# Patient Record
Sex: FEMALE | Race: WHITE | NOT HISPANIC OR LATINO | Employment: UNEMPLOYED | URBAN - NONMETROPOLITAN AREA
[De-identification: names, ages, dates, MRNs, and addresses within clinical notes are randomized per-mention and may not be internally consistent; named-entity substitution may affect disease eponyms.]

---

## 2017-05-11 ENCOUNTER — ALLSCRIPTS OFFICE VISIT (OUTPATIENT)
Dept: FAMILY MEDICINE CLINIC | Facility: CLINIC | Age: 42
End: 2017-05-11
Payer: COMMERCIAL

## 2017-05-11 DIAGNOSIS — M79.671 PAIN OF RIGHT FOOT: ICD-10-CM

## 2017-05-11 DIAGNOSIS — E66.9 OBESITY: ICD-10-CM

## 2017-05-11 PROCEDURE — T1015 CLINIC SERVICE: HCPCS | Performed by: NURSE PRACTITIONER

## 2017-07-09 ENCOUNTER — OFFICE VISIT (OUTPATIENT)
Dept: URGENT CARE | Facility: CLINIC | Age: 42
End: 2017-07-09
Payer: COMMERCIAL

## 2017-07-09 PROCEDURE — 99284 EMERGENCY DEPT VISIT MOD MDM: CPT

## 2017-07-09 PROCEDURE — 90715 TDAP VACCINE 7 YRS/> IM: CPT

## 2017-07-09 PROCEDURE — G0383 LEV 4 HOSP TYPE B ED VISIT: HCPCS

## 2017-10-12 ENCOUNTER — OFFICE VISIT (OUTPATIENT)
Dept: URGENT CARE | Facility: CLINIC | Age: 42
End: 2017-10-12
Payer: COMMERCIAL

## 2017-10-12 PROCEDURE — 99283 EMERGENCY DEPT VISIT LOW MDM: CPT

## 2017-10-12 PROCEDURE — G0382 LEV 3 HOSP TYPE B ED VISIT: HCPCS

## 2017-10-12 PROCEDURE — 94640 AIRWAY INHALATION TREATMENT: CPT

## 2017-10-29 NOTE — PROGRESS NOTES
Assessment    1  Acute bronchitis (466 0) (J20 9)    Plan  Acute bronchitis    · Azithromycin 250 MG Oral Tablet; TAKE 2 TABLETS ON DAY 1 THEN TAKE 1TABLET A DAY FOR 4 DAYS   · Benzonatate 100 MG Oral Capsule; 1-2 caps every 8 hrs as needed for cough   · Ventolin  (90 Base) MCG/ACT Inhalation Aerosol Solution; INHALE 2PUFFS EVERY 4 HOURS AS NEEDED    Discussion/Summary  Medication Side Effects Reviewed: Possible side effects of new medications were reviewed with the patient/guardian today  Understands and agrees with treatment plan: The treatment plan was reviewed with the patient/guardian  The patient/guardian understands and agrees with the treatment plan   Counseling Documentation With Imm: The patient was counseled regarding instructions for management  Chief Complaint    1  Cough  Chief Complaint Free Text Note Form: C/o cough and congestion with diarrhea x 2 days  History of Present Illness  HPI: Started with cough and congestion 2 days ago  Cough is productive of yellow mucous  no fever or chills  Cough: Rhett Ashton presents with complaints of cough  Associated symptoms include stuffy nose, but-- no dyspnea,-- no wheezing,-- no chills,-- no fever,-- no runny nose,-- no sore throat,-- no myalgias,-- no pleuritic chest pain,-- no chest pain,-- no vomiting,-- no postnasal drainage,-- no mouth breathing,-- no noisy breathing,-- no rapid breathing,-- no hoarseness-- and-- no hemoptysis  Review of Systems  Focused-Female:  Constitutional: no fever-- and-- no chills  ENT: nasal discharge, but-- no earache,-- no sore throat-- and-- no hoarseness  Cardiovascular: no chest pain-- and-- no palpitations  Respiratory: no shortness of breath-- and-- no wheezing  Gastrointestinal: no abdominal pain,-- no nausea-- and-- no vomiting  Musculoskeletal: no arthralgias-- and-- no myalgias  Integumentary: no rashes  Neurological: no headache-- and-- no dizziness  ROS Reviewed:   ROS reviewed  Active Problems  1  Allergic rhinitis (477 9) (J30 9)   2  Depression with anxiety (300 4) (F41 8)   3  Disc degeneration, lumbosacral (722 52) (M51 37)   4  Mood disorder of manic type (296 40) (F30 9)   5  Obesity (278 00) (E66 9)   6  Poison ivy dermatitis (692 6) (L23 7)   7  Right foot pain (729 5) (M79 671)    Past Medical History  1  History of Acute foot pain, left (729 5) (M79 672)   2  History of urinary tract infection (V13 02) (Z87 440)   3  History of Pain in left foot (729 5) (S61 678)  Active Problems And Past Medical History Reviewed: The active problems and past medical history were reviewed and updated today  Family History  Mother    1  Family history of obesity (V18 19) (Z83 49)  Father    2  Family history of obesity (V18 19) (Z83 49)   3  Family history of type 2 diabetes mellitus (V18 0) (Z83 3)  Brother    4  Family history of borderline diabetes mellitus (V18 0) (Z84 89)   5  Family history of obesity (V18 19) (Z83 49)  Family History Reviewed: The family history was reviewed and updated today  Social History   · Denied: History of Alcohol   · Denied: History of Drug Use   · Never A Smoker  Social History Reviewed: The social history was reviewed and updated today  Surgical History    1  History of Arthroscopy Knee Left   2  History of Gallbladder Surgery  Surgical History Reviewed: The surgical history was reviewed and updated today  Current Meds   1  Gabapentin TABS; Therapy: (Recorded:[id number]) to Recorded   2  Loratadine 10 MG Oral Tablet; TAKE 1 TABLET  AS NEEDED; Therapy: [id number] to (Evaluate:[id number])  Requested for: [id number]; Last Rx:[id number] Ordered   3  Oxycodone-Acetaminophen 5-325 MG Oral Tablet; Therapy: [id number] to Recorded  Medication List Reviewed: The medication list was reviewed and updated today  Allergies    1   No Known Drug Allergies    Vitals  Signs   Recorded: [id number] 06:18PM   Temperature: 97 7 F  Heart Rate: 118  Respiration: 20  Systolic: 868  Diastolic: 75  Height: 5 ft 4 in  Weight: 220 lb   BMI Calculated: 37 76  BSA Calculated: 2 04  O2 Saturation: 100    Physical Exam   Constitutional  General appearance: No acute distress, well appearing and well nourished  Eyes  Conjunctiva and lids: No swelling, erythema or discharge  Ears, Nose, Mouth, and Throat  External inspection of ears and nose: Normal    Otoscopic examination: Tympanic membranes translucent with normal light reflex  Canals patent without erythema  Nasal mucosa, septum, and turbinates: Normal without edema or erythema  Oropharynx: Normal with no erythema, edema, exudate or lesions  Pulmonary  Respiratory effort: No increased work of breathing or signs of respiratory distress  Auscultation of lungs: Abnormal   Auscultation of the lungs revealed decreased breath sounds diffusely  Cardiovascular  Auscultation of heart: Normal rate and rhythm, normal S1 and S2, without murmurs  Lymphatic  Palpation of lymph nodes in neck: No lymphadenopathy  Musculoskeletal  Gait and station: Normal    Skin  Skin and subcutaneous tissue: Normal without rashes or lesions  Psychiatric  Mood and affect: Normal        Procedure   Treatment #1 included Albuterol 2 5 mg and Ipratropium 0 5 mg  After the first nebulizer treatment, examination at revealed a heart rate of 84 beats per minute,-- a respiratory rate of 16 breaths per minute-- and-- an oxygen saturation of 99%  After treatment #1, the patient noted symptomatic improvement  Lung sounds were clear  Air exchange was improved        Signatures   Electronically signed by : JUDE Clark; Oct 12 2017  7:04PM EST                       (Author)    Electronically signed by : JACQUELINE Rosales ; Oct 13 2017  1:31PM EST                       (Co-author)

## 2018-01-12 VITALS
SYSTOLIC BLOOD PRESSURE: 120 MMHG | BODY MASS INDEX: 37.9 KG/M2 | WEIGHT: 222 LBS | HEIGHT: 64 IN | RESPIRATION RATE: 18 BRPM | DIASTOLIC BLOOD PRESSURE: 80 MMHG | HEART RATE: 110 BPM | TEMPERATURE: 95.5 F | OXYGEN SATURATION: 100 %

## 2018-02-06 ENCOUNTER — OFFICE VISIT (OUTPATIENT)
Dept: FAMILY MEDICINE CLINIC | Facility: CLINIC | Age: 43
End: 2018-02-06
Payer: COMMERCIAL

## 2018-02-06 VITALS
RESPIRATION RATE: 18 BRPM | DIASTOLIC BLOOD PRESSURE: 86 MMHG | HEART RATE: 99 BPM | WEIGHT: 233 LBS | BODY MASS INDEX: 39.78 KG/M2 | TEMPERATURE: 96.9 F | SYSTOLIC BLOOD PRESSURE: 122 MMHG | OXYGEN SATURATION: 98 % | HEIGHT: 64 IN

## 2018-02-06 DIAGNOSIS — F41.9 ANXIETY: ICD-10-CM

## 2018-02-06 DIAGNOSIS — J40 BRONCHITIS: Primary | ICD-10-CM

## 2018-02-06 PROCEDURE — T1015 CLINIC SERVICE: HCPCS | Performed by: FAMILY MEDICINE

## 2018-02-06 RX ORDER — GABAPENTIN 800 MG/1
TABLET ORAL
COMMUNITY
End: 2018-12-14

## 2018-02-06 RX ORDER — HYDROXYZINE HYDROCHLORIDE 25 MG/1
25 TABLET, FILM COATED ORAL EVERY 6 HOURS PRN
Qty: 30 TABLET | Refills: 0 | Status: SHIPPED | OUTPATIENT
Start: 2018-02-06 | End: 2018-12-14

## 2018-02-06 RX ORDER — ALBUTEROL SULFATE 2.5 MG/3ML
2.5 SOLUTION RESPIRATORY (INHALATION) EVERY 6 HOURS PRN
Qty: 75 ML | Refills: 0 | Status: SHIPPED | OUTPATIENT
Start: 2018-02-06 | End: 2018-12-14

## 2018-02-06 RX ORDER — LORATADINE 10 MG/1
1 TABLET ORAL
COMMUNITY
Start: 2017-05-11 | End: 2018-12-14

## 2018-02-06 RX ORDER — ALPRAZOLAM 0.5 MG/1
TABLET ORAL
Refills: 1 | COMMUNITY
Start: 2018-01-13 | End: 2018-12-14

## 2018-02-06 RX ORDER — ALBUTEROL SULFATE 90 UG/1
2 AEROSOL, METERED RESPIRATORY (INHALATION) EVERY 4 HOURS PRN
COMMUNITY
Start: 2017-10-12 | End: 2018-12-14

## 2018-02-06 RX ORDER — PRAMIPEXOLE DIHYDROCHLORIDE 0.25 MG/1
TABLET ORAL
Refills: 2 | COMMUNITY
Start: 2018-01-12 | End: 2018-12-14

## 2018-02-06 NOTE — PROGRESS NOTES
OFFICE VISIT  Mariseal Staton 38 y o  female MRN: [id number]      Assessment / Plan:  Diagnoses and all orders for this visit:    Bronchitis  -     albuterol (2 5 mg/3 mL) 0 083 % nebulizer solution; Take 3 mL (2 5 mg total) by nebulization every 6 (six) hours as needed for wheezing    Anxiety  -     hydrOXYzine HCL (ATARAX) 25 mg tablet; Take 1 tablet (25 mg total) by mouth every 6 (six) hours as needed for itching          Reason For Visit / Chief Complaint  Chief Complaint   Patient presents with    Letter for School/Work     pt would like to go back to work        HPI:  Marisela Staton is a 38 y o  female presents today for release to work  She was admitted to [facility] after failed out patient treatement for bronchits  She had worsening sob  She was treated for the flu/bronchitis  She was released last weekened  She reports today feeling better, slight cough  She is slowly working her [employer] level up  She works as a personal care aide, [employer]  She has finished her course of medications  She also reports having anxiety, unable to get a return call from her pain dr Garcia Reef worse since hospitalization  Historical Information   No past medical history on file    Past Surgical History:   Procedure Laterality Date    ARTHROSCOPY KNEE Left     CHOLECYSTECTOMY       Social History   History   Alcohol Use No     History   Drug Use No     History   Smoking Status    Never Smoker   Smokeless Tobacco    Never Used     Family History   Problem Relation Age of Onset    Obesity Mother     Obesity Father     Diabetes type II Father     Diabetes Brother     Obesity Brother        Meds/Allergies   No Known Allergies    Meds:    Current Outpatient Prescriptions:     albuterol (2 5 mg/3 mL) 0 083 % nebulizer solution, Take 3 mL (2 5 mg total) by nebulization every 6 (six) hours as needed for wheezing, Disp: 75 mL, Rfl: 0    albuterol (VENTOLIN HFA) 90 mcg/act inhaler, Inhale 2 puffs every 4 (four) hours as needed, Disp: , Rfl:     ALPRAZolam (XANAX) 0 5 mg tablet, , Disp: , Rfl: 1    gabapentin (NEURONTIN) 800 mg tablet, Take by mouth, Disp: , Rfl:     hydrOXYzine HCL (ATARAX) 25 mg tablet, Take 1 tablet (25 mg total) by mouth every 6 (six) hours as needed for itching, Disp: 30 tablet, Rfl: 0    loratadine (CLARITIN) 10 mg tablet, Take 1 tablet by mouth, Disp: , Rfl:     pramipexole (MIRAPEX) 0 25 mg tablet, , Disp: , Rfl: 2      REVIEW OF SYSTEMS  A comprehensive review of systems was negative except for: Respiratory: positive for Symptoms; Respiratory w/o Neg: cough      Current Vitals:   Blood Pressure: 122/86 (02/06/18 1336)  Pulse: 99 (02/06/18 1336)  Temperature: (!) 96 9 °F (36 1 °C) (02/06/18 1336)  Respirations: 18 (02/06/18 1336)  Height: 5' 4" (162 6 cm) (02/06/18 1336)  Weight - Scale: 106 kg (233 lb) (02/06/18 1336)  SpO2: 98 % (02/06/18 1336)  [unfilled]    PHYSICAL EXAMS:  General appearance: alert and oriented, in no acute distress  Head: Normocephalic, without obvious abnormality, atraumatic  Eyes: conjunctivae/corneas clear  PERRL, EOM's intact  Fundi benign  Ears: normal TM's and external ear canals both ears  Nose: Nares normal  Septum midline  Mucosa normal  No drainage or sinus tenderness  Throat: lips, mucosa, and tongue normal; teeth and gums normal  Neck: no adenopathy, no carotid bruit, no JVD, supple, symmetrical, trachea midline and thyroid not enlarged, symmetric, no tenderness/mass/nodules  Lungs: wheezes  Heart: regular rate and rhythm, S1, S2 normal, no murmur, click, rub or [id number]:21        Follow up at this office in1  week    Counseling / Coordination of Care  Total floor / unit time spent today 20 minutes  Greater than 50% of total time was spent with the patient and / or family counseling and / or coordination of care

## 2018-02-06 NOTE — LETTER
February 6, 2018     Patient: Braeden Cedeno   YOB: 1975   Date of Visit: 2/6/2018       To Whom it May Concern:    Braeden Cedeno is under my professional care  She was seen in my office on 2/6/2018  She may return to work on 2/12/18  If you have any questions or concerns, please don't hesitate to call           Sincerely,          JENNIFER Mike        CC: No Recipients

## 2018-02-07 NOTE — PROGRESS NOTES
I have reviewed the notes, assessments, and/or procedures performed by [facility] and agree with the above    Nick MUSTAFA

## 2018-07-31 ENCOUNTER — OFFICE VISIT (OUTPATIENT)
Dept: URGENT CARE | Facility: CLINIC | Age: 43
End: 2018-07-31
Payer: COMMERCIAL

## 2018-07-31 VITALS
HEART RATE: 108 BPM | SYSTOLIC BLOOD PRESSURE: 137 MMHG | HEIGHT: 64 IN | TEMPERATURE: 98.2 F | OXYGEN SATURATION: 97 % | BODY MASS INDEX: 39.78 KG/M2 | WEIGHT: 233 LBS | RESPIRATION RATE: 16 BRPM | DIASTOLIC BLOOD PRESSURE: 80 MMHG

## 2018-07-31 DIAGNOSIS — G43.909 MIGRAINE WITHOUT STATUS MIGRAINOSUS, NOT INTRACTABLE, UNSPECIFIED MIGRAINE TYPE: Primary | ICD-10-CM

## 2018-07-31 DIAGNOSIS — A08.4 VIRAL GASTROENTERITIS: ICD-10-CM

## 2018-07-31 PROBLEM — J30.9 ALLERGIC RHINITIS: Status: ACTIVE | Noted: 2017-05-11

## 2018-07-31 PROBLEM — F30.9 MOOD DISORDER OF MANIC TYPE (HCC): Status: ACTIVE | Noted: 2017-05-11

## 2018-07-31 PROCEDURE — 99283 EMERGENCY DEPT VISIT LOW MDM: CPT | Performed by: FAMILY MEDICINE

## 2018-07-31 PROCEDURE — G0382 LEV 3 HOSP TYPE B ED VISIT: HCPCS | Performed by: FAMILY MEDICINE

## 2018-07-31 RX ORDER — ONDANSETRON 4 MG/1
4 TABLET, FILM COATED ORAL EVERY 8 HOURS PRN
Qty: 12 TABLET | Refills: 0 | Status: SHIPPED | OUTPATIENT
Start: 2018-07-31 | End: 2018-12-14

## 2018-07-31 RX ORDER — ONDANSETRON 4 MG/1
4 TABLET, ORALLY DISINTEGRATING ORAL ONCE
Status: COMPLETED | OUTPATIENT
Start: 2018-07-31 | End: 2018-07-31

## 2018-07-31 RX ORDER — DIPHENOXYLATE HYDROCHLORIDE AND ATROPINE SULFATE 2.5; .025 MG/1; MG/1
1 TABLET ORAL 4 TIMES DAILY PRN
Qty: 12 TABLET | Refills: 0 | Status: SHIPPED | OUTPATIENT
Start: 2018-07-31 | End: 2018-08-04

## 2018-07-31 RX ORDER — KETOROLAC TROMETHAMINE 30 MG/ML
30 INJECTION, SOLUTION INTRAMUSCULAR; INTRAVENOUS ONCE
Status: COMPLETED | OUTPATIENT
Start: 2018-07-31 | End: 2018-07-31

## 2018-07-31 RX ADMIN — ONDANSETRON 4 MG: 4 TABLET, ORALLY DISINTEGRATING ORAL at 19:21

## 2018-07-31 RX ADMIN — KETOROLAC TROMETHAMINE 30 MG: 30 INJECTION, SOLUTION INTRAMUSCULAR; INTRAVENOUS at 19:21

## 2018-07-31 NOTE — PATIENT INSTRUCTIONS
Please use the medications for nausea called Zofran as labeled please use the medication Lomotil for diarrhea as labile drink small amounts donor filled stomach     if fever returns please see primary care or present to the emergency room

## 2018-07-31 NOTE — PROGRESS NOTES
[facility] - Patient Visit Note  Clive Leventhal 38 y o  female MRN: [id number]      Assessment / Plan:   Diagnosis ICD-10-CM Associated Orders   1  Migraine without status migrainosus, not intractable, unspecified migraine type G43 909    2  Viral gastroenteritis A08 4 ondansetron (ZOFRAN) 4 mg tablet     diphenoxylate-atropine (LOMOTIL) 2 5-0 025 mg per tablet       Reason For Visit / Chief Complaint  Chief Complaint   Patient presents with    Vomiting     x 2 days    Headache    Diarrhea             Liliana Settle Discussion:  Patient is given 30 milligrams of Toradol and Zofran for her migraine she will be given a script for Lomotil for her diarrhea and Zofran for nausea home  She understands instructions via talk back instructions     HPI:  Clive Leventhal is a 38 y o  female Patient           This Patient Presents with a migraine that is lasting approximately 48 hours  She had tried initially Fioricet x2 and then went to her mom's house and used her mother's Imitrex without relief  She has been nauseated about a 5/10 she also complains of vomiting yesterday approximately 4 times and diarrhea approximately 5 times  No one else in the house is ill she had a temperature on Sunday of proximally 102 as measured by herself today she is afebrile 1st day last normal menstrual period 1 month ago she denies pregnancy she is allergic to no medications at the present time       Problem List     Allergic rhinitis    Depression with anxiety    Disc degeneration, lumbosacral    Mood disorder of manic type ([facility] [address] )    Obesity           ALLERGIES:  Allergies as of 07/31/2018    (No Known Allergies)       The following portions of the patient's history were reviewed and updated as appropriate:   Allergies, current medications, past family history, past medical history, past social history, past surgical history, and the problem list     Historical Information   History reviewed  No pertinent past medical history    Past Surgical History:   Procedure Laterality Date    ARTHROSCOPY KNEE Left     CHOLECYSTECTOMY       Social History   History   Alcohol Use No     History   Drug Use No     History   Smoking Status    Never Smoker   Smokeless Tobacco    Never Used     Family History   Problem Relation Age of Onset    Obesity Mother     Obesity Father     Diabetes type II Father     Diabetes Brother     Obesity Brother              MEDS:    Current Outpatient Prescriptions:     ALPRAZolam (XANAX) 0 5 mg tablet, , Disp: , Rfl: 1    albuterol (2 5 mg/3 mL) 0 083 % nebulizer solution, Take 3 mL (2 5 mg total) by nebulization every 6 (six) hours as needed for wheezing, Disp: 75 mL, Rfl: 0    albuterol (VENTOLIN HFA) 90 mcg/act inhaler, Inhale 2 puffs every 4 (four) hours as needed, Disp: , Rfl:     diphenoxylate-atropine (LOMOTIL) 2 5-0 025 mg per tablet, Take 1 tablet by mouth 4 (four) times a day as needed for diarrhea for up to 4 days, Disp: 12 tablet, Rfl: 0    gabapentin (NEURONTIN) 800 mg tablet, Take by mouth, Disp: , Rfl:     hydrOXYzine HCL (ATARAX) 25 mg tablet, Take 1 tablet (25 mg total) by mouth every 6 (six) hours as needed for itching, Disp: 30 tablet, Rfl: 0    loratadine (CLARITIN) 10 mg tablet, Take 1 tablet by mouth, Disp: , Rfl:     ondansetron (ZOFRAN) 4 mg tablet, Take 1 tablet (4 mg total) by mouth every 8 (eight) hours as needed for nausea or vomiting for up to 4 days, Disp: 12 tablet, Rfl: 0    pramipexole (MIRAPEX) 0 25 mg tablet, , Disp: , Rfl: 2    FACILITY ADMINISTERED MEDS:        REVIEW OF SYSTEMS    GENERAL: NEGATIVE for:  Generalized Fatigue                             Chills                              Fever                             Myalgias     OPTHALMIC: NEGATIVE for:  Diplopia                            Scotomata                            Visual Changes                            Blurred Vision     ENT:  EARS NEGATIVE for:  Hearing Difficulty                            Tinnitus                            Vertigo                            Dizziness                            Ear Pain                            Ear Drainage               NOSE NEGATIVE for:  Nasal Congestion                            Nasal Discharge                            Sinus Pain / Pressure               THROAT NEGATIVE for:  Sore Throat / Throat Pain                            Difficulty Swallowing     RESPIRATORY: NEGATIVE for:  Cough                            Wheezing                            Sputum Production                            Sob / Tachypnea                            Hemoptysis     CARDIOVASCULAR: NEGATIVE for:  Chest Pain                             SOB (cardiac Related)                             Dyspnea on Exertion                             Orthopnea                             PND                             Leg Edema                             Palpitations                               Irregularities/rythym                       CURRENT VITALS:   Blood Pressure: 137/80 (07/31/18 1900)  Pulse: (!) 108 (07/31/18 1900)  Temperature: 98 2 °F (36 8 °C) (07/31/18 1900)  Respirations: 16 (07/31/18 1900)  Height: 5' 4" (162 6 cm) (07/31/18 1900)  Weight - Scale: 106 kg (233 lb) (07/31/18 1900)  SpO2: 97 % (07/31/18 1900)  /80   Pulse (!) 108   Temp 98 2 °F (36 8 °C)   Resp 16   Ht 5' 4" (1 626 m)   Wt 106 kg (233 lb)   SpO2 97%   BMI 39 99 kg/m²       PHYSICAL EXAM:         General Appearance:    Alert, cooperative, no apparent distress, appears stated age     Oriented x3    Head:    Normocephalic, without obvious abnormality, atraumatic   Eyes:      EOM's intact,      BEBE,        conjunctiva/corneas clear,          fundi not visualized well,, patient is very photosensitive   Ears:     Normal external ear canals     Tm right side  Normal     Tm left side    Normal       Nose:   Nares normal externally, septum midline,     mucosa normal,     No anterior drainage         Sinuses   with out   tenderness to palpation / percussion     Throat:   Lips, mucosa, and tongue normal       Anterior pharynx   Normal      Posterior pharynx   Normal        No exudate obvious       Neck:   Supple, symmetrical, trachea midline and moveable    Normal thyroid click present    No carotid bruits appreciated        Lymphatics:     Adenopathy in anterior cervical chain  Normal      Adenopathy in posterior cervical chain   Normal     Lungs:     Clear to auscultation bilaterally    No rales    No ronchi    No wheeze     Heart[de-identified]    Regular rate and rhythm, S1 and S2 normal,     No S3, S4, audible    No murmurs, rubs      Extremities:     Extremities grossly normal     atraumatic,     no cyanosis or edema        Skin:     Skin color, texture, turgor normal, no rashes or lesions           Abdomen:     Soft, non-tender, bowel sounds active all four quadrants,     no masses, no organomegaly     Neurologic   CNII-XII intact,     Motor strength in upper and lower ext  Normal and symmetrical ,     Gross sensory intact          Follow up at primary care in 2 or 3 days, or sooner if needed OR pesent to local Emergency Room if symptoms are worsening  Portions of the record may have been created with voice recognition software   Occasional wrong word or "sound a like" substitutions may have occurred due to the inherent limitations of voice recognition software   Read the chart carefully and recognize, using context, where substitutions have occurred

## 2018-10-25 ENCOUNTER — OFFICE VISIT (OUTPATIENT)
Dept: URGENT CARE | Facility: CLINIC | Age: 43
End: 2018-10-25
Payer: COMMERCIAL

## 2018-10-25 VITALS
TEMPERATURE: 97.8 F | RESPIRATION RATE: 18 BRPM | SYSTOLIC BLOOD PRESSURE: 130 MMHG | HEART RATE: 98 BPM | OXYGEN SATURATION: 100 % | DIASTOLIC BLOOD PRESSURE: 65 MMHG

## 2018-10-25 DIAGNOSIS — M54.2 CERVICALGIA: Primary | ICD-10-CM

## 2018-10-25 PROCEDURE — 99283 EMERGENCY DEPT VISIT LOW MDM: CPT | Performed by: NURSE PRACTITIONER

## 2018-10-25 PROCEDURE — G0382 LEV 3 HOSP TYPE B ED VISIT: HCPCS | Performed by: NURSE PRACTITIONER

## 2018-10-25 RX ORDER — METHOCARBAMOL 750 MG/1
750 TABLET, FILM COATED ORAL EVERY 8 HOURS SCHEDULED
Qty: 20 TABLET | Refills: 0 | Status: SHIPPED | OUTPATIENT
Start: 2018-10-25 | End: 2018-12-14 | Stop reason: SDUPTHER

## 2018-10-25 RX ORDER — OXYCODONE HYDROCHLORIDE AND ACETAMINOPHEN 5; 325 MG/1; MG/1
TABLET ORAL
Refills: 0 | COMMUNITY
Start: 2018-10-11 | End: 2018-12-14

## 2018-10-25 RX ORDER — KETOROLAC TROMETHAMINE 30 MG/ML
30 INJECTION, SOLUTION INTRAMUSCULAR; INTRAVENOUS ONCE
Status: COMPLETED | OUTPATIENT
Start: 2018-10-25 | End: 2018-10-25

## 2018-10-25 RX ORDER — NAPROXEN 500 MG/1
500 TABLET ORAL 2 TIMES DAILY WITH MEALS
Qty: 20 TABLET | Refills: 0 | Status: SHIPPED | OUTPATIENT
Start: 2018-10-25 | End: 2018-12-14 | Stop reason: SDUPTHER

## 2018-10-25 RX ADMIN — KETOROLAC TROMETHAMINE 30 MG: 30 INJECTION, SOLUTION INTRAMUSCULAR; INTRAVENOUS at 14:35

## 2018-10-25 NOTE — PROGRESS NOTES
[facility]        NAME: Levi Esquivel is a 41 y o  female  : 1975    MRN: [id number]  DATE: 2018  TIME: 2:36 PM    Assessment and Plan   Cervicalgia [M54 2]  1  Cervicalgia  ketorolac (TORADOL) injection 30 mg    naproxen (NAPROSYN) 500 mg tablet    methocarbamol (ROBAXIN) 750 mg tablet         Patient Instructions       Follow up with PCP in 3-5 days  Proceed to  ER if symptoms worsen  Chief Complaint     Chief Complaint   Patient presents with    Neck Pain     Pt c/o neck pain for a few weeks  History of Present Illness       80-year-old female with a chief complaint of right-sided neck pain  She has history of chronic left-sided neck pain for which she sees a pain management doctor in [place] [id number]  She takes Percocet as needed for the chronic left-sided neck pain  She states that she has been experiencing pain on the right neck area radiating to the right shoulder for the past couple weeks  She performed CPR at work yesterday and did chest compressions for about 30 min which aggravated the already present pain in the right side of her neck  Current pain level 6/10 on a 0-10 scale  She has taken Percocet couple times without relief  She has used naproxen without significant relief  Pain is exacerbated by any type of movement and is somewhat relieved by rest   She denies arm pain, numbness, tingling, weakness  Neck Pain          Review of Systems   Review of Systems   Constitutional: Negative  HENT: Negative  Eyes: Negative  Respiratory: Negative  Cardiovascular: Negative  Gastrointestinal: Negative  Endocrine: Negative  Genitourinary: Negative  Musculoskeletal: Positive for neck pain  Skin: Negative  Neurological: Negative  Psychiatric/Behavioral: Negative            Current Medications       Current Outpatient Prescriptions:     albuterol (2 5 mg/3 mL) 0 083 % nebulizer solution, Take 3 mL (2 5 mg total) by nebulization every 6 (six) hours as needed for wheezing, Disp: 75 mL, Rfl: 0    albuterol (VENTOLIN HFA) 90 mcg/act inhaler, Inhale 2 puffs every 4 (four) hours as needed, Disp: , Rfl:     ALPRAZolam (XANAX) 0 5 mg tablet, , Disp: , Rfl: 1    gabapentin (NEURONTIN) 800 mg tablet, Take by mouth, Disp: , Rfl:     hydrOXYzine HCL (ATARAX) 25 mg tablet, Take 1 tablet (25 mg total) by mouth every 6 (six) hours as needed for itching, Disp: 30 tablet, Rfl: 0    loratadine (CLARITIN) 10 mg tablet, Take 1 tablet by mouth, Disp: , Rfl:     methocarbamol (ROBAXIN) 750 mg tablet, Take 1 tablet (750 mg total) by mouth every 8 (eight) hours, Disp: 20 tablet, Rfl: 0    naproxen (NAPROSYN) 500 mg tablet, Take 1 tablet (500 mg total) by mouth 2 (two) times a day with meals for 10 days, Disp: 20 tablet, Rfl: 0    ondansetron (ZOFRAN) 4 mg tablet, Take 1 tablet (4 mg total) by mouth every 8 (eight) hours as needed for nausea or vomiting for up to 4 days, Disp: 12 tablet, Rfl: 0    oxyCODONE-acetaminophen (PERCOCET) 5-325 mg per tablet, , Disp: , Rfl: 0    pramipexole (MIRAPEX) 0 25 mg tablet, , Disp: , Rfl: 2  No current facility-administered medications for this visit  Current Allergies     Allergies as of 10/25/2018    (No Known Allergies)            The following portions of the patient's history were reviewed and updated as appropriate: allergies, current medications, past family history, past medical history, past social history, past surgical history and problem list      No past medical history on file  Past Surgical History:   Procedure Laterality Date    ARTHROSCOPY KNEE Left     CHOLECYSTECTOMY         Family History   Problem Relation Age of Onset    Obesity Mother     Obesity Father     Diabetes type II Father     Diabetes Brother     Obesity Brother          Medications have been verified          Objective   /65   Pulse 98   Temp 97 8 °F (36 6 °C)   Resp 18   SpO2 100%        Physical Exam     Physical Exam Constitutional: She is oriented to person, place, and time  She appears well-developed and well-nourished  No distress  HENT:   Head: Normocephalic and atraumatic  Right Ear: External ear normal    Left Ear: External ear normal    Nose: Nose normal    Mouth/Throat: Oropharynx is clear and moist    Eyes: Pupils are equal, round, and reactive to light  Conjunctivae and EOM are normal    Neck: Normal range of motion  Neck supple  Cardiovascular: Normal rate, regular rhythm and normal heart sounds  Pulmonary/Chest: Effort normal and breath sounds normal  No respiratory distress  She has no wheezes  She has no rales  Abdominal: Soft  Bowel sounds are normal    Musculoskeletal:        Right shoulder: She exhibits decreased range of motion (Bilateral lateral rotation ), tenderness (Bilaterally C2-C6 ) and spasm (Bilateral cervical paraspinal muscle spasms  )  Lymphadenopathy:     She has no cervical adenopathy  Neurological: She is alert and oriented to person, place, and time  She has normal strength and normal reflexes  No sensory deficit  She displays a negative Romberg sign  Coordination and gait normal    Reflex Scores:       Tricep reflexes are 2+ on the right side and 2+ on the left side  Bicep reflexes are 2+ on the right side and 2+ on the left side  Brachioradialis reflexes are 2+ on the right side and 2+ on the left side  Skin: Skin is warm and dry  She is not diaphoretic  Psychiatric: She has a normal mood and affect  Her behavior is normal  Judgment and thought content normal    Nursing note and vitals reviewed

## 2018-10-25 NOTE — PATIENT INSTRUCTIONS
Acute Neck Pain   WHAT YOU NEED TO KNOW:   Acute neck pain starts suddenly, increases quickly, and goes away in a few days  The pain may come and go, or be worse with certain movements  The pain may be only in your neck, or it may move to your arms, back, or shoulders  You may also have pain that starts in another body area and moves to your neck  DISCHARGE INSTRUCTIONS:   Return to the emergency department if:   · You have an injury that causes neck pain and shooting pain down your arms or legs  · Your neck pain suddenly becomes severe  · You have neck pain along with numbness, tingling, or weakness in your arms or legs  · You have a stiff neck, a headache, and a fever  Contact your healthcare provider if:   · You have new or worsening symptoms  · Your symptoms continue even after treatment  · You have questions or concerns about your condition or care  Medicines:   · NSAIDs , such as ibuprofen, help decrease swelling, pain, and fever  This medicine is available without a doctor's order  Ask your healthcare provider which medicine to take and how often to take it  Follow directions  NSAIDs can cause stomach bleeding or kidney problems if not taken correctly  If you take blood thinner medicine, always ask if NSAIDs are safe for you  · Acetaminophen  helps decrease pain and fever  Ask your healthcare provider how much to take and how often to take it  Follow directions  Acetaminophen can cause liver damage if not taken correctly  · Steroid medicine  may be used to reduce inflammation  This can help relieve pain caused by swelling  · Take your medicine as directed  Contact your healthcare provider if you think your medicine is not helping or if you have side effects  Tell him or her if you are allergic to any medicine  Keep a list of the medicines, vitamins, and herbs you take  Include the amounts, and when and why you take them  Bring the list or the pill bottles to follow-up visits  Carry your medicine list with you in case of an emergency  Manage or prevent acute neck pain:   · Rest your neck as directed  Do not make sudden movements, such as turning your head quickly  Your healthcare provider may recommend you wear a cervical collar for a short time  The collar will prevent you from moving your head  This will give your neck time to heal if an injury is causing your neck pain  Ask your healthcare provider when you can return to sports or other normal daily activities  · Apply heat as directed  Heat helps relieve pain and swelling  Use a heat wrap, or soak a small towel in warm water  Wring out the extra water  Apply the heat wrap or towel for 20 minutes every hour, or as directed  · Apply ice as directed  Ice helps relieve pain and swelling, and can help prevent tissue damage  Use an ice pack, or put ice in a bag  Cover the ice pack or back with a towel before you apply it to your neck  Apply the ice pack or ice for 15 minutes every hour, or as directed  Your healthcare provider can tell you how often to apply ice  · Do neck exercises as directed  Neck exercises help strengthen the muscles and increase range of motion  Your healthcare provider will tell you which exercises are right for you  He may give you instructions, or he may recommend that you work with a physical therapist  Your healthcare provider or therapist can make sure you are doing the exercises correctly  · Maintain good posture  Try to keep your head and shoulders lifted when you sit  If you work in front of a computer, make sure the monitor is at the right level  You should not need to look up down to see the screen  You should also not have to lean forward to be able to read what is on the screen  Make sure your keyboard, mouse, and other computer items are placed where you do not have to extend your shoulder to reach them  Get up often if you work in front of a computer or sit for long periods of time  Stretch or walk around to keep your neck muscles loose  Follow up with your healthcare provider as directed: Your healthcare provider may refer you to a specialist if your pain does not get better with treatment  Write down your questions so you remember to ask them during your visits  © 2017 [employer] Information is for End User's use only and may not be sold, redistributed or otherwise used for commercial purposes  All illustrations and images included in CareNotes® are the copyrighted property of [employer]  or [address]  The above information is an  only  It is not intended as medical advice for individual conditions or treatments  Talk to your doctor, nurse or pharmacist before following any medical regimen to see if it is safe and effective for you

## 2018-12-09 ENCOUNTER — APPOINTMENT (EMERGENCY)
Dept: RADIOLOGY | Facility: HOSPITAL | Age: 43
End: 2018-12-09
Payer: COMMERCIAL

## 2018-12-09 ENCOUNTER — HOSPITAL ENCOUNTER (EMERGENCY)
Facility: HOSPITAL | Age: 43
Discharge: HOME | End: 2018-12-09
Payer: COMMERCIAL

## 2018-12-09 VITALS
TEMPERATURE: 97.1 F | HEART RATE: 92 BPM | HEIGHT: 64 IN | DIASTOLIC BLOOD PRESSURE: 73 MMHG | WEIGHT: 245 LBS | BODY MASS INDEX: 41.83 KG/M2 | SYSTOLIC BLOOD PRESSURE: 146 MMHG | RESPIRATION RATE: 16 BRPM | OXYGEN SATURATION: 97 %

## 2018-12-09 DIAGNOSIS — M25.511 RIGHT SHOULDER PAIN: Primary | ICD-10-CM

## 2018-12-09 PROCEDURE — 73030 X-RAY EXAM OF SHOULDER: CPT

## 2018-12-09 PROCEDURE — 96372 THER/PROPH/DIAG INJ SC/IM: CPT

## 2018-12-09 PROCEDURE — 99283 EMERGENCY DEPT VISIT LOW MDM: CPT

## 2018-12-09 RX ORDER — KETOROLAC TROMETHAMINE 30 MG/ML
60 INJECTION, SOLUTION INTRAMUSCULAR; INTRAVENOUS ONCE
Status: COMPLETED | OUTPATIENT
Start: 2018-12-09 | End: 2018-12-09

## 2018-12-09 RX ORDER — LORAZEPAM 2 MG/ML
2 INJECTION INTRAMUSCULAR ONCE
Status: COMPLETED | OUTPATIENT
Start: 2018-12-09 | End: 2018-12-09

## 2018-12-09 RX ADMIN — KETOROLAC TROMETHAMINE 60 MG: 30 INJECTION, SOLUTION INTRAMUSCULAR at 17:46

## 2018-12-09 RX ADMIN — LORAZEPAM 2 MG: 2 INJECTION, SOLUTION INTRAMUSCULAR; INTRAVENOUS at 17:44

## 2018-12-09 NOTE — DISCHARGE INSTRUCTIONS
Shoulder Pain   WHAT YOU NEED TO KNOW:   Shoulder pain is a common problem and can affect your daily activities  Pain can be caused by a problem within your shoulder  Shoulder pain may also be caused by pain that spreads to your shoulder from another part of your body  DISCHARGE INSTRUCTIONS:   Return to the emergency department if:   · You have severe pain  · You cannot move your arm or shoulder  · You have numbness or tingling in your shoulder or arm  Contact your healthcare provider if:   · Your pain gets worse or does not go away with treatment  · You have trouble moving your arm or shoulder  · You have questions or concerns about your condition or care  Medicines: You may need any of the following:  · Acetaminophen  decreases pain and fever  It is available without a doctor's order  Ask how much to take and how often to take it  Follow directions  Acetaminophen can cause liver damage if not taken correctly  · NSAIDs , such as ibuprofen, help decrease swelling, pain, and fever  This medicine is available with or without a doctor's order  NSAIDs can cause stomach bleeding or kidney problems in certain people  If you take blood thinner medicine, always ask your healthcare provider if NSAIDs are safe for you  Always read the medicine label and follow directions  · Take your medicine as directed  Contact your healthcare provider if you think your medicine is not helping or if you have side effects  Tell him of her if you are allergic to any medicine  Keep a list of the medicines, vitamins, and herbs you take  Include the amounts, and when and why you take them  Bring the list or the pill bottles to follow-up visits  Carry your medicine list with you in case of an emergency  Follow up with your healthcare provider or orthopedist as directed:  Write down your questions so you remember to ask them during your visits     Manage your symptoms:   · Apply ice  on your shoulder for 20 to 30 minutes every 2 hours or as directed  Use an ice pack, or put crushed ice in a plastic bag  Cover it with a towel  Ice helps prevent tissue damage and decreases swelling and pain  · Apply heat if ice does not help your symptoms  Apply heat on your shoulder for 20 to 30 minutes every 2 hours for as many days as directed  Heat helps decrease pain and muscle spasms  · Go to physical or occupational therapy as directed  A physical therapist teaches you exercises to help improve movement and strength, and to decrease pain  An occupational therapist teaches you skills to help with your daily activities  Prevent shoulder pain:   · Stretch and strengthen your shoulder  Use proper technique during exercises and sports  · Limit activities as directed  Try to avoid repeated overhead movements  © 2017 [address] [facility] Information is for End User's use only and may not be sold, redistributed or otherwise used for commercial purposes  All illustrations and images included in CareNotes® are the copyrighted property of [employer]  or [employer]  The above information is an  only  It is not intended as medical advice for individual conditions or treatments  Talk to your doctor, nurse or pharmacist before following any medical regimen to see if it is safe and effective for you  Shoulder Pain   WHAT YOU NEED TO KNOW:   What do I need to know about shoulder pain? Shoulder pain is a common problem and can affect your daily activities  Pain can be caused by a problem within your shoulder  Shoulder pain may also be caused by pain that spreads to your shoulder from another part of your body  What increases my risk for shoulder pain?    · Repeated overhead activities, such as baseball, weight lifting, or swimming    · Medical conditions, such as rotator cuff disease, diabetes, or thyroid disorders    · A quick increase in the amount or intensity of exercises, or improper technique during exercise    · Muscle imbalance or weakness    · Trauma or a fall    · Age older than 61  How is shoulder pain diagnosed? Your healthcare provider will ask about your pain, including how and when it started  Tell him if you have any weakness or if there was an injury  He will examine your shoulder and do movement tests to see how well you can move your shoulder  You may also need any of the following tests:  · An x-ray, ultrasound, CT, or MRI  may be needed to show the cause of your shoulder pain  You may be given contrast liquid to help the shoulder area show up better in the pictures  Tell the healthcare provider if you have ever had an allergic reaction to contrast dye  Do not enter the MRI room with anything metal  Metal can cause serious injury  Tell the healthcare provider if you have any metal in or on your body  · An electromyography test  measures the electrical activity of your muscles at rest and with movement  How is shoulder pain treated? · Acetaminophen  decreases pain and fever  It is available without a doctor's order  Ask how much to take and how often to take it  Follow directions  Acetaminophen can cause liver damage if not taken correctly  · NSAIDs , such as ibuprofen, help decrease swelling, pain, and fever  This medicine is available with or without a doctor's order  NSAIDs can cause stomach bleeding or kidney problems in certain people  If you take blood thinner medicine, always ask your healthcare provider if NSAIDs are safe for you  Always read the medicine label and follow directions  · A steroid injection  may help decrease pain and swelling  · Surgery  may be needed for long-term pain and loss of function  How can I manage my symptoms? · Apply ice  on your shoulder for 20 to 30 minutes every 2 hours or as directed  Use an ice pack, or put crushed ice in a plastic bag  Cover it with a towel   Ice helps prevent tissue damage and decreases swelling and pain     · Apply heat if ice does not help your symptoms  Apply heat on your shoulder for 20 to 30 minutes every 2 hours for as many days as directed  Heat helps decrease pain and muscle spasms  · Go to physical or occupational therapy as directed  A physical therapist teaches you exercises to help improve movement and strength, and to decrease pain  An occupational therapist teaches you skills to help with your daily activities  How can I help prevent shoulder pain? · Stretch and strengthen your shoulder  Use proper technique during exercises and sports  · Limit activities as directed  Try to avoid repeated overhead movements  When should I seek immediate care or call 911? · You have severe pain  · You cannot move your arm or shoulder  · You have numbness or tingling in your shoulder or arm  When should I contact my healthcare provider? · Your pain gets worse or does not go away with treatment  · You have trouble moving your arm or shoulder  · You have questions or concerns about your condition or care  CARE AGREEMENT:   You have the right to help plan your care  Learn about your health condition and how it may be treated  Discuss treatment options with your caregivers to decide what care you want to receive  You always have the right to refuse treatment  The above information is an  only  It is not intended as medical advice for individual conditions or treatments  Talk to your doctor, nurse or pharmacist before following any medical regimen to see if it is safe and effective for you  © 2017 [address] [employer] Information is for End User's use only and may not be sold, redistributed or otherwise used for commercial purposes  All illustrations and images included in CareNotes® are the copyrighted property of [employer]  or [employer] Gu[employer]

## 2018-12-09 NOTE — ED PROVIDER NOTES
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     AJIT is a 72-year-old female who came to the emergency department due to pain on the right shoulder which started 1 month prior to arrival   Was taking Robaxin and no pain with mild discomfort noted but became more afternoon after she lifted a patient were emergency department for further evaluation and treatment  History provided by:  Patient   used: No    Shoulder Injury   Location:  Shoulder  Shoulder location:  R shoulder  Injury: no    Pain details:     Quality:  Aching    Radiates to:  Does not radiate    Severity:  Moderate    Onset quality:  Gradual    Duration: Several     Timing:  Intermittent    Progression:  Worsening  Handedness:  Right-handed  Dislocation: no    Foreign body present:  No foreign bodies  Tetanus status:  Unknown  Prior injury to area:  No  Relieved by:  Nothing  Worsened by:  Nothing  Ineffective treatments:  NSAIDs  Associated symptoms: decreased range of motion    Associated symptoms: no back pain, no fatigue, no fever, no muscle weakness, no neck pain, no numbness, no stiffness, no swelling and no tingling        Prior to Admission Medications   Prescriptions Last Dose Informant Patient Reported? Taking?    ALPRAZolam (XANAX) 0 5 mg tablet Not Taking at Unknown time  Yes No   albuterol (2 5 mg/3 mL) 0 083 % nebulizer solution Not Taking at Unknown time  No No   Sig: Take 3 mL (2 5 mg total) by nebulization every 6 (six) hours as needed for wheezing   Patient not taking: Reported on 12/9/2018    albuterol (VENTOLIN HFA) 90 mcg/act inhaler Not Taking at Unknown time  Yes No   Sig: Inhale 2 puffs every 4 (four) hours as needed   gabapentin (NEURONTIN) 800 mg tablet Not Taking at Unknown time  Yes No   Sig: Take by mouth   hydrOXYzine HCL (ATARAX) 25 mg tablet Not Taking at Unknown time  No No   Sig: Take 1 tablet (25 mg total) by mouth every 6 (six) hours as needed for itching   Patient not taking: Reported on 12/9/2018    loratadine (CLARITIN) 10 mg tablet Not Taking at Unknown time  Yes No   Sig: Take 1 tablet by mouth   methocarbamol (ROBAXIN) 750 mg tablet Not Taking at Unknown time  No No   Sig: Take 1 tablet (750 mg total) by mouth every 8 (eight) hours   Patient not taking: Reported on 12/9/2018    naproxen (NAPROSYN) 500 mg tablet   No No   Sig: Take 1 tablet (500 mg total) by mouth 2 (two) times a day with meals for 10 days   ondansetron (ZOFRAN) 4 mg tablet   No No   Sig: Take 1 tablet (4 mg total) by mouth every 8 (eight) hours as needed for nausea or vomiting for up to 4 days   oxyCODONE-acetaminophen (PERCOCET) 5-325 mg per tablet Not Taking at Unknown time  Yes No   pramipexole (MIRAPEX) 0 25 mg tablet   Yes No      Facility-Administered Medications: None       History reviewed  No pertinent past medical history  Past Surgical History:   Procedure Laterality Date    ARTHROSCOPY KNEE Left     CHOLECYSTECTOMY         Family History   Problem Relation Age of Onset    Obesity Mother     Obesity Father     Diabetes type II Father     Diabetes Brother     Obesity Brother      I have reviewed and agree with the history as documented  Social History   Substance Use Topics    Smoking status: Never Smoker    Smokeless tobacco: Never Used    Alcohol use No        Review of Systems   Constitutional: Negative for fatigue and fever  HENT: Negative  Eyes: Negative  Respiratory: Negative  Cardiovascular: Negative  Gastrointestinal: Negative  Endocrine: Negative  Genitourinary: Negative  Musculoskeletal: Positive for arthralgias  Negative for back pain, neck pain and stiffness  Skin: Negative  Allergic/Immunologic: Negative  Neurological: Negative  Hematological: Negative  Psychiatric/Behavioral: Negative          Physical Exam  Physical Exam Constitutional: She is oriented to person, place, and time  She appears well-developed and well-nourished  No distress  HENT:   Head: Normocephalic and atraumatic  Right Ear: External ear normal    Left Ear: External ear normal    Nose: Nose normal    Mouth/Throat: Oropharynx is clear and moist  No oropharyngeal exudate  Eyes: Pupils are equal, round, and reactive to light  Conjunctivae and EOM are normal  Right eye exhibits no discharge  Left eye exhibits no discharge  No scleral icterus  Neck: Normal range of motion  Neck supple  No tracheal deviation present  No thyromegaly present  Cardiovascular: Normal rate, regular rhythm, normal heart sounds and intact distal pulses  Pulmonary/Chest: Effort normal and breath sounds normal  No respiratory distress  Abdominal: Soft  Bowel sounds are normal  She exhibits no distension  Musculoskeletal: She exhibits tenderness  She exhibits no edema or deformity  Right shoulder: She exhibits decreased range of motion and tenderness  Lymphadenopathy:     She has no cervical adenopathy  Neurological: She is alert and oriented to person, place, and time  No cranial nerve deficit or sensory deficit  She exhibits normal muscle tone  Coordination normal    Skin: Skin is warm and dry  No rash noted  She is not diaphoretic  No erythema  No pallor  Psychiatric: She has a normal mood and affect  Her behavior is normal  Judgment and thought content normal    Nursing note and vitals reviewed        Vital Signs  ED Triage Vitals [12/09/18 1707]   Temperature Pulse Respirations Blood Pressure SpO2   (!) 97 1 °F (36 2 °C) 92 16 146/73 97 %      Temp Source Heart Rate Source Patient Position - Orthostatic VS BP Location FiO2 (%)   Temporal Monitor Sitting Left arm --      Pain Score       7           Vitals:    12/09/18 1707   BP: 146/73   Pulse: 92   Patient Position - Orthostatic VS: Sitting       Visual Acuity      ED Medications  Medications   LORazepam (ATIVAN) 2 mg/mL injection 2 mg (not administered)   ketorolac (TORADOL) injection 60 mg (not administered)       Diagnostic Studies  Results Reviewed     None                 XR shoulder 2+ views RIGHT   ED Interpretation by Tennille Dickey MD (12/09 1727)   No fracture or dislocation                 Procedures  Procedures       Phone Contacts  ED Phone Contact    ED Course  ED Course as of Dec 09 1741   [employer] Dec 09, 2018   5031 I discussed the x-ray results with the patient who is resting comfortably on the stretcher  Patient request for work excuse  MDM  Number of Diagnoses or Management Options  Right shoulder pain: minor     Amount and/or Complexity of Data Reviewed  Tests in the radiology section of CPT®: ordered and reviewed    Risk of Complications, Morbidity, and/or Mortality  Presenting problems: low  Diagnostic procedures: low  Management options: low    Patient Progress  Patient progress: stable    CritCare Time    Disposition  Final diagnoses:   Right shoulder pain     Time reflects when diagnosis was documented in both MDM as applicable and the Disposition within this note     Time User Action Codes Description Comment    12/9/2018  5:40 PM Allegra Wilson [[id number]] Right shoulder pain       ED Disposition     ED Disposition Condition Comment    Discharge  [address] discharge to home/self care  Condition at discharge: Good        Follow-up Information     Follow up With Specialties Details Why [address], [address], Nurse Practitioner In 3 days  [facility]  [address]  [phone]            Patient's Medications   Discharge Prescriptions    No medications on file     No discharge procedures on file      ED Provider  Electronically Signed by           Tennille Dickey MD  12/09/18 4056

## 2018-12-14 ENCOUNTER — OFFICE VISIT (OUTPATIENT)
Dept: FAMILY MEDICINE CLINIC | Facility: CLINIC | Age: 43
End: 2018-12-14
Payer: COMMERCIAL

## 2018-12-14 VITALS
RESPIRATION RATE: 17 BRPM | WEIGHT: 251 LBS | HEIGHT: 64 IN | TEMPERATURE: 96.8 F | DIASTOLIC BLOOD PRESSURE: 78 MMHG | HEART RATE: 94 BPM | BODY MASS INDEX: 42.85 KG/M2 | SYSTOLIC BLOOD PRESSURE: 134 MMHG | OXYGEN SATURATION: 98 %

## 2018-12-14 DIAGNOSIS — M25.511 ACUTE PAIN OF RIGHT SHOULDER: ICD-10-CM

## 2018-12-14 DIAGNOSIS — Z12.39 SCREENING FOR BREAST CANCER: ICD-10-CM

## 2018-12-14 DIAGNOSIS — M62.838 TRAPEZIUS MUSCLE SPASM: ICD-10-CM

## 2018-12-14 DIAGNOSIS — Z23 FLU VACCINE NEED: ICD-10-CM

## 2018-12-14 DIAGNOSIS — E66.01 CLASS 3 SEVERE OBESITY DUE TO EXCESS CALORIES WITH BODY MASS INDEX (BMI) OF 40.0 TO 44.9 IN ADULT, UNSPECIFIED WHETHER SERIOUS COMORBIDITY PRESENT (HCC): Primary | ICD-10-CM

## 2018-12-14 DIAGNOSIS — Z12.4 SCREENING FOR CERVICAL CANCER: ICD-10-CM

## 2018-12-14 PROCEDURE — 87086 URINE CULTURE/COLONY COUNT: CPT | Performed by: FAMILY MEDICINE

## 2018-12-14 PROCEDURE — T1015 CLINIC SERVICE: HCPCS | Performed by: FAMILY MEDICINE

## 2018-12-14 PROCEDURE — 81001 URINALYSIS AUTO W/SCOPE: CPT | Performed by: FAMILY MEDICINE

## 2018-12-14 RX ORDER — NAPROXEN 500 MG/1
500 TABLET ORAL 2 TIMES DAILY WITH MEALS
Qty: 20 TABLET | Refills: 0 | Status: SHIPPED | OUTPATIENT
Start: 2018-12-14 | End: 2019-05-08

## 2018-12-14 RX ORDER — METHOCARBAMOL 750 MG/1
750 TABLET, FILM COATED ORAL EVERY 8 HOURS SCHEDULED
Qty: 20 TABLET | Refills: 0 | Status: SHIPPED | OUTPATIENT
Start: 2018-12-14 | End: 2019-05-08

## 2018-12-14 NOTE — PROGRESS NOTES
Assessment/Plan:     Diagnoses and all orders for this visit:    Class 3 severe obesity due to excess calories with body mass index (BMI) of 40 0 to 44 9 in adult, unspecified whether serious comorbidity present (HCC)  -     CBC and differential; Future  -     Comprehensive metabolic panel; Future  -     TSH, 3rd generation with Free T4 reflex; Future  -     Lipid Panel with Direct LDL reflex; Future  -     UA w Reflex to Microscopic w Reflex to Culture -Lab Collect  -     Vitamin D 1,25 dihydroxy; Future  -     HEMOGLOBIN A1C W/ EAG ESTIMATION; Future    Flu vaccine need  -     SYRINGE/SINGLE-DOSE VIAL: influenza vaccine, [id number], quadrivalent, 0 5 mL, preservative-free, for patients 3+ yr (FLUZONE)    Acute pain of right shoulder  -     Ambulatory referral to Orthopedic Surgery; Future  -     naproxen (NAPROSYN) 500 mg tablet; Take 1 tablet (500 mg total) by mouth 2 (two) times a day with meals for 10 days    Screening for cervical cancer  -     Ambulatory referral to Obstetrics / Gynecology; Future    Screening for breast cancer  -     Mammo screening bilateral w 3d & cad; Future    Cervicalgia    Trapezius muscle spasm  -     methocarbamol (ROBAXIN) 750 mg tablet; Take 1 tablet (750 mg total) by mouth every 8 (eight) hours       Discussion/Plan:   Obesity - healthy diet and exercise discussed  Check labs  Right shoulder pain/trapezius muscle spasm - naproxen and PRN robaxin renewed  Rest, ice/heat, gentle stretching  Ortho referral given  Follow up Monday as scheduled  Flu vaccine in office today  Mammogram ordered  Referral to OBGYN given for cervical cancer screening  RTC 6 months or sooner if needed  Subjective:      Patient ID: Levi Esquivel is a 41 y o  female  Chief Complaint   Patient presents with    Follow-up     She states she needs a referral to ortho, She has an appt on MOnday  S       Patient is a 41year old female who presents to the office today for follow up   She has right shoulder pain, scheduled with orthopedics with shoulder specialist at [facility]  She is requesting refill on naproxen and PRN robaxin for right shoulder/neck pain, which helps  She is also requesting routine labs  She does not have OBGYN, has not had mammogram  She needs flu shot today  She is otherwise doing well without any complaints or concerns, she is eating and sleeping well, no elimination concerns  She denies use of tobacco, alcohol, recreational drugs          The following portions of the patient's history were reviewed and updated as appropriate: allergies, current medications, past family history, past medical history, past social history, past surgical history and problem list     Patient Active Problem List   Diagnosis    Allergic rhinitis    Depression with anxiety    Disc degeneration, lumbosacral    Mood disorder of manic type ([facility] [address] )    Obesity     Current Outpatient Prescriptions on File Prior to Visit   Medication Sig Dispense Refill    [DISCONTINUED] albuterol (2 5 mg/3 mL) 0 083 % nebulizer solution Take 3 mL (2 5 mg total) by nebulization every 6 (six) hours as needed for wheezing (Patient not taking: Reported on 12/9/2018 ) 75 mL 0    [DISCONTINUED] albuterol (VENTOLIN HFA) 90 mcg/act inhaler Inhale 2 puffs every 4 (four) hours as needed      [DISCONTINUED] ALPRAZolam (XANAX) 0 5 mg tablet   1    [DISCONTINUED] gabapentin (NEURONTIN) 800 mg tablet Take by mouth      [DISCONTINUED] hydrOXYzine HCL (ATARAX) 25 mg tablet Take 1 tablet (25 mg total) by mouth every 6 (six) hours as needed for itching (Patient not taking: Reported on 12/9/2018 ) 30 tablet 0    [DISCONTINUED] loratadine (CLARITIN) 10 mg tablet Take 1 tablet by mouth      [DISCONTINUED] methocarbamol (ROBAXIN) 750 mg tablet Take 1 tablet (750 mg total) by mouth every 8 (eight) hours (Patient not taking: Reported on 12/9/2018 ) 20 tablet 0    [DISCONTINUED] naproxen (NAPROSYN) 500 mg tablet Take 1 tablet (500 mg total) by mouth 2 (two) times a day with meals for 10 days 20 tablet 0    [DISCONTINUED] ondansetron (ZOFRAN) 4 mg tablet Take 1 tablet (4 mg total) by mouth every 8 (eight) hours as needed for nausea or vomiting for up to 4 days 12 tablet 0    [DISCONTINUED] oxyCODONE-acetaminophen (PERCOCET) 5-325 mg per tablet   0    [DISCONTINUED] pramipexole (MIRAPEX) 0 25 mg tablet   2     No current facility-administered medications on file prior to visit  Review of Systems   Constitutional: Negative  HENT: Negative  Respiratory: Negative  Cardiovascular: Negative  Gastrointestinal: Negative  Genitourinary: Negative  Musculoskeletal: Positive for arthralgias and myalgias  Neurological: Negative  Hematological: Negative  Psychiatric/Behavioral: Negative  Objective:      /78   Pulse 94   Temp (!) 96 8 °F (36 °C)   Resp 17   Ht 5' 4" (1 626 m)   Wt 114 kg (251 lb)   SpO2 98%   BMI 43 08 kg/m²          Physical Exam   Constitutional: She appears well-developed and well-nourished  obese   HENT:   Head: Normocephalic and atraumatic  Right Ear: Tympanic membrane, external ear and ear canal normal    Left Ear: Tympanic membrane, external ear and ear canal normal    Nose: Nose normal    Mouth/Throat: Oropharynx is clear and moist    Eyes: Pupils are equal, round, and reactive to light  Conjunctivae are normal    Neck: Neck supple  No thyromegaly present  Cardiovascular: Normal rate, regular rhythm, normal heart sounds and intact distal pulses  Pulmonary/Chest: Effort normal and breath sounds normal    Abdominal: Soft  Bowel sounds are normal  There is no tenderness  Musculoskeletal: She exhibits no edema  Right shoulder: She exhibits decreased range of motion, tenderness and spasm  She exhibits no effusion, normal pulse and normal strength  Arms:  Lymphadenopathy:     She has no cervical adenopathy  Neurological: She is alert  She has normal reflexes     Skin: Skin is warm and dry  Psychiatric: She has a normal mood and affect   Her behavior is normal

## 2018-12-20 ENCOUNTER — APPOINTMENT (OUTPATIENT)
Dept: LAB | Facility: HOSPITAL | Age: 43
End: 2018-12-20
Payer: COMMERCIAL

## 2018-12-20 ENCOUNTER — TRANSCRIBE ORDERS (OUTPATIENT)
Dept: ADMINISTRATIVE | Facility: HOSPITAL | Age: 43
End: 2018-12-20

## 2018-12-20 DIAGNOSIS — E66.01 OBESITY, CLASS III, BMI 40-49.9 (MORBID OBESITY) (HCC): Primary | ICD-10-CM

## 2018-12-20 DIAGNOSIS — E66.01 CLASS 3 SEVERE OBESITY DUE TO EXCESS CALORIES WITH BODY MASS INDEX (BMI) OF 40.0 TO 44.9 IN ADULT, UNSPECIFIED WHETHER SERIOUS COMORBIDITY PRESENT (HCC): ICD-10-CM

## 2018-12-20 LAB
ALBUMIN SERPL BCP-MCNC: 3.9 G/DL (ref 3.5–5.7)
ALP SERPL-CCNC: 71 U/L (ref 40–150)
ALT SERPL W P-5'-P-CCNC: 76 U/L (ref 7–52)
ANION GAP SERPL CALCULATED.3IONS-SCNC: 6 MMOL/L (ref 4–13)
AST SERPL W P-5'-P-CCNC: 73 U/L (ref 13–39)
BACTERIA UR QL AUTO: ABNORMAL /HPF
BASOPHILS # BLD AUTO: 0 THOUSANDS/ΜL (ref 0–0.1)
BASOPHILS NFR BLD AUTO: 0 % (ref 0–2)
BILIRUB SERPL-MCNC: 0.6 MG/DL (ref 0.2–1)
BILIRUB UR QL STRIP: NEGATIVE
BUN SERPL-MCNC: 10 MG/DL (ref 7–25)
CALCIUM SERPL-MCNC: 9.5 MG/DL (ref 8.6–10.5)
CHLORIDE SERPL-SCNC: 102 MMOL/L (ref 98–107)
CHOLEST SERPL-MCNC: 181 MG/DL (ref 0–200)
CLARITY UR: ABNORMAL
CO2 SERPL-SCNC: 26 MMOL/L (ref 21–31)
COLOR UR: ABNORMAL
CREAT SERPL-MCNC: 0.61 MG/DL (ref 0.6–1.2)
EOSINOPHIL # BLD AUTO: 0.3 THOUSAND/ΜL (ref 0–0.61)
EOSINOPHIL NFR BLD AUTO: 3 % (ref 0–5)
ERYTHROCYTE [DISTWIDTH] IN BLOOD BY AUTOMATED COUNT: 13.1 % (ref 11.5–14.5)
EST. AVERAGE GLUCOSE BLD GHB EST-MCNC: 174 MG/DL
GFR SERPL CREATININE-BSD FRML MDRD: 113 ML/MIN/1.73SQ M
GLUCOSE P FAST SERPL-MCNC: 159 MG/DL (ref 65–99)
GLUCOSE UR STRIP-MCNC: NEGATIVE MG/DL
HBA1C MFR BLD: 7.7 % (ref 4.2–6.3)
HCT VFR BLD AUTO: 43.6 % (ref 34.8–46.1)
HDLC SERPL-MCNC: 44 MG/DL (ref 40–60)
HGB BLD-MCNC: 14.5 G/DL (ref 12–16)
HGB UR QL STRIP.AUTO: NEGATIVE
KETONES UR STRIP-MCNC: ABNORMAL MG/DL
LDLC SERPL CALC-MCNC: 102 MG/DL (ref 75–193)
LEUKOCYTE ESTERASE UR QL STRIP: ABNORMAL
LYMPHOCYTES # BLD AUTO: 2.9 THOUSANDS/ΜL (ref 0.6–4.47)
LYMPHOCYTES NFR BLD AUTO: 32 % (ref 21–51)
MCH RBC QN AUTO: 30.5 PG (ref 26–34)
MCHC RBC AUTO-ENTMCNC: 33.3 G/DL (ref 31–37)
MCV RBC AUTO: 92 FL (ref 81–99)
MONOCYTES # BLD AUTO: 1.1 THOUSAND/ΜL (ref 0.17–1.22)
MONOCYTES NFR BLD AUTO: 12 % (ref 2–12)
MUCOUS THREADS UR QL AUTO: ABNORMAL
NEUTROPHILS # BLD AUTO: 4.8 THOUSANDS/ΜL (ref 1.4–6.5)
NEUTS SEG NFR BLD AUTO: 53 % (ref 42–75)
NITRITE UR QL STRIP: NEGATIVE
NON-SQ EPI CELLS URNS QL MICRO: ABNORMAL /HPF
NRBC BLD AUTO-RTO: 0 /100 WBCS
PH UR STRIP.AUTO: 5 [PH] (ref 5–8)
PLATELET # BLD AUTO: 231 THOUSANDS/UL (ref 149–390)
PMV BLD AUTO: 8.9 FL (ref 8.6–11.7)
POTASSIUM SERPL-SCNC: 4.1 MMOL/L (ref 3.5–5.5)
PROT SERPL-MCNC: 7 G/DL (ref 6.4–8.9)
PROT UR STRIP-MCNC: NEGATIVE MG/DL
RBC # BLD AUTO: 4.76 MILLION/UL (ref 3.9–5.2)
RBC #/AREA URNS AUTO: ABNORMAL /HPF
SODIUM SERPL-SCNC: 134 MMOL/L (ref 134–143)
SP GR UR STRIP.AUTO: >=1.03 (ref 1–1.03)
TRIGL SERPL-MCNC: 177 MG/DL (ref 44–166)
TSH SERPL DL<=0.05 MIU/L-ACNC: 0.67 UIU/ML (ref 0.45–5.33)
UROBILINOGEN UR QL STRIP.AUTO: 0.2 E.U./DL
WBC # BLD AUTO: 9.1 THOUSAND/UL (ref 4.8–10.8)
WBC #/AREA URNS AUTO: ABNORMAL /HPF

## 2018-12-20 PROCEDURE — 80053 COMPREHEN METABOLIC PANEL: CPT

## 2018-12-20 PROCEDURE — 36415 COLL VENOUS BLD VENIPUNCTURE: CPT

## 2018-12-20 PROCEDURE — 80061 LIPID PANEL: CPT

## 2018-12-20 PROCEDURE — 82652 VIT D 1 25-DIHYDROXY: CPT

## 2018-12-20 PROCEDURE — 84443 ASSAY THYROID STIM HORMONE: CPT

## 2018-12-20 PROCEDURE — 83036 HEMOGLOBIN GLYCOSYLATED A1C: CPT

## 2018-12-20 PROCEDURE — 85025 COMPLETE CBC W/AUTO DIFF WBC: CPT

## 2018-12-21 LAB
1,25(OH)2D3 SERPL-MCNC: 22 PG/ML (ref 19.9–79.3)
BACTERIA UR CULT: ABNORMAL
BACTERIA UR CULT: ABNORMAL

## 2018-12-26 DIAGNOSIS — R79.89 ELEVATED LFTS: ICD-10-CM

## 2018-12-26 DIAGNOSIS — E11.9 TYPE 2 DIABETES MELLITUS WITHOUT COMPLICATION, WITHOUT LONG-TERM CURRENT USE OF INSULIN (HCC): Primary | ICD-10-CM

## 2018-12-26 DIAGNOSIS — R82.90 ABNORMAL URINALYSIS: ICD-10-CM

## 2019-01-02 ENCOUNTER — TELEPHONE (OUTPATIENT)
Dept: FAMILY MEDICINE CLINIC | Facility: CLINIC | Age: 44
End: 2019-01-02

## 2019-01-02 DIAGNOSIS — E11.8 TYPE 2 DIABETES MELLITUS WITH COMPLICATION, UNSPECIFIED WHETHER LONG TERM INSULIN USE: Primary | ICD-10-CM

## 2019-01-02 RX ORDER — LANCETS
EACH MISCELLANEOUS
Qty: 102 EACH | Refills: 3 | Status: SHIPPED | OUTPATIENT
Start: 2019-01-02 | End: 2019-05-08 | Stop reason: SDUPTHER

## 2019-01-02 RX ORDER — BLOOD-GLUCOSE METER
EACH MISCELLANEOUS 3 TIMES DAILY
Qty: 1 KIT | Refills: 0 | Status: SHIPPED | OUTPATIENT
Start: 2019-01-02 | End: 2021-03-05

## 2019-01-02 NOTE — TELEPHONE ENCOUNTER
Patient is aware  She is NOT interested in education at this time  She states she knows a bit about diabetes  Glucometer was ordered  ----- Message from Douglas Luke PA-C sent at 12/26/2018  8:57 AM EST -----  Please call the patient regarding her abnormal result  A1C 7 7  She is diabetic  Will refer her to DM education class and start metformin 500mg BID  Healthy diet and exercise  Will send blood glucose testing supplies to pharmacy  She may bring to office and we can show her how to test sugars  Liver enzymes elevated  Repeat cmp and if remains elevated, will order further work up  Repeat UA as culture revealed mixed contaminents  Schedule 3 month follow up       Thanks

## 2019-05-08 ENCOUNTER — OFFICE VISIT (OUTPATIENT)
Dept: FAMILY MEDICINE CLINIC | Facility: CLINIC | Age: 44
End: 2019-05-08
Payer: COMMERCIAL

## 2019-05-08 VITALS
HEART RATE: 77 BPM | HEIGHT: 64 IN | RESPIRATION RATE: 16 BRPM | TEMPERATURE: 96.9 F | WEIGHT: 244.8 LBS | SYSTOLIC BLOOD PRESSURE: 118 MMHG | BODY MASS INDEX: 41.79 KG/M2 | OXYGEN SATURATION: 97 % | DIASTOLIC BLOOD PRESSURE: 82 MMHG

## 2019-05-08 DIAGNOSIS — E11.9 TYPE 2 DIABETES MELLITUS WITHOUT COMPLICATION, WITHOUT LONG-TERM CURRENT USE OF INSULIN (HCC): Primary | ICD-10-CM

## 2019-05-08 DIAGNOSIS — E66.01 CLASS 3 SEVERE OBESITY DUE TO EXCESS CALORIES WITH BODY MASS INDEX (BMI) OF 40.0 TO 44.9 IN ADULT, UNSPECIFIED WHETHER SERIOUS COMORBIDITY PRESENT (HCC): ICD-10-CM

## 2019-05-08 DIAGNOSIS — G43.109 MIGRAINE WITH AURA AND WITHOUT STATUS MIGRAINOSUS, NOT INTRACTABLE: ICD-10-CM

## 2019-05-08 DIAGNOSIS — E11.8 TYPE 2 DIABETES MELLITUS WITH COMPLICATION, UNSPECIFIED WHETHER LONG TERM INSULIN USE: ICD-10-CM

## 2019-05-08 PROCEDURE — 99213 OFFICE O/P EST LOW 20 MIN: CPT | Performed by: FAMILY MEDICINE

## 2019-05-08 PROCEDURE — T1015 CLINIC SERVICE: HCPCS | Performed by: FAMILY MEDICINE

## 2019-05-08 RX ORDER — PROPRANOLOL HYDROCHLORIDE 80 MG/1
80 CAPSULE, EXTENDED RELEASE ORAL DAILY
Qty: 30 CAPSULE | Refills: 1 | Status: SHIPPED | OUTPATIENT
Start: 2019-05-08 | End: 2019-09-20 | Stop reason: ALTCHOICE

## 2019-05-08 RX ORDER — ATORVASTATIN CALCIUM 10 MG/1
10 TABLET, FILM COATED ORAL DAILY
Qty: 90 TABLET | Refills: 1 | Status: SHIPPED | OUTPATIENT
Start: 2019-05-08 | End: 2019-11-26 | Stop reason: SDUPTHER

## 2019-05-08 RX ORDER — SUMATRIPTAN 25 MG/1
25 TABLET, FILM COATED ORAL ONCE AS NEEDED
Qty: 9 TABLET | Refills: 1 | Status: SHIPPED | OUTPATIENT
Start: 2019-05-08 | End: 2019-09-28 | Stop reason: SDUPTHER

## 2019-05-08 RX ORDER — LANCETS
EACH MISCELLANEOUS
Qty: 100 EACH | Refills: 5 | Status: SHIPPED | OUTPATIENT
Start: 2019-05-08 | End: 2020-02-25 | Stop reason: SDUPTHER

## 2019-05-08 RX ORDER — IBUPROFEN 600 MG/1
600 TABLET ORAL EVERY 8 HOURS PRN
Refills: 2 | COMMUNITY
Start: 2019-03-25 | End: 2022-03-04

## 2019-05-13 DIAGNOSIS — E11.9 TYPE 2 DIABETES MELLITUS WITHOUT COMPLICATION, WITHOUT LONG-TERM CURRENT USE OF INSULIN (HCC): ICD-10-CM

## 2019-06-16 ENCOUNTER — HOSPITAL ENCOUNTER (EMERGENCY)
Facility: HOSPITAL | Age: 44
Discharge: HOME | End: 2019-06-16
Attending: EMERGENCY MEDICINE
Payer: COMMERCIAL

## 2019-06-16 VITALS
HEART RATE: 96 BPM | TEMPERATURE: 99.9 F | WEIGHT: 240 LBS | RESPIRATION RATE: 16 BRPM | BODY MASS INDEX: 40.97 KG/M2 | SYSTOLIC BLOOD PRESSURE: 145 MMHG | OXYGEN SATURATION: 96 % | DIASTOLIC BLOOD PRESSURE: 63 MMHG | HEIGHT: 64 IN

## 2019-06-16 DIAGNOSIS — S61.419A HAND LACERATION: Primary | ICD-10-CM

## 2019-06-16 PROCEDURE — 99283 EMERGENCY DEPT VISIT LOW MDM: CPT

## 2019-06-16 RX ORDER — GINSENG 100 MG
1 CAPSULE ORAL ONCE
Status: COMPLETED | OUTPATIENT
Start: 2019-06-16 | End: 2019-06-16

## 2019-06-16 RX ORDER — LIDOCAINE HYDROCHLORIDE AND EPINEPHRINE 10; 10 MG/ML; UG/ML
1 INJECTION, SOLUTION INFILTRATION; PERINEURAL ONCE
Status: COMPLETED | OUTPATIENT
Start: 2019-06-16 | End: 2019-06-16

## 2019-06-16 RX ADMIN — LIDOCAINE HYDROCHLORIDE,EPINEPHRINE BITARTRATE 1 ML: 10; .01 INJECTION, SOLUTION INFILTRATION; PERINEURAL at 21:33

## 2019-06-16 RX ADMIN — BACITRACIN ZINC 1 SMALL APPLICATION: 500 OINTMENT TOPICAL at 21:57

## 2019-06-24 ENCOUNTER — OFFICE VISIT (OUTPATIENT)
Dept: FAMILY MEDICINE CLINIC | Facility: CLINIC | Age: 44
End: 2019-06-24
Payer: COMMERCIAL

## 2019-06-24 VITALS
SYSTOLIC BLOOD PRESSURE: 118 MMHG | RESPIRATION RATE: 16 BRPM | DIASTOLIC BLOOD PRESSURE: 62 MMHG | TEMPERATURE: 97.4 F | HEIGHT: 64 IN | WEIGHT: 245 LBS | OXYGEN SATURATION: 96 % | BODY MASS INDEX: 41.83 KG/M2 | HEART RATE: 97 BPM

## 2019-06-24 DIAGNOSIS — S61.411D LACERATION OF RIGHT HAND WITHOUT FOREIGN BODY, SUBSEQUENT ENCOUNTER: Primary | ICD-10-CM

## 2019-06-24 PROCEDURE — T1015 CLINIC SERVICE: HCPCS | Performed by: FAMILY MEDICINE

## 2019-06-24 PROCEDURE — 99213 OFFICE O/P EST LOW 20 MIN: CPT | Performed by: FAMILY MEDICINE

## 2019-07-02 ENCOUNTER — HOSPITAL ENCOUNTER (EMERGENCY)
Facility: HOSPITAL | Age: 44
Discharge: HOME | End: 2019-07-02
Attending: EMERGENCY MEDICINE | Admitting: EMERGENCY MEDICINE
Payer: COMMERCIAL

## 2019-07-02 VITALS
OXYGEN SATURATION: 98 % | TEMPERATURE: 97.6 F | WEIGHT: 245 LBS | HEART RATE: 84 BPM | SYSTOLIC BLOOD PRESSURE: 93 MMHG | BODY MASS INDEX: 41.83 KG/M2 | DIASTOLIC BLOOD PRESSURE: 64 MMHG | RESPIRATION RATE: 16 BRPM | HEIGHT: 64 IN

## 2019-07-02 DIAGNOSIS — G43.909 MIGRAINE: Primary | ICD-10-CM

## 2019-07-02 LAB
ALBUMIN SERPL BCP-MCNC: 4.3 G/DL (ref 3.5–5.7)
ALP SERPL-CCNC: 68 U/L (ref 40–150)
ALT SERPL W P-5'-P-CCNC: 54 U/L (ref 7–52)
ANION GAP SERPL CALCULATED.3IONS-SCNC: 7 MMOL/L (ref 4–13)
AST SERPL W P-5'-P-CCNC: 50 U/L (ref 13–39)
BASOPHILS # BLD AUTO: 0 THOUSANDS/ΜL (ref 0–0.1)
BASOPHILS NFR BLD AUTO: 1 % (ref 0–2)
BILIRUB SERPL-MCNC: 1.1 MG/DL (ref 0.2–1)
BUN SERPL-MCNC: 11 MG/DL (ref 7–25)
CALCIUM SERPL-MCNC: 10.2 MG/DL (ref 8.6–10.5)
CHLORIDE SERPL-SCNC: 104 MMOL/L (ref 98–107)
CO2 SERPL-SCNC: 27 MMOL/L (ref 21–31)
CREAT SERPL-MCNC: 0.69 MG/DL (ref 0.6–1.2)
EOSINOPHIL # BLD AUTO: 0.1 THOUSAND/ΜL (ref 0–0.61)
EOSINOPHIL NFR BLD AUTO: 1 % (ref 0–5)
ERYTHROCYTE [DISTWIDTH] IN BLOOD BY AUTOMATED COUNT: 13 % (ref 11.5–14.5)
GFR SERPL CREATININE-BSD FRML MDRD: 108 ML/MIN/1.73SQ M
GLUCOSE SERPL-MCNC: 151 MG/DL (ref 65–99)
HCT VFR BLD AUTO: 42.5 % (ref 42–47)
HGB BLD-MCNC: 14.5 G/DL (ref 12–16)
LYMPHOCYTES # BLD AUTO: 2.6 THOUSANDS/ΜL (ref 0.6–4.47)
LYMPHOCYTES NFR BLD AUTO: 29 % (ref 21–51)
MCH RBC QN AUTO: 31.3 PG (ref 26–34)
MCHC RBC AUTO-ENTMCNC: 34.2 G/DL (ref 31–37)
MCV RBC AUTO: 92 FL (ref 81–99)
MONOCYTES # BLD AUTO: 0.9 THOUSAND/ΜL (ref 0.17–1.22)
MONOCYTES NFR BLD AUTO: 10 % (ref 2–12)
NEUTROPHILS # BLD AUTO: 5.4 THOUSANDS/ΜL (ref 1.4–6.5)
NEUTS SEG NFR BLD AUTO: 60 % (ref 42–75)
PLATELET # BLD AUTO: 238 THOUSANDS/UL (ref 149–390)
PMV BLD AUTO: 8.3 FL (ref 8.6–11.7)
POTASSIUM SERPL-SCNC: 3.6 MMOL/L (ref 3.5–5.5)
PROT SERPL-MCNC: 7 G/DL (ref 6.4–8.9)
RBC # BLD AUTO: 4.64 MILLION/UL (ref 3.9–5.2)
SODIUM SERPL-SCNC: 138 MMOL/L (ref 134–143)
WBC # BLD AUTO: 8.9 THOUSAND/UL (ref 4.8–10.8)

## 2019-07-02 PROCEDURE — 36415 COLL VENOUS BLD VENIPUNCTURE: CPT | Performed by: EMERGENCY MEDICINE

## 2019-07-02 PROCEDURE — 96361 HYDRATE IV INFUSION ADD-ON: CPT

## 2019-07-02 PROCEDURE — 99284 EMERGENCY DEPT VISIT MOD MDM: CPT

## 2019-07-02 PROCEDURE — 80053 COMPREHEN METABOLIC PANEL: CPT | Performed by: EMERGENCY MEDICINE

## 2019-07-02 PROCEDURE — 85025 COMPLETE CBC W/AUTO DIFF WBC: CPT | Performed by: EMERGENCY MEDICINE

## 2019-07-02 PROCEDURE — 96374 THER/PROPH/DIAG INJ IV PUSH: CPT

## 2019-07-02 PROCEDURE — 96375 TX/PRO/DX INJ NEW DRUG ADDON: CPT

## 2019-07-02 RX ORDER — KETOROLAC TROMETHAMINE 30 MG/ML
15 INJECTION, SOLUTION INTRAMUSCULAR; INTRAVENOUS ONCE
Status: COMPLETED | OUTPATIENT
Start: 2019-07-02 | End: 2019-07-02

## 2019-07-02 RX ORDER — METOCLOPRAMIDE 10 MG/1
10 TABLET ORAL 4 TIMES DAILY PRN
Qty: 30 TABLET | Refills: 0 | Status: SHIPPED | OUTPATIENT
Start: 2019-07-02 | End: 2019-10-25 | Stop reason: ALTCHOICE

## 2019-07-02 RX ORDER — METOCLOPRAMIDE HYDROCHLORIDE 5 MG/ML
10 INJECTION INTRAMUSCULAR; INTRAVENOUS ONCE
Status: COMPLETED | OUTPATIENT
Start: 2019-07-02 | End: 2019-07-02

## 2019-07-02 RX ADMIN — KETOROLAC TROMETHAMINE 15 MG: 30 INJECTION, SOLUTION INTRAMUSCULAR; INTRAVENOUS at 10:46

## 2019-07-02 RX ADMIN — SODIUM CHLORIDE 1000 ML: 0.9 INJECTION, SOLUTION INTRAVENOUS at 10:46

## 2019-07-02 RX ADMIN — METOCLOPRAMIDE 10 MG: 5 INJECTION, SOLUTION INTRAMUSCULAR; INTRAVENOUS at 10:47

## 2019-07-02 NOTE — ED PROVIDER NOTES
History  Chief Complaint   Patient presents with    Headache     for the past 3 days  history of migraines    Dizziness     Patient is a 79-year-old female with history of migraine who started with a headache 3 days ago  Patient states she has had intermittent headaches for the past 3 days  She took her Imitrex yesterday and the headache went away but today it is back  Patient reports her sugars elevated and also feels lightheaded  She denies any chest pain  Denies any fever chills  Prior to Admission Medications   Prescriptions Last Dose Informant Patient Reported? Taking? ACCU-CHEK FASTCLIX LANCETS MISC   No No   Sig: Test blood sugars once daily  Blood Glucose Monitoring Suppl (ACCU-CHEK GUIDE) w/Device KIT   No No   Sig: by Does not apply route 3 (three) times a day Test blood sugars 3 times a day      MG tablet   Yes No   Sig: Take 600 mg by mouth every 8 (eight) hours as needed    SUMAtriptan (IMITREX) 25 mg tablet   No No   Sig: Take 1 tablet (25 mg total) by mouth once as needed for migraine   atorvastatin (LIPITOR) 10 mg tablet   No No   Sig: Take 1 tablet (10 mg total) by mouth daily   glucose blood (ACCU-CHEK GUIDE) test strip   No No   Si each by Other route daily   metFORMIN (GLUCOPHAGE) 500 mg tablet   No No   Sig: TAKE 1 TABLET (500 MG TOTAL) BY MOUTH 2 (TWO) TIMES A DAY WITH MEALS   propranolol (INDERAL LA) 80 mg 24 hr capsule   No No   Sig: Take 1 capsule (80 mg total) by mouth daily   Patient not taking: Reported on 2019      Facility-Administered Medications: None       Past Medical History:   Diagnosis Date    Diabetes mellitus ([address] )     Hyperlipidemia        Past Surgical History:   Procedure Laterality Date    ARTHROSCOPY KNEE Left     CHOLECYSTECTOMY      ECTOPIC PREGNANCY SURGERY      SHOULDER SURGERY Right     torn bicep       Family History   Problem Relation Age of Onset    Obesity Mother     Obesity Father     Diabetes type II Father    [facility] Diabetes Brother     Obesity Brother      I have reviewed and agree with the history as documented  Social History     Tobacco Use    Smoking status: Never Smoker    Smokeless tobacco: Never Used   Substance Use Topics    Alcohol use: No    Drug use: No        Review of Systems   Constitutional: Negative  HENT: Negative  Eyes: Positive for photophobia  Respiratory: Negative  Cardiovascular: Negative  Gastrointestinal: Positive for nausea  Negative for abdominal pain  Genitourinary: Negative  Musculoskeletal: Negative  Skin: Negative  Neurological: Positive for dizziness and headaches  Negative for weakness  All other systems reviewed and are negative  Physical Exam  Physical Exam   Constitutional: She is oriented to person, place, and time  She appears well-developed and well-nourished  HENT:   Head: Normocephalic and atraumatic  Eyes: Pupils are equal, round, and reactive to light  EOM are normal    Neck: Normal range of motion  Cardiovascular: Normal rate and regular rhythm  Pulmonary/Chest: Effort normal and breath sounds normal    Neurological: She is alert and oriented to person, place, and time  Skin: Skin is warm and dry  Psychiatric: She has a normal mood and affect  Her behavior is normal    Nursing note and vitals reviewed        Vital Signs  ED Triage Vitals [07/02/19 0917]   Temperature Pulse Respirations Blood Pressure SpO2   97 6 °F (36 4 °C) 96 16 119/70 98 %      Temp Source Heart Rate Source Patient Position - Orthostatic VS BP Location FiO2 (%)   Temporal Monitor Sitting Left arm --      Pain Score       8           Vitals:    07/02/19 0917   BP: 119/70   Pulse: 96   Patient Position - Orthostatic VS: Sitting         Visual Acuity      ED Medications  Medications   sodium chloride 0 9 % bolus 1,000 mL (1,000 mL Intravenous New Bag 7/2/19 1046)   metoclopramide (REGLAN) injection 10 mg (10 mg Intravenous Given 7/2/19 1047)   ketorolac (TORADOL) injection 15 mg (15 mg Intravenous Given 7/2/19 1046)       Diagnostic Studies  Results Reviewed     Procedure Component Value Units Date/Time    Comprehensive metabolic panel [[id number]]  (Abnormal) Collected:  07/02/19 1044    Lab Status:  Final result Specimen:  Blood from Arm, Right Updated:  07/02/19 1111     Sodium 138 mmol/L      Potassium 3 6 mmol/L      Chloride 104 mmol/L      CO2 27 mmol/L      ANION GAP 7 mmol/L      BUN 11 mg/dL      Creatinine 0 69 mg/dL      Glucose 151 mg/dL      Calcium 10 2 mg/dL      AST 50 U/L      ALT 54 U/L      Alkaline Phosphatase 68 U/L      Total Protein 7 0 g/dL      Albumin 4 3 g/dL      Total Bilirubin 1 10 mg/dL      eGFR 108 ml/min/1 73sq m     Narrative:       National Kidney Disease Foundation guidelines for Chronic Kidney Disease (CKD):     Stage 1 with normal or high GFR (GFR > 90 mL/min/1 73 square meters)    Stage 2 Mild CKD (GFR = 60-89 mL/min/1 73 square meters)    Stage 3A Moderate CKD (GFR = 45-59 mL/min/1 73 square meters)    Stage 3B Moderate CKD (GFR = 30-44 mL/min/1 73 square meters)    Stage 4 Severe CKD (GFR = 15-29 mL/min/1 73 square meters)    Stage 5 End Stage CKD (GFR <15 mL/min/1 73 square meters)  Note: GFR calculation is accurate only with a steady state creatinine    CBC and differential [[id number]]  (Abnormal) Collected:  07/02/19 1044    Lab Status:  Final result Specimen:  Blood from Arm, Right Updated:  07/02/19 1055     WBC 8 90 Thousand/uL      RBC 4 64 Million/uL      Hemoglobin 14 5 g/dL      Hematocrit 42 5 %      MCV 92 fL      MCH 31 3 pg      MCHC 34 2 g/dL      RDW 13 0 %      MPV 8 3 fL      Platelets 897 Thousands/uL      Neutrophils Relative 60 %      Lymphocytes Relative 29 %      Monocytes Relative 10 %      Eosinophils Relative 1 %      Basophils Relative 1 %      Neutrophils Absolute 5 40 Thousands/µL      Lymphocytes Absolute 2 60 Thousands/µL      Monocytes Absolute 0 90 Thousand/µL      Eosinophils Absolute 0 10 Thousand/µL      Basophils Absolute 0 00 Thousands/µL                  No orders to display              Procedures  Procedures       ED Course                               MDM  Number of Diagnoses or Management Options  Migraine:   Diagnosis management comments: Improved post medication       Amount and/or Complexity of Data Reviewed  Clinical lab tests: reviewed    Risk of Complications, Morbidity, and/or Mortality  Presenting problems: moderate  Diagnostic procedures: low  Management options: low    Patient Progress  Patient progress: improved      Disposition  Final diagnoses:   Migraine     Time reflects when diagnosis was documented in both MDM as applicable and the Disposition within this note     Time User Action Codes Description Comment    7/2/2019 12:39 PM Jenny Wilson [G43 [id number]] Migraine       ED Disposition     ED Disposition Condition Date/Time Comment    Discharge Stable Tu Jul 2, 2019 12:39 PM Evans Weiss discharge to home/self care  Follow-up Information     Follow up With Specialties Details Why [id number] Fritz Physician Assistant  As needed [address] [place] [address]  [phone]            Patient's Medications   Discharge Prescriptions    METOCLOPRAMIDE (REGLAN) 10 MG TABLET    Take 1 tablet (10 mg total) by mouth 4 (four) times a day as needed (migraine)       Start Date: 7/2/2019  End Date: --       Order Dose: 10 mg       Quantity: 30 tablet    Refills: 0     No discharge procedures on file      ED Provider  Electronically Signed by           Yoav Keen MD  07/02/19 7758

## 2019-07-16 LAB
LEFT EYE DIABETIC RETINOPATHY: NORMAL
RIGHT EYE DIABETIC RETINOPATHY: NORMAL

## 2019-09-20 ENCOUNTER — OFFICE VISIT (OUTPATIENT)
Dept: URGENT CARE | Facility: HOSPITAL | Age: 44
End: 2019-09-20
Payer: COMMERCIAL

## 2019-09-20 ENCOUNTER — TELEPHONE (OUTPATIENT)
Dept: OTHER | Facility: OTHER | Age: 44
End: 2019-09-20

## 2019-09-20 VITALS
DIASTOLIC BLOOD PRESSURE: 78 MMHG | WEIGHT: 233.4 LBS | HEIGHT: 64 IN | OXYGEN SATURATION: 97 % | RESPIRATION RATE: 18 BRPM | SYSTOLIC BLOOD PRESSURE: 124 MMHG | HEART RATE: 108 BPM | BODY MASS INDEX: 39.85 KG/M2 | TEMPERATURE: 98.6 F

## 2019-09-20 DIAGNOSIS — F41.9 ANXIETY: Primary | ICD-10-CM

## 2019-09-20 PROCEDURE — 99203 OFFICE O/P NEW LOW 30 MIN: CPT | Performed by: NURSE PRACTITIONER

## 2019-09-20 PROCEDURE — G0382 LEV 3 HOSP TYPE B ED VISIT: HCPCS | Performed by: NURSE PRACTITIONER

## 2019-09-20 PROCEDURE — 99283 EMERGENCY DEPT VISIT LOW MDM: CPT | Performed by: NURSE PRACTITIONER

## 2019-09-20 NOTE — PATIENT INSTRUCTIONS
Recommend follow-up with PCP and psychiatrist   If you develop any thoughts to hurt yourself or others, auditory or visual hallucinations, depression, chest pain, palpitations, shortness of breath or any concerning symptoms please proceed ER  Panic Attack   WHAT YOU NEED TO KNOW:   A panic attack is a sudden, strong feeling of fear even though you are not in danger  You also have physical symptoms such as rapid breathing or heavy sweating  Symptoms are usually worst about 10 minutes after they start and can last up to 20 minutes  You may feel like you are having a heart attack  You may have a panic attack before an event, such as a public speech you have to give  A panic attack can also happen for no clear reason  Frequent panic attacks may be a sign of a panic disorder that needs long-term treatment  DISCHARGE INSTRUCTIONS:   Return to the emergency department if:   · You have severe chest pain, shortness of breath, or irregular heartbeats  · You have thoughts of harming yourself or another person  Contact your healthcare provider if:   · You have new or worsening panic attacks after treatment  · You have questions or concerns about your condition or care  Medicines:   · Medicines  may be given to make you feel more relaxed or to reduce anxiety that causes a panic attack  Some medicines are taken only when you are having a panic attack  Other medicines can be taken to prevent panic attacks  · Take your medicine as directed  Contact your healthcare provider if you think your medicine is not helping or if you have side effects  Tell him of her if you are allergic to any medicine  Keep a list of the medicines, vitamins, and herbs you take  Include the amounts, and when and why you take them  Bring the list or the pill bottles to follow-up visits  Carry your medicine list with you in case of an emergency    Follow up with your healthcare provider as directed:  Write down your questions so you remember to ask them during your visits  Manage or prevent a panic attack:   · Manage stress  Stress can trigger a panic attack  Yoga and meditation are good ways to help manage stress  It might be helpful to talk to someone about the stress in your life  · Exercise as directed  Exercise can reduce stress and help you sleep better  Your healthcare provider can help you create an exercise plan  · Set a sleep schedule  Too little sleep can increase anxiety  Go to bed at the same time each night and wake up at the same time each morning  Keep your room quiet and free from distractions, such as a television or computer  · Limit alcohol and caffeine  Alcohol and caffeine can both increase anxiety and make it difficult for you to sleep well  A drink of alcohol is 12 ounces of beer, 5 ounces of wine, or 1½ ounces of liquor  · Eat a variety of healthy foods  Healthy foods include fruits, vegetables, low-fat dairy products, lean meats, fish, and beans  Limit sugar  Sugar can increase your symptoms  · Do not smoke  Nicotine and other chemicals in cigarettes and cigars can increase anxiety and also cause lung damage  Ask your healthcare provider for information if you currently smoke and need help to quit  E-cigarettes or smokeless tobacco still contain nicotine  Talk to your healthcare provider before you use these products  © 2017 [facility] Information is for End User's use only and may not be sold, redistributed or otherwise used for commercial purposes  All illustrations and images included in CareNotes® are the copyrighted property of [employer] , [employer]  or Jorge Guzman  The above information is an  only  It is not intended as medical advice for individual conditions or treatments  Talk to your doctor, nurse or pharmacist before following any medical regimen to see if it is safe and effective for you

## 2019-09-20 NOTE — TELEPHONE ENCOUNTER
Pt would like a call back from [facility]  Pt did not specify for what, other than it was a very important matter

## 2019-09-20 NOTE — PROGRESS NOTES
[facility]        NAME: Madalyn Nails is a 45 y o  female  : 1975    MRN: [id number]  DATE: 2019  TIME: 7:42 PM    Assessment and Plan   Anxiety [F41 9]  1  Anxiety           Patient Instructions     Recommend follow-up with PCP and psychiatrist   If you develop any thoughts to hurt yourself or others, auditory or visual hallucinations, depression, chest pain, palpitations, shortness of breath or any concerning symptoms please proceed ER  Chief Complaint     Chief Complaint   Patient presents with    Anxiety     pt states that she is having panic attacks at workand her boss wants her seen by a doctor because he is concerned about her being around the residents  History of Present Illness       Patient is a 82-year-old female who presents with a chief complaint of anxiety  Patient states that over the past couple weeks she has had an increase in panic attacks  States she does have a history of anxiety and depression  Denies any SI or HI  Denies any auditory or visual hallucinations  Denies any recent psych admissions  States that she was admitted at age 14 for cutting  Denies any previous suicide attempts  Patient notes that she has been fighting a lot with her  over the past couple weeks  states that her boss overheard her arguing with her  and advised her to take a couple days off of work  Patient states that her boss wants a doctor's note to clear her to come back to work  Patient states that she has an appointment with her PCP in a week and a half and in 2 weeks will be seeing her psychiatrist   Does not currently see a psychiatrist or therapist regularly  Denies feeling anxious at this time  Denies any chest pain, palpitations, shortness of breath  Review of Systems   Review of Systems   Constitutional: Negative for chills, diaphoresis, fatigue and fever  HENT: Negative  Eyes: Negative for photophobia and visual disturbance  Respiratory: Negative for cough, chest tightness and shortness of breath  Cardiovascular: Negative for chest pain and palpitations  Gastrointestinal: Negative for abdominal pain, diarrhea, nausea and vomiting  Musculoskeletal: Negative for arthralgias, back pain, joint swelling, myalgias, neck pain and neck stiffness  Skin: Negative for rash  Neurological: Negative for dizziness, facial asymmetry, weakness, light-headedness, numbness and headaches  Psychiatric/Behavioral: Positive for agitation  Negative for behavioral problems, confusion, hallucinations, self-injury, sleep disturbance and suicidal ideas  The patient is nervous/anxious  Current Medications       Current Outpatient Medications:     ACCU-CHEK FASTCLIX LANCETS MISC, Test blood sugars once daily  , Disp: 100 each, Rfl: 5    atorvastatin (LIPITOR) 10 mg tablet, Take 1 tablet (10 mg total) by mouth daily, Disp: 90 tablet, Rfl: 1    Blood Glucose Monitoring Suppl (ACCU-CHEK GUIDE) w/Device KIT, by Does not apply route 3 (three) times a day Test blood sugars 3 times a day , Disp: 1 kit, Rfl: 0    glucose blood (ACCU-CHEK GUIDE) test strip, 1 each by Other route daily, Disp: 100 each, Rfl: 5    metFORMIN (GLUCOPHAGE) 500 mg tablet, TAKE 1 TABLET (500 MG TOTAL) BY MOUTH 2 (TWO) TIMES A DAY WITH MEALS, Disp: 60 tablet, Rfl: 2    metoclopramide (REGLAN) 10 mg tablet, Take 1 tablet (10 mg total) by mouth 4 (four) times a day as needed (migraine), Disp: 30 tablet, Rfl: 0    SUMAtriptan (IMITREX) 25 mg tablet, Take 1 tablet (25 mg total) by mouth once as needed for migraine, Disp: 9 tablet, Rfl: 1     MG tablet, Take 600 mg by mouth every 8 (eight) hours as needed , Disp: , Rfl: 2    Current Allergies     Allergies as of 09/20/2019    (No Known Allergies)            The following portions of the patient's history were reviewed and updated as appropriate: allergies, current medications, past family history, past medical history, past social history, past surgical history and problem list      Past Medical History:   Diagnosis Date    Anxiety     Diabetes mellitus ([address] )     Hyperlipidemia        Past Surgical History:   Procedure Laterality Date    ARTHROSCOPY KNEE Left     CHOLECYSTECTOMY      ECTOPIC PREGNANCY SURGERY      SHOULDER SURGERY Right     torn bicep       Family History   Problem Relation Age of Onset    Obesity Mother     Obesity Father     Diabetes type II Father     Diabetes Brother     Obesity Brother          Medications have been verified  Objective   /78   Pulse (!) 108   Temp 98 6 °F (37 °C) (Tympanic)   Resp 18   Ht 5' 4" (1 626 m)   Wt 106 kg (233 lb 6 4 oz)   LMP 09/14/2019 (Exact Date)   SpO2 97%   BMI 40 06 kg/m²        Physical Exam     Physical Exam   Constitutional: She is oriented to person, place, and time  She appears well-developed and well-nourished  No distress  HENT:   Head: Normocephalic and atraumatic  Eyes: Pupils are equal, round, and reactive to light  Conjunctivae and EOM are normal    Cardiovascular: Normal rate, regular rhythm, S1 normal, S2 normal, normal heart sounds, intact distal pulses and normal pulses  Pulmonary/Chest: Effort normal and breath sounds normal    Neurological: She is alert and oriented to person, place, and time  GCS eye subscore is 4  GCS verbal subscore is 5  GCS motor subscore is 6  Skin: Skin is warm and dry  Capillary refill takes less than 2 seconds  She is not diaphoretic  Psychiatric: She has a normal mood and affect   Her speech is normal and behavior is normal  Thought content normal

## 2019-09-25 NOTE — TELEPHONE ENCOUNTER
Could you follow up with patient regarding status of this matter as I have been out of office  Thanks!

## 2019-09-28 DIAGNOSIS — G43.109 MIGRAINE WITH AURA AND WITHOUT STATUS MIGRAINOSUS, NOT INTRACTABLE: ICD-10-CM

## 2019-09-28 DIAGNOSIS — E11.9 TYPE 2 DIABETES MELLITUS WITHOUT COMPLICATION, WITHOUT LONG-TERM CURRENT USE OF INSULIN (HCC): ICD-10-CM

## 2019-09-30 RX ORDER — SUMATRIPTAN 25 MG/1
25 TABLET, FILM COATED ORAL ONCE AS NEEDED
Qty: 9 TABLET | Refills: 1 | Status: SHIPPED | OUTPATIENT
Start: 2019-09-30 | End: 2019-10-25 | Stop reason: SDUPTHER

## 2019-09-30 RX ORDER — PROPRANOLOL HYDROCHLORIDE 80 MG/1
80 CAPSULE, EXTENDED RELEASE ORAL DAILY
Qty: 30 CAPSULE | Refills: 1 | Status: SHIPPED | OUTPATIENT
Start: 2019-09-30 | End: 2019-10-25 | Stop reason: ALTCHOICE

## 2019-10-25 ENCOUNTER — OFFICE VISIT (OUTPATIENT)
Dept: FAMILY MEDICINE CLINIC | Facility: CLINIC | Age: 44
End: 2019-10-25
Payer: COMMERCIAL

## 2019-10-25 VITALS
HEART RATE: 103 BPM | TEMPERATURE: 98.7 F | BODY MASS INDEX: 39.61 KG/M2 | HEIGHT: 64 IN | OXYGEN SATURATION: 99 % | RESPIRATION RATE: 18 BRPM | SYSTOLIC BLOOD PRESSURE: 108 MMHG | WEIGHT: 232 LBS | DIASTOLIC BLOOD PRESSURE: 66 MMHG

## 2019-10-25 DIAGNOSIS — Z12.39 SCREENING FOR BREAST CANCER: ICD-10-CM

## 2019-10-25 DIAGNOSIS — E66.01 CLASS 3 SEVERE OBESITY DUE TO EXCESS CALORIES WITHOUT SERIOUS COMORBIDITY WITH BODY MASS INDEX (BMI) OF 40.0 TO 44.9 IN ADULT (HCC): ICD-10-CM

## 2019-10-25 DIAGNOSIS — F30.9 MOOD DISORDER OF MANIC TYPE (HCC): ICD-10-CM

## 2019-10-25 DIAGNOSIS — E11.9 TYPE 2 DIABETES MELLITUS WITHOUT COMPLICATION, WITHOUT LONG-TERM CURRENT USE OF INSULIN (HCC): Primary | ICD-10-CM

## 2019-10-25 DIAGNOSIS — E11.9 DIABETIC EYE EXAM (HCC): ICD-10-CM

## 2019-10-25 DIAGNOSIS — Z23 NEEDS FLU SHOT: ICD-10-CM

## 2019-10-25 DIAGNOSIS — Z13.220 SCREENING FOR HYPERLIPIDEMIA: ICD-10-CM

## 2019-10-25 DIAGNOSIS — Z01.00 DIABETIC EYE EXAM (HCC): ICD-10-CM

## 2019-10-25 DIAGNOSIS — G43.109 MIGRAINE WITH AURA AND WITHOUT STATUS MIGRAINOSUS, NOT INTRACTABLE: ICD-10-CM

## 2019-10-25 DIAGNOSIS — Z13.29 SCREENING FOR THYROID DISORDER: ICD-10-CM

## 2019-10-25 LAB
CREAT UR-MCNC: 131 MG/DL
MICROALBUMIN UR-MCNC: 12.2 MG/L (ref 0–20)
MICROALBUMIN/CREAT 24H UR: 9 MG/G CREATININE (ref 0–30)
SL AMB POCT HEMOGLOBIN AIC: 7.5 (ref ?–6.5)

## 2019-10-25 PROCEDURE — 90686 IIV4 VACC NO PRSV 0.5 ML IM: CPT

## 2019-10-25 PROCEDURE — 83036 HEMOGLOBIN GLYCOSYLATED A1C: CPT | Performed by: NURSE PRACTITIONER

## 2019-10-25 PROCEDURE — 99204 OFFICE O/P NEW MOD 45 MIN: CPT | Performed by: NURSE PRACTITIONER

## 2019-10-25 PROCEDURE — 90471 IMMUNIZATION ADMIN: CPT

## 2019-10-25 PROCEDURE — 82043 UR ALBUMIN QUANTITATIVE: CPT | Performed by: NURSE PRACTITIONER

## 2019-10-25 PROCEDURE — 3008F BODY MASS INDEX DOCD: CPT | Performed by: NURSE PRACTITIONER

## 2019-10-25 PROCEDURE — 82570 ASSAY OF URINE CREATININE: CPT | Performed by: NURSE PRACTITIONER

## 2019-10-25 RX ORDER — SUMATRIPTAN 25 MG/1
25 TABLET, FILM COATED ORAL ONCE AS NEEDED
Qty: 9 TABLET | Refills: 1 | Status: SHIPPED | OUTPATIENT
Start: 2019-10-25 | End: 2020-01-03 | Stop reason: SDUPTHER

## 2019-10-25 RX ORDER — METFORMIN HYDROCHLORIDE 500 MG/1
500 TABLET, EXTENDED RELEASE ORAL
Qty: 30 TABLET | Refills: 3 | Status: SHIPPED | OUTPATIENT
Start: 2019-10-25 | End: 2020-01-03 | Stop reason: SDUPTHER

## 2019-10-25 RX ORDER — QUETIAPINE FUMARATE 100 MG/1
TABLET, FILM COATED ORAL
Qty: 60 TABLET | Refills: 0 | Status: SHIPPED | OUTPATIENT
Start: 2019-10-25 | End: 2019-11-19

## 2019-10-25 NOTE — PROGRESS NOTES
[facility]        NAME: Osiel East is a 45 y o  female  : 1975    MRN: [id number]  DATE: 2019  TIME: 6:01 PM    Assessment and Plan   Type 2 diabetes mellitus without complication, without long-term current use of insulin ([facility]) [E11 9]  1  Type 2 diabetes mellitus without complication, without long-term current use of insulin ([facility])  Microalbumin / creatinine urine ratio    POCT hemoglobin A1c    metFORMIN (GLUCOPHAGE-XR) 500 mg 24 hr tablet    Comprehensive metabolic panel   2  Screening for breast cancer  Mammo screening bilateral w 3d & cad   3  Diabetic eye exam ([facility] [address] )     4  Screening for hyperlipidemia  Lipid panel   5  Screening for thyroid disorder  TSH, 3rd generation   6  Migraine with aura and without status migrainosus, not intractable  SUMAtriptan (IMITREX) 25 mg tablet   7  Needs flu shot  influenza vaccine, 9320-7875, quadrivalent, 0 5 mL, preservative-free, for adult and pediatric patients 6 mos+ (AFLURIA, FLUARIX, FLULAVAL, FLUZONE)    CANCELED: influenza vaccine, 3016-3218, quadrivalent, recombinant, PF, 0 5 mL, for patients 18 yr+ (FLUBLOK)   8  Mood disorder of manic type ([facility])  QUEtiapine (SEROquel) 100 mg tablet   9  Class 3 severe obesity due to excess calories without serious comorbidity with body mass index (BMI) of 40 0 to 44 9 in adult ([facility])     BMI Counseling: Body mass index is 39 82 kg/m²  The BMI is above normal  Nutrition recommendations include 3-5 servings of fruits/vegetables daily, moderation in carbohydrate intake, increasing intake of lean protein, reducing intake of saturated fat and trans fat and reducing intake of cholesterol  Patient Instructions     Patient Instructions   Follow up  Chief Complaint     Chief Complaint   Patient presents with    Establish Care    Anxiety     patient scratches body when nervous    Diabetes         History of Present Illness       Patient here as a new patient visit       History of DM   Diagnosed 6 months ago  Has been on metformin  Having diarrhea and needing the bathroom right after  Anxiety, bipolar- was taking xanax in the past which seemed to help  Has appointment next week with the [facility]  Wants to start something today  Willing to try something different that xanax  Migraines- getting about 6-8 a month  Takes Imitrex for migraines which seems to work well  Usually unilateral  Denies vision changes  Does get some nausea  Review of Systems   Review of Systems   Constitutional: Negative for activity change, appetite change, chills, fatigue and fever  HENT: Negative for congestion, ear pain, nosebleeds, rhinorrhea and sore throat  Eyes: Negative for photophobia, pain, redness and visual disturbance  Respiratory: Negative for cough, shortness of breath and wheezing  Cardiovascular: Negative  Negative for chest pain  Gastrointestinal: Negative  Negative for abdominal pain, constipation, diarrhea and vomiting  Endocrine: Negative  Genitourinary: Negative for difficulty urinating, dysuria and flank pain  Musculoskeletal: Negative  Skin: Negative for color change and rash  Neurological: Positive for headaches  Negative for dizziness, weakness and numbness  Hematological: Negative for adenopathy  Psychiatric/Behavioral: Positive for dysphoric mood  Negative for agitation and confusion  The patient is nervous/anxious          PHQ-9 Depression Screening    PHQ-9:    Frequency of the following problems over the past two weeks:       Little interest or pleasure in doing things:  3 - nearly every day  Feeling down, depressed, or hopeless:  3 - nearly every day  Trouble falling or staying asleep, or sleeping too much:  3 - nearly every day  Feeling tired or having little energy:  3 - nearly every day  Poor appetite or overeating:  3 - nearly every day  Feeling bad about yourself - or that you are a failure or have let yourself or your family down:  3 - nearly every day  Trouble concentrating on things, such as reading the newspaper or watching television:  3 - nearly every day  Moving or speaking so slowly that other people could have noticed  Or the opposite - being so fidgety or restless that you have been moving around a lot more than usual:  3 - nearly every day  Thoughts that you would be better off dead, or of hurting yourself in some way:  1 - several days  PHQ-2 Score:  6  PHQ-9 Score:  25        Current Medications       Current Outpatient Medications:     ACCU-CHEK FASTCLIX LANCETS MISC, Test blood sugars once daily  , Disp: 100 each, Rfl: 5    atorvastatin (LIPITOR) 10 mg tablet, Take 1 tablet (10 mg total) by mouth daily, Disp: 90 tablet, Rfl: 1    Blood Glucose Monitoring Suppl (ACCU-CHEK GUIDE) w/Device KIT, by Does not apply route 3 (three) times a day Test blood sugars 3 times a day , Disp: 1 kit, Rfl: 0     MG tablet, Take 600 mg by mouth every 8 (eight) hours as needed , Disp: , Rfl: 2    SUMAtriptan (IMITREX) 25 mg tablet, Take 1 tablet (25 mg total) by mouth once as needed for migraine, Disp: 9 tablet, Rfl: 1    metFORMIN (GLUCOPHAGE-XR) 500 mg 24 hr tablet, Take 1 tablet (500 mg total) by mouth daily with dinner, Disp: 30 tablet, Rfl: 3    QUEtiapine (SEROquel) 100 mg tablet, Start taking 1 tablet at night x 3 days, then increase to 2 tab x 3 days and then take 3 tabs nightly, Disp: 60 tablet, Rfl: 0    Current Allergies     Allergies as of 10/25/2019    (No Known Allergies)            The following portions of the patient's history were reviewed and updated as appropriate: allergies, current medications, past family history, past medical history, past social history, past surgical history and problem list      Past Medical History:   Diagnosis Date    Anxiety     Diabetes mellitus ([facility] [address] )     Hyperlipidemia        Past Surgical History:   Procedure Laterality Date    ARTHROSCOPY KNEE Left     CHOLECYSTECTOMY      ECTOPIC PREGNANCY SURGERY      SHOULDER SURGERY Right     torn bicep       Family History   Problem Relation Age of Onset    Obesity Mother     Obesity Father     Diabetes type II Father     Diabetes Brother     Obesity Brother          Medications have been verified  Objective   /66   Pulse 103   Temp 98 7 °F (37 1 °C) (Tympanic)   Resp 18   Ht 5' 4" (1 626 m)   Wt 105 kg (232 lb)   SpO2 99%   BMI 39 82 kg/m²        Physical Exam     Physical Exam   Constitutional: She is oriented to person, place, and time  She appears well-developed and well-nourished  She is cooperative  She does not appear ill  No distress  Eyes: Lids are normal    Cardiovascular: Normal rate, regular rhythm, S1 normal, S2 normal, normal heart sounds and intact distal pulses  Exam reveals no gallop and no friction rub  Pulses are no weak pulses  No murmur heard  Pulses:       Dorsalis pedis pulses are 2+ on the right side, and 2+ on the left side  Pulmonary/Chest: Effort normal and breath sounds normal  No respiratory distress  She has no decreased breath sounds  She has no wheezes  Abdominal: Normal appearance  Musculoskeletal: Normal range of motion  She exhibits no edema, tenderness or deformity  Feet:   Right Foot:   Skin Integrity: Negative for ulcer, skin breakdown, erythema, warmth, callus or dry skin  Left Foot:   Skin Integrity: Negative for ulcer, skin breakdown, erythema, warmth, callus or dry skin  Neurological: She is alert and oriented to person, place, and time  Skin: Skin is warm  No rash noted  No erythema  Psychiatric: She has a normal mood and affect  Her behavior is normal  Thought content normal    Nursing note and vitals reviewed  Patient's shoes and socks removed  Right Foot/Ankle   Right Foot Inspection  Skin Exam: skin normal and skin intact no dry skin, no warmth, no callus, no erythema, no maceration, no abnormal color, no pre-ulcer, no ulcer and no callus Toe Exam: ROM and strength within normal limits  Sensory       Monofilament testing: intact  Vascular  Capillary refills: < 3 seconds  The right DP pulse is 2+  Left Foot/Ankle  Left Foot Inspection  Skin Exam: skin normal and skin intactno dry skin, no warmth, no erythema, no maceration, normal color, no pre-ulcer, no ulcer and no callus                                         Sensory       Monofilament: intact  Vascular  Capillary refills: < 3 seconds  The left DP pulse is 2+  Assign Risk Category:  No deformity present; No loss of protective sensation;  No weak pulses       Risk: 0

## 2019-11-19 ENCOUNTER — HOSPITAL ENCOUNTER (EMERGENCY)
Facility: HOSPITAL | Age: 44
Discharge: HOME | End: 2019-11-19
Attending: EMERGENCY MEDICINE | Admitting: EMERGENCY MEDICINE
Payer: COMMERCIAL

## 2019-11-19 ENCOUNTER — APPOINTMENT (EMERGENCY)
Dept: RADIOLOGY | Facility: HOSPITAL | Age: 44
End: 2019-11-19
Payer: COMMERCIAL

## 2019-11-19 VITALS
OXYGEN SATURATION: 99 % | TEMPERATURE: 97.3 F | RESPIRATION RATE: 18 BRPM | DIASTOLIC BLOOD PRESSURE: 84 MMHG | HEART RATE: 112 BPM | HEIGHT: 64 IN | BODY MASS INDEX: 39.61 KG/M2 | WEIGHT: 232 LBS | SYSTOLIC BLOOD PRESSURE: 137 MMHG

## 2019-11-19 DIAGNOSIS — S90.32XA CONTUSION OF LEFT FOOT, INITIAL ENCOUNTER: Primary | ICD-10-CM

## 2019-11-19 PROCEDURE — 99283 EMERGENCY DEPT VISIT LOW MDM: CPT

## 2019-11-19 PROCEDURE — 73630 X-RAY EXAM OF FOOT: CPT

## 2019-11-19 PROCEDURE — 99284 EMERGENCY DEPT VISIT MOD MDM: CPT | Performed by: EMERGENCY MEDICINE

## 2019-11-19 RX ORDER — CITALOPRAM 10 MG/1
40 TABLET ORAL DAILY
COMMUNITY
End: 2021-07-07 | Stop reason: SDUPTHER

## 2019-11-19 RX ORDER — OXYCODONE HYDROCHLORIDE AND ACETAMINOPHEN 5; 325 MG/1; MG/1
2 TABLET ORAL ONCE
Status: COMPLETED | OUTPATIENT
Start: 2019-11-19 | End: 2019-11-19

## 2019-11-19 RX ADMIN — OXYCODONE HYDROCHLORIDE AND ACETAMINOPHEN 2 TABLET: 5; 325 TABLET ORAL at 20:48

## 2019-11-20 NOTE — ED PROVIDER NOTES
History  Chief Complaint   Patient presents with    Foot Injury     dropped cast iron pan on left foot     REPORT A 45YEAR-OLD  PAST MEDICAL HISTORY:  OBESITY, TYPE 2 DIABETES    CHIEF COMPLAINT:    LEFT FOOT INJURY PRIOR TO ARRIVAL    MECHANISM:  PATIENT WAS PUTTING AWAY HEAVY CAST AND POT AND SLIPPED AND FELL DOWN BLUNTLY ONTO THE TOP OF HER LEFT FOOT  SHE HAS HAD SEVERE PAIN SINCE THEN  THERE IS NO DEFORMITY    NEUROVASCULARLY INTACT:  NO WEAKNESS OR NUMBNESS    NO OTHER INJURIES:  NO BACK PAIN  NO HEAD INJURY, NO NECK INJURY, NO PAIN IN THE OTHER EXTREMITIES  SHE HAS EQUAL AND NORMAL  STRENGTH    NO WOUNDS  TETANUS UP-TO-DATE    INTERVENTIONS:  NONE        History provided by:  Patient  Foot Injury - Major   Location:  Foot  Injury: yes    Mechanism of injury: crush    Crush:     Mechanism:  Falling object    Approximate weight of object:  5 LBS  Foot location:  L foot  Pain details:     Quality:  Pressure and shooting    Radiates to:  Does not radiate    Severity:  Severe    Onset quality:  Sudden    Timing:  Constant    Progression:  Unchanged  Chronicity:  New  Dislocation: no    Foreign body present:  No foreign bodies  Prior injury to area:  No  Relieved by:  Nothing  Worsened by:  Rotation and flexion  Ineffective treatments:  None tried  Associated symptoms: no back pain, no decreased ROM, no fatigue, no fever, no itching, no muscle weakness, no neck pain, no numbness, no stiffness, no swelling and no tingling    Risk factors: obesity    Risk factors: no concern for non-accidental trauma, no frequent fractures, no known bone disorder and no recent illness        Prior to Admission Medications   Prescriptions Last Dose Informant Patient Reported? Taking? ACCU-CHEK FASTCLIX LANCETS MISC   No No   Sig: Test blood sugars once daily  Blood Glucose Monitoring Suppl (ACCU-CHEK GUIDE) w/Device KIT   No No   Sig: by Does not apply route 3 (three) times a day Test blood sugars 3 times a day      MG tablet   Yes No   Sig: Take 600 mg by mouth every 8 (eight) hours as needed    SUMAtriptan (IMITREX) 25 mg tablet   No No   Sig: Take 1 tablet (25 mg total) by mouth once as needed for migraine   atorvastatin (LIPITOR) 10 mg tablet   No No   Sig: Take 1 tablet (10 mg total) by mouth daily   citalopram (CeleXA) 10 mg tablet   Yes Yes   Sig: Take 10 mg by mouth daily   metFORMIN (GLUCOPHAGE-XR) 500 mg 24 hr tablet   No No   Sig: Take 1 tablet (500 mg total) by mouth daily with dinner      Facility-Administered Medications: None       Past Medical History:   Diagnosis Date    Anxiety     Diabetes mellitus ([address] )     Hyperlipidemia        Past Surgical History:   Procedure Laterality Date    ARTHROSCOPY KNEE Left     CHOLECYSTECTOMY      ECTOPIC PREGNANCY SURGERY      SHOULDER SURGERY Right     torn bicep       Family History   Problem Relation Age of Onset    Obesity Mother     Obesity Father     Diabetes type II Father     Diabetes Brother     Obesity Brother      I have reviewed and agree with the history as documented  Social History     Tobacco Use    Smoking status: Never Smoker    Smokeless tobacco: Never Used   Substance Use Topics    Alcohol use: No    Drug use: Not Currently        Review of Systems   Constitutional: Negative for fatigue and fever  Musculoskeletal: Negative for back pain, neck pain, neck stiffness and stiffness  ACUTE LEFT FOOT DORSUM PAIN   Skin: Negative for itching  All other systems reviewed and are negative  Physical Exam  Physical Exam   Constitutional: She is oriented to person, place, and time  She appears well-developed and well-nourished  No distress  HENT:   Head: Normocephalic and atraumatic  Nose: Nose normal    Mouth/Throat: Oropharynx is clear and moist  No oropharyngeal exudate  Eyes: Pupils are equal, round, and reactive to light  Conjunctivae and EOM are normal  Right eye exhibits no discharge  Left eye exhibits no discharge   No scleral icterus  Neck: Normal range of motion  Neck supple  No JVD present  No tracheal deviation present  Cardiovascular: Normal rate, regular rhythm, normal heart sounds and intact distal pulses  Exam reveals no gallop and no friction rub  No murmur heard  Pulmonary/Chest: Effort normal and breath sounds normal  No stridor  No respiratory distress  She has no wheezes  She has no rales  She exhibits no tenderness  Abdominal: Soft  Bowel sounds are normal  She exhibits no distension and no mass  There is no tenderness  There is no rebound and no guarding  No hernia  Musculoskeletal: Normal range of motion  She exhibits tenderness (TTP OVER THE DORSUM OF THE LEFT FOOT)  She exhibits no edema or deformity  PT'S EXAM IS NOT CONCERNING FOR MLT, NOR IS     THERE IS NO MIDLINE TENDERNESS OF THE CERVICAL, THORACIC OR L-SPINE  THERE IS NO CREPITUS OR ECCHYMOSES  NO INDURATION OR FLUCTUANCE THAT IS PRESENT THROUGHOUT THE SPINE    NECK IS SOFT AND SUPPLE, NO NUCHAL RIGIDITY, NO MENINGEAL SIGNS, NO CREPITUS, NO MLT, NO INDURATION OR FLUCCUANCE     Lymphadenopathy:     She has no cervical adenopathy  Neurological: She is alert and oriented to person, place, and time  No cranial nerve deficit or sensory deficit  She exhibits normal muscle tone  Coordination normal    Skin: Skin is warm  Capillary refill takes less than 2 seconds  No rash noted  She is not diaphoretic  No erythema  No pallor     Psychiatric: Judgment and thought content normal    Mild agitation from pain         Vital Signs  ED Triage Vitals [11/19/19 2003]   Temperature Pulse Respirations Blood Pressure SpO2   (!) 97 3 °F (36 3 °C) (!) 112 18 137/84 99 %      Temp Source Heart Rate Source Patient Position - Orthostatic VS BP Location FiO2 (%)   Temporal Monitor Sitting Left arm --      Pain Score       Worst Possible Pain           Vitals:    11/19/19 2003   BP: 137/84   Pulse: (!) 112   Patient Position - Orthostatic VS: Sitting         Visual Acuity      ED Medications  Medications   oxyCODONE-acetaminophen (PERCOCET) 5-325 mg per tablet 2 tablet (2 tablets Oral Given 11/19/19 2048)       Diagnostic Studies  Results Reviewed     None                 XR foot 3+ views LEFT   ED Interpretation by Tati Ku MD (11/19 2134)   NO DEFINITE FRACTURE SEEN  NO FOREIGN BODIES  NO FINDINGS TO SUGGEST DISLOCATION                 Procedures  Procedures       ED Course  ED Course as of Nov 19 2239 Tue Nov 19, 2019 2117 PATIENT RECEIVED HER MEDICATION  SHE IS VISIBLY MUCH IMPROVED      2134 X-RAYS REVIEWED  NO DEFINITE FRACTURE SEEN  WE WILL SPLINT AND PROVIDED CRUTCHES      2144 PATIENT UNDERSTANDS THE RESULTS OF HER WORKUP  SHE IS VERY APPRECIATIVE OF HER ER CARE AND SHE IS READY TO MANAGE FROM HOME                                      MDM    Disposition  Final diagnoses:   Contusion of left foot, initial encounter     Time reflects when diagnosis was documented in both MDM as applicable and the Disposition within this note     Time User Action Codes Description Comment    11/19/2019  9:46 PM Katrina Watt Add [S90 32XA] Contusion of left foot, initial encounter       ED Disposition     ED Disposition Condition Date/Time Comment    Discharge Stable Tue Nov 19, 2019  9:46 PM Caridad Youssef discharge to home/self care              Follow-up Information     Follow up With Specialties Details Why Contact Info Additional [id number] [address], [address] [place] [address], Nurse Practitioner Call today  [place] [address]  [address]  [address] Specialists [facility] Orthopedic Surgery Call today  [address] [id number]  [address] [place] [address] Specialists Shahnaz Cho, [address] [place], [place], [address]          Discharge Medication List as of 11/19/2019  9:47 PM      CONTINUE these medications which have NOT CHANGED    Details   citalopram (CeleXA) 10 mg tablet Take 10 mg by mouth daily, Historical Med      ACCU-CHEK FASTCLIX LANCETS MISC Test blood sugars once daily  , Normal      atorvastatin (LIPITOR) 10 mg tablet Take 1 tablet (10 mg total) by mouth daily, Starting Wed 5/8/2019, Normal      Blood Glucose Monitoring Suppl (ACCU-CHEK GUIDE) w/Device KIT by Does not apply route 3 (three) times a day Test blood sugars 3 times a day , Starting Wed 1/2/2019, Normal       MG tablet Take 600 mg by mouth every 8 (eight) hours as needed , Starting Mon 3/25/2019, Historical Med      metFORMIN (GLUCOPHAGE-XR) 500 mg 24 hr tablet Take 1 tablet (500 mg total) by mouth daily with dinner, Starting Fri 10/25/2019, Normal      SUMAtriptan (IMITREX) 25 mg tablet Take 1 tablet (25 mg total) by mouth once as needed for migraine, Starting Fri 10/25/2019, Normal           No discharge procedures on file      ED Provider  Electronically Signed by           Morena Pruitt MD  11/19/19 6652

## 2019-11-26 DIAGNOSIS — E11.9 TYPE 2 DIABETES MELLITUS WITHOUT COMPLICATION, WITHOUT LONG-TERM CURRENT USE OF INSULIN (HCC): ICD-10-CM

## 2019-11-26 NOTE — TELEPHONE ENCOUNTER
Patient called needing refills on her Atorvastatin 10mg 1 tab  daily by Mouth   # 30 with 3 refills called into [address]

## 2019-11-27 RX ORDER — ATORVASTATIN CALCIUM 10 MG/1
10 TABLET, FILM COATED ORAL DAILY
Qty: 30 TABLET | Refills: 3 | Status: SHIPPED | OUTPATIENT
Start: 2019-11-27 | End: 2020-04-24 | Stop reason: SDUPTHER

## 2020-01-03 ENCOUNTER — OFFICE VISIT (OUTPATIENT)
Dept: FAMILY MEDICINE CLINIC | Facility: CLINIC | Age: 45
End: 2020-01-03
Payer: COMMERCIAL

## 2020-01-03 VITALS
TEMPERATURE: 99.6 F | HEART RATE: 82 BPM | RESPIRATION RATE: 18 BRPM | SYSTOLIC BLOOD PRESSURE: 118 MMHG | OXYGEN SATURATION: 98 % | HEIGHT: 64 IN | BODY MASS INDEX: 39.61 KG/M2 | DIASTOLIC BLOOD PRESSURE: 68 MMHG | WEIGHT: 232 LBS

## 2020-01-03 DIAGNOSIS — E11.9 TYPE 2 DIABETES MELLITUS WITHOUT COMPLICATION, WITHOUT LONG-TERM CURRENT USE OF INSULIN (HCC): Primary | ICD-10-CM

## 2020-01-03 DIAGNOSIS — G43.109 MIGRAINE WITH AURA AND WITHOUT STATUS MIGRAINOSUS, NOT INTRACTABLE: ICD-10-CM

## 2020-01-03 DIAGNOSIS — G25.81 RESTLESS LEG SYNDROME: ICD-10-CM

## 2020-01-03 DIAGNOSIS — M79.672 LEFT FOOT PAIN: ICD-10-CM

## 2020-01-03 PROCEDURE — 3008F BODY MASS INDEX DOCD: CPT | Performed by: NURSE PRACTITIONER

## 2020-01-03 PROCEDURE — 99214 OFFICE O/P EST MOD 30 MIN: CPT | Performed by: NURSE PRACTITIONER

## 2020-01-03 RX ORDER — METFORMIN HYDROCHLORIDE 500 MG/1
500 TABLET, EXTENDED RELEASE ORAL
Qty: 30 TABLET | Refills: 3 | Status: SHIPPED | OUTPATIENT
Start: 2020-01-03 | End: 2020-04-24

## 2020-01-03 RX ORDER — SUMATRIPTAN 25 MG/1
25 TABLET, FILM COATED ORAL ONCE AS NEEDED
Qty: 9 TABLET | Refills: 1 | Status: SHIPPED | OUTPATIENT
Start: 2020-01-03 | End: 2020-02-25 | Stop reason: SDUPTHER

## 2020-01-03 RX ORDER — PRAMIPEXOLE DIHYDROCHLORIDE 0.25 MG/1
0.25 TABLET ORAL
Qty: 30 TABLET | Refills: 2 | Status: SHIPPED | OUTPATIENT
Start: 2020-01-03 | End: 2020-06-24 | Stop reason: SDUPTHER

## 2020-01-03 RX ORDER — HYDROXYZINE HYDROCHLORIDE 25 MG/1
TABLET, FILM COATED ORAL
Refills: 0 | COMMUNITY
Start: 2019-10-29 | End: 2020-06-24 | Stop reason: SDUPTHER

## 2020-01-03 NOTE — PROGRESS NOTES
[facility]        NAME: Alicia Hyde is a 45 y o  female  : 1975    MRN: [id number]  DATE: January 3, 2020  TIME: 2:45 PM    Assessment and Plan   Type 2 diabetes mellitus without complication, without long-term current use of insulin ([facility]CC) [E11 9]  1  Type 2 diabetes mellitus without complication, without long-term current use of insulin ([facility]CC)  HEMOGLOBIN A1C W/ EAG ESTIMATION    metFORMIN (GLUCOPHAGE-XR) 500 mg 24 hr tablet    CANCELED: Ambulatory referral to Podiatry   2  Left foot pain  Ambulatory referral to Orthopedic Surgery   3  Restless leg syndrome  pramipexole (MIRAPEX) 0 25 mg tablet   4  Migraine with aura and without status migrainosus, not intractable  SUMAtriptan (IMITREX) 25 mg tablet         Patient Instructions     There are no Patient Instructions on file for this visit  Chief Complaint     Chief Complaint   Patient presents with    Left foot pain    Restless Leg         History of Present Illness       Patient here with c/o left foot pain x a few months  Pain with ambulation  Denies numbness, tingling, swelling  History of Restless leg, was on mirapex  In the past which seemed to help  Its keeping her up at night, moving so much its keeping her  up, reports kicking a water glass off the table next to her bed  DM2- diabetic diet 80% of the time, has been having more sweets recently but knows she needs to stop this  Will try to avoid carbs and sugars in diet  No exercise on a daily basis  Review of Systems   Review of Systems   Constitutional: Negative for activity change, appetite change, chills, fatigue and fever  HENT: Negative for congestion, ear pain, nosebleeds, rhinorrhea and sore throat  Eyes: Negative for photophobia, pain, redness and visual disturbance  Respiratory: Negative for cough, shortness of breath and wheezing  Cardiovascular: Negative  Negative for chest pain  Gastrointestinal: Negative    Negative for abdominal pain, constipation, diarrhea and vomiting  Endocrine: Negative  Genitourinary: Negative for difficulty urinating, dysuria and flank pain  Musculoskeletal: Negative  Left foot pain  Skin: Negative for color change and rash  Neurological: Negative for dizziness, weakness, numbness and headaches  Restless leg   Hematological: Negative for adenopathy  Psychiatric/Behavioral: Negative for agitation and confusion  The patient is not nervous/anxious  PHQ-9 Depression Screening    PHQ-9:    Frequency of the following problems over the past two weeks:       Little interest or pleasure in doing things:  0 - not at all  Feeling down, depressed, or hopeless:  0 - not at all  PHQ-2 Score:  0        Current Medications       Current Outpatient Medications:     ACCU-CHEK FASTCLIX LANCETS MISC, Test blood sugars once daily  , Disp: 100 each, Rfl: 5    atorvastatin (LIPITOR) 10 mg tablet, Take 1 tablet (10 mg total) by mouth daily, Disp: 30 tablet, Rfl: 3    Blood Glucose Monitoring Suppl (ACCU-CHEK GUIDE) w/Device KIT, by Does not apply route 3 (three) times a day Test blood sugars 3 times a day , Disp: 1 kit, Rfl: 0    citalopram (CeleXA) 10 mg tablet, Take 10 mg by mouth daily, Disp: , Rfl:     hydrOXYzine HCL (ATARAX) 25 mg tablet, , Disp: , Rfl: 0     MG tablet, Take 600 mg by mouth every 8 (eight) hours as needed , Disp: , Rfl: 2    metFORMIN (GLUCOPHAGE-XR) 500 mg 24 hr tablet, Take 1 tablet (500 mg total) by mouth daily with dinner, Disp: 30 tablet, Rfl: 3    SUMAtriptan (IMITREX) 25 mg tablet, Take 1 tablet (25 mg total) by mouth once as needed for migraine, Disp: 9 tablet, Rfl: 1    pramipexole (MIRAPEX) 0 25 mg tablet, Take 1 tablet (0 25 mg total) by mouth daily at bedtime, Disp: 30 tablet, Rfl: 2    Current Allergies     Allergies as of 01/03/2020    (No Known Allergies)            The following portions of the patient's history were reviewed and updated as appropriate: allergies, current medications, past family history, past medical history, past social history, past surgical history and problem list      Past Medical History:   Diagnosis Date    Anxiety     Diabetes mellitus ([address] )     Hyperlipidemia        Past Surgical History:   Procedure Laterality Date    ARTHROSCOPY KNEE Left     CHOLECYSTECTOMY      ECTOPIC PREGNANCY SURGERY      SHOULDER SURGERY Right     torn bicep       Family History   Problem Relation Age of Onset    Obesity Mother     Obesity Father     Diabetes type II Father     Diabetes Brother     Obesity Brother          Medications have been verified  Objective   /68   Pulse 82   Temp 99 6 °F (37 6 °C) (Tympanic)   Resp 18   Ht 5' 4" (1 626 m)   Wt 105 kg (232 lb)   SpO2 98%   BMI 39 82 kg/m²        Physical Exam     Physical Exam   Constitutional: She is oriented to person, place, and time  She appears well-developed and well-nourished  She is cooperative  She does not appear ill  No distress  Eyes: Lids are normal    Cardiovascular: Normal rate, regular rhythm, S1 normal, S2 normal, normal heart sounds and intact distal pulses  Exam reveals no gallop and no friction rub  No murmur heard  Pulmonary/Chest: Effort normal and breath sounds normal  No respiratory distress  She has no decreased breath sounds  She has no wheezes  Abdominal: Normal appearance  Musculoskeletal: Normal range of motion  She exhibits no edema, tenderness or deformity  Neurological: She is alert and oriented to person, place, and time  Skin: Skin is warm  No rash noted  No erythema  Psychiatric: She has a normal mood and affect  Her behavior is normal  Thought content normal    Nursing note and vitals reviewed

## 2020-01-15 ENCOUNTER — APPOINTMENT (OUTPATIENT)
Dept: RADIOLOGY | Facility: CLINIC | Age: 45
End: 2020-01-15
Payer: COMMERCIAL

## 2020-01-15 ENCOUNTER — CONSULT (OUTPATIENT)
Dept: OBGYN CLINIC | Facility: CLINIC | Age: 45
End: 2020-01-15
Payer: COMMERCIAL

## 2020-01-15 VITALS
HEIGHT: 64 IN | DIASTOLIC BLOOD PRESSURE: 74 MMHG | BODY MASS INDEX: 41.15 KG/M2 | WEIGHT: 241 LBS | SYSTOLIC BLOOD PRESSURE: 114 MMHG

## 2020-01-15 DIAGNOSIS — M79.672 LEFT FOOT PAIN: ICD-10-CM

## 2020-01-15 DIAGNOSIS — S92.515A CLOSED NONDISPLACED FRACTURE OF PROXIMAL PHALANX OF LESSER TOE OF LEFT FOOT, INITIAL ENCOUNTER: Primary | ICD-10-CM

## 2020-01-15 PROCEDURE — 73630 X-RAY EXAM OF FOOT: CPT

## 2020-01-15 PROCEDURE — 99243 OFF/OP CNSLTJ NEW/EST LOW 30: CPT | Performed by: ORTHOPAEDIC SURGERY

## 2020-01-15 NOTE — PROGRESS NOTES
ASSESSMENT/PLAN:    Diagnoses and all orders for this visit:    Closed nondisplaced fracture of proximal phalanx of lesser toe of left foot, initial encounter    Left foot pain  -     Ambulatory referral to Orthopedic Surgery  -     XR foot 3+ vw left; Future        Plan:  I would recommend follow-up in 3-4 weeks  She was provided with a postoperative, wooden soled shoe which is to be worn as instructed  If she does note improvement in symptoms, she can transition to a standard pair shoes  In addition, the toe was buddy-taped to the adjacent toes, both medially and laterally  Tape should remain in place but may be changed as needed  X-rays will be obtained only if clinically indicated at follow-up  I have encouraged her to contact me if any questions or concerns arise  Return for 3 - 4 weeks  _____________________________________________________  CHIEF COMPLAINT:  Chief Complaint   Patient presents with    Left Foot - Pain         SUBJECTIVE:  Lydia Gagr is a 45y o  year old female who presents for evaluation of her left foot  She injured herself approximately 6 or 7 weeks ago when she dropped something onto the left foot and noted immediate onset of pain  She was seen in the emergency room at [facility] on the day of injury, 11/19/2019  X-rays were obtained  She was provided with crutches  She presented to her primary care physician's office about 2 weeks ago because of persistent pain and was referred for orthopedic consultation and treatment  She complains of pain across the dorsal aspect of her foot near the area of the MTP joint  She is not sure that there has been any improvement, indicating persistent inability to wear a pair of sneakers  She states that she is tired of wearing sandals and is hoping something could be done to help improve her symptoms  She denies paresthesias      PAST MEDICAL HISTORY:  Past Medical History:   Diagnosis Date    Anxiety     Diabetes mellitus ([facility] [address] )     Hyperlipidemia        PAST SURGICAL HISTORY:  Past Surgical History:   Procedure Laterality Date    ARTHROSCOPY KNEE Left     CHOLECYSTECTOMY      ECTOPIC PREGNANCY SURGERY      SHOULDER SURGERY Right     torn bicep       FAMILY HISTORY:  Family History   Problem Relation Age of Onset    Obesity Mother     Obesity Father     Diabetes type II Father     Diabetes Brother     Obesity Brother        SOCIAL HISTORY:  Social History     Tobacco Use    Smoking status: Never Smoker    Smokeless tobacco: Never Used   Substance Use Topics    Alcohol use: No    Drug use: Not Currently       MEDICATIONS:    Current Outpatient Medications:     ACCU-CHEK FASTCLIX LANCETS MISC, Test blood sugars once daily  , Disp: 100 each, Rfl: 5    atorvastatin (LIPITOR) 10 mg tablet, Take 1 tablet (10 mg total) by mouth daily, Disp: 30 tablet, Rfl: 3    Blood Glucose Monitoring Suppl (ACCU-CHEK GUIDE) w/Device KIT, by Does not apply route 3 (three) times a day Test blood sugars 3 times a day , Disp: 1 kit, Rfl: 0    citalopram (CeleXA) 10 mg tablet, Take 10 mg by mouth daily, Disp: , Rfl:     hydrOXYzine HCL (ATARAX) 25 mg tablet, , Disp: , Rfl: 0     MG tablet, Take 600 mg by mouth every 8 (eight) hours as needed , Disp: , Rfl: 2    metFORMIN (GLUCOPHAGE-XR) 500 mg 24 hr tablet, Take 1 tablet (500 mg total) by mouth daily with dinner, Disp: 30 tablet, Rfl: 3    pramipexole (MIRAPEX) 0 25 mg tablet, Take 1 tablet (0 25 mg total) by mouth daily at bedtime, Disp: 30 tablet, Rfl: 2    SUMAtriptan (IMITREX) 25 mg tablet, Take 1 tablet (25 mg total) by mouth once as needed for migraine, Disp: 9 tablet, Rfl: 1    ALLERGIES:  No Known Allergies    Review of systems:   Constitutional: Negative for fatigue, fever or loss of apetite  HENT: Negative  Respiratory: Negative for shortness of breath, dyspnea  Cardiovascular: Negative for chest pain/tightness     Gastrointestinal: Negative for abdominal pain, N/V  Endocrine: Negative for cold/heat intolerance, unexplained weight loss/gain  Genitourinary: Negative for flank pain, dysuria, hematuria  Musculoskeletal:  Positive as in the HPI   Skin: Negative for rash  Neurological:  Negative  Psychiatric/Behavioral: Negative for agitation  _____________________________________________________  PHYSICAL EXAMINATION:    Blood pressure 114/74, height 5' 4" (1 626 m), weight 109 kg (241 lb), not currently breastfeeding  General: well developed and well nourished, alert, oriented times 3 and appears comfortable  Psychiatric: Normal  HEENT: Benign  Cardiovascular: Regular    Pulmonary: No wheezing or stridor  Abdomen: Soft, Nontender  Skin: No masses, erythema, lacerations, fluctation, ulcerations  Neurovascular: Motor and sensory exams are grossly intact although she does lack some active flexion of the toes secondary to pain  Good color and capillary refill is noted  MUSCULOSKELETAL EXAMINATION:    The left foot exam demonstrates mild swelling and slight red discoloration of the 3rd toe  There is tenderness to palpation of the proximal portion of the 3rd toe  The remaining toes are nontender  The metatarsals are nontender, including the distal portion of the 3rd metatarsal   There is no gross deformity to visual inspection of the toe or the remainder of her left foot  The remainder of the lower extremity examination bilaterally is benign  _____________________________________________________  STUDIES REVIEWED:  Initial x-rays dated 11/19/2019 were reviewed  I do see evidence of a fracture of the proximal phalanx of the 3rd toe, most visible on the oblique image  Repeat x-rays were obtained today demonstrating the fracture to remain acceptably aligned with early callus formation  The original report was reviewed      PROCEDURES:  Procedures      Lucillie Snellen

## 2020-02-25 ENCOUNTER — TELEPHONE (OUTPATIENT)
Dept: FAMILY MEDICINE CLINIC | Facility: CLINIC | Age: 45
End: 2020-02-25

## 2020-02-25 DIAGNOSIS — G43.109 MIGRAINE WITH AURA AND WITHOUT STATUS MIGRAINOSUS, NOT INTRACTABLE: ICD-10-CM

## 2020-02-25 DIAGNOSIS — E11.8 TYPE 2 DIABETES MELLITUS WITH COMPLICATION (HCC): ICD-10-CM

## 2020-02-25 RX ORDER — SUMATRIPTAN 25 MG/1
25 TABLET, FILM COATED ORAL ONCE AS NEEDED
Qty: 30 TABLET | Refills: 1 | Status: SHIPPED | OUTPATIENT
Start: 2020-02-25 | End: 2020-04-24 | Stop reason: SDUPTHER

## 2020-02-25 RX ORDER — LANCETS
EACH MISCELLANEOUS
Qty: 100 EACH | Refills: 5 | Status: SHIPPED | OUTPATIENT
Start: 2020-02-25 | End: 2021-06-07 | Stop reason: SDUPTHER

## 2020-02-25 NOTE — TELEPHONE ENCOUNTER
Patient called and the WRONG test strips were called in for her   Her Meter is a TRUEMETRIX and the right stuff needs  benjamín called into [place]

## 2020-02-26 DIAGNOSIS — E11.8 TYPE 2 DIABETES MELLITUS WITH COMPLICATION (HCC): Primary | ICD-10-CM

## 2020-03-16 ENCOUNTER — TELEPHONE (OUTPATIENT)
Dept: FAMILY MEDICINE CLINIC | Facility: CLINIC | Age: 45
End: 2020-03-16

## 2020-03-16 NOTE — TELEPHONE ENCOUNTER
Patient called she has a tooth infection and was wondering if she could get an antibiotic  She is unable to go to the dentist at the moment for insurance reason she plans on going to the dentist in 2 months once she gets her insurance figured out  Tooth ache has been going on for 2 months now and patient does have pain         [facility]acy is [facility]

## 2020-03-17 DIAGNOSIS — K04.7 TOOTH ABSCESS: Primary | ICD-10-CM

## 2020-03-17 RX ORDER — AMOXICILLIN 500 MG/1
500 CAPSULE ORAL 2 TIMES DAILY
Qty: 20 CAPSULE | Refills: 0 | Status: SHIPPED | OUTPATIENT
Start: 2020-03-17 | End: 2020-03-27

## 2020-03-19 ENCOUNTER — TELEPHONE (OUTPATIENT)
Dept: FAMILY MEDICINE CLINIC | Facility: CLINIC | Age: 45
End: 2020-03-19

## 2020-04-24 ENCOUNTER — TELEMEDICINE (OUTPATIENT)
Dept: FAMILY MEDICINE CLINIC | Facility: CLINIC | Age: 45
End: 2020-04-24
Payer: COMMERCIAL

## 2020-04-24 VITALS — RESPIRATION RATE: 16 BRPM | TEMPERATURE: 98.7 F

## 2020-04-24 DIAGNOSIS — E11.9 TYPE 2 DIABETES MELLITUS WITHOUT COMPLICATION, WITHOUT LONG-TERM CURRENT USE OF INSULIN (HCC): ICD-10-CM

## 2020-04-24 DIAGNOSIS — G43.109 MIGRAINE WITH AURA AND WITHOUT STATUS MIGRAINOSUS, NOT INTRACTABLE: ICD-10-CM

## 2020-04-24 DIAGNOSIS — J30.1 ALLERGIC RHINITIS DUE TO POLLEN, UNSPECIFIED SEASONALITY: Primary | ICD-10-CM

## 2020-04-24 PROCEDURE — 99213 OFFICE O/P EST LOW 20 MIN: CPT | Performed by: NURSE PRACTITIONER

## 2020-04-24 RX ORDER — SUMATRIPTAN 25 MG/1
25 TABLET, FILM COATED ORAL ONCE AS NEEDED
Qty: 30 TABLET | Refills: 1 | Status: SHIPPED | OUTPATIENT
Start: 2020-04-24 | End: 2020-05-15 | Stop reason: SDUPTHER

## 2020-04-24 RX ORDER — LEVOCETIRIZINE DIHYDROCHLORIDE 5 MG/1
5 TABLET, FILM COATED ORAL EVERY EVENING
Qty: 30 TABLET | Refills: 3 | Status: SHIPPED | OUTPATIENT
Start: 2020-04-24 | End: 2020-06-24

## 2020-04-24 RX ORDER — TOPIRAMATE 25 MG/1
25 CAPSULE, COATED PELLETS ORAL 2 TIMES DAILY
Qty: 60 CAPSULE | Refills: 3 | Status: SHIPPED | OUTPATIENT
Start: 2020-04-24 | End: 2020-10-05 | Stop reason: SDUPTHER

## 2020-04-24 RX ORDER — ATORVASTATIN CALCIUM 10 MG/1
10 TABLET, FILM COATED ORAL DAILY
Qty: 30 TABLET | Refills: 5 | Status: SHIPPED | OUTPATIENT
Start: 2020-04-24 | End: 2020-05-15 | Stop reason: SDUPTHER

## 2020-05-05 ENCOUNTER — TELEMEDICINE (OUTPATIENT)
Dept: FAMILY MEDICINE CLINIC | Facility: CLINIC | Age: 45
End: 2020-05-05
Payer: COMMERCIAL

## 2020-05-05 DIAGNOSIS — K04.7 DENTAL ABSCESS: Primary | ICD-10-CM

## 2020-05-05 PROCEDURE — 99213 OFFICE O/P EST LOW 20 MIN: CPT | Performed by: NURSE PRACTITIONER

## 2020-05-14 ENCOUNTER — TELEMEDICINE (OUTPATIENT)
Dept: FAMILY MEDICINE CLINIC | Facility: CLINIC | Age: 45
End: 2020-05-14
Payer: COMMERCIAL

## 2020-05-14 DIAGNOSIS — Z20.828 EXPOSURE TO SARS-ASSOCIATED CORONAVIRUS: ICD-10-CM

## 2020-05-14 DIAGNOSIS — J40 BRONCHITIS: Primary | ICD-10-CM

## 2020-05-14 PROCEDURE — 99213 OFFICE O/P EST LOW 20 MIN: CPT | Performed by: NURSE PRACTITIONER

## 2020-05-14 RX ORDER — PREDNISONE 20 MG/1
TABLET ORAL
Qty: 12 TABLET | Refills: 0 | Status: SHIPPED | OUTPATIENT
Start: 2020-05-14 | End: 2020-06-24

## 2020-05-14 RX ORDER — DEXTROMETHORPHAN HYDROBROMIDE AND PROMETHAZINE HYDROCHLORIDE 15; 6.25 MG/5ML; MG/5ML
5 SOLUTION ORAL 4 TIMES DAILY PRN
Qty: 118 ML | Refills: 0 | Status: SHIPPED | OUTPATIENT
Start: 2020-05-14 | End: 2020-06-24

## 2020-05-14 RX ORDER — ALBUTEROL SULFATE 90 UG/1
2 AEROSOL, METERED RESPIRATORY (INHALATION) EVERY 6 HOURS PRN
Qty: 1 INHALER | Refills: 5 | Status: SHIPPED | OUTPATIENT
Start: 2020-05-14 | End: 2021-07-07

## 2020-05-14 RX ORDER — DOXYCYCLINE HYCLATE 100 MG
100 TABLET ORAL 2 TIMES DAILY
Qty: 20 TABLET | Refills: 0 | Status: SHIPPED | OUTPATIENT
Start: 2020-05-14 | End: 2020-05-24

## 2020-05-15 DIAGNOSIS — E11.9 TYPE 2 DIABETES MELLITUS WITHOUT COMPLICATION, WITHOUT LONG-TERM CURRENT USE OF INSULIN (HCC): ICD-10-CM

## 2020-05-15 DIAGNOSIS — G43.109 MIGRAINE WITH AURA AND WITHOUT STATUS MIGRAINOSUS, NOT INTRACTABLE: ICD-10-CM

## 2020-05-15 RX ORDER — ATORVASTATIN CALCIUM 10 MG/1
10 TABLET, FILM COATED ORAL DAILY
Qty: 30 TABLET | Refills: 3 | Status: SHIPPED | OUTPATIENT
Start: 2020-05-15 | End: 2020-10-05 | Stop reason: SDUPTHER

## 2020-05-15 RX ORDER — SUMATRIPTAN 25 MG/1
25 TABLET, FILM COATED ORAL ONCE AS NEEDED
Qty: 30 TABLET | Refills: 0 | Status: SHIPPED | OUTPATIENT
Start: 2020-05-15 | End: 2020-11-29 | Stop reason: SDUPTHER

## 2020-05-22 LAB — EXT SARS-COV-2: NOT DETECTED

## 2020-06-24 ENCOUNTER — OFFICE VISIT (OUTPATIENT)
Dept: FAMILY MEDICINE CLINIC | Facility: CLINIC | Age: 45
End: 2020-06-24
Payer: COMMERCIAL

## 2020-06-24 VITALS
HEIGHT: 64 IN | HEART RATE: 110 BPM | RESPIRATION RATE: 20 BRPM | BODY MASS INDEX: 39.27 KG/M2 | TEMPERATURE: 97.6 F | WEIGHT: 230 LBS | DIASTOLIC BLOOD PRESSURE: 86 MMHG | SYSTOLIC BLOOD PRESSURE: 128 MMHG | OXYGEN SATURATION: 98 %

## 2020-06-24 DIAGNOSIS — G25.81 RESTLESS LEG SYNDROME: ICD-10-CM

## 2020-06-24 DIAGNOSIS — H10.13 ALLERGIC CONJUNCTIVITIS OF BOTH EYES: ICD-10-CM

## 2020-06-24 DIAGNOSIS — J30.1 ALLERGIC RHINITIS DUE TO POLLEN, UNSPECIFIED SEASONALITY: Primary | ICD-10-CM

## 2020-06-24 DIAGNOSIS — F41.8 DEPRESSION WITH ANXIETY: ICD-10-CM

## 2020-06-24 PROCEDURE — 1036F TOBACCO NON-USER: CPT | Performed by: INTERNAL MEDICINE

## 2020-06-24 PROCEDURE — 99213 OFFICE O/P EST LOW 20 MIN: CPT | Performed by: INTERNAL MEDICINE

## 2020-06-24 PROCEDURE — 3008F BODY MASS INDEX DOCD: CPT | Performed by: INTERNAL MEDICINE

## 2020-06-24 PROCEDURE — 2022F DILAT RTA XM EVC RTNOPTHY: CPT | Performed by: INTERNAL MEDICINE

## 2020-06-24 RX ORDER — OLOPATADINE HYDROCHLORIDE 1 MG/ML
1 SOLUTION/ DROPS OPHTHALMIC 2 TIMES DAILY
Qty: 5 ML | Refills: 3 | Status: SHIPPED | OUTPATIENT
Start: 2020-06-24 | End: 2020-11-29 | Stop reason: SDUPTHER

## 2020-06-24 RX ORDER — MONTELUKAST SODIUM 10 MG/1
10 TABLET ORAL
Qty: 30 TABLET | Refills: 1 | Status: SHIPPED | OUTPATIENT
Start: 2020-06-24 | End: 2020-08-05 | Stop reason: SDUPTHER

## 2020-06-24 RX ORDER — HYDROXYZINE 50 MG/1
50 TABLET, FILM COATED ORAL EVERY 6 HOURS PRN
Qty: 120 TABLET | Refills: 1 | Status: SHIPPED | OUTPATIENT
Start: 2020-06-24 | End: 2021-11-22 | Stop reason: SDUPTHER

## 2020-06-24 RX ORDER — AZELASTINE 1 MG/ML
1 SPRAY, METERED NASAL 2 TIMES DAILY
Qty: 1 BOTTLE | Refills: 3 | Status: SHIPPED | OUTPATIENT
Start: 2020-06-24 | End: 2020-11-29 | Stop reason: SDUPTHER

## 2020-06-24 RX ORDER — PRAMIPEXOLE DIHYDROCHLORIDE 0.25 MG/1
0.25 TABLET ORAL
Qty: 30 TABLET | Refills: 2 | Status: SHIPPED | OUTPATIENT
Start: 2020-06-24 | End: 2020-10-01

## 2020-08-05 DIAGNOSIS — J30.1 ALLERGIC RHINITIS DUE TO POLLEN, UNSPECIFIED SEASONALITY: ICD-10-CM

## 2020-08-05 RX ORDER — MONTELUKAST SODIUM 10 MG/1
10 TABLET ORAL
Qty: 30 TABLET | Refills: 1 | Status: SHIPPED | OUTPATIENT
Start: 2020-08-05 | End: 2021-07-07 | Stop reason: SDUPTHER

## 2020-08-05 NOTE — TELEPHONE ENCOUNTER
Received fax from patient's pharmacy, [facility], requesting refill of Singulair 10mg  Please advise

## 2020-09-26 DIAGNOSIS — E11.9 TYPE 2 DIABETES MELLITUS WITHOUT COMPLICATION, WITHOUT LONG-TERM CURRENT USE OF INSULIN (HCC): Primary | ICD-10-CM

## 2020-09-26 DIAGNOSIS — E78.00 HYPERCHOLESTEREMIA: ICD-10-CM

## 2020-09-26 DIAGNOSIS — G25.81 RESTLESS LEG SYNDROME: ICD-10-CM

## 2020-10-01 DIAGNOSIS — J30.1 ALLERGIC RHINITIS DUE TO POLLEN, UNSPECIFIED SEASONALITY: ICD-10-CM

## 2020-10-01 RX ORDER — PRAMIPEXOLE DIHYDROCHLORIDE 0.25 MG/1
0.25 TABLET ORAL
Qty: 30 TABLET | Refills: 0 | Status: SHIPPED | OUTPATIENT
Start: 2020-10-01 | End: 2020-10-05 | Stop reason: SDUPTHER

## 2020-10-02 RX ORDER — LEVOCETIRIZINE DIHYDROCHLORIDE 5 MG/1
5 TABLET, FILM COATED ORAL EVERY EVENING
Qty: 30 TABLET | Refills: 0 | Status: SHIPPED | OUTPATIENT
Start: 2020-10-02 | End: 2021-03-05

## 2020-10-05 ENCOUNTER — TELEPHONE (OUTPATIENT)
Dept: FAMILY MEDICINE CLINIC | Facility: CLINIC | Age: 45
End: 2020-10-05

## 2020-10-05 ENCOUNTER — OFFICE VISIT (OUTPATIENT)
Dept: FAMILY MEDICINE CLINIC | Facility: CLINIC | Age: 45
End: 2020-10-05
Payer: COMMERCIAL

## 2020-10-05 VITALS
OXYGEN SATURATION: 99 % | RESPIRATION RATE: 18 BRPM | TEMPERATURE: 97.9 F | DIASTOLIC BLOOD PRESSURE: 70 MMHG | HEART RATE: 99 BPM | BODY MASS INDEX: 39.27 KG/M2 | HEIGHT: 64 IN | WEIGHT: 230 LBS | SYSTOLIC BLOOD PRESSURE: 116 MMHG

## 2020-10-05 DIAGNOSIS — T78.40XA ALLERGY, INITIAL ENCOUNTER: ICD-10-CM

## 2020-10-05 DIAGNOSIS — G25.81 RESTLESS LEG SYNDROME: ICD-10-CM

## 2020-10-05 DIAGNOSIS — G43.109 MIGRAINE WITH AURA AND WITHOUT STATUS MIGRAINOSUS, NOT INTRACTABLE: ICD-10-CM

## 2020-10-05 DIAGNOSIS — E11.9 TYPE 2 DIABETES MELLITUS WITHOUT COMPLICATION, WITHOUT LONG-TERM CURRENT USE OF INSULIN (HCC): Primary | ICD-10-CM

## 2020-10-05 LAB — SL AMB POCT HEMOGLOBIN AIC: 7 (ref ?–6.5)

## 2020-10-05 PROCEDURE — 3051F HG A1C>EQUAL 7.0%<8.0%: CPT | Performed by: NURSE PRACTITIONER

## 2020-10-05 PROCEDURE — 99214 OFFICE O/P EST MOD 30 MIN: CPT | Performed by: NURSE PRACTITIONER

## 2020-10-05 PROCEDURE — 3725F SCREEN DEPRESSION PERFORMED: CPT | Performed by: NURSE PRACTITIONER

## 2020-10-05 PROCEDURE — 83036 HEMOGLOBIN GLYCOSYLATED A1C: CPT | Performed by: NURSE PRACTITIONER

## 2020-10-05 PROCEDURE — 1036F TOBACCO NON-USER: CPT | Performed by: NURSE PRACTITIONER

## 2020-10-05 RX ORDER — TOPIRAMATE 25 MG/1
25 CAPSULE, COATED PELLETS ORAL 2 TIMES DAILY
Qty: 60 CAPSULE | Refills: 3 | Status: SHIPPED | OUTPATIENT
Start: 2020-10-05 | End: 2020-11-23

## 2020-10-05 RX ORDER — PRAMIPEXOLE DIHYDROCHLORIDE 0.5 MG/1
0.5 TABLET ORAL
Qty: 30 TABLET | Refills: 3 | Status: SHIPPED | OUTPATIENT
Start: 2020-10-05 | End: 2020-11-29 | Stop reason: SDUPTHER

## 2020-10-05 RX ORDER — ATORVASTATIN CALCIUM 10 MG/1
10 TABLET, FILM COATED ORAL DAILY
Qty: 30 TABLET | Refills: 3 | Status: SHIPPED | OUTPATIENT
Start: 2020-10-05 | End: 2020-11-29 | Stop reason: SDUPTHER

## 2020-11-04 ENCOUNTER — TELEPHONE (OUTPATIENT)
Dept: FAMILY MEDICINE CLINIC | Facility: CLINIC | Age: 45
End: 2020-11-04

## 2020-11-06 ENCOUNTER — TRANSCRIBE ORDERS (OUTPATIENT)
Dept: ADMINISTRATIVE | Facility: HOSPITAL | Age: 45
End: 2020-11-06

## 2020-11-06 ENCOUNTER — CLINICAL SUPPORT (OUTPATIENT)
Dept: FAMILY MEDICINE CLINIC | Facility: CLINIC | Age: 45
End: 2020-11-06

## 2020-11-06 DIAGNOSIS — E11.9 TYPE 2 DIABETES MELLITUS WITHOUT COMPLICATION, WITHOUT LONG-TERM CURRENT USE OF INSULIN (HCC): Primary | ICD-10-CM

## 2020-11-06 DIAGNOSIS — K04.7 TOOTH INFECTION: ICD-10-CM

## 2020-11-06 DIAGNOSIS — Z12.31 SCREENING MAMMOGRAM FOR HIGH-RISK PATIENT: Primary | ICD-10-CM

## 2020-11-06 LAB
CREAT UR-MCNC: 110 MG/DL
MICROALBUMIN UR-MCNC: 20.4 MG/L (ref 0–20)
MICROALBUMIN/CREAT 24H UR: 19 MG/G CREATININE (ref 0–30)

## 2020-11-06 PROCEDURE — 3061F NEG MICROALBUMINURIA REV: CPT | Performed by: NURSE PRACTITIONER

## 2020-11-06 PROCEDURE — 82043 UR ALBUMIN QUANTITATIVE: CPT | Performed by: NURSE PRACTITIONER

## 2020-11-06 PROCEDURE — 82570 ASSAY OF URINE CREATININE: CPT | Performed by: NURSE PRACTITIONER

## 2020-11-06 RX ORDER — AMOXICILLIN 500 MG/1
500 CAPSULE ORAL EVERY 12 HOURS SCHEDULED
Qty: 20 CAPSULE | Refills: 0 | Status: SHIPPED | OUTPATIENT
Start: 2020-11-06 | End: 2020-11-16

## 2020-11-12 ENCOUNTER — HOSPITAL ENCOUNTER (OUTPATIENT)
Dept: MAMMOGRAPHY | Facility: HOSPITAL | Age: 45
Discharge: HOME | End: 2020-11-12
Payer: COMMERCIAL

## 2020-11-12 VITALS — HEIGHT: 64 IN | BODY MASS INDEX: 39.27 KG/M2 | WEIGHT: 230 LBS

## 2020-11-12 DIAGNOSIS — Z12.31 SCREENING MAMMOGRAM FOR HIGH-RISK PATIENT: ICD-10-CM

## 2020-11-12 PROCEDURE — 77063 BREAST TOMOSYNTHESIS BI: CPT

## 2020-11-12 PROCEDURE — 77067 SCR MAMMO BI INCL CAD: CPT

## 2020-11-21 DIAGNOSIS — G43.109 MIGRAINE WITH AURA AND WITHOUT STATUS MIGRAINOSUS, NOT INTRACTABLE: ICD-10-CM

## 2020-11-23 RX ORDER — TOPIRAMATE 25 MG/1
25 CAPSULE, COATED PELLETS ORAL 2 TIMES DAILY
Qty: 60 CAPSULE | Refills: 3 | Status: SHIPPED | OUTPATIENT
Start: 2020-11-23 | End: 2021-11-22 | Stop reason: SDUPTHER

## 2020-11-29 DIAGNOSIS — G43.109 MIGRAINE WITH AURA AND WITHOUT STATUS MIGRAINOSUS, NOT INTRACTABLE: ICD-10-CM

## 2020-11-29 DIAGNOSIS — J30.1 ALLERGIC RHINITIS DUE TO POLLEN, UNSPECIFIED SEASONALITY: ICD-10-CM

## 2020-11-29 DIAGNOSIS — H10.13 ALLERGIC CONJUNCTIVITIS OF BOTH EYES: ICD-10-CM

## 2020-11-29 DIAGNOSIS — E11.9 TYPE 2 DIABETES MELLITUS WITHOUT COMPLICATION, WITHOUT LONG-TERM CURRENT USE OF INSULIN (HCC): ICD-10-CM

## 2020-11-29 DIAGNOSIS — G25.81 RESTLESS LEG SYNDROME: ICD-10-CM

## 2020-11-30 RX ORDER — PRAMIPEXOLE DIHYDROCHLORIDE 0.5 MG/1
0.5 TABLET ORAL
Qty: 30 TABLET | Refills: 0 | Status: SHIPPED | OUTPATIENT
Start: 2020-11-30 | End: 2020-12-07

## 2020-11-30 RX ORDER — ATORVASTATIN CALCIUM 10 MG/1
10 TABLET, FILM COATED ORAL DAILY
Qty: 30 TABLET | Refills: 0 | Status: SHIPPED | OUTPATIENT
Start: 2020-11-30 | End: 2021-09-14

## 2020-11-30 RX ORDER — OLOPATADINE HYDROCHLORIDE 1 MG/ML
1 SOLUTION/ DROPS OPHTHALMIC 2 TIMES DAILY
Qty: 5 ML | Refills: 0 | Status: SHIPPED | OUTPATIENT
Start: 2020-11-30 | End: 2021-06-07 | Stop reason: SDUPTHER

## 2020-11-30 RX ORDER — SUMATRIPTAN 25 MG/1
25 TABLET, FILM COATED ORAL ONCE AS NEEDED
Qty: 30 TABLET | Refills: 0 | Status: SHIPPED | OUTPATIENT
Start: 2020-11-30 | End: 2021-06-07 | Stop reason: SDUPTHER

## 2020-11-30 RX ORDER — AZELASTINE 1 MG/ML
1 SPRAY, METERED NASAL 2 TIMES DAILY
Qty: 1 BOTTLE | Refills: 0 | Status: SHIPPED | OUTPATIENT
Start: 2020-11-30 | End: 2020-12-29

## 2020-12-05 DIAGNOSIS — E11.9 TYPE 2 DIABETES MELLITUS WITHOUT COMPLICATION, WITHOUT LONG-TERM CURRENT USE OF INSULIN (HCC): ICD-10-CM

## 2020-12-05 DIAGNOSIS — G25.81 RESTLESS LEG SYNDROME: ICD-10-CM

## 2020-12-07 RX ORDER — PRAMIPEXOLE DIHYDROCHLORIDE 0.5 MG/1
0.5 TABLET ORAL
Qty: 30 TABLET | Refills: 2 | Status: SHIPPED | OUTPATIENT
Start: 2020-12-07 | End: 2020-12-29

## 2020-12-09 ENCOUNTER — OFFICE VISIT (OUTPATIENT)
Dept: URGENT CARE | Facility: CLINIC | Age: 45
End: 2020-12-09
Payer: COMMERCIAL

## 2020-12-09 VITALS — RESPIRATION RATE: 18 BRPM | OXYGEN SATURATION: 99 % | HEART RATE: 122 BPM | TEMPERATURE: 97.4 F

## 2020-12-09 DIAGNOSIS — R05.9 COUGH: ICD-10-CM

## 2020-12-09 DIAGNOSIS — J20.9 ACUTE BRONCHITIS, UNSPECIFIED ORGANISM: Primary | ICD-10-CM

## 2020-12-09 PROCEDURE — G0382 LEV 3 HOSP TYPE B ED VISIT: HCPCS | Performed by: PHYSICIAN ASSISTANT

## 2020-12-09 PROCEDURE — 87637 SARSCOV2&INF A&B&RSV AMP PRB: CPT | Performed by: PHYSICIAN ASSISTANT

## 2020-12-09 PROCEDURE — 99203 OFFICE O/P NEW LOW 30 MIN: CPT | Performed by: PHYSICIAN ASSISTANT

## 2020-12-09 PROCEDURE — 99283 EMERGENCY DEPT VISIT LOW MDM: CPT | Performed by: PHYSICIAN ASSISTANT

## 2020-12-09 RX ORDER — ALBUTEROL SULFATE 90 UG/1
2 AEROSOL, METERED RESPIRATORY (INHALATION) EVERY 6 HOURS PRN
Qty: 8.5 G | Refills: 0 | Status: SHIPPED | OUTPATIENT
Start: 2020-12-09 | End: 2021-07-07 | Stop reason: SDUPTHER

## 2020-12-09 RX ORDER — AZITHROMYCIN 250 MG/1
TABLET, FILM COATED ORAL
Qty: 6 TABLET | Refills: 0 | Status: SHIPPED | OUTPATIENT
Start: 2020-12-09 | End: 2020-12-13

## 2020-12-12 LAB
FLUAV RNA NPH QL NAA+PROBE: NOT DETECTED
FLUBV RNA NPH QL NAA+PROBE: NOT DETECTED
RSV RNA NPH QL NAA+PROBE: NOT DETECTED
SARS-COV-2 RNA NPH QL NAA+PROBE: DETECTED

## 2020-12-15 ENCOUNTER — TELEMEDICINE (OUTPATIENT)
Dept: FAMILY MEDICINE CLINIC | Facility: CLINIC | Age: 45
End: 2020-12-15
Payer: COMMERCIAL

## 2020-12-15 VITALS — OXYGEN SATURATION: 93 %

## 2020-12-15 DIAGNOSIS — U07.1 COVID-19 VIRUS INFECTION: Primary | ICD-10-CM

## 2020-12-15 PROCEDURE — 1036F TOBACCO NON-USER: CPT | Performed by: INTERNAL MEDICINE

## 2020-12-15 PROCEDURE — 99213 OFFICE O/P EST LOW 20 MIN: CPT | Performed by: INTERNAL MEDICINE

## 2020-12-16 ENCOUNTER — TELEPHONE (OUTPATIENT)
Dept: FAMILY MEDICINE CLINIC | Facility: CLINIC | Age: 45
End: 2020-12-16

## 2020-12-27 DIAGNOSIS — G25.81 RESTLESS LEG SYNDROME: ICD-10-CM

## 2020-12-27 DIAGNOSIS — J30.1 ALLERGIC RHINITIS DUE TO POLLEN, UNSPECIFIED SEASONALITY: ICD-10-CM

## 2020-12-29 RX ORDER — PRAMIPEXOLE DIHYDROCHLORIDE 0.5 MG/1
0.5 TABLET ORAL
Qty: 30 TABLET | Refills: 2 | Status: SHIPPED | OUTPATIENT
Start: 2020-12-29 | End: 2021-05-03

## 2020-12-29 RX ORDER — AZELASTINE 1 MG/ML
1 SPRAY, METERED NASAL 2 TIMES DAILY
Qty: 30 ML | Refills: 2 | Status: SHIPPED | OUTPATIENT
Start: 2020-12-29 | End: 2021-03-19

## 2020-12-30 DIAGNOSIS — U07.1 COVID-19: Primary | ICD-10-CM

## 2020-12-30 RX ORDER — NEBULIZER ACCESSORIES
KIT MISCELLANEOUS EVERY 6 HOURS
Qty: 1 EACH | Refills: 0 | Status: SHIPPED | OUTPATIENT
Start: 2020-12-30 | End: 2021-03-05

## 2020-12-31 ENCOUNTER — OFFICE VISIT (OUTPATIENT)
Dept: URGENT CARE | Facility: CLINIC | Age: 45
End: 2020-12-31
Payer: COMMERCIAL

## 2020-12-31 VITALS
HEART RATE: 107 BPM | TEMPERATURE: 97.6 F | SYSTOLIC BLOOD PRESSURE: 125 MMHG | HEIGHT: 64 IN | OXYGEN SATURATION: 98 % | RESPIRATION RATE: 20 BRPM | BODY MASS INDEX: 39.27 KG/M2 | DIASTOLIC BLOOD PRESSURE: 81 MMHG | WEIGHT: 230 LBS

## 2020-12-31 DIAGNOSIS — R00.0 TACHYCARDIA: ICD-10-CM

## 2020-12-31 DIAGNOSIS — R06.02 SHORTNESS OF BREATH: Primary | ICD-10-CM

## 2020-12-31 DIAGNOSIS — R05.9 COUGH: ICD-10-CM

## 2020-12-31 DIAGNOSIS — U07.1 COVID-19 VIRUS INFECTION: ICD-10-CM

## 2020-12-31 PROCEDURE — 99213 OFFICE O/P EST LOW 20 MIN: CPT | Performed by: PHYSICIAN ASSISTANT

## 2020-12-31 PROCEDURE — 99283 EMERGENCY DEPT VISIT LOW MDM: CPT | Performed by: PHYSICIAN ASSISTANT

## 2020-12-31 PROCEDURE — G0382 LEV 3 HOSP TYPE B ED VISIT: HCPCS | Performed by: PHYSICIAN ASSISTANT

## 2021-01-05 RX ORDER — ALBUTEROL SULFATE 90 UG/1
2 AEROSOL, METERED RESPIRATORY (INHALATION) EVERY 6 HOURS PRN
Qty: 8.5 G | Refills: 0 | OUTPATIENT
Start: 2021-01-05

## 2021-01-18 ENCOUNTER — TELEPHONE (OUTPATIENT)
Dept: FAMILY MEDICINE CLINIC | Facility: CLINIC | Age: 46
End: 2021-01-18

## 2021-01-18 NOTE — TELEPHONE ENCOUNTER
Called and spoke with patient  I offered her a virtual visit with you for tomorrow  She is scheduled for 11:30 with you  I advised the patient to to ER with any worsening symptoms, chest pain, sob or difficulty breathing, nausea, vomiting, or headache  She verbalizes an understanding

## 2021-01-18 NOTE — TELEPHONE ENCOUNTER
Pt called and stated that she did her 14 days quarantine and her cough is worse   SOB also      She has no inhalers and is using her husbands      Please advise    Phone; [phone]

## 2021-02-06 DIAGNOSIS — E11.9 TYPE 2 DIABETES MELLITUS WITHOUT COMPLICATION, WITHOUT LONG-TERM CURRENT USE OF INSULIN (HCC): ICD-10-CM

## 2021-03-05 ENCOUNTER — LAB (OUTPATIENT)
Dept: LAB | Facility: CLINIC | Age: 46
End: 2021-03-05
Payer: COMMERCIAL

## 2021-03-05 ENCOUNTER — OFFICE VISIT (OUTPATIENT)
Dept: FAMILY MEDICINE CLINIC | Facility: CLINIC | Age: 46
End: 2021-03-05
Payer: COMMERCIAL

## 2021-03-05 ENCOUNTER — TELEPHONE (OUTPATIENT)
Dept: FAMILY MEDICINE CLINIC | Facility: CLINIC | Age: 46
End: 2021-03-05

## 2021-03-05 VITALS
SYSTOLIC BLOOD PRESSURE: 126 MMHG | RESPIRATION RATE: 20 BRPM | HEART RATE: 97 BPM | OXYGEN SATURATION: 98 % | TEMPERATURE: 98.1 F | WEIGHT: 243 LBS | HEIGHT: 64 IN | BODY MASS INDEX: 41.48 KG/M2 | DIASTOLIC BLOOD PRESSURE: 70 MMHG

## 2021-03-05 DIAGNOSIS — E78.00 HYPERCHOLESTEREMIA: ICD-10-CM

## 2021-03-05 DIAGNOSIS — E11.9 TYPE 2 DIABETES MELLITUS WITHOUT COMPLICATION, WITHOUT LONG-TERM CURRENT USE OF INSULIN (HCC): ICD-10-CM

## 2021-03-05 DIAGNOSIS — E66.01 CLASS 3 SEVERE OBESITY DUE TO EXCESS CALORIES WITHOUT SERIOUS COMORBIDITY WITH BODY MASS INDEX (BMI) OF 40.0 TO 44.9 IN ADULT (HCC): ICD-10-CM

## 2021-03-05 DIAGNOSIS — Z00.00 ANNUAL PHYSICAL EXAM: ICD-10-CM

## 2021-03-05 DIAGNOSIS — E11.9 TYPE 2 DIABETES MELLITUS WITHOUT COMPLICATION, WITHOUT LONG-TERM CURRENT USE OF INSULIN (HCC): Primary | ICD-10-CM

## 2021-03-05 DIAGNOSIS — F41.8 DEPRESSION WITH ANXIETY: ICD-10-CM

## 2021-03-05 LAB
ALBUMIN SERPL BCP-MCNC: 3.8 G/DL (ref 3.5–5)
ALP SERPL-CCNC: 69 U/L (ref 46–116)
ALT SERPL W P-5'-P-CCNC: 46 U/L (ref 12–78)
ANION GAP SERPL CALCULATED.3IONS-SCNC: 6 MMOL/L (ref 4–13)
AST SERPL W P-5'-P-CCNC: 28 U/L (ref 5–45)
BILIRUB SERPL-MCNC: 0.39 MG/DL (ref 0.2–1)
BUN SERPL-MCNC: 12 MG/DL (ref 5–25)
CALCIUM SERPL-MCNC: 9.2 MG/DL (ref 8.3–10.1)
CHLORIDE SERPL-SCNC: 111 MMOL/L (ref 100–108)
CHOLEST SERPL-MCNC: 147 MG/DL (ref 50–200)
CO2 SERPL-SCNC: 23 MMOL/L (ref 21–32)
CREAT SERPL-MCNC: 0.7 MG/DL (ref 0.6–1.3)
EST. AVERAGE GLUCOSE BLD GHB EST-MCNC: 160 MG/DL
GFR SERPL CREATININE-BSD FRML MDRD: 106 ML/MIN/1.73SQ M
GLUCOSE P FAST SERPL-MCNC: 146 MG/DL (ref 65–99)
HBA1C MFR BLD: 7.2 %
HDLC SERPL-MCNC: 50 MG/DL
LDLC SERPL CALC-MCNC: 70 MG/DL (ref 0–100)
NONHDLC SERPL-MCNC: 97 MG/DL
POTASSIUM SERPL-SCNC: 4 MMOL/L (ref 3.5–5.3)
PROT SERPL-MCNC: 7.4 G/DL (ref 6.4–8.2)
SL AMB POCT HEMOGLOBIN AIC: 7.5 (ref ?–6.5)
SODIUM SERPL-SCNC: 140 MMOL/L (ref 136–145)
TRIGL SERPL-MCNC: 136 MG/DL

## 2021-03-05 PROCEDURE — 36415 COLL VENOUS BLD VENIPUNCTURE: CPT

## 2021-03-05 PROCEDURE — 3008F BODY MASS INDEX DOCD: CPT | Performed by: NURSE PRACTITIONER

## 2021-03-05 PROCEDURE — 80053 COMPREHEN METABOLIC PANEL: CPT

## 2021-03-05 PROCEDURE — 83036 HEMOGLOBIN GLYCOSYLATED A1C: CPT | Performed by: NURSE PRACTITIONER

## 2021-03-05 PROCEDURE — 99213 OFFICE O/P EST LOW 20 MIN: CPT | Performed by: NURSE PRACTITIONER

## 2021-03-05 PROCEDURE — 3051F HG A1C>EQUAL 7.0%<8.0%: CPT | Performed by: NURSE PRACTITIONER

## 2021-03-05 PROCEDURE — 99396 PREV VISIT EST AGE 40-64: CPT | Performed by: NURSE PRACTITIONER

## 2021-03-05 PROCEDURE — 3725F SCREEN DEPRESSION PERFORMED: CPT | Performed by: NURSE PRACTITIONER

## 2021-03-05 PROCEDURE — 80061 LIPID PANEL: CPT

## 2021-03-05 PROCEDURE — 83036 HEMOGLOBIN GLYCOSYLATED A1C: CPT

## 2021-03-05 NOTE — PATIENT INSTRUCTIONS
follow up in 3 months    Wellness Visit for Adults   AMBULATORY CARE:   A wellness visit  is when you see your healthcare provider to get screened for health problems  Your healthcare provider will also give you advice on how to stay healthy  Write down your questions so you remember to ask them  Ask your healthcare provider how often you should have a wellness visit  What happens at a wellness visit:  Your healthcare provider will ask about your health, and your family history of health problems  This includes high blood pressure, heart disease, and cancer  He or she will ask if you have symptoms that concern you, if you smoke, and about your mood  You may also be asked about your intake of medicines, supplements, food, and alcohol  Any of the following may be done:  · Your weight  will be checked  Your height may also be checked so your body mass index (BMI) can be calculated  Your BMI shows if you are at a healthy weight  · Your blood pressure  and heart rate will be checked  Your temperature may also be checked  · Blood and urine tests  may be done  Blood tests may be done to check your cholesterol levels  Abnormal cholesterol levels increase your risk for heart disease and stroke  You may also need a blood or urine test to check for diabetes if you are at increased risk  Urine tests may be done to look for signs of an infection or kidney disease  · A physical exam  includes checking your heartbeat and lungs with a stethoscope  Your healthcare provider may also check your skin to look for sun damage  · Screening tests  may be recommended  A screening test is done to check for diseases that may not cause symptoms  The screening tests you may need depend on your age, gender, family history, and lifestyle habits  For example, colorectal screening may be recommended if you are 48years old or older  Screening tests you need if you are a woman:   · A Pap smear  is used to screen for cervical cancer  Pap smears are usually done every 3 to 5 years depending on your age  You may need them more often if you have had abnormal Pap smear test results in the past  Ask your healthcare provider how often you should have a Pap smear  · A mammogram  is an x-ray of your breasts to screen for breast cancer  Experts recommend mammograms every 2 years starting at age 48 years  You may need a mammogram at age 52 years or younger if you have an increased risk for breast cancer  Talk to your healthcare provider about when you should start having mammograms and how often you need them  Vaccines you may need:   · Get an influenza vaccine  every year  The influenza vaccine protects you from the flu  Several types of viruses cause the flu  The viruses change over time, so new vaccines are made each year  · Get a tetanus-diphtheria (Td) booster vaccine  every 10 years  This vaccine protects you against tetanus and diphtheria  Tetanus is a severe infection that may cause painful muscle spasms and lockjaw  Diphtheria is a severe bacterial infection that causes a thick covering in the back of your mouth and throat  · Get a human papillomavirus (HPV) vaccine  if you are female and aged 23 to 32 or male 23 to 24 and never received it  This vaccine protects you from HPV infection  HPV is the most common infection spread by sexual contact  HPV may also cause vaginal, penile, and anal cancers  · Get a pneumococcal vaccine  if you are aged 72 years or older  The pneumococcal vaccine is an injection given to protect you from pneumococcal disease  Pneumococcal disease is an infection caused by pneumococcal bacteria  The infection may cause pneumonia, meningitis, or an ear infection  · Get a shingles vaccine  if you are 60 or older, even if you have had shingles before  The shingles vaccine is an injection to protect you from the varicella-zoster virus  This is the same virus that causes chickenpox   Shingles is a painful rash that develops in people who had chickenpox or have been exposed to the virus  How to eat healthy:  My Plate is a model for planning healthy meals  It shows the types and amounts of foods that should go on your plate  Fruits and vegetables make up about half of your plate, and grains and protein make up the other half  A serving of dairy is included on the side of your plate  The amount of calories and serving sizes you need depends on your age, gender, weight, and height  Examples of healthy foods are listed below:  · Eat a variety of vegetables  such as dark green, red, and orange vegetables  You can also include canned vegetables low in sodium (salt) and frozen vegetables without added butter or sauces  · Eat a variety of fresh fruits , canned fruit in 100% juice, frozen fruit, and dried fruit  · Include whole grains  At least half of the grains you eat should be whole grains  Examples include whole-wheat bread, wheat pasta, brown rice, and whole-grain cereals such as oatmeal     · Eat a variety of protein foods such as seafood (fish and shellfish), lean meat, and poultry without skin (turkey and chicken)  Examples of lean meats include pork leg, shoulder, or tenderloin, and beef round, sirloin, tenderloin, and extra lean ground beef  Other protein foods include eggs and egg substitutes, beans, peas, soy products, nuts, and seeds  · Choose low-fat dairy products such as skim or 1% milk or low-fat yogurt, cheese, and cottage cheese  · Limit unhealthy fats  such as butter, hard margarine, and shortening  Exercise:  Exercise at least 30 minutes per day on most days of the week  Some examples of exercise include walking, biking, dancing, and swimming  You can also fit in more physical activity by taking the stairs instead of the elevator or parking farther away from stores  Include muscle strengthening activities 2 days each week  Regular exercise provides many health benefits   It helps you manage your weight, and decreases your risk for type 2 diabetes, heart disease, stroke, and high blood pressure  Exercise can also help improve your mood  Ask your healthcare provider about the best exercise plan for you  General health and safety guidelines:   · Do not smoke  Nicotine and other chemicals in cigarettes and cigars can cause lung damage  Ask your healthcare provider for information if you currently smoke and need help to quit  E-cigarettes or smokeless tobacco still contain nicotine  Talk to your healthcare provider before you use these products  · Limit alcohol  A drink of alcohol is 12 ounces of beer, 5 ounces of wine, or 1½ ounces of liquor  · Lose weight, if needed  Being overweight increases your risk of certain health conditions  These include heart disease, high blood pressure, type 2 diabetes, and certain types of cancer  · Protect your skin  Do not sunbathe or use tanning beds  Use sunscreen with a SPF 15 or higher  Apply sunscreen at least 15 minutes before you go outside  Reapply sunscreen every 2 hours  Wear protective clothing, hats, and sunglasses when you are outside  · Drive safely  Always wear your seatbelt  Make sure everyone in your car wears a seatbelt  A seatbelt can save your life if you are in an accident  Do not use your cell phone when you are driving  This could distract you and cause an accident  Pull over if you need to make a call or send a text message  · Practice safe sex  Use latex condoms if are sexually active and have more than one partner  Your healthcare provider may recommend screening tests for sexually transmitted infections (STIs)  · Wear helmets, lifejackets, and protective gear  Always wear a helmet when you ride a bike or motorcycle, go skiing, or play sports that could cause a head injury  Wear protective equipment when you play sports  Wear a lifejacket when you are on a boat or doing water sports      © Copyright [facility] [address] Information is for End User's use only and may not be sold, redistributed or otherwise used for commercial purposes  All illustrations and images included in CareNotes® are the copyrighted property of [employer]  or [employer]  The above information is an  only  It is not intended as medical advice for individual conditions or treatments  Talk to your doctor, nurse or pharmacist before following any medical regimen to see if it is safe and effective for you

## 2021-03-05 NOTE — PROGRESS NOTES
[facility]        NAME: Anthony Tristan is a 39 y o  female  : 1975    MRN: [id number]  DATE: 2021  TIME: 2:15 PM    Assessment and Plan   Type 2 diabetes mellitus without complication, without long-term current use of insulin (HCC) [E11 9]  1  Type 2 diabetes mellitus without complication, without long-term current use of insulin (HCC)  One Touch Verio IQ    glucose blood test strip    POCT hemoglobin A1c   2  Depression with anxiety     3  Class 3 severe obesity due to excess calories without serious comorbidity with body mass index (BMI) of 40 0 to 44 9 in adult ([facility] [address] )     4  Annual physical exam       1  Type 2 diabetes mellitus without complication, without long-term current use of insulin (HCC)  Continue metformin and dietary changes  Needs new meter   - One Touch Verio IQ  - glucose blood test strip; Use as instructed  Dispense: 200 each; Refill: 11  - POCT hemoglobin A1c    2  Depression with anxiety  Continue celexa    3  Class 3 severe obesity due to excess calories without serious comorbidity with body mass index (BMI) of 40 0 to 44 9 in adult [facility])  Dietary changes    4  Annual physical exam        Patient Instructions     Patient Instructions     follow up in 3 months    Wellness Visit for Adults   AMBULATORY CARE:   A wellness visit  is when you see your healthcare provider to get screened for health problems  Your healthcare provider will also give you advice on how to stay healthy  Write down your questions so you remember to ask them  Ask your healthcare provider how often you should have a wellness visit  What happens at a wellness visit:  Your healthcare provider will ask about your health, and your family history of health problems  This includes high blood pressure, heart disease, and cancer  He or she will ask if you have symptoms that concern you, if you smoke, and about your mood   You may also be asked about your intake of medicines, supplements, food, and alcohol  Any of the following may be done:  · Your weight  will be checked  Your height may also be checked so your body mass index (BMI) can be calculated  Your BMI shows if you are at a healthy weight  · Your blood pressure  and heart rate will be checked  Your temperature may also be checked  · Blood and urine tests  may be done  Blood tests may be done to check your cholesterol levels  Abnormal cholesterol levels increase your risk for heart disease and stroke  You may also need a blood or urine test to check for diabetes if you are at increased risk  Urine tests may be done to look for signs of an infection or kidney disease  · A physical exam  includes checking your heartbeat and lungs with a stethoscope  Your healthcare provider may also check your skin to look for sun damage  · Screening tests  may be recommended  A screening test is done to check for diseases that may not cause symptoms  The screening tests you may need depend on your age, gender, family history, and lifestyle habits  For example, colorectal screening may be recommended if you are 48years old or older  Screening tests you need if you are a woman:   · A Pap smear  is used to screen for cervical cancer  Pap smears are usually done every 3 to 5 years depending on your age  You may need them more often if you have had abnormal Pap smear test results in the past  Ask your healthcare provider how often you should have a Pap smear  · A mammogram  is an x-ray of your breasts to screen for breast cancer  Experts recommend mammograms every 2 years starting at age 48 years  You may need a mammogram at age 52 years or younger if you have an increased risk for breast cancer  Talk to your healthcare provider about when you should start having mammograms and how often you need them  Vaccines you may need:   · Get an influenza vaccine  every year  The influenza vaccine protects you from the flu   Several types of viruses cause the flu  The viruses change over time, so new vaccines are made each year  · Get a tetanus-diphtheria (Td) booster vaccine  every 10 years  This vaccine protects you against tetanus and diphtheria  Tetanus is a severe infection that may cause painful muscle spasms and lockjaw  Diphtheria is a severe bacterial infection that causes a thick covering in the back of your mouth and throat  · Get a human papillomavirus (HPV) vaccine  if you are female and aged 23 to 32 or male 23 to 24 and never received it  This vaccine protects you from HPV infection  HPV is the most common infection spread by sexual contact  HPV may also cause vaginal, penile, and anal cancers  · Get a pneumococcal vaccine  if you are aged 72 years or older  The pneumococcal vaccine is an injection given to protect you from pneumococcal disease  Pneumococcal disease is an infection caused by pneumococcal bacteria  The infection may cause pneumonia, meningitis, or an ear infection  · Get a shingles vaccine  if you are 60 or older, even if you have had shingles before  The shingles vaccine is an injection to protect you from the varicella-zoster virus  This is the same virus that causes chickenpox  Shingles is a painful rash that develops in people who had chickenpox or have been exposed to the virus  How to eat healthy:  My Plate is a model for planning healthy meals  It shows the types and amounts of foods that should go on your plate  Fruits and vegetables make up about half of your plate, and grains and protein make up the other half  A serving of dairy is included on the side of your plate  The amount of calories and serving sizes you need depends on your age, gender, weight, and height  Examples of healthy foods are listed below:  · Eat a variety of vegetables  such as dark green, red, and orange vegetables  You can also include canned vegetables low in sodium (salt) and frozen vegetables without added butter or sauces      · Eat a variety of fresh fruits , canned fruit in 100% juice, frozen fruit, and dried fruit  · Include whole grains  At least half of the grains you eat should be whole grains  Examples include whole-wheat bread, wheat pasta, brown rice, and whole-grain cereals such as oatmeal     · Eat a variety of protein foods such as seafood (fish and shellfish), lean meat, and poultry without skin (turkey and chicken)  Examples of lean meats include pork leg, shoulder, or tenderloin, and beef round, sirloin, tenderloin, and extra lean ground beef  Other protein foods include eggs and egg substitutes, beans, peas, soy products, nuts, and seeds  · Choose low-fat dairy products such as skim or 1% milk or low-fat yogurt, cheese, and cottage cheese  · Limit unhealthy fats  such as butter, hard margarine, and shortening  Exercise:  Exercise at least 30 minutes per day on most days of the week  Some examples of exercise include walking, biking, dancing, and swimming  You can also fit in more physical activity by taking the stairs instead of the elevator or parking farther away from stores  Include muscle strengthening activities 2 days each week  Regular exercise provides many health benefits  It helps you manage your weight, and decreases your risk for type 2 diabetes, heart disease, stroke, and high blood pressure  Exercise can also help improve your mood  Ask your healthcare provider about the best exercise plan for you  General health and safety guidelines:   · Do not smoke  Nicotine and other chemicals in cigarettes and cigars can cause lung damage  Ask your healthcare provider for information if you currently smoke and need help to quit  E-cigarettes or smokeless tobacco still contain nicotine  Talk to your healthcare provider before you use these products  · Limit alcohol  A drink of alcohol is 12 ounces of beer, 5 ounces of wine, or 1½ ounces of liquor  · Lose weight, if needed    Being overweight increases your risk of certain health conditions  These include heart disease, high blood pressure, type 2 diabetes, and certain types of cancer  · Protect your skin  Do not sunbathe or use tanning beds  Use sunscreen with a SPF 15 or higher  Apply sunscreen at least 15 minutes before you go outside  Reapply sunscreen every 2 hours  Wear protective clothing, hats, and sunglasses when you are outside  · Drive safely  Always wear your seatbelt  Make sure everyone in your car wears a seatbelt  A seatbelt can save your life if you are in an accident  Do not use your cell phone when you are driving  This could distract you and cause an accident  Pull over if you need to make a call or send a text message  · Practice safe sex  Use latex condoms if are sexually active and have more than one partner  Your healthcare provider may recommend screening tests for sexually transmitted infections (STIs)  · Wear helmets, lifejackets, and protective gear  Always wear a helmet when you ride a bike or motorcycle, go skiing, or play sports that could cause a head injury  Wear protective equipment when you play sports  Wear a lifejacket when you are on a boat or doing water sports  © Copyright [address] Information is for End User's use only and may not be sold, redistributed or otherwise used for commercial purposes  All illustrations and images included in CareNotes® are the copyrighted property of A D A M , Inc  or [facility] [employer]   The above information is an  only  It is not intended as medical advice for individual conditions or treatments  Talk to your doctor, nurse or pharmacist before following any medical regimen to see if it is safe and effective for you  Chief Complaint     Chief Complaint   Patient presents with    Follow-up         History of Present Illness       Patient here for follow up visit  Recently has COVID infection, has recovered from this     Reports she gets out of breathing with the stairs since she had COVID and she also has gained 15 lbs  Reports that shoveling snow she needs to use an inhaler because she starts to wheeze with activity  DM2- Hgba1c in office is 7 5 today and  Needs to start watching her diet  Has been eating more sweets  A1c has increased from 7 0 up to 7 5  compliant with medication  2 friends  of an overdose in her house, they were staying with patient while they were on parole when her  was in senior care in [place] for 2 months   has some health issues and feels like she has a a lot of stress in her life    Diabetes  She presents for her follow-up diabetic visit  She has type 2 diabetes mellitus  Her disease course has been stable  There are no hypoglycemic associated symptoms  Pertinent negatives for hypoglycemia include no dizziness or headaches  There are no diabetic associated symptoms  Pertinent negatives for diabetes include no chest pain and no fatigue  Risk factors for coronary artery disease include diabetes mellitus and dyslipidemia  Current diabetic treatment includes oral agent (monotherapy)  Her weight is stable  She is following a generally healthy diet  She has not had a previous visit with a dietitian  An ACE inhibitor/angiotensin II receptor blocker is being taken  She does not see a podiatrist Eye exam is not current  Review of Systems   Review of Systems   Constitutional: Negative  Negative for fatigue and fever  Eyes: Negative for photophobia  Respiratory: Positive for shortness of breath  Negative for cough and wheezing  Cardiovascular: Negative  Negative for chest pain, palpitations and leg swelling  Musculoskeletal: Negative for arthralgias  Neurological: Negative  Negative for dizziness, light-headedness, numbness and headaches  Hematological: Negative for adenopathy  Psychiatric/Behavioral: Negative  Negative for decreased concentration and dysphoric mood         PHQ-9 Depression Screening    PHQ-9:   Frequency of the following problems over the past two weeks:      Little interest or pleasure in doing things: 0 - not at all  Feeling down, depressed, or hopeless: 0 - not at all  Trouble falling or staying asleep, or sleeping too much: 0 - not at all  Feeling tired or having little energy: 0 - not at all  Poor appetite or overeatin - not at all  Feeling bad about yourself - or that you are a failure or have let yourself or your family down: 0 - not at all  Trouble concentrating on things, such as reading the newspaper or watching television: 0 - not at all  Moving or speaking so slowly that other people could have noticed  Or the opposite - being so fidgety or restless that you have been moving around a lot more than usual: 0 - not at all  Thoughts that you would be better off dead, or of hurting yourself in some way: 0 - not at all  PHQ-2 Score: 0  PHQ-9 Score: 0        Current Medications       Current Outpatient Medications:     Accu-Chek FastClix Lancets MISC, Test blood sugars once daily  , Disp: 100 each, Rfl: 5    albuterol (ProAir HFA) 90 mcg/act inhaler, Inhale 2 puffs every 6 (six) hours as needed for wheezing, Disp: 8 5 g, Rfl: 0    atorvastatin (LIPITOR) 10 mg tablet, Take 1 tablet (10 mg total) by mouth daily, Disp: 30 tablet, Rfl: 0    azelastine (ASTELIN) 0 1 % nasal spray, 1 SPRAY INTO EACH NOSTRIL 2 (TWO) TIMES A DAY USE IN EACH NOSTRIL AS DIRECTED, Disp: 30 mL, Rfl: 2    citalopram (CeleXA) 10 mg tablet, Take 10 mg by mouth daily, Disp: , Rfl:     hydrOXYzine HCL (ATARAX) 50 mg tablet, Take 1 tablet (50 mg total) by mouth every 6 (six) hours as needed for itching, allergies or anxiety, Disp: 120 tablet, Rfl: 1    metFORMIN (GLUCOPHAGE) 500 mg tablet, TAKE 2 TABLETS IN THE AM AND 1 TABLET IN THE PM, Disp: 60 tablet, Rfl: 2    montelukast (SINGULAIR) 10 mg tablet, Take 1 tablet (10 mg total) by mouth daily at bedtime, Disp: 30 tablet, Rfl: 1   olopatadine (PATANOL) 0 1 % ophthalmic solution, Administer 1 drop to both eyes 2 (two) times a day, Disp: 5 mL, Rfl: 0    pramipexole (MIRAPEX) 0 5 mg tablet, TAKE 1 TABLET (0 5 MG TOTAL) BY MOUTH DAILY AT BEDTIME, Disp: 30 tablet, Rfl: 2    SUMAtriptan (IMITREX) 25 mg tablet, Take 1 tablet (25 mg total) by mouth once as needed for migraine, Disp: 30 tablet, Rfl: 0    topiramate (TOPAMAX) 25 mg sprinkle capsule, TAKE 1 CAPSULE (25 MG TOTAL) BY MOUTH 2 (TWO) TIMES A DAY, Disp: 60 capsule, Rfl: 3    albuterol (PROVENTIL HFA,VENTOLIN HFA) 90 mcg/act inhaler, Inhale 2 puffs every 6 (six) hours as needed for wheezing or shortness of breath (Patient not taking: Reported on 3/5/2021), Disp: 1 Inhaler, Rfl: 5    glucose blood test strip, Use as instructed, Disp: 200 each, Rfl: 11     MG tablet, Take 600 mg by mouth every 8 (eight) hours as needed , Disp: , Rfl: 2    Current Allergies     Allergies as of 03/05/2021    (No Known Allergies)            The following portions of the patient's history were reviewed and updated as appropriate: allergies, current medications, past family history, past medical history, past social history, past surgical history and problem list      Past Medical History:   Diagnosis Date    Anxiety     Diabetes mellitus ([address] )     Hyperlipidemia        Past Surgical History:   Procedure Laterality Date    ARTHROSCOPY KNEE Left     CHOLECYSTECTOMY      ECTOPIC PREGNANCY SURGERY      SHOULDER SURGERY Right     torn bicep       Family History   Problem Relation Age of Onset    Obesity Mother     Obesity Father     Diabetes type II Father     Diabetes Brother     Obesity Brother     No Known Problems Daughter     No Known Problems Maternal Grandmother     No Known Problems Paternal Grandmother     No Known Problems Maternal Aunt     No Known Problems Maternal Aunt     No Known Problems Maternal Aunt          Medications have been verified          Objective   /70 Pulse 97   Temp 98 1 °F (36 7 °C)   Resp 20   Ht 5' 4" (1 626 m)   Wt 110 kg (243 lb)   SpO2 98%   BMI 41 71 kg/m²        Physical Exam     Physical Exam  Vitals signs and nursing note reviewed  Constitutional:       General: She is not in acute distress  Appearance: Normal appearance  She is well-developed  She is not ill-appearing  Eyes:      General: Lids are normal    Cardiovascular:      Rate and Rhythm: Normal rate and regular rhythm  Heart sounds: Normal heart sounds, S1 normal and S2 normal  No murmur  No friction rub  No gallop  Pulmonary:      Effort: Pulmonary effort is normal  No respiratory distress  Breath sounds: Normal breath sounds  No decreased breath sounds or wheezing  Musculoskeletal: Normal range of motion  General: No tenderness or deformity  Skin:     General: Skin is warm  Findings: No erythema or rash  Neurological:      Mental Status: She is alert and oriented to person, place, and time  Psychiatric:         Behavior: Behavior normal  Behavior is cooperative  Thought Content:  Thought content normal

## 2021-03-06 PROBLEM — E78.49 OTHER HYPERLIPIDEMIA: Status: ACTIVE | Noted: 2021-03-06

## 2021-03-06 PROBLEM — G43.009 MIGRAINE WITHOUT AURA, NOT INTRACTABLE: Status: ACTIVE | Noted: 2021-03-06

## 2021-03-06 NOTE — PROGRESS NOTES
ADULT ANNUAL [facility]    NAME: Katherine Ledesma  AGE: 39 y o  SEX: female  : 1975     DATE: 3/6/2021     Assessment and Plan:     Problem List Items Addressed This Visit        Endocrine    Type 2 diabetes mellitus without complication, without long-term current use of insulin ([facility] [address] ) - Primary    Relevant Medications    glucose blood test strip    Other Relevant Orders    One Touch Verio IQ    POCT hemoglobin A1c (Completed)       Other    Depression with anxiety    Obesity      Other Visit Diagnoses     Annual physical exam              Immunizations and preventive care screenings were discussed with patient today  Appropriate education was printed on patient's after visit summary  Counseling:  Alcohol/drug use: discussed moderation in alcohol intake, the recommendations for healthy alcohol use, and avoidance of illicit drug use  Sexual health: discussed sexually transmitted diseases, partner selection, use of condoms, avoidance of unintended pregnancy, and contraceptive alternatives  · Exercise: the importance of regular exercise/physical activity was discussed  Recommend exercise 3-5 times per week for at least 30 minutes  BMI Counseling: Body mass index is 41 71 kg/m²  The BMI is above normal  Nutrition recommendations include encouraging healthy choices of fruits and vegetables, consuming healthier snacks, limiting drinks that contain sugar, moderation in carbohydrate intake, reducing intake of saturated and trans fat and reducing intake of cholesterol  Exercise recommendations include exercising 3-5 times per week  Return in 3 months (on 2021)  Chief Complaint:     Chief Complaint   Patient presents with    Follow-up      History of Present Illness:     Adult Annual Physical   Patient here for a comprehensive physical exam  The patient reports no problems      Diet and Physical Activity  · Diet/Nutrition: poor diet, frequent junk food and limited fruits/vegetables  · Exercise: no formal exercise  Depression Screening  PHQ-9 Depression Screening    PHQ-9:   Frequency of the following problems over the past two weeks:      Little interest or pleasure in doing things: 0 - not at all  Feeling down, depressed, or hopeless: 0 - not at all  Trouble falling or staying asleep, or sleeping too much: 0 - not at all  Feeling tired or having little energy: 0 - not at all  Poor appetite or overeatin - not at all  Feeling bad about yourself - or that you are a failure or have let yourself or your family down: 0 - not at all  Trouble concentrating on things, such as reading the newspaper or watching television: 0 - not at all  Moving or speaking so slowly that other people could have noticed  Or the opposite - being so fidgety or restless that you have been moving around a lot more than usual: 0 - not at all  Thoughts that you would be better off dead, or of hurting yourself in some way: 0 - not at all  PHQ-2 Score: 0  PHQ-9 Score: 0       General Health  · Sleep: sleeps well and gets 7-8 hours of sleep on average  · Hearing: normal - bilateral   · Vision: most recent eye exam >1 year ago  · Dental: no dental visits for >1 year              Review of Systems:     Review of Systems see acute note   Past Medical History:     Past Medical History:   Diagnosis Date    Anxiety     Diabetes mellitus ([facility] [address] )     Hyperlipidemia       Past Surgical History:     Past Surgical History:   Procedure Laterality Date    ARTHROSCOPY KNEE Left     CHOLECYSTECTOMY      ECTOPIC PREGNANCY SURGERY      SHOULDER SURGERY Right     torn bicep      Social History:        Social History     Socioeconomic History    Marital status: /Civil Union     Spouse name: Not on file    Number of children: Not on file    Years of education: Not on file    Highest education level: Not on file   Occupational History    Not on file   Social Needs    Financial resource strain: Not on file    Food insecurity     Worry: Not on file     Inability: Not on file    Transportation needs     Medical: Not on file     Non-medical: Not on file   Tobacco Use    Smoking status: Never Smoker    Smokeless tobacco: Never Used   Substance and Sexual Activity    Alcohol use: No    Drug use: Not Currently    Sexual activity: Not on file   Lifestyle    Physical activity     Days per week: Not on file     Minutes per session: Not on file    Stress: Not on file   Relationships    Social connections     Talks on phone: Not on file     Gets together: Not on file     Attends Denominational service: Not on file     Active member of club or organization: Not on file     Attends meetings of clubs or organizations: Not on file     Relationship status: Not on file    Intimate partner violence     Fear of current or ex partner: Not on file     Emotionally abused: Not on file     Physically abused: Not on file     Forced sexual activity: Not on file   Other Topics Concern    Not on file   Social History Narrative    Not on file      Family History:     Family History   Problem Relation Age of Onset    Obesity Mother     Obesity Father     Diabetes type II Father     Diabetes Brother     Obesity Brother     No Known Problems Daughter     No Known Problems Maternal Grandmother     No Known Problems Paternal Grandmother     No Known Problems Maternal Aunt     No Known Problems Maternal Aunt     No Known Problems Maternal Aunt       Current Medications:     Current Outpatient Medications   Medication Sig Dispense Refill    Accu-Chek FastClix Lancets MISC Test blood sugars once daily   100 each 5    albuterol (ProAir HFA) 90 mcg/act inhaler Inhale 2 puffs every 6 (six) hours as needed for wheezing 8 5 g 0    atorvastatin (LIPITOR) 10 mg tablet Take 1 tablet (10 mg total) by mouth daily 30 tablet 0    azelastine (ASTELIN) 0 1 % nasal spray 1 SPRAY INTO EACH NOSTRIL 2 (TWO) TIMES A DAY USE IN EACH NOSTRIL AS DIRECTED 30 mL 2    citalopram (CeleXA) 10 mg tablet Take 10 mg by mouth daily      hydrOXYzine HCL (ATARAX) 50 mg tablet Take 1 tablet (50 mg total) by mouth every 6 (six) hours as needed for itching, allergies or anxiety 120 tablet 1    metFORMIN (GLUCOPHAGE) 500 mg tablet TAKE 2 TABLETS IN THE AM AND 1 TABLET IN THE PM 60 tablet 2    montelukast (SINGULAIR) 10 mg tablet Take 1 tablet (10 mg total) by mouth daily at bedtime 30 tablet 1    olopatadine (PATANOL) 0 1 % ophthalmic solution Administer 1 drop to both eyes 2 (two) times a day 5 mL 0    pramipexole (MIRAPEX) 0 5 mg tablet TAKE 1 TABLET (0 5 MG TOTAL) BY MOUTH DAILY AT BEDTIME 30 tablet 2    SUMAtriptan (IMITREX) 25 mg tablet Take 1 tablet (25 mg total) by mouth once as needed for migraine 30 tablet 0    topiramate (TOPAMAX) 25 mg sprinkle capsule TAKE 1 CAPSULE (25 MG TOTAL) BY MOUTH 2 (TWO) TIMES A DAY 60 capsule 3    albuterol (PROVENTIL HFA,VENTOLIN HFA) 90 mcg/act inhaler Inhale 2 puffs every 6 (six) hours as needed for wheezing or shortness of breath (Patient not taking: Reported on 3/5/2021) 1 Inhaler 5    glucose blood test strip Use as instructed 200 each 11     MG tablet Take 600 mg by mouth every 8 (eight) hours as needed   2     No current facility-administered medications for this visit         Allergies:     No Known Allergies   Physical Exam:     /70   Pulse 97   Temp 98 1 °F (36 7 °C)   Resp 20   Ht 5' 4" (1 626 m)   Wt 110 kg (243 lb)   SpO2 98%   BMI 41 71 kg/m²     Physical Exam see acute note    Vincenzo King, [address]

## 2021-03-08 DIAGNOSIS — E11.9 TYPE 2 DIABETES MELLITUS WITHOUT COMPLICATION, WITHOUT LONG-TERM CURRENT USE OF INSULIN (HCC): Primary | ICD-10-CM

## 2021-03-08 NOTE — TELEPHONE ENCOUNTER
Pt called stating her new one touch verio glucometer was covered, but she does not have the test strips for it   Sending script back for approval

## 2021-03-10 DIAGNOSIS — E11.9 TYPE 2 DIABETES MELLITUS WITHOUT COMPLICATION, WITHOUT LONG-TERM CURRENT USE OF INSULIN (HCC): ICD-10-CM

## 2021-03-10 NOTE — TELEPHONE ENCOUNTER
Pharmacy called stating they need a new script with how many times a day patient is to be using the strips for testing   [facility]

## 2021-03-15 RX ORDER — LORAZEPAM 0.5 MG/1
TABLET ORAL
COMMUNITY
Start: 2021-02-20 | End: 2022-03-11

## 2021-03-15 RX ORDER — NALOXONE HYDROCHLORIDE 4 MG/.1ML
SPRAY NASAL
COMMUNITY
Start: 2020-12-23 | End: 2021-10-11

## 2021-03-15 RX ORDER — VENLAFAXINE HYDROCHLORIDE 75 MG/1
CAPSULE, EXTENDED RELEASE ORAL
COMMUNITY
Start: 2021-01-20 | End: 2021-07-07

## 2021-03-15 RX ORDER — VENLAFAXINE HYDROCHLORIDE 37.5 MG/1
CAPSULE, EXTENDED RELEASE ORAL
COMMUNITY
Start: 2021-01-20 | End: 2021-07-07

## 2021-03-19 DIAGNOSIS — J30.1 ALLERGIC RHINITIS DUE TO POLLEN, UNSPECIFIED SEASONALITY: ICD-10-CM

## 2021-03-19 RX ORDER — AZELASTINE 1 MG/ML
1 SPRAY, METERED NASAL 2 TIMES DAILY
Qty: 30 ML | Refills: 2 | Status: SHIPPED | OUTPATIENT
Start: 2021-03-19 | End: 2022-03-11

## 2021-03-26 ENCOUNTER — TELEPHONE (OUTPATIENT)
Dept: FAMILY MEDICINE CLINIC | Facility: CLINIC | Age: 46
End: 2021-03-26

## 2021-03-26 DIAGNOSIS — E11.9 TYPE 2 DIABETES MELLITUS WITHOUT COMPLICATION, WITHOUT LONG-TERM CURRENT USE OF INSULIN (HCC): Primary | ICD-10-CM

## 2021-03-26 NOTE — TELEPHONE ENCOUNTER
Pt called asking if we can send lancets to go with her new glucose meter   She is asking for one touch verio lancets to go to [facility]

## 2021-05-02 DIAGNOSIS — G25.81 RESTLESS LEG SYNDROME: ICD-10-CM

## 2021-05-03 RX ORDER — PRAMIPEXOLE DIHYDROCHLORIDE 0.5 MG/1
0.5 TABLET ORAL
Qty: 30 TABLET | Refills: 2 | Status: SHIPPED | OUTPATIENT
Start: 2021-05-03 | End: 2021-08-17 | Stop reason: SDUPTHER

## 2021-06-07 DIAGNOSIS — H10.13 ALLERGIC CONJUNCTIVITIS OF BOTH EYES: ICD-10-CM

## 2021-06-07 DIAGNOSIS — E11.8 TYPE 2 DIABETES MELLITUS WITH COMPLICATION (HCC): ICD-10-CM

## 2021-06-07 DIAGNOSIS — G43.109 MIGRAINE WITH AURA AND WITHOUT STATUS MIGRAINOSUS, NOT INTRACTABLE: ICD-10-CM

## 2021-06-07 DIAGNOSIS — E11.9 TYPE 2 DIABETES MELLITUS WITHOUT COMPLICATION, WITHOUT LONG-TERM CURRENT USE OF INSULIN (HCC): ICD-10-CM

## 2021-06-09 RX ORDER — OLOPATADINE HYDROCHLORIDE 1 MG/ML
1 SOLUTION/ DROPS OPHTHALMIC 2 TIMES DAILY
Qty: 5 ML | Refills: 0 | Status: SHIPPED | OUTPATIENT
Start: 2021-06-09 | End: 2021-07-01

## 2021-06-09 RX ORDER — LANCETS
EACH MISCELLANEOUS
Qty: 100 EACH | Refills: 0 | Status: SHIPPED | OUTPATIENT
Start: 2021-06-09 | End: 2022-03-23

## 2021-06-09 RX ORDER — SUMATRIPTAN 25 MG/1
25 TABLET, FILM COATED ORAL ONCE AS NEEDED
Qty: 30 TABLET | Refills: 0 | Status: SHIPPED | OUTPATIENT
Start: 2021-06-09 | End: 2021-11-22 | Stop reason: SDUPTHER

## 2021-06-11 DIAGNOSIS — E11.8 TYPE 2 DIABETES MELLITUS WITH COMPLICATION (HCC): Primary | ICD-10-CM

## 2021-06-11 DIAGNOSIS — K04.7 DENTAL ABSCESS: ICD-10-CM

## 2021-06-11 RX ORDER — AMOXICILLIN 500 MG/1
500 CAPSULE ORAL EVERY 12 HOURS SCHEDULED
Qty: 20 CAPSULE | Refills: 0 | Status: SHIPPED | OUTPATIENT
Start: 2021-06-11 | End: 2021-06-21

## 2021-06-11 RX ORDER — LANCETS 30 GAUGE
EACH MISCELLANEOUS ONCE
Qty: 100 EACH | Refills: 1 | Status: SHIPPED | OUTPATIENT
Start: 2021-06-11 | End: 2022-03-16

## 2021-06-11 NOTE — TELEPHONE ENCOUNTER
Patient called states that the lancets that were called in 6/9 are not the right ones  Patient states she needs the verio 1 touch lancets   [facility]     Please call patient to let her know they were sent [phone]

## 2021-06-11 NOTE — TELEPHONE ENCOUNTER
Please enter script for these  Thanks   Also patient no showed her appointment and needs this rescheduled if not already done

## 2021-06-11 NOTE — TELEPHONE ENCOUNTER
Patient is scheduled for 6/23 to come in for DM check  Patient is asking if you could send in a antibiotic for a dental abscess  Patients dentist said to call us since they were not sure what she could take with her DM

## 2021-06-24 ENCOUNTER — TELEPHONE (OUTPATIENT)
Dept: FAMILY MEDICINE CLINIC | Facility: CLINIC | Age: 46
End: 2021-06-24

## 2021-06-24 NOTE — TELEPHONE ENCOUNTER
Pt called and is getting her teeth pulled on July 9  She has ulcers in her mouth and is asking for an antibiotic called in to the pharmacy      Due to her being DM the dentist would not call any thing in    [facility]    Phone: [phone]

## 2021-06-25 NOTE — TELEPHONE ENCOUNTER
Sores in her mouth may not be a bacterial infection  She could have ulcers which would not require an antibiotic, if she has a dental abscess this is another story   Please call to get more information from the patient

## 2021-06-28 NOTE — TELEPHONE ENCOUNTER
Attempted to contact patient  No answer and voicemail box is full  Unable to leave message  Will try again later

## 2021-06-29 NOTE — TELEPHONE ENCOUNTER
Third attempt to contact pt without success  vm is full, unable to leave message  She has follow up scheduled with PCP tomorrow

## 2021-07-06 DIAGNOSIS — E11.9 TYPE 2 DIABETES MELLITUS WITHOUT COMPLICATION, WITHOUT LONG-TERM CURRENT USE OF INSULIN (HCC): ICD-10-CM

## 2021-07-07 ENCOUNTER — OFFICE VISIT (OUTPATIENT)
Dept: FAMILY MEDICINE CLINIC | Facility: CLINIC | Age: 46
End: 2021-07-07
Payer: COMMERCIAL

## 2021-07-07 DIAGNOSIS — Z12.4 SCREENING FOR CERVICAL CANCER: ICD-10-CM

## 2021-07-07 DIAGNOSIS — J30.1 ALLERGIC RHINITIS DUE TO POLLEN, UNSPECIFIED SEASONALITY: ICD-10-CM

## 2021-07-07 DIAGNOSIS — F41.8 DEPRESSION WITH ANXIETY: Primary | ICD-10-CM

## 2021-07-07 DIAGNOSIS — E78.49 OTHER HYPERLIPIDEMIA: ICD-10-CM

## 2021-07-07 DIAGNOSIS — F41.8 DEPRESSION WITH ANXIETY: ICD-10-CM

## 2021-07-07 DIAGNOSIS — E11.9 ENCOUNTER FOR DIABETIC FOOT EXAM (HCC): ICD-10-CM

## 2021-07-07 DIAGNOSIS — R05.9 COUGH: ICD-10-CM

## 2021-07-07 DIAGNOSIS — H10.13 ALLERGIC CONJUNCTIVITIS OF BOTH EYES: ICD-10-CM

## 2021-07-07 DIAGNOSIS — E11.9 TYPE 2 DIABETES MELLITUS WITHOUT COMPLICATION, WITHOUT LONG-TERM CURRENT USE OF INSULIN (HCC): Primary | ICD-10-CM

## 2021-07-07 PROCEDURE — 3725F SCREEN DEPRESSION PERFORMED: CPT | Performed by: NURSE PRACTITIONER

## 2021-07-07 PROCEDURE — 1036F TOBACCO NON-USER: CPT | Performed by: NURSE PRACTITIONER

## 2021-07-07 PROCEDURE — 3008F BODY MASS INDEX DOCD: CPT | Performed by: NURSE PRACTITIONER

## 2021-07-07 PROCEDURE — 99214 OFFICE O/P EST MOD 30 MIN: CPT | Performed by: NURSE PRACTITIONER

## 2021-07-07 RX ORDER — DIPHENOXYLATE HYDROCHLORIDE AND ATROPINE SULFATE 2.5; .025 MG/1; MG/1
1 TABLET ORAL DAILY
COMMUNITY
End: 2022-03-23

## 2021-07-07 RX ORDER — MONTELUKAST SODIUM 10 MG/1
10 TABLET ORAL
Qty: 30 TABLET | Refills: 3 | Status: SHIPPED | OUTPATIENT
Start: 2021-07-07 | End: 2021-10-26

## 2021-07-07 RX ORDER — CITALOPRAM 40 MG/1
40 TABLET ORAL DAILY
Qty: 30 TABLET | Refills: 3 | Status: SHIPPED | OUTPATIENT
Start: 2021-07-07 | End: 2021-10-26

## 2021-07-07 RX ORDER — ALBUTEROL SULFATE 90 UG/1
2 AEROSOL, METERED RESPIRATORY (INHALATION) EVERY 6 HOURS PRN
Qty: 8.5 G | Refills: 6 | Status: SHIPPED | OUTPATIENT
Start: 2021-07-07 | End: 2021-12-07

## 2021-07-07 RX ORDER — OLOPATADINE HYDROCHLORIDE 1 MG/ML
1 SOLUTION/ DROPS OPHTHALMIC 2 TIMES DAILY
Qty: 5 ML | Refills: 5 | Status: SHIPPED | OUTPATIENT
Start: 2021-07-07

## 2021-07-07 RX ORDER — CITALOPRAM 10 MG/1
40 TABLET ORAL DAILY
Qty: 30 TABLET | Refills: 3 | Status: SHIPPED | OUTPATIENT
Start: 2021-07-07 | End: 2021-07-07

## 2021-07-07 NOTE — TELEPHONE ENCOUNTER
Pharmacy called in regards to patient's rx for celexa 10 mg take 4 tablets daily #30 x 3  Pharmacy states she has been getting 40 mg tablets all along  They are asking if we can resend script for celexa 40 mg rather than 10 mg  Please advise

## 2021-07-07 NOTE — PROGRESS NOTES
[facility] Primary Care        NAME: Carmelo Bro is a 39 y o  female  : 1975    MRN: [id number]  DATE: 2021  TIME: 4:07 PM    Assessment and Plan   Type 2 diabetes mellitus without complication, without long-term current use of insulin (HCC) [E11 9]  1  Type 2 diabetes mellitus without complication, without long-term current use of insulin ([facility])  metFORMIN (GLUCOPHAGE) 500 mg tablet    HEMOGLOBIN A1C W/ EAG ESTIMATION    Comprehensive metabolic panel   2  Screening for cervical cancer  Ambulatory referral to Obstetrics / Gynecology   3  Depression with anxiety  DISCONTINUED: citalopram (CeleXA) 10 mg tablet   4  Allergic rhinitis due to pollen, unspecified seasonality  montelukast (SINGULAIR) 10 mg tablet   5  Cough  albuterol (ProAir HFA) 90 mcg/act inhaler   6  Allergic conjunctivitis of both eyes  olopatadine (PATANOL) 0 1 % ophthalmic solution   7  Other hyperlipidemia  Lipid panel   8  [facility]       1  Screening for cervical cancer  Has not had OPAP in 18 years  2  Depression with anxiety  Needs refill as she got discharged from her mental health office for no show  - citalopram (CeleXA) 10 mg tablet; Take 4 tablets (40 mg total) by mouth daily  Dispense: 30 tablet; Refill: 3    3  Allergic rhinitis due to pollen, unspecified seasonality    - montelukast (SINGULAIR) 10 mg tablet; Take 1 tablet (10 mg total) by mouth daily at bedtime  Dispense: 30 tablet; Refill: 3    4  Type 2 diabetes mellitus without complication, without long-term current use of insulin ([facility])  contnue medication  Will get labs done tomorrow  - metFORMIN (GLUCOPHAGE) 500 mg tablet; Take 2 tablets (1,000 mg total) by mouth daily with breakfast AND 1 tablet (500 mg total) daily with dinner  Dispense: 90 tablet; Refill: 3    5  Cough    - albuterol (ProAir HFA) 90 mcg/act inhaler; Inhale 2 puffs every 6 (six) hours as needed for wheezing  Dispense: 8 5 g; Refill: 6    6  Allergic conjunctivitis of both eyes    - olopatadine (PATANOL) 0 1 % ophthalmic solution; Administer 1 drop to both eyes 2 (two) times a day  Dispense: 5 mL; Refill: 5    7  Other hyperlipidemia      8  Encounter for diabetic foot exam ([facility] [address] )  Decreased sensation in b/l feet  Patient Instructions     There are no Patient Instructions on file for this visit  Chief Complaint     Chief Complaint   Patient presents with    Follow-up     dm         History of Present Illness       Patient here for follow up visit for DM2  A1c was checked last 3 months ago and was 7 2  DM2- has been taking medications  Has decrease sensation in her feet  DM foot exam done today  Reports ongoing back pain with radiation down the left leg  Has been trying the topicals  Due for DM eye exam  Goes to [facility]  Did get mammogram done  Diabetes  She presents for her follow-up diabetic visit  She has type 2 diabetes mellitus  Her disease course has been stable  Pertinent negatives for hypoglycemia include no confusion, dizziness, headaches or nervousness/anxiousness  Pertinent negatives for diabetes include no chest pain, no fatigue and no weakness  Risk factors for coronary artery disease include diabetes mellitus, dyslipidemia and hypertension  When asked about current treatments, none were reported  She is following a diabetic and generally healthy diet  When asked about meal planning, she reported none  She has not had a previous visit with a dietitian  She participates in exercise daily  Her breakfast blood glucose range is generally 140-180 mg/dl  (Reports afternoon sugar was 154  ) An ACE inhibitor/angiotensin II receptor blocker is being taken  She does not see a podiatrist Eye exam is not current  Review of Systems   Review of Systems   Constitutional: Negative for activity change, appetite change, chills, fatigue and fever     HENT: Negative for congestion, ear pain, nosebleeds, rhinorrhea and sore throat  Eyes: Negative for photophobia, pain, redness and visual disturbance  Respiratory: Negative for cough, shortness of breath and wheezing  Cardiovascular: Negative  Negative for chest pain  Gastrointestinal: Negative  Negative for abdominal pain, constipation, diarrhea and vomiting  Endocrine: Negative  Genitourinary: Negative for difficulty urinating, dysuria and flank pain  Musculoskeletal: Negative  Skin: Negative for color change and rash  Neurological: Negative for dizziness, weakness, numbness and headaches  Hematological: Negative for adenopathy  Psychiatric/Behavioral: Negative for agitation and confusion  The patient is not nervous/anxious  PHQ-9 Depression Screening    PHQ-9:   Frequency of the following problems over the past two weeks:      Little interest or pleasure in doing things: 2 - more than half the days  Feeling down, depressed, or hopeless: 1 - several days  Trouble falling or staying asleep, or sleeping too much: 3 - nearly every day  Feeling tired or having little energy: 3 - nearly every day  Poor appetite or overeatin - not at all  Feeling bad about yourself - or that you are a failure or have let yourself or your family down: 3 - nearly every day  Trouble concentrating on things, such as reading the newspaper or watching television: 3 - nearly every day  Moving or speaking so slowly that other people could have noticed  Or the opposite - being so fidgety or restless that you have been moving around a lot more than usual: 0 - not at all  Thoughts that you would be better off dead, or of hurting yourself in some way: 0 - not at all  PHQ-2 Score: 3  PHQ-9 Score: 15        Current Medications       Current Outpatient Medications:     Accu-Chek FastClix Lancets MISC, Test blood sugars once daily  , Disp: 100 each, Rfl: 0    albuterol (ProAir HFA) 90 mcg/act inhaler, Inhale 2 puffs every 6 (six) hours as needed for wheezing, Disp: 8 5 g, Rfl: 6    atorvastatin (LIPITOR) 10 mg tablet, Take 1 tablet (10 mg total) by mouth daily, Disp: 30 tablet, Rfl: 0    azelastine (ASTELIN) 0 1 % nasal spray, 1 SPRAY INTO EACH NOSTRIL 2 (TWO) TIMES A DAY USE IN EACH NOSTRIL AS DIRECTED, Disp: 30 mL, Rfl: 2    glucose blood test strip, Use as instructed, Disp: 100 each, Rfl: 2    glucose blood test strip, Use as instructed  Test up to 3 times a day as needed, Disp: 200 each, Rfl: 11    hydrOXYzine HCL (ATARAX) 50 mg tablet, Take 1 tablet (50 mg total) by mouth every 6 (six) hours as needed for itching, allergies or anxiety, Disp: 120 tablet, Rfl: 1     MG tablet, Take 600 mg by mouth every 8 (eight) hours as needed , Disp: , Rfl: 2    metFORMIN (GLUCOPHAGE) 500 mg tablet, Take 2 tablets (1,000 mg total) by mouth daily with breakfast AND 1 tablet (500 mg total) daily with dinner , Disp: 90 tablet, Rfl: 3    multivitamin (THERAGRAN) TABS, Take 1 tablet by mouth daily, Disp: , Rfl:     Narcan 4 MG/0 1ML nasal spray, , Disp: , Rfl:     olopatadine (PATANOL) 0 1 % ophthalmic solution, Administer 1 drop to both eyes 2 (two) times a day, Disp: 5 mL, Rfl: 5    pramipexole (MIRAPEX) 0 5 mg tablet, TAKE 1 TABLET (0 5 MG TOTAL) BY MOUTH DAILY AT BEDTIME, Disp: 30 tablet, Rfl: 2    SUMAtriptan (IMITREX) 25 mg tablet, Take 1 tablet (25 mg total) by mouth once as needed for migraine, Disp: 30 tablet, Rfl: 0    topiramate (TOPAMAX) 25 mg sprinkle capsule, TAKE 1 CAPSULE (25 MG TOTAL) BY MOUTH 2 (TWO) TIMES A DAY, Disp: 60 capsule, Rfl: 3    citalopram (CeleXA) 40 mg tablet, Take 1 tablet (40 mg total) by mouth daily, Disp: 30 tablet, Rfl: 3    Lancets MISC, Use once for 1 dose On[facility]ouch Verio   Test once daily or as directed , Disp: 100 each, Rfl: 1    LORazepam (ATIVAN) 0 5 mg tablet, , Disp: , Rfl:     montelukast (SINGULAIR) 10 mg tablet, Take 1 tablet (10 mg total) by mouth daily at bedtime, Disp: 30 tablet, Rfl: 3    Current Allergies     Allergies as of 07/07/2021    (No Known Allergies)            The following portions of the patient's history were reviewed and updated as appropriate: allergies, current medications, past family history, past medical history, past social history, past surgical history and problem list      Past Medical History:   Diagnosis Date    Anxiety     Diabetes mellitus ([address] )     Hyperlipidemia        Past Surgical History:   Procedure Laterality Date    ARTHROSCOPY KNEE Left     CHOLECYSTECTOMY      ECTOPIC PREGNANCY SURGERY      SHOULDER SURGERY Right     torn bicep       Family History   Problem Relation Age of Onset    Obesity Mother     Obesity Father     Diabetes type II Father     Diabetes Brother     Obesity Brother     No Known Problems Daughter     No Known Problems Maternal Grandmother     No Known Problems Paternal Grandmother     No Known Problems Maternal Aunt     No Known Problems Maternal Aunt     No Known Problems Maternal Aunt          Medications have been verified  Objective   /74   Pulse (!) 124   Temp 98 5 °F (36 9 °C)   Resp 18   Ht 5' 4" (1 626 m)   Wt 110 kg (243 lb)   SpO2 98%   BMI 41 71 kg/m²        Physical Exam     Physical Exam  Vitals and nursing note reviewed  Constitutional:       General: She is not in acute distress  Appearance: Normal appearance  She is well-developed  She is not ill-appearing  Eyes:      General: Lids are normal    Cardiovascular:      Rate and Rhythm: Normal rate and regular rhythm  Pulses: no weak pulses          Dorsalis pedis pulses are 2+ on the right side and 2+ on the left side  Heart sounds: Normal heart sounds, S1 normal and S2 normal  No murmur heard  No friction rub  Pulmonary:      Effort: Pulmonary effort is normal  No respiratory distress  Breath sounds: Normal breath sounds  No decreased breath sounds or wheezing  Musculoskeletal:         General: No tenderness or deformity  Normal range of motion  Feet:      Right foot:      Skin integrity: No ulcer, skin breakdown, erythema, warmth, callus or dry skin  Left foot:      Skin integrity: No ulcer, skin breakdown, erythema, warmth, callus or dry skin  Skin:     General: Skin is warm  Findings: No erythema or rash  Neurological:      Mental Status: She is alert and oriented to person, place, and time  Psychiatric:         Behavior: Behavior normal  Behavior is cooperative  Thought Content: Thought content normal        Right Foot/Ankle   Right Foot Inspection  Skin Exam: skin normal and skin intact no dry skin, no warmth, no callus, no erythema, no maceration, no abnormal color, no pre-ulcer, no ulcer and no callus                          Toe Exam: ROM and strength within normal limits  Sensory       Monofilament testing: diminished  Vascular  Capillary refills: < 3 seconds  The right DP pulse is 2+  Left Foot/Ankle  Left Foot Inspection  Skin Exam: skin normal and skin intactno dry skin, no warmth, no erythema, no maceration, normal color, no pre-ulcer, no ulcer and no callus                                         Sensory       Monofilament: diminished  Vascular  Capillary refills: < 3 seconds  The left DP pulse is 2+  Assign Risk Category:  No deformity present; No loss of protective sensation;  No weak pulses       Risk: 0

## 2021-07-09 VITALS
WEIGHT: 243 LBS | SYSTOLIC BLOOD PRESSURE: 134 MMHG | DIASTOLIC BLOOD PRESSURE: 74 MMHG | HEIGHT: 64 IN | TEMPERATURE: 98.5 F | RESPIRATION RATE: 18 BRPM | OXYGEN SATURATION: 98 % | BODY MASS INDEX: 41.48 KG/M2 | HEART RATE: 90 BPM

## 2021-08-03 DIAGNOSIS — E11.9 TYPE 2 DIABETES MELLITUS WITHOUT COMPLICATION, WITHOUT LONG-TERM CURRENT USE OF INSULIN (HCC): ICD-10-CM

## 2021-08-17 DIAGNOSIS — G25.81 RESTLESS LEG SYNDROME: ICD-10-CM

## 2021-08-17 NOTE — TELEPHONE ENCOUNTER
Patient requesting refill(s) of:pramipexole 0 5 mg daily    Last filled: 5/3/2021 #30 x 2  Last appt:7/7/2021  Next appt:11/9/2021  Pharmacy: [facility]

## 2021-08-18 RX ORDER — PRAMIPEXOLE DIHYDROCHLORIDE 0.5 MG/1
0.5 TABLET ORAL
Qty: 30 TABLET | Refills: 2 | Status: SHIPPED | OUTPATIENT
Start: 2021-08-18 | End: 2021-11-22 | Stop reason: SDUPTHER

## 2021-09-10 DIAGNOSIS — E11.9 TYPE 2 DIABETES MELLITUS WITHOUT COMPLICATION, WITHOUT LONG-TERM CURRENT USE OF INSULIN (HCC): ICD-10-CM

## 2021-09-10 NOTE — TELEPHONE ENCOUNTER
Patient requesting refill(s) of: Atorvastatin 10mg     Last filled: 11/30/20  Last appt: 7/7/21   Next appt: 11/9/21  Pharmacy: [address] [place] [address]

## 2021-09-14 RX ORDER — ATORVASTATIN CALCIUM 10 MG/1
10 TABLET, FILM COATED ORAL DAILY
Qty: 30 TABLET | Refills: 3 | Status: SHIPPED | OUTPATIENT
Start: 2021-09-14 | End: 2022-01-03

## 2021-09-17 DIAGNOSIS — E11.9 TYPE 2 DIABETES MELLITUS WITHOUT COMPLICATION, WITHOUT LONG-TERM CURRENT USE OF INSULIN (HCC): ICD-10-CM

## 2021-09-20 NOTE — TELEPHONE ENCOUNTER
Patient requesting refill(s) of: metformin 500 mg 2 tablets am 1 tablet pm    Last filled: 8/3/2021 #90 x 0  Last appt:7/7/2021  Next appt:11/9/2021  Pharmacy: [facility]

## 2021-09-28 ENCOUNTER — HOSPITAL ENCOUNTER (EMERGENCY)
Facility: HOSPITAL | Age: 46
Discharge: HOME | End: 2021-09-28
Attending: EMERGENCY MEDICINE | Admitting: EMERGENCY MEDICINE
Payer: COMMERCIAL

## 2021-09-28 VITALS
RESPIRATION RATE: 16 BRPM | WEIGHT: 230 LBS | HEIGHT: 64 IN | BODY MASS INDEX: 39.27 KG/M2 | OXYGEN SATURATION: 99 % | HEART RATE: 108 BPM | TEMPERATURE: 98.6 F | DIASTOLIC BLOOD PRESSURE: 73 MMHG | SYSTOLIC BLOOD PRESSURE: 124 MMHG
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
## 2021-09-28 DIAGNOSIS — L03.90 CELLULITIS: Primary | ICD-10-CM

## 2021-09-28 PROCEDURE — 90715 TDAP VACCINE 7 YRS/> IM: CPT | Performed by: EMERGENCY MEDICINE

## 2021-09-28 PROCEDURE — 99281 EMR DPT VST MAYX REQ PHY/QHP: CPT

## 2021-09-28 PROCEDURE — 99284 EMERGENCY DEPT VISIT MOD MDM: CPT | Performed by: EMERGENCY MEDICINE

## 2021-09-28 PROCEDURE — 90471 IMMUNIZATION ADMIN: CPT

## 2021-09-28 RX ORDER — SULFAMETHOXAZOLE AND TRIMETHOPRIM 800; 160 MG/1; MG/1
1 TABLET ORAL 2 TIMES DAILY
Qty: 14 TABLET | Refills: 0 | Status: SHIPPED | OUTPATIENT
Start: 2021-09-28 | End: 2021-10-05

## 2021-09-28 RX ORDER — AMOXICILLIN AND CLAVULANATE POTASSIUM 875; 125 MG/1; MG/1
1 TABLET, FILM COATED ORAL EVERY 12 HOURS
Qty: 14 TABLET | Refills: 0 | Status: SHIPPED | OUTPATIENT
Start: 2021-09-28 | End: 2021-10-05

## 2021-09-28 RX ORDER — AMOXICILLIN AND CLAVULANATE POTASSIUM 875; 125 MG/1; MG/1
1 TABLET, FILM COATED ORAL ONCE
Status: COMPLETED | OUTPATIENT
Start: 2021-09-28 | End: 2021-09-28

## 2021-09-28 RX ORDER — SULFAMETHOXAZOLE AND TRIMETHOPRIM 800; 160 MG/1; MG/1
1 TABLET ORAL ONCE
Status: COMPLETED | OUTPATIENT
Start: 2021-09-28 | End: 2021-09-28

## 2021-09-28 RX ADMIN — SULFAMETHOXAZOLE AND TRIMETHOPRIM 1 TABLET: 800; 160 TABLET ORAL at 17:04

## 2021-09-28 RX ADMIN — AMOXICILLIN AND CLAVULANATE POTASSIUM 1 TABLET: 875; 125 TABLET, FILM COATED ORAL at 17:04

## 2021-09-28 RX ADMIN — TETANUS TOXOID, REDUCED DIPHTHERIA TOXOID AND ACELLULAR PERTUSSIS VACCINE, ADSORBED 0.5 ML: 5; 2.5; 8; 8; 2.5 SUSPENSION INTRAMUSCULAR at 17:04

## 2021-10-06 ENCOUNTER — APPOINTMENT (OUTPATIENT)
Dept: LAB | Facility: CLINIC | Age: 46
End: 2021-10-06
Payer: COMMERCIAL

## 2021-10-06 ENCOUNTER — OFFICE VISIT (OUTPATIENT)
Dept: FAMILY MEDICINE CLINIC | Facility: CLINIC | Age: 46
End: 2021-10-06
Payer: COMMERCIAL

## 2021-10-06 VITALS
DIASTOLIC BLOOD PRESSURE: 68 MMHG | HEART RATE: 100 BPM | SYSTOLIC BLOOD PRESSURE: 112 MMHG | TEMPERATURE: 98.5 F | OXYGEN SATURATION: 97 % | WEIGHT: 247 LBS | BODY MASS INDEX: 42.17 KG/M2 | HEIGHT: 64 IN

## 2021-10-06 DIAGNOSIS — S81.801D WOUND OF RIGHT LOWER EXTREMITY, SUBSEQUENT ENCOUNTER: ICD-10-CM

## 2021-10-06 DIAGNOSIS — E11.9 TYPE 2 DIABETES MELLITUS WITHOUT COMPLICATION, WITHOUT LONG-TERM CURRENT USE OF INSULIN (HCC): ICD-10-CM

## 2021-10-06 DIAGNOSIS — Z23 ENCOUNTER FOR IMMUNIZATION: Primary | ICD-10-CM

## 2021-10-06 DIAGNOSIS — L03.115 CELLULITIS OF RIGHT LOWER EXTREMITY: ICD-10-CM

## 2021-10-06 DIAGNOSIS — E78.49 OTHER HYPERLIPIDEMIA: ICD-10-CM

## 2021-10-06 LAB
ALBUMIN SERPL BCP-MCNC: 3.5 G/DL (ref 3.5–5)
ALP SERPL-CCNC: 90 U/L (ref 46–116)
ALT SERPL W P-5'-P-CCNC: 49 U/L (ref 12–78)
ANION GAP SERPL CALCULATED.3IONS-SCNC: 4 MMOL/L (ref 4–13)
AST SERPL W P-5'-P-CCNC: 38 U/L (ref 5–45)
BILIRUB SERPL-MCNC: 0.19 MG/DL (ref 0.2–1)
BUN SERPL-MCNC: 11 MG/DL (ref 5–25)
CALCIUM SERPL-MCNC: 9.4 MG/DL (ref 8.3–10.1)
CHLORIDE SERPL-SCNC: 103 MMOL/L (ref 100–108)
CHOLEST SERPL-MCNC: 158 MG/DL (ref 50–200)
CO2 SERPL-SCNC: 25 MMOL/L (ref 21–32)
CREAT SERPL-MCNC: 0.77 MG/DL (ref 0.6–1.3)
CREAT UR-MCNC: 228 MG/DL
EST. AVERAGE GLUCOSE BLD GHB EST-MCNC: 200 MG/DL
GFR SERPL CREATININE-BSD FRML MDRD: 94 ML/MIN/1.73SQ M
GLUCOSE P FAST SERPL-MCNC: 213 MG/DL (ref 65–99)
HBA1C MFR BLD: 8.6 %
HDLC SERPL-MCNC: 43 MG/DL
LDLC SERPL CALC-MCNC: 79 MG/DL (ref 0–100)
MICROALBUMIN UR-MCNC: 25.1 MG/L (ref 0–20)
MICROALBUMIN/CREAT 24H UR: 11 MG/G CREATININE (ref 0–30)
NONHDLC SERPL-MCNC: 115 MG/DL
POTASSIUM SERPL-SCNC: 4.1 MMOL/L (ref 3.5–5.3)
PROT SERPL-MCNC: 7.7 G/DL (ref 6.4–8.2)
SODIUM SERPL-SCNC: 132 MMOL/L (ref 136–145)
TRIGL SERPL-MCNC: 179 MG/DL

## 2021-10-06 PROCEDURE — 82570 ASSAY OF URINE CREATININE: CPT | Performed by: NURSE PRACTITIONER

## 2021-10-06 PROCEDURE — 90471 IMMUNIZATION ADMIN: CPT

## 2021-10-06 PROCEDURE — 82043 UR ALBUMIN QUANTITATIVE: CPT | Performed by: NURSE PRACTITIONER

## 2021-10-06 PROCEDURE — 80053 COMPREHEN METABOLIC PANEL: CPT

## 2021-10-06 PROCEDURE — 90682 RIV4 VACC RECOMBINANT DNA IM: CPT

## 2021-10-06 PROCEDURE — 36415 COLL VENOUS BLD VENIPUNCTURE: CPT

## 2021-10-06 PROCEDURE — 83036 HEMOGLOBIN GLYCOSYLATED A1C: CPT

## 2021-10-06 PROCEDURE — 99214 OFFICE O/P EST MOD 30 MIN: CPT | Performed by: NURSE PRACTITIONER

## 2021-10-06 PROCEDURE — 80061 LIPID PANEL: CPT

## 2021-10-06 RX ORDER — CLINDAMYCIN HYDROCHLORIDE 300 MG/1
300 CAPSULE ORAL 2 TIMES DAILY
Qty: 14 CAPSULE | Refills: 0 | Status: SHIPPED | OUTPATIENT
Start: 2021-10-06 | End: 2021-10-13

## 2021-10-11 ENCOUNTER — OFFICE VISIT (OUTPATIENT)
Dept: WOUND CARE | Facility: CLINIC | Age: 46
End: 2021-10-11
Payer: COMMERCIAL

## 2021-10-11 VITALS
SYSTOLIC BLOOD PRESSURE: 132 MMHG | TEMPERATURE: 98.1 F | DIASTOLIC BLOOD PRESSURE: 69 MMHG | RESPIRATION RATE: 20 BRPM | HEIGHT: 64 IN | WEIGHT: 240 LBS | BODY MASS INDEX: 40.97 KG/M2 | HEART RATE: 89 BPM

## 2021-10-11 DIAGNOSIS — T63.304A SPIDER BITE WOUND, UNDETERMINED INTENT, INITIAL ENCOUNTER: Primary | ICD-10-CM

## 2021-10-11 DIAGNOSIS — E11.9 TYPE 2 DIABETES MELLITUS WITHOUT COMPLICATION, WITHOUT LONG-TERM CURRENT USE OF INSULIN (HCC): ICD-10-CM

## 2021-10-11 PROCEDURE — 97597 DBRDMT OPN WND 1ST 20 CM/<: CPT | Performed by: FAMILY MEDICINE

## 2021-10-11 PROCEDURE — 99213 OFFICE O/P EST LOW 20 MIN: CPT | Performed by: FAMILY MEDICINE

## 2021-10-11 PROCEDURE — 99214 OFFICE O/P EST MOD 30 MIN: CPT | Performed by: FAMILY MEDICINE

## 2021-10-11 RX ORDER — LIDOCAINE 40 MG/G
CREAM TOPICAL ONCE
Status: COMPLETED | OUTPATIENT
Start: 2021-10-11 | End: 2021-10-11

## 2021-10-11 RX ADMIN — LIDOCAINE: 40 CREAM TOPICAL at 14:19

## 2021-10-16 DIAGNOSIS — E11.9 TYPE 2 DIABETES MELLITUS WITHOUT COMPLICATION, WITHOUT LONG-TERM CURRENT USE OF INSULIN (HCC): ICD-10-CM

## 2021-10-23 DIAGNOSIS — F41.8 DEPRESSION WITH ANXIETY: ICD-10-CM

## 2021-10-23 DIAGNOSIS — J30.1 ALLERGIC RHINITIS DUE TO POLLEN, UNSPECIFIED SEASONALITY: ICD-10-CM

## 2021-10-26 RX ORDER — MONTELUKAST SODIUM 10 MG/1
10 TABLET ORAL
Qty: 30 TABLET | Refills: 3 | Status: SHIPPED | OUTPATIENT
Start: 2021-10-26 | End: 2022-02-18

## 2021-10-26 RX ORDER — CITALOPRAM 40 MG/1
40 TABLET ORAL DAILY
Qty: 30 TABLET | Refills: 3 | Status: SHIPPED | OUTPATIENT
Start: 2021-10-26 | End: 2022-02-18

## 2021-11-03 ENCOUNTER — TELEPHONE (OUTPATIENT)
Dept: FAMILY MEDICINE CLINIC | Facility: CLINIC | Age: 46
End: 2021-11-03

## 2021-11-03 DIAGNOSIS — E11.9 TYPE 2 DIABETES MELLITUS WITHOUT COMPLICATION, WITHOUT LONG-TERM CURRENT USE OF INSULIN (HCC): Primary | ICD-10-CM

## 2021-11-08 ENCOUNTER — TELEPHONE (OUTPATIENT)
Dept: WOUND CARE | Facility: CLINIC | Age: 46
End: 2021-11-08

## 2021-11-15 ENCOUNTER — OFFICE VISIT (OUTPATIENT)
Dept: URGENT CARE | Facility: CLINIC | Age: 46
End: 2021-11-15
Payer: COMMERCIAL

## 2021-11-15 VITALS
OXYGEN SATURATION: 98 % | WEIGHT: 240 LBS | RESPIRATION RATE: 18 BRPM | BODY MASS INDEX: 41.2 KG/M2 | HEART RATE: 100 BPM | TEMPERATURE: 97.9 F

## 2021-11-15 DIAGNOSIS — J20.8 ACUTE BACTERIAL BRONCHITIS: Primary | ICD-10-CM

## 2021-11-15 DIAGNOSIS — R05.9 COUGH: ICD-10-CM

## 2021-11-15 DIAGNOSIS — B96.89 ACUTE BACTERIAL BRONCHITIS: Primary | ICD-10-CM

## 2021-11-15 LAB — GLUCOSE SERPL-MCNC: 209 MG/DL (ref 65–140)

## 2021-11-15 PROCEDURE — 82948 REAGENT STRIP/BLOOD GLUCOSE: CPT | Performed by: PHYSICIAN ASSISTANT

## 2021-11-15 PROCEDURE — 0241U HB NFCT DS VIR RESP RNA 4 TRGT: CPT | Performed by: PHYSICIAN ASSISTANT

## 2021-11-15 PROCEDURE — 99214 OFFICE O/P EST MOD 30 MIN: CPT | Performed by: PHYSICIAN ASSISTANT

## 2021-11-15 RX ORDER — AZITHROMYCIN 250 MG/1
TABLET, FILM COATED ORAL
Qty: 6 TABLET | Refills: 0 | Status: SHIPPED | OUTPATIENT
Start: 2021-11-15 | End: 2021-11-19

## 2021-11-15 RX ORDER — BENZONATATE 200 MG/1
200 CAPSULE ORAL 3 TIMES DAILY PRN
Qty: 20 CAPSULE | Refills: 0 | Status: SHIPPED | OUTPATIENT
Start: 2021-11-15 | End: 2022-03-11

## 2021-11-15 RX ORDER — ALBUTEROL SULFATE 90 UG/1
2 AEROSOL, METERED RESPIRATORY (INHALATION) EVERY 6 HOURS PRN
Qty: 8.5 G | Refills: 0 | Status: SHIPPED | OUTPATIENT
Start: 2021-11-15 | End: 2022-02-15

## 2021-11-16 LAB
FLUAV RNA RESP QL NAA+PROBE: NEGATIVE
FLUBV RNA RESP QL NAA+PROBE: NEGATIVE
RSV RNA RESP QL NAA+PROBE: NEGATIVE
SARS-COV-2 RNA RESP QL NAA+PROBE: NEGATIVE

## 2021-11-17 ENCOUNTER — HOSPITAL ENCOUNTER (EMERGENCY)
Facility: HOSPITAL | Age: 46
Discharge: HOME | End: 2021-11-17
Attending: INTERNAL MEDICINE | Admitting: INTERNAL MEDICINE
Payer: COMMERCIAL

## 2021-11-17 VITALS
SYSTOLIC BLOOD PRESSURE: 125 MMHG | RESPIRATION RATE: 16 BRPM | HEART RATE: 95 BPM | TEMPERATURE: 98.4 F | DIASTOLIC BLOOD PRESSURE: 88 MMHG | OXYGEN SATURATION: 99 %

## 2021-11-17 DIAGNOSIS — F32.A DEPRESSION, UNSPECIFIED DEPRESSION TYPE: Primary | ICD-10-CM

## 2021-11-17 LAB
AMPHETAMINES SERPL QL SCN: POSITIVE
BARBITURATES UR QL: NEGATIVE
BENZODIAZ UR QL: NEGATIVE
COCAINE UR QL: NEGATIVE
METHADONE UR QL: NEGATIVE
OPIATES UR QL SCN: NEGATIVE
OXYCODONE+OXYMORPHONE UR QL SCN: NEGATIVE
PCP UR QL: NEGATIVE
THC UR QL: NEGATIVE

## 2021-11-17 PROCEDURE — 99284 EMERGENCY DEPT VISIT MOD MDM: CPT | Performed by: INTERNAL MEDICINE

## 2021-11-17 PROCEDURE — 99284 EMERGENCY DEPT VISIT MOD MDM: CPT

## 2021-11-17 PROCEDURE — 80307 DRUG TEST PRSMV CHEM ANLYZR: CPT | Performed by: INTERNAL MEDICINE

## 2021-11-18 ENCOUNTER — TELEPHONE (OUTPATIENT)
Dept: DIABETES SERVICES | Facility: CLINIC | Age: 46
End: 2021-11-18

## 2021-11-22 DIAGNOSIS — G43.109 MIGRAINE WITH AURA AND WITHOUT STATUS MIGRAINOSUS, NOT INTRACTABLE: ICD-10-CM

## 2021-11-22 DIAGNOSIS — F41.8 DEPRESSION WITH ANXIETY: ICD-10-CM

## 2021-11-22 DIAGNOSIS — G25.81 RESTLESS LEG SYNDROME: ICD-10-CM

## 2021-11-22 DIAGNOSIS — J30.1 ALLERGIC RHINITIS DUE TO POLLEN, UNSPECIFIED SEASONALITY: ICD-10-CM

## 2021-11-23 RX ORDER — TOPIRAMATE 25 MG/1
25 CAPSULE, COATED PELLETS ORAL 2 TIMES DAILY
Qty: 60 CAPSULE | Refills: 0 | Status: SHIPPED | OUTPATIENT
Start: 2021-11-23 | End: 2021-12-20

## 2021-11-23 RX ORDER — SUMATRIPTAN 25 MG/1
25 TABLET, FILM COATED ORAL ONCE AS NEEDED
Qty: 30 TABLET | Refills: 0 | Status: SHIPPED | OUTPATIENT
Start: 2021-11-23 | End: 2022-02-22

## 2021-11-23 RX ORDER — PRAMIPEXOLE DIHYDROCHLORIDE 0.5 MG/1
0.5 TABLET ORAL
Qty: 30 TABLET | Refills: 0 | Status: SHIPPED | OUTPATIENT
Start: 2021-11-23 | End: 2022-02-14 | Stop reason: SDUPTHER

## 2021-11-23 RX ORDER — HYDROXYZINE 50 MG/1
50 TABLET, FILM COATED ORAL EVERY 6 HOURS PRN
Qty: 120 TABLET | Refills: 0 | Status: SHIPPED | OUTPATIENT
Start: 2021-11-23 | End: 2021-12-20

## 2021-11-29 ENCOUNTER — OFFICE VISIT (OUTPATIENT)
Dept: URGENT CARE | Facility: CLINIC | Age: 46
End: 2021-11-29
Payer: COMMERCIAL

## 2021-11-29 VITALS
WEIGHT: 240 LBS | HEART RATE: 94 BPM | TEMPERATURE: 97.3 F | OXYGEN SATURATION: 98 % | BODY MASS INDEX: 40.97 KG/M2 | HEIGHT: 64 IN | RESPIRATION RATE: 18 BRPM

## 2021-11-29 DIAGNOSIS — J02.9 SORE THROAT: ICD-10-CM

## 2021-11-29 DIAGNOSIS — B34.9 VIRAL ILLNESS: Primary | ICD-10-CM

## 2021-11-29 LAB — S PYO AG THROAT QL: NEGATIVE

## 2021-11-29 PROCEDURE — 99213 OFFICE O/P EST LOW 20 MIN: CPT | Performed by: PHYSICIAN ASSISTANT

## 2021-11-29 PROCEDURE — 87070 CULTURE OTHR SPECIMN AEROBIC: CPT | Performed by: PHYSICIAN ASSISTANT

## 2021-11-29 PROCEDURE — U0003 INFECTIOUS AGENT DETECTION BY NUCLEIC ACID (DNA OR RNA); SEVERE ACUTE RESPIRATORY SYNDROME CORONAVIRUS 2 (SARS-COV-2) (CORONAVIRUS DISEASE [COVID-19]), AMPLIFIED PROBE TECHNIQUE, MAKING USE OF HIGH THROUGHPUT TECHNOLOGIES AS DESCRIBED BY CMS-2020-01-R: HCPCS | Performed by: PHYSICIAN ASSISTANT

## 2021-11-29 PROCEDURE — U0005 INFEC AGEN DETEC AMPLI PROBE: HCPCS | Performed by: PHYSICIAN ASSISTANT

## 2021-11-29 PROCEDURE — 87880 STREP A ASSAY W/OPTIC: CPT | Performed by: PHYSICIAN ASSISTANT

## 2021-11-30 LAB — SARS-COV-2 RNA RESP QL NAA+PROBE: NEGATIVE

## 2021-12-01 LAB — BACTERIA THROAT CULT: NORMAL

## 2021-12-15 ENCOUNTER — TELEPHONE (OUTPATIENT)
Dept: FAMILY MEDICINE CLINIC | Facility: CLINIC | Age: 46
End: 2021-12-15

## 2021-12-20 DIAGNOSIS — G43.109 MIGRAINE WITH AURA AND WITHOUT STATUS MIGRAINOSUS, NOT INTRACTABLE: ICD-10-CM

## 2021-12-20 DIAGNOSIS — F41.8 DEPRESSION WITH ANXIETY: ICD-10-CM

## 2021-12-20 DIAGNOSIS — J30.1 ALLERGIC RHINITIS DUE TO POLLEN, UNSPECIFIED SEASONALITY: ICD-10-CM

## 2021-12-20 RX ORDER — TOPIRAMATE 25 MG/1
25 CAPSULE, COATED PELLETS ORAL 2 TIMES DAILY
Qty: 60 CAPSULE | Refills: 0 | Status: SHIPPED | OUTPATIENT
Start: 2021-12-20 | End: 2022-01-18

## 2021-12-20 RX ORDER — HYDROXYZINE 50 MG/1
50 TABLET, FILM COATED ORAL EVERY 6 HOURS PRN
Qty: 120 TABLET | Refills: 0 | Status: SHIPPED | OUTPATIENT
Start: 2021-12-20 | End: 2022-01-18

## 2021-12-29 DIAGNOSIS — R05.9 COUGH: ICD-10-CM

## 2021-12-29 RX ORDER — ALBUTEROL SULFATE 90 UG/1
2 AEROSOL, METERED RESPIRATORY (INHALATION) EVERY 6 HOURS PRN
Qty: 18 G | Refills: 0 | Status: SHIPPED | OUTPATIENT
Start: 2021-12-29 | End: 2022-02-15

## 2021-12-31 DIAGNOSIS — E11.9 TYPE 2 DIABETES MELLITUS WITHOUT COMPLICATION, WITHOUT LONG-TERM CURRENT USE OF INSULIN (HCC): ICD-10-CM

## 2022-01-03 RX ORDER — ATORVASTATIN CALCIUM 10 MG/1
10 TABLET, FILM COATED ORAL DAILY
Qty: 30 TABLET | Refills: 3 | Status: SHIPPED | OUTPATIENT
Start: 2022-01-03 | End: 2022-04-26

## 2022-01-03 NOTE — TELEPHONE ENCOUNTER
Patient requesting refill(s) of: Atorvastatin     Last filled: 9/14/21 30 tabs 3 refills   Last appt: 10/6/21  Next appt: None scheduled   Pharmacy: [facility]

## 2022-01-16 DIAGNOSIS — F41.8 DEPRESSION WITH ANXIETY: ICD-10-CM

## 2022-01-16 DIAGNOSIS — G43.109 MIGRAINE WITH AURA AND WITHOUT STATUS MIGRAINOSUS, NOT INTRACTABLE: ICD-10-CM

## 2022-01-16 DIAGNOSIS — J30.1 ALLERGIC RHINITIS DUE TO POLLEN, UNSPECIFIED SEASONALITY: ICD-10-CM

## 2022-01-18 RX ORDER — HYDROXYZINE 50 MG/1
50 TABLET, FILM COATED ORAL EVERY 6 HOURS PRN
Qty: 120 TABLET | Refills: 0 | Status: SHIPPED | OUTPATIENT
Start: 2022-01-18

## 2022-01-18 RX ORDER — TOPIRAMATE 25 MG/1
25 CAPSULE, COATED PELLETS ORAL 2 TIMES DAILY
Qty: 60 CAPSULE | Refills: 0 | Status: SHIPPED | OUTPATIENT
Start: 2022-01-18 | End: 2022-05-23 | Stop reason: SDUPTHER

## 2022-01-20 DIAGNOSIS — R05.9 COUGH: ICD-10-CM

## 2022-01-20 RX ORDER — ALBUTEROL SULFATE 90 UG/1
2 AEROSOL, METERED RESPIRATORY (INHALATION) EVERY 6 HOURS PRN
Qty: 18 G | Refills: 0 | Status: SHIPPED | OUTPATIENT
Start: 2022-01-20 | End: 2022-02-15

## 2022-01-20 NOTE — TELEPHONE ENCOUNTER
Patient requesting refill(s) of: albuterol inhaler 2 puff Q6H PRN    Last filled: 12/29/2021 #18g x 0  Last appt: 10/6/2021  Next appt: none  Pharmacy: [facility]

## 2022-02-14 DIAGNOSIS — G25.81 RESTLESS LEG SYNDROME: ICD-10-CM

## 2022-02-14 NOTE — TELEPHONE ENCOUNTER
Patient requesting refill(s) of: Pramipexole 0 5mg    Last filled: 11/23/21 #30 x0  Last appt: 10/6/21  Next appt: N/A  Pharmacy: [facility]

## 2022-02-15 ENCOUNTER — OFFICE VISIT (OUTPATIENT)
Dept: URGENT CARE | Facility: CLINIC | Age: 47
End: 2022-02-15
Payer: COMMERCIAL

## 2022-02-15 VITALS
WEIGHT: 240 LBS | BODY MASS INDEX: 41.2 KG/M2 | RESPIRATION RATE: 18 BRPM | HEART RATE: 102 BPM | TEMPERATURE: 97.5 F | OXYGEN SATURATION: 98 %

## 2022-02-15 DIAGNOSIS — T16.2XXA FOREIGN BODY OF LEFT EAR, INITIAL ENCOUNTER: ICD-10-CM

## 2022-02-15 DIAGNOSIS — R05.9 COUGH: ICD-10-CM

## 2022-02-15 DIAGNOSIS — H60.502 ACUTE OTITIS EXTERNA OF LEFT EAR, UNSPECIFIED TYPE: Primary | ICD-10-CM

## 2022-02-15 PROCEDURE — 99213 OFFICE O/P EST LOW 20 MIN: CPT | Performed by: PHYSICIAN ASSISTANT

## 2022-02-15 RX ORDER — PRAMIPEXOLE DIHYDROCHLORIDE 0.5 MG/1
0.5 TABLET ORAL
Qty: 30 TABLET | Refills: 0 | Status: ON HOLD | OUTPATIENT
Start: 2022-02-15 | End: 2022-03-15

## 2022-02-15 RX ORDER — ALBUTEROL SULFATE 90 UG/1
2 AEROSOL, METERED RESPIRATORY (INHALATION) EVERY 6 HOURS PRN
Qty: 18 G | Refills: 0 | Status: SHIPPED | OUTPATIENT
Start: 2022-02-15 | End: 2022-03-10

## 2022-02-15 NOTE — PROGRESS NOTES
[facility]    NAME: Liza Moulton is a 42 y o  female  : 1975    MRN: [id number]  DATE: February 15, 2022  TIME: 5:53 PM    Assessment and Plan   Acute otitis externa of left ear, unspecified type [H60 502]  1  Acute otitis externa of left ear, unspecified type  neomycin-polymyxin-hydrocortisone (CORTISPORIN) otic solution   2  Foreign body of left ear, initial encounter     Universal Protocol:  Patient understanding: patient states understanding of the procedure being performed    Foreign body removal    Date/Time: 2/15/2022 5:52 PM  Performed by: Juan Escobar PA-C  Authorized by: Juan Escobar PA-C   Body area: ear  Location details: left ear  Localization method: visualized  Removal mechanism: alligator forceps  Complexity: simple  1 objects recovered  Objects recovered: piece of cotton  Post-procedure assessment: foreign body removed  Patient tolerance: patient tolerated the procedure well with no immediate complications        Patient Instructions     Patient Instructions   Drops as directed  Chief Complaint     Chief Complaint   Patient presents with    Earache     left x 3 days       History of Present Illness   17-year-old female here with complaint of decreased hearing in her left ear  Started couple days ago  Thinks there is something in her ear blocking it  Denies any ear pain  No cough or cold symptoms  Review of Systems   Review of Systems   Constitutional: Negative for appetite change, chills and fever  HENT: Positive for hearing loss  Negative for congestion, ear discharge, ear pain, facial swelling, postnasal drip, sinus pressure, sneezing and sore throat  Respiratory: Negative for cough, shortness of breath and wheezing  Neurological: Negative for headaches  Current Medications     Current Outpatient Medications:     Accu-Chek FastClix Lancets MISC, Test blood sugars once daily  , Disp: 100 each, Rfl: 0    albuterol (Vernadine  HFA) 90 mcg/act inhaler, INHALE 2 PUFFS EVERY 6 (SIX) HOURS AS NEEDED FOR WHEEZING, Disp: 18 g, Rfl: 0    atorvastatin (LIPITOR) 10 mg tablet, TAKE 1 TABLET (10 MG TOTAL) BY MOUTH DAILY, Disp: 30 tablet, Rfl: 3    azelastine (ASTELIN) 0 1 % nasal spray, 1 SPRAY INTO EACH NOSTRIL 2 (TWO) TIMES A DAY USE IN EACH NOSTRIL AS DIRECTED, Disp: 30 mL, Rfl: 2    benzonatate (TESSALON) 200 MG capsule, Take 1 capsule (200 mg total) by mouth 3 (three) times a day as needed for cough, Disp: 20 capsule, Rfl: 0    citalopram (CeleXA) 40 mg tablet, TAKE 1 TABLET (40 MG TOTAL) BY MOUTH DAILY, Disp: 30 tablet, Rfl: 3    glucose blood test strip, Use as instructed, Disp: 100 each, Rfl: 2    glucose blood test strip, Use as instructed  Test up to 3 times a day as needed, Disp: 200 each, Rfl: 11    hydrOXYzine HCL (ATARAX) 50 mg tablet, TAKE 1 TABLET (50 MG TOTAL) BY MOUTH EVERY 6 (SIX) HOURS AS NEEDED FOR ITCHING, ALLERGIES OR ANXIETY, Disp: 120 tablet, Rfl: 0    metFORMIN (GLUCOPHAGE) 500 mg tablet, TAKE 2 TABLETS (1,000 MG TOTAL) BY MOUTH DAILY WITH BREAKFAST AND 1 TABLET (500 MG TOTAL) DAILY WITH DINNER , Disp: 90 tablet, Rfl: 2    montelukast (SINGULAIR) 10 mg tablet, TAKE 1 TABLET (10 MG TOTAL) BY MOUTH DAILY AT BEDTIME, Disp: 30 tablet, Rfl: 3    olopatadine (PATANOL) 0 1 % ophthalmic solution, Administer 1 drop to both eyes 2 (two) times a day, Disp: 5 mL, Rfl: 5    pramipexole (MIRAPEX) 0 5 mg tablet, Take 1 tablet (0 5 mg total) by mouth daily at bedtime, Disp: 30 tablet, Rfl: 0    SUMAtriptan (IMITREX) 25 mg tablet, Take 1 tablet (25 mg total) by mouth once as needed for migraine, Disp: 30 tablet, Rfl: 0    topiramate (TOPAMAX) 25 mg sprinkle capsule, TAKE 1 CAPSULE (25 MG TOTAL) BY MOUTH 2 (TWO) TIMES A DAY, Disp: 60 capsule, Rfl: 0     MG tablet, Take 600 mg by mouth every 8 (eight) hours as needed , Disp: , Rfl: 2    Lancets MISC, Use once for 1 dose Onetouch Verio  Test once daily or as directed  , Disp: 100 each, Rfl: 1    LORazepam (ATIVAN) 0 5 mg tablet, , Disp: , Rfl:     multivitamin (THERAGRAN) TABS, Take 1 tablet by mouth daily (Patient not taking: Reported on 10/11/2021), Disp: , Rfl:     neomycin-polymyxin-hydrocortisone (CORTISPORIN) otic solution, Administer 4 drops into the left ear every 6 (six) hours for 5 days, Disp: 10 mL, Rfl: 0    Current Allergies     Allergies as of 02/15/2022 - Reviewed 02/15/2022   Allergen Reaction Noted    Grass pollen(k-o-r-t-swt milana) Hives, Itching, Shortness Of Breath, Sneezing, and Throat Swelling 11/17/2021    Dust mite extract Hives and Itching 11/17/2021    Misc natural products Diarrhea, Nausea Only, and Vomiting 11/17/2021    Red dye - food allergy Hives, Itching, and Rash 11/17/2021          The following portions of the patient's history were reviewed and updated as appropriate: allergies, current medications, past family history, past medical history, past social history, past surgical history and problem list    Past Medical History:   Diagnosis Date    Anxiety     Diabetes mellitus ([address] )     Hyperlipidemia      Past Surgical History:   Procedure Laterality Date    ARTHROSCOPY KNEE Left     CHOLECYSTECTOMY      ECTOPIC PREGNANCY SURGERY      SHOULDER SURGERY Right     torn bicep     Family History   Problem Relation Age of Onset    Obesity Mother     Obesity Father     Diabetes type II Father     Diabetes Brother     Obesity Brother     No Known Problems Daughter     No Known Problems Maternal Grandmother     No Known Problems Paternal Grandmother     No Known Problems Maternal Aunt     No Known Problems Maternal Aunt     No Known Problems Maternal Aunt      Social History     Socioeconomic History    Marital status: /Civil Union     Spouse name: Not on file    Number of children: Not on file    Years of education: Not on file    Highest education level: Not on file   Occupational History    Not on file   Tobacco Use  Smoking status: Never Smoker    Smokeless tobacco: Never Used   Vaping Use    Vaping Use: Never used   Substance and Sexual Activity    Alcohol use: No    Drug use: Not Currently    Sexual activity: Not on file   Other Topics Concern    Not on file   Social History Narrative    Not on file     Social Determinants of Health     Financial Resource Strain: Not on file   Food Insecurity: Not on file   Transportation Needs: Not on file   Physical Activity: Not on file   Stress: Not on file   Social Connections: Not on file   Intimate Partner Violence: Not on file   Housing Stability: Not on file     Medications have been verified  Objective   Pulse 102   Temp 97 5 °F (36 4 °C)   Resp 18   Wt 109 kg (240 lb)   SpO2 98%   BMI 41 20 kg/m²      Physical Exam   Physical Exam  Vitals and nursing note reviewed  Constitutional:       General: She is not in acute distress  Appearance: She is well-developed  HENT:      Head: Normocephalic and atraumatic  Right Ear: Tympanic membrane normal       Left Ear: Tympanic membrane normal  A foreign body is present  Nose: Nose normal  No mucosal edema or rhinorrhea  Right Sinus: No maxillary sinus tenderness or frontal sinus tenderness  Left Sinus: No maxillary sinus tenderness or frontal sinus tenderness  Mouth/Throat:      Pharynx: No oropharyngeal exudate or posterior oropharyngeal erythema  Eyes:      Conjunctiva/sclera: Conjunctivae normal    Cardiovascular:      Rate and Rhythm: Normal rate and regular rhythm  Heart sounds: Normal heart sounds  No murmur heard        Pulmonary:      Effort: Pulmonary effort is normal

## 2022-02-15 NOTE — TELEPHONE ENCOUNTER
Patient requesting refill(s) of: Albuterol     Last filled: 1/20/22 18g 0 refills   Last appt: 10/6/2021  Next appt: None scheduled   [facility]       Called patient she is due for an appointment but she did not answer and voice mail is full

## 2022-02-15 NOTE — TELEPHONE ENCOUNTER
Patient requesting refill(s) of:  albuterol (PROVENTIL HFA,VENTOLIN HFA) 90 mcg/act inhaler  Last filled:12/29/21  Last appt:10/6/21  Next appt:None  Pharmacy: [facility]

## 2022-02-22 DIAGNOSIS — G43.109 MIGRAINE WITH AURA AND WITHOUT STATUS MIGRAINOSUS, NOT INTRACTABLE: ICD-10-CM

## 2022-02-22 RX ORDER — SUMATRIPTAN 25 MG/1
25 TABLET, FILM COATED ORAL ONCE AS NEEDED
Qty: 3 TABLET | Refills: 0 | Status: SHIPPED | OUTPATIENT
Start: 2022-02-22 | End: 2022-03-07

## 2022-02-22 NOTE — TELEPHONE ENCOUNTER
Patient requesting refill(s) of:  SUMAtriptan (IMITREX) 25 mg tablet  Last filled:11/23/21  Last appt:10/6/21  Next appt:3/4/22  Pharmacy: [facility]

## 2022-03-04 ENCOUNTER — OFFICE VISIT (OUTPATIENT)
Dept: FAMILY MEDICINE CLINIC | Facility: CLINIC | Age: 47
End: 2022-03-04
Payer: COMMERCIAL

## 2022-03-04 VITALS
HEART RATE: 89 BPM | WEIGHT: 234 LBS | DIASTOLIC BLOOD PRESSURE: 80 MMHG | SYSTOLIC BLOOD PRESSURE: 118 MMHG | RESPIRATION RATE: 12 BRPM | OXYGEN SATURATION: 94 % | BODY MASS INDEX: 39.95 KG/M2 | TEMPERATURE: 97.1 F | HEIGHT: 64 IN

## 2022-03-04 DIAGNOSIS — E66.01 CLASS 3 SEVERE OBESITY DUE TO EXCESS CALORIES WITH SERIOUS COMORBIDITY AND BODY MASS INDEX (BMI) OF 40.0 TO 44.9 IN ADULT (HCC): ICD-10-CM

## 2022-03-04 DIAGNOSIS — G43.109 MIGRAINE WITH AURA AND WITHOUT STATUS MIGRAINOSUS, NOT INTRACTABLE: ICD-10-CM

## 2022-03-04 DIAGNOSIS — E11.9 TYPE 2 DIABETES MELLITUS WITHOUT COMPLICATION, WITHOUT LONG-TERM CURRENT USE OF INSULIN (HCC): ICD-10-CM

## 2022-03-04 DIAGNOSIS — Z91.09 ENVIRONMENTAL ALLERGIES: ICD-10-CM

## 2022-03-04 DIAGNOSIS — F30.9 MOOD DISORDER OF MANIC TYPE (HCC): ICD-10-CM

## 2022-03-04 DIAGNOSIS — F41.8 DEPRESSION WITH ANXIETY: ICD-10-CM

## 2022-03-04 DIAGNOSIS — E78.49 OTHER HYPERLIPIDEMIA: Primary | ICD-10-CM

## 2022-03-04 DIAGNOSIS — G47.09 OTHER INSOMNIA: ICD-10-CM

## 2022-03-04 DIAGNOSIS — Z12.31 SCREENING MAMMOGRAM FOR BREAST CANCER: ICD-10-CM

## 2022-03-04 LAB — SL AMB POCT HEMOGLOBIN AIC: 8 (ref ?–6.5)

## 2022-03-04 PROCEDURE — 99214 OFFICE O/P EST MOD 30 MIN: CPT | Performed by: NURSE PRACTITIONER

## 2022-03-04 PROCEDURE — 83036 HEMOGLOBIN GLYCOSYLATED A1C: CPT | Performed by: NURSE PRACTITIONER

## 2022-03-04 RX ORDER — TRAZODONE HYDROCHLORIDE 50 MG/1
50 TABLET ORAL
Qty: 30 TABLET | Refills: 2 | Status: SHIPPED | OUTPATIENT
Start: 2022-03-04 | End: 2022-03-23 | Stop reason: SDUPTHER

## 2022-03-04 NOTE — PROGRESS NOTES
[facility]        NAME: Mary Carmen Dhillon is a 42 y o  female  : 1975    MRN: [id number]  DATE: 2022  TIME: 4:53 PM    Assessment and Plan   Other hyperlipidemia [E78 49]  1  Other hyperlipidemia  CBC and differential   2  Mood disorder of manic type ([place] [address] )     3  Type 2 diabetes mellitus without complication, without long-term current use of insulin (HCC)  HEMOGLOBIN A1C W/ EAG ESTIMATION    Comprehensive metabolic panel    Lipid panel    CBC and differential    POCT hemoglobin A1c   4  Depression with anxiety     5  Other insomnia  traZODone (DESYREL) 50 mg tablet   6  Screening mammogram for breast cancer  Mammo screening bilateral w 3d & cad   7  Class 3 severe obesity due to excess calories with serious comorbidity and body mass index (BMI) of 40 0 to 44 9 in [facility])  Ambulatory Referral to Weight Management   8  Environmental allergies  Ambulatory Referral to Allergy   9  Migraine with aura and without status migrainosus, not intractable       1  Other hyperlipidemia    - CBC and differential; Future    2  Mood disorder of manic type ([place] [address] )      3  Type 2 diabetes mellitus without complication, without long-term current use of insulin (HCC)  Improving down to 8 0 with HgbA1c  Continue current medications and dietary changes  Follow up in 4 months    - HEMOGLOBIN A1C W/ EAG ESTIMATION; Future  - Comprehensive metabolic panel; Future  - Lipid panel; Future  - CBC and differential; Future  - POCT hemoglobin A1c    4  Depression with anxiety      5  Other insomnia    - traZODone (DESYREL) 50 mg tablet; Take 1 tablet (50 mg total) by mouth daily at bedtime  Dispense: 30 tablet; Refill: 2    6  Screening mammogram for breast cancer    - Mammo screening bilateral w 3d & cad; Future    7  Class 3 severe obesity due to excess calories with serious comorbidity and body mass index (BMI) of 40 0 to 44 9 in [facility])    - Ambulatory Referral to Weight Management; Future    8  Environmental allergies    - Ambulatory Referral to Allergy; Future    9  Migraine with aura and without status migrainosus, not intractable        Patient Instructions     There are no Patient Instructions on file for this visit  Chief Complaint     Chief Complaint   Patient presents with    Follow-up         History of Present Illness        Patient here for follow up visit today  Admits to using methamphetamines 2021 and before this  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  Migraines getting 1-2 times a month now that she is taking the Topamax again which is improved from every week  Having trouble sleeping- taking 20mg of melatonin, other OTC medications that did not help  Would like something for sleep  Dm2-taking medication, has bee watching her diet  Would like to lose weight, wants to go to weight management to help with diet  Has allergies and would like to know what she is allergic too as she gets bad reactions even from the grass  Review of Systems   Review of Systems   Constitutional: Positive for fever  Respiratory: Negative  Negative for shortness of breath and wheezing  Cardiovascular: Negative  Negative for chest pain and palpitations  Gastrointestinal: Negative  Negative for abdominal pain  Neurological: Positive for headaches  Psychiatric/Behavioral: Positive for sleep disturbance  Negative for dysphoric mood  The patient is not nervous/anxious          PHQ-2/9 Depression Screening    Little interest or pleasure in doing things: 0 - not at all  Feeling down, depressed, or hopeless: 0 - not at all  Trouble falling or staying asleep, or sleeping too much: 3 - nearly every day  Feeling tired or having little energy: 2 - more than half the days  Poor appetite or overeatin - not at all  Feeling bad about yourself - or that you are a failure or have let yourself or your family down: 0 - not at all  Trouble concentrating on things, such as reading the newspaper or watching television: 3 - nearly every day  Moving or speaking so slowly that other people could have noticed  Or the opposite - being so fidgety or restless that you have been moving around a lot more than usual: 0 - not at all  Thoughts that you would be better off dead, or of hurting yourself in some way: 0 - not at all  PHQ-9 Score: 8   PHQ-9 Interpretation: Mild depression         Current Medications       Current Outpatient Medications:     Accu-Chek FastClix Lancets MISC, Test blood sugars once daily  , Disp: 100 each, Rfl: 0    albuterol (PROVENTIL HFA,VENTOLIN HFA) 90 mcg/act inhaler, INHALE 2 PUFFS EVERY 6 (SIX) HOURS AS NEEDED FOR WHEEZING, Disp: 18 g, Rfl: 0    atorvastatin (LIPITOR) 10 mg tablet, TAKE 1 TABLET (10 MG TOTAL) BY MOUTH DAILY, Disp: 30 tablet, Rfl: 3    azelastine (ASTELIN) 0 1 % nasal spray, 1 SPRAY INTO EACH NOSTRIL 2 (TWO) TIMES A DAY USE IN EACH NOSTRIL AS DIRECTED, Disp: 30 mL, Rfl: 2    benzonatate (TESSALON) 200 MG capsule, Take 1 capsule (200 mg total) by mouth 3 (three) times a day as needed for cough, Disp: 20 capsule, Rfl: 0    citalopram (CeleXA) 40 mg tablet, TAKE 1 TABLET (40 MG TOTAL) BY MOUTH DAILY, Disp: 30 tablet, Rfl: 1    glucose blood test strip, Use as instructed, Disp: 100 each, Rfl: 2    glucose blood test strip, Use as instructed   Test up to 3 times a day as needed, Disp: 200 each, Rfl: 11    hydrOXYzine HCL (ATARAX) 50 mg tablet, TAKE 1 TABLET (50 MG TOTAL) BY MOUTH EVERY 6 (SIX) HOURS AS NEEDED FOR ITCHING, ALLERGIES OR ANXIETY, Disp: 120 tablet, Rfl: 0    LORazepam (ATIVAN) 0 5 mg tablet,  , Disp: , Rfl:     metFORMIN (GLUCOPHAGE) 500 mg tablet, TAKE 2 TABLETS (1,000 MG TOTAL) BY MOUTH DAILY WITH BREAKFAST AND 1 TABLET (500 MG TOTAL) DAILY WITH DINNER , Disp: 90 tablet, Rfl: 2    montelukast (SINGULAIR) 10 mg tablet, TAKE 1 TABLET (10 MG TOTAL) BY MOUTH DAILY AT BEDTIME, Disp: 30 tablet, Rfl: 1    multivitamin (THERAGRAN) TABS, Take 1 tablet by mouth daily  , Disp: , Rfl:     olopatadine (PATANOL) 0 1 % ophthalmic solution, Administer 1 drop to both eyes 2 (two) times a day, Disp: 5 mL, Rfl: 5    pramipexole (MIRAPEX) 0 5 mg tablet, Take 1 tablet (0 5 mg total) by mouth daily at bedtime, Disp: 30 tablet, Rfl: 0    SUMAtriptan (IMITREX) 25 mg tablet, TAKE 1 TABLET (25 MG TOTAL) BY MOUTH ONCE AS NEEDED FOR MIGRAINE, Disp: 3 tablet, Rfl: 0    topiramate (TOPAMAX) 25 mg sprinkle capsule, TAKE 1 CAPSULE (25 MG TOTAL) BY MOUTH 2 (TWO) TIMES A DAY, Disp: 60 capsule, Rfl: 0    Lancets MISC, Use once for 1 dose Onetouch Verio   Test once daily or as directed , Disp: 100 each, Rfl: 1    traZODone (DESYREL) 50 mg tablet, Take 1 tablet (50 mg total) by mouth daily at bedtime, Disp: 30 tablet, Rfl: 2    Current Allergies     Allergies as of 03/04/2022 - Reviewed 03/04/2022   Allergen Reaction Noted    Grass pollen(k-o-r-t-swt milana) Hives, Itching, Shortness Of Breath, Sneezing, and Throat Swelling 11/17/2021    Dust mite extract Hives and Itching 11/17/2021    Misc natural products Diarrhea, Nausea Only, and Vomiting 11/17/2021    Red dye - food allergy Hives, Itching, and Rash 11/17/2021            The following portions of the patient's history were reviewed and updated as appropriate: allergies, current medications, past family history, past medical history, past social history, past surgical history and problem list      Past Medical History:   Diagnosis Date    Anxiety     Diabetes mellitus ([address] )     Hyperlipidemia        Past Surgical History:   Procedure Laterality Date    ARTHROSCOPY KNEE Left     CHOLECYSTECTOMY      ECTOPIC PREGNANCY SURGERY      SHOULDER SURGERY Right     torn bicep       Family History   Problem Relation Age of Onset    Obesity Mother     Obesity Father     Diabetes type II Father     Diabetes Brother     Obesity Brother     No Known Problems Daughter     No Known Problems Maternal Grandmother     No Known Problems Paternal Grandmother     No Known Problems Maternal Aunt     No Known Problems Maternal Aunt     No Known Problems Maternal Aunt          Medications have been verified  Objective   /80   Pulse 89   Temp (!) 97 1 °F (36 2 °C) (Tympanic)   Resp 12   Ht 5' 4" (1 626 m)   Wt 106 kg (234 lb)   SpO2 94%   BMI 40 17 kg/m²        Physical Exam     Physical Exam  Vitals and nursing note reviewed  Constitutional:       General: She is not in acute distress  Appearance: Normal appearance  She is well-developed  She is not ill-appearing  Eyes:      General: Lids are normal    Cardiovascular:      Rate and Rhythm: Normal rate and regular rhythm  Heart sounds: Normal heart sounds, S1 normal and S2 normal  No murmur heard  Pulmonary:      Effort: Pulmonary effort is normal  No respiratory distress  Breath sounds: Normal breath sounds  No decreased breath sounds  Musculoskeletal:         General: No tenderness or deformity  Normal range of motion  Skin:     General: Skin is warm  Findings: No erythema or rash  Neurological:      Mental Status: She is alert and oriented to person, place, and time  Psychiatric:         Behavior: Behavior normal  Behavior is cooperative  Thought Content:  Thought content normal

## 2022-03-06 DIAGNOSIS — F41.8 DEPRESSION WITH ANXIETY: ICD-10-CM

## 2022-03-07 RX ORDER — CITALOPRAM 40 MG/1
40 TABLET ORAL DAILY
Qty: 30 TABLET | Refills: 3 | Status: SHIPPED | OUTPATIENT
Start: 2022-03-07 | End: 2022-05-21

## 2022-03-07 NOTE — TELEPHONE ENCOUNTER
Patient pharmacy requesting refill of Celexa 40mg    Last filled: 2/18/22 #30x1  Last appt: 3/4/22  Next appt: 7/7/22  [facility]

## 2022-03-11 ENCOUNTER — APPOINTMENT (EMERGENCY)
Dept: CT IMAGING | Facility: HOSPITAL | Age: 47
DRG: 720 | End: 2022-03-11
Payer: COMMERCIAL

## 2022-03-11 ENCOUNTER — HOSPITAL ENCOUNTER (INPATIENT)
Facility: HOSPITAL | Age: 47
LOS: 4 days | Discharge: HOME | DRG: 720 | End: 2022-03-15
Attending: EMERGENCY MEDICINE | Admitting: STUDENT IN AN ORGANIZED HEALTH CARE EDUCATION/TRAINING PROGRAM
Payer: COMMERCIAL

## 2022-03-11 ENCOUNTER — OFFICE VISIT (OUTPATIENT)
Dept: URGENT CARE | Facility: CLINIC | Age: 47
End: 2022-03-11
Payer: COMMERCIAL

## 2022-03-11 VITALS — OXYGEN SATURATION: 98 % | RESPIRATION RATE: 18 BRPM | HEART RATE: 100 BPM | TEMPERATURE: 97.4 F

## 2022-03-11 DIAGNOSIS — L03.314 CELLULITIS OF GROIN: Primary | ICD-10-CM

## 2022-03-11 DIAGNOSIS — L03.90 SEPSIS DUE TO CELLULITIS (HCC): ICD-10-CM

## 2022-03-11 DIAGNOSIS — E11.9 TYPE 2 DIABETES MELLITUS WITHOUT COMPLICATION, WITHOUT LONG-TERM CURRENT USE OF INSULIN (HCC): Chronic | ICD-10-CM

## 2022-03-11 DIAGNOSIS — F30.9 MOOD DISORDER OF MANIC TYPE (HCC): ICD-10-CM

## 2022-03-11 DIAGNOSIS — L03.116 CELLULITIS OF LEFT THIGH: ICD-10-CM

## 2022-03-11 DIAGNOSIS — L03.116 LEFT LEG CELLULITIS: Primary | ICD-10-CM

## 2022-03-11 DIAGNOSIS — A41.9 SEPSIS DUE TO CELLULITIS (HCC): ICD-10-CM

## 2022-03-11 DIAGNOSIS — R73.9 HYPERGLYCEMIA: ICD-10-CM

## 2022-03-11 DIAGNOSIS — D72.829 LEUKOCYTOSIS: ICD-10-CM

## 2022-03-11 DIAGNOSIS — E66.9 OBESITY: ICD-10-CM

## 2022-03-11 DIAGNOSIS — E11.9 DM (DIABETES MELLITUS) (HCC): ICD-10-CM

## 2022-03-11 PROBLEM — M79.672 LEFT FOOT PAIN: Status: RESOLVED | Noted: 2020-01-15 | Resolved: 2022-03-11

## 2022-03-11 PROBLEM — E78.49 OTHER HYPERLIPIDEMIA: Chronic | Status: ACTIVE | Noted: 2021-03-06

## 2022-03-11 PROBLEM — G47.09 OTHER INSOMNIA: Status: RESOLVED | Noted: 2022-03-04 | Resolved: 2022-03-11

## 2022-03-11 PROBLEM — S92.515A CLOSED NONDISPLACED FRACTURE OF PROXIMAL PHALANX OF LESSER TOE OF LEFT FOOT: Status: RESOLVED | Noted: 2020-01-15 | Resolved: 2022-03-11

## 2022-03-11 LAB
ALBUMIN SERPL BCP-MCNC: 4 G/DL (ref 3.5–5)
ALP SERPL-CCNC: 70 U/L (ref 34–104)
ALT SERPL W P-5'-P-CCNC: 27 U/L (ref 7–52)
ANION GAP SERPL CALCULATED.3IONS-SCNC: 10 MMOL/L (ref 4–13)
APTT PPP: 30 SECONDS (ref 23–37)
AST SERPL W P-5'-P-CCNC: 21 U/L (ref 13–39)
BASOPHILS # BLD AUTO: 0.06 THOUSANDS/ΜL (ref 0–0.1)
BASOPHILS NFR BLD AUTO: 0 % (ref 0–1)
BILIRUB SERPL-MCNC: 0.58 MG/DL (ref 0.2–1)
BILIRUB UR QL STRIP: NEGATIVE
BUN SERPL-MCNC: 8 MG/DL (ref 5–25)
CALCIUM SERPL-MCNC: 9.6 MG/DL (ref 8.4–10.2)
CHLORIDE SERPL-SCNC: 103 MMOL/L (ref 96–108)
CLARITY UR: ABNORMAL
CO2 SERPL-SCNC: 17 MMOL/L (ref 21–32)
COLOR UR: YELLOW
CREAT SERPL-MCNC: 0.68 MG/DL (ref 0.6–1.3)
EOSINOPHIL # BLD AUTO: 0.05 THOUSAND/ΜL (ref 0–0.61)
EOSINOPHIL NFR BLD AUTO: 0 % (ref 0–6)
ERYTHROCYTE [DISTWIDTH] IN BLOOD BY AUTOMATED COUNT: 13.1 % (ref 11.6–15.1)
GFR SERPL CREATININE-BSD FRML MDRD: 106 ML/MIN/1.73SQ M
GLUCOSE SERPL-MCNC: 193 MG/DL (ref 65–140)
GLUCOSE SERPL-MCNC: 277 MG/DL (ref 65–140)
GLUCOSE UR STRIP-MCNC: ABNORMAL MG/DL
HCT VFR BLD AUTO: 40.8 % (ref 34.8–46.1)
HGB BLD-MCNC: 13.1 G/DL (ref 11.5–15.4)
HGB UR QL STRIP.AUTO: NEGATIVE
IMM GRANULOCYTES # BLD AUTO: 0.09 THOUSAND/UL (ref 0–0.2)
IMM GRANULOCYTES NFR BLD AUTO: 1 % (ref 0–2)
INR PPP: 0.98 (ref 0.84–1.19)
KETONES UR STRIP-MCNC: ABNORMAL MG/DL
LACTATE SERPL-SCNC: 1.8 MMOL/L (ref 0.5–2)
LEUKOCYTE ESTERASE UR QL STRIP: NEGATIVE
LYMPHOCYTES # BLD AUTO: 2.87 THOUSANDS/ΜL (ref 0.6–4.47)
LYMPHOCYTES NFR BLD AUTO: 16 % (ref 14–44)
MCH RBC QN AUTO: 29.6 PG (ref 26.8–34.3)
MCHC RBC AUTO-ENTMCNC: 32.1 G/DL (ref 31.4–37.4)
MCV RBC AUTO: 92 FL (ref 82–98)
MONOCYTES # BLD AUTO: 1.74 THOUSAND/ΜL (ref 0.17–1.22)
MONOCYTES NFR BLD AUTO: 10 % (ref 4–12)
NEUTROPHILS # BLD AUTO: 13.44 THOUSANDS/ΜL (ref 1.85–7.62)
NEUTS SEG NFR BLD AUTO: 73 % (ref 43–75)
NITRITE UR QL STRIP: NEGATIVE
NRBC BLD AUTO-RTO: 0 /100 WBCS
PH UR STRIP.AUTO: 6 [PH]
PLATELET # BLD AUTO: 220 THOUSANDS/UL (ref 149–390)
PMV BLD AUTO: 10.2 FL (ref 8.9–12.7)
POTASSIUM SERPL-SCNC: 3.7 MMOL/L (ref 3.5–5.3)
PROCALCITONIN SERPL-MCNC: 0.08 NG/ML
PROT SERPL-MCNC: 7.4 G/DL (ref 6.4–8.4)
PROT UR STRIP-MCNC: NEGATIVE MG/DL
PROTHROMBIN TIME: 12.9 SECONDS (ref 11.6–14.5)
RBC # BLD AUTO: 4.42 MILLION/UL (ref 3.81–5.12)
SODIUM SERPL-SCNC: 130 MMOL/L (ref 135–147)
SP GR UR STRIP.AUTO: 1.02 (ref 1–1.03)
UROBILINOGEN UR QL STRIP.AUTO: 0.2 E.U./DL
WBC # BLD AUTO: 18.25 THOUSAND/UL (ref 4.31–10.16)

## 2022-03-11 PROCEDURE — G1004 CDSM NDSC: HCPCS

## 2022-03-11 PROCEDURE — 81003 URINALYSIS AUTO W/O SCOPE: CPT | Performed by: EMERGENCY MEDICINE

## 2022-03-11 PROCEDURE — 90715 TDAP VACCINE 7 YRS/> IM: CPT | Performed by: EMERGENCY MEDICINE

## 2022-03-11 PROCEDURE — 99223 1ST HOSP IP/OBS HIGH 75: CPT | Performed by: PHYSICIAN ASSISTANT

## 2022-03-11 PROCEDURE — 36415 COLL VENOUS BLD VENIPUNCTURE: CPT | Performed by: EMERGENCY MEDICINE

## 2022-03-11 PROCEDURE — 84145 PROCALCITONIN (PCT): CPT | Performed by: EMERGENCY MEDICINE

## 2022-03-11 PROCEDURE — 85730 THROMBOPLASTIN TIME PARTIAL: CPT | Performed by: EMERGENCY MEDICINE

## 2022-03-11 PROCEDURE — 82948 REAGENT STRIP/BLOOD GLUCOSE: CPT

## 2022-03-11 PROCEDURE — 85610 PROTHROMBIN TIME: CPT | Performed by: EMERGENCY MEDICINE

## 2022-03-11 PROCEDURE — 96375 TX/PRO/DX INJ NEW DRUG ADDON: CPT

## 2022-03-11 PROCEDURE — 96365 THER/PROPH/DIAG IV INF INIT: CPT

## 2022-03-11 PROCEDURE — 96361 HYDRATE IV INFUSION ADD-ON: CPT

## 2022-03-11 PROCEDURE — 80053 COMPREHEN METABOLIC PANEL: CPT | Performed by: EMERGENCY MEDICINE

## 2022-03-11 PROCEDURE — 87040 BLOOD CULTURE FOR BACTERIA: CPT | Performed by: EMERGENCY MEDICINE

## 2022-03-11 PROCEDURE — 99285 EMERGENCY DEPT VISIT HI MDM: CPT

## 2022-03-11 PROCEDURE — 85025 COMPLETE CBC W/AUTO DIFF WBC: CPT | Performed by: EMERGENCY MEDICINE

## 2022-03-11 PROCEDURE — 99285 EMERGENCY DEPT VISIT HI MDM: CPT | Performed by: EMERGENCY MEDICINE

## 2022-03-11 PROCEDURE — 83605 ASSAY OF LACTIC ACID: CPT | Performed by: EMERGENCY MEDICINE

## 2022-03-11 PROCEDURE — 99213 OFFICE O/P EST LOW 20 MIN: CPT

## 2022-03-11 PROCEDURE — 73701 CT LOWER EXTREMITY W/DYE: CPT

## 2022-03-11 RX ORDER — TOPIRAMATE 25 MG/1
25 CAPSULE, COATED PELLETS ORAL 2 TIMES DAILY
Status: DISCONTINUED | OUTPATIENT
Start: 2022-03-12 | End: 2022-03-15 | Stop reason: HOSPADM

## 2022-03-11 RX ORDER — PRAMIPEXOLE DIHYDROCHLORIDE 0.5 MG/1
0.5 TABLET ORAL
Status: DISCONTINUED | OUTPATIENT
Start: 2022-03-11 | End: 2022-03-15 | Stop reason: HOSPADM

## 2022-03-11 RX ORDER — MORPHINE SULFATE 4 MG/ML
4 INJECTION, SOLUTION INTRAMUSCULAR; INTRAVENOUS ONCE
Status: COMPLETED | OUTPATIENT
Start: 2022-03-11 | End: 2022-03-11

## 2022-03-11 RX ORDER — HYDROXYZINE HYDROCHLORIDE 25 MG/1
50 TABLET, FILM COATED ORAL EVERY 6 HOURS PRN
Status: DISCONTINUED | OUTPATIENT
Start: 2022-03-11 | End: 2022-03-15 | Stop reason: HOSPADM

## 2022-03-11 RX ORDER — CEFEPIME HYDROCHLORIDE 2 G/50ML
2000 INJECTION, SOLUTION INTRAVENOUS ONCE
Status: COMPLETED | OUTPATIENT
Start: 2022-03-11 | End: 2022-03-11

## 2022-03-11 RX ORDER — TRAZODONE HYDROCHLORIDE 50 MG/1
50 TABLET ORAL
Status: DISCONTINUED | OUTPATIENT
Start: 2022-03-11 | End: 2022-03-15 | Stop reason: HOSPADM

## 2022-03-11 RX ORDER — ACETAMINOPHEN 325 MG/1
650 TABLET ORAL EVERY 4 HOURS PRN
Status: DISCONTINUED | OUTPATIENT
Start: 2022-03-11 | End: 2022-03-15 | Stop reason: HOSPADM

## 2022-03-11 RX ORDER — ONDANSETRON 2 MG/ML
4 INJECTION INTRAMUSCULAR; INTRAVENOUS EVERY 6 HOURS PRN
Status: DISCONTINUED | OUTPATIENT
Start: 2022-03-11 | End: 2022-03-15 | Stop reason: HOSPADM

## 2022-03-11 RX ORDER — MONTELUKAST SODIUM 10 MG/1
10 TABLET ORAL
Status: DISCONTINUED | OUTPATIENT
Start: 2022-03-11 | End: 2022-03-15 | Stop reason: HOSPADM

## 2022-03-11 RX ORDER — SODIUM CHLORIDE 9 MG/ML
125 INJECTION, SOLUTION INTRAVENOUS CONTINUOUS
Status: DISCONTINUED | OUTPATIENT
Start: 2022-03-11 | End: 2022-03-12

## 2022-03-11 RX ORDER — KETOROLAC TROMETHAMINE 30 MG/ML
30 INJECTION, SOLUTION INTRAMUSCULAR; INTRAVENOUS EVERY 6 HOURS PRN
Status: DISCONTINUED | OUTPATIENT
Start: 2022-03-11 | End: 2022-03-12

## 2022-03-11 RX ORDER — FENTANYL CITRATE 50 UG/ML
50 INJECTION, SOLUTION INTRAMUSCULAR; INTRAVENOUS ONCE
Status: COMPLETED | OUTPATIENT
Start: 2022-03-11 | End: 2022-03-11

## 2022-03-11 RX ORDER — CITALOPRAM 20 MG/1
40 TABLET ORAL DAILY
Status: DISCONTINUED | OUTPATIENT
Start: 2022-03-12 | End: 2022-03-15 | Stop reason: HOSPADM

## 2022-03-11 RX ORDER — ATORVASTATIN CALCIUM 10 MG/1
10 TABLET, FILM COATED ORAL DAILY
Status: DISCONTINUED | OUTPATIENT
Start: 2022-03-12 | End: 2022-03-15 | Stop reason: HOSPADM

## 2022-03-11 RX ORDER — CEFEPIME HYDROCHLORIDE 2 G/50ML
2000 INJECTION, SOLUTION INTRAVENOUS EVERY 12 HOURS
Status: DISCONTINUED | OUTPATIENT
Start: 2022-03-12 | End: 2022-03-15 | Stop reason: HOSPADM

## 2022-03-11 RX ADMIN — IOHEXOL 100 ML: 350 INJECTION, SOLUTION INTRAVENOUS at 21:07

## 2022-03-11 RX ADMIN — SODIUM CHLORIDE 1000 ML: 0.9 INJECTION, SOLUTION INTRAVENOUS at 22:34

## 2022-03-11 RX ADMIN — SODIUM CHLORIDE 1000 ML: 0.9 INJECTION, SOLUTION INTRAVENOUS at 20:19

## 2022-03-11 RX ADMIN — FENTANYL CITRATE 50 MCG: 50 INJECTION, SOLUTION INTRAMUSCULAR; INTRAVENOUS at 22:33

## 2022-03-11 RX ADMIN — CEFEPIME HYDROCHLORIDE 2000 MG: 2 INJECTION, SOLUTION INTRAVENOUS at 20:17

## 2022-03-11 RX ADMIN — VANCOMYCIN HYDROCHLORIDE 1500 MG: 1 INJECTION, POWDER, LYOPHILIZED, FOR SOLUTION INTRAVENOUS at 23:10

## 2022-03-11 RX ADMIN — INSULIN LISPRO 1 UNITS: 100 INJECTION, SOLUTION INTRAVENOUS; SUBCUTANEOUS at 23:05

## 2022-03-11 RX ADMIN — INSULIN DETEMIR 10 UNITS: 100 INJECTION, SOLUTION SUBCUTANEOUS at 23:07

## 2022-03-11 RX ADMIN — TETANUS TOXOID, REDUCED DIPHTHERIA TOXOID AND ACELLULAR PERTUSSIS VACCINE, ADSORBED 0.5 ML: 5; 2.5; 8; 8; 2.5 SUSPENSION INTRAMUSCULAR at 22:43

## 2022-03-11 RX ADMIN — MORPHINE SULFATE 4 MG: 4 INJECTION INTRAVENOUS at 20:14

## 2022-03-11 NOTE — PROGRESS NOTES
[facility]        NAME: Supa Liao is a 42 y o  female  : 1975    MRN: [id number]  DATE: 2022  TIME: 6:56 PM    Assessment and Plan   Cellulitis of groin [Q44 730]  1  Cellulitis of groin  Transfer to other facility       Patient is an uncontrolled diabetic and appears ill upon arrival  I sent patient to the ER for possible IV antibiotics and further evaluation  Patient Instructions     Go to the ER for further evaluation  Follow up with PCP in 3-5 days  Proceed to  ER if symptoms worsen  Chief Complaint     Chief Complaint   Patient presents with    Insect Bite     Insect bite on her left thigh for two days  History of Present Illness       Patient is a 60EEN presenting with swelling and redness in her left upper thigh  Patient states she is unsure if she was bit by something  Patient admits she is a diabetic and has not checked her blood sugar for two months  Patient has fever, chills and nausea  Denies any cough ,sob, cp  Insect Bite  Associated symptoms include arthralgias, chills, a fever and nausea  Pertinent negatives include no abdominal pain, chest pain, congestion, coughing, headaches, numbness, vomiting or weakness  Review of Systems   Review of Systems   Constitutional: Positive for chills and fever  Negative for activity change and appetite change  HENT: Negative for congestion  Respiratory: Negative for cough, chest tightness, shortness of breath and wheezing  Cardiovascular: Negative for chest pain and palpitations  Gastrointestinal: Positive for nausea  Negative for abdominal pain, diarrhea and vomiting  Musculoskeletal: Positive for arthralgias  Skin: Positive for wound  Neurological: Negative for dizziness, weakness, numbness and headaches  All other systems reviewed and are negative  Current Medications       Current Outpatient Medications:     Accu-Chek FastClix Lancets MISC, Test blood sugars once daily  , Disp: 100 each, Rfl: 0    albuterol (PROVENTIL HFA,VENTOLIN HFA) 90 mcg/act inhaler, INHALE 2 PUFFS EVERY 6 (SIX) HOURS AS NEEDED FOR WHEEZING, Disp: 18 g, Rfl: 6    atorvastatin (LIPITOR) 10 mg tablet, TAKE 1 TABLET (10 MG TOTAL) BY MOUTH DAILY, Disp: 30 tablet, Rfl: 3    azelastine (ASTELIN) 0 1 % nasal spray, 1 SPRAY INTO EACH NOSTRIL 2 (TWO) TIMES A DAY USE IN EACH NOSTRIL AS DIRECTED, Disp: 30 mL, Rfl: 2    benzonatate (TESSALON) 200 MG capsule, Take 1 capsule (200 mg total) by mouth 3 (three) times a day as needed for cough, Disp: 20 capsule, Rfl: 0    citalopram (CeleXA) 40 mg tablet, TAKE 1 TABLET (40 MG TOTAL) BY MOUTH DAILY, Disp: 30 tablet, Rfl: 3    glucose blood test strip, Use as instructed, Disp: 100 each, Rfl: 2    glucose blood test strip, Use as instructed  Test up to 3 times a day as needed, Disp: 200 each, Rfl: 11    hydrOXYzine HCL (ATARAX) 50 mg tablet, TAKE 1 TABLET (50 MG TOTAL) BY MOUTH EVERY 6 (SIX) HOURS AS NEEDED FOR ITCHING, ALLERGIES OR ANXIETY, Disp: 120 tablet, Rfl: 0    Lancets MISC, Use once for 1 dose Onetouch Verio   Test once daily or as directed , Disp: 100 each, Rfl: 1    LORazepam (ATIVAN) 0 5 mg tablet,  , Disp: , Rfl:     metFORMIN (GLUCOPHAGE) 500 mg tablet, TAKE 2 TABLETS (1,000 MG TOTAL) BY MOUTH DAILY WITH BREAKFAST AND 1 TABLET (500 MG TOTAL) DAILY WITH DINNER , Disp: 90 tablet, Rfl: 2    montelukast (SINGULAIR) 10 mg tablet, TAKE 1 TABLET (10 MG TOTAL) BY MOUTH DAILY AT BEDTIME, Disp: 30 tablet, Rfl: 1    multivitamin (THERAGRAN) TABS, Take 1 tablet by mouth daily  , Disp: , Rfl:     olopatadine (PATANOL) 0 1 % ophthalmic solution, Administer 1 drop to both eyes 2 (two) times a day, Disp: 5 mL, Rfl: 5    pramipexole (MIRAPEX) 0 5 mg tablet, Take 1 tablet (0 5 mg total) by mouth daily at bedtime, Disp: 30 tablet, Rfl: 0    SUMAtriptan (IMITREX) 25 mg tablet, TAKE 1 TABLET (25 MG TOTAL) BY MOUTH ONCE AS NEEDED FOR MIGRAINE, Disp: 9 tablet, Rfl: 0    topiramate (TOPAMAX) 25 mg sprinkle capsule, TAKE 1 CAPSULE (25 MG TOTAL) BY MOUTH 2 (TWO) TIMES A DAY, Disp: 60 capsule, Rfl: 0    traZODone (DESYREL) 50 mg tablet, Take 1 tablet (50 mg total) by mouth daily at bedtime, Disp: 30 tablet, Rfl: 2    Current Allergies     Allergies as of 03/11/2022 - Reviewed 03/11/2022   Allergen Reaction Noted    Grass pollen(k-o-r-t-swt milana) Hives, Itching, Shortness Of Breath, Sneezing, and Throat Swelling 11/17/2021    Dust mite extract Hives and Itching 11/17/2021    Misc natural products Diarrhea, Nausea Only, and Vomiting 11/17/2021    Red dye - food allergy Hives, Itching, and Rash 11/17/2021            The following portions of the patient's history were reviewed and updated as appropriate: allergies, current medications, past family history, past medical history, past social history, past surgical history and problem list      Past Medical History:   Diagnosis Date    Anxiety     Diabetes mellitus ([facility] [address] )     Hyperlipidemia        Past Surgical History:   Procedure Laterality Date    ARTHROSCOPY KNEE Left     CHOLECYSTECTOMY      ECTOPIC PREGNANCY SURGERY      SHOULDER SURGERY Right     torn bicep       Family History   Problem Relation Age of Onset    Obesity Mother     Obesity Father     Diabetes type II Father     Diabetes Brother     Obesity Brother     No Known Problems Daughter     No Known Problems Maternal Grandmother     No Known Problems Paternal Grandmother     No Known Problems Maternal Aunt     No Known Problems Maternal Aunt     No Known Problems Maternal Aunt          Medications have been verified  Objective   Pulse 100   Temp (!) 97 4 °F (36 3 °C)   Resp 18   SpO2 98%        Physical Exam     Physical Exam  Vitals and nursing note reviewed  Constitutional:       General: She is not in acute distress  Appearance: She is normal weight  She is ill-appearing and toxic-appearing  HENT:      Nose: No congestion        Mouth/Throat: Pharynx: Oropharynx is clear  Cardiovascular:      Rate and Rhythm: Normal rate and regular rhythm  Heart sounds: Normal heart sounds  Pulmonary:      Effort: Tachypnea present  Breath sounds: Normal breath sounds  Skin:         Neurological:      Mental Status: She is alert

## 2022-03-12 LAB
ANION GAP SERPL CALCULATED.3IONS-SCNC: 8 MMOL/L (ref 4–13)
BUN SERPL-MCNC: 5 MG/DL (ref 5–25)
CALCIUM SERPL-MCNC: 8.7 MG/DL (ref 8.4–10.2)
CHLORIDE SERPL-SCNC: 107 MMOL/L (ref 96–108)
CO2 SERPL-SCNC: 19 MMOL/L (ref 21–32)
CREAT SERPL-MCNC: 0.59 MG/DL (ref 0.6–1.3)
ERYTHROCYTE [DISTWIDTH] IN BLOOD BY AUTOMATED COUNT: 13 % (ref 11.6–15.1)
GFR SERPL CREATININE-BSD FRML MDRD: 111 ML/MIN/1.73SQ M
GLUCOSE SERPL-MCNC: 178 MG/DL (ref 65–140)
GLUCOSE SERPL-MCNC: 191 MG/DL (ref 65–140)
GLUCOSE SERPL-MCNC: 196 MG/DL (ref 65–140)
GLUCOSE SERPL-MCNC: 201 MG/DL (ref 65–140)
GLUCOSE SERPL-MCNC: 230 MG/DL (ref 65–140)
HCT VFR BLD AUTO: 35.3 % (ref 34.8–46.1)
HGB BLD-MCNC: 11.8 G/DL (ref 11.5–15.4)
MCH RBC QN AUTO: 29.4 PG (ref 26.8–34.3)
MCHC RBC AUTO-ENTMCNC: 33.4 G/DL (ref 31.4–37.4)
MCV RBC AUTO: 88 FL (ref 82–98)
PLATELET # BLD AUTO: 223 THOUSANDS/UL (ref 149–390)
PMV BLD AUTO: 10.1 FL (ref 8.9–12.7)
POTASSIUM SERPL-SCNC: 3.1 MMOL/L (ref 3.5–5.3)
PROCALCITONIN SERPL-MCNC: 0.09 NG/ML
RBC # BLD AUTO: 4.01 MILLION/UL (ref 3.81–5.12)
SODIUM SERPL-SCNC: 134 MMOL/L (ref 135–147)
WBC # BLD AUTO: 15.24 THOUSAND/UL (ref 4.31–10.16)

## 2022-03-12 PROCEDURE — 82948 REAGENT STRIP/BLOOD GLUCOSE: CPT

## 2022-03-12 PROCEDURE — 84145 PROCALCITONIN (PCT): CPT | Performed by: EMERGENCY MEDICINE

## 2022-03-12 PROCEDURE — 99221 1ST HOSP IP/OBS SF/LOW 40: CPT | Performed by: SURGERY

## 2022-03-12 PROCEDURE — 99232 SBSQ HOSP IP/OBS MODERATE 35: CPT | Performed by: HOSPITALIST

## 2022-03-12 PROCEDURE — 80048 BASIC METABOLIC PNL TOTAL CA: CPT | Performed by: PHYSICIAN ASSISTANT

## 2022-03-12 PROCEDURE — 36415 COLL VENOUS BLD VENIPUNCTURE: CPT | Performed by: EMERGENCY MEDICINE

## 2022-03-12 PROCEDURE — 85027 COMPLETE CBC AUTOMATED: CPT | Performed by: PHYSICIAN ASSISTANT

## 2022-03-12 RX ORDER — KETOROLAC TROMETHAMINE 30 MG/ML
15 INJECTION, SOLUTION INTRAMUSCULAR; INTRAVENOUS EVERY 6 HOURS SCHEDULED
Status: DISPENSED | OUTPATIENT
Start: 2022-03-12 | End: 2022-03-15

## 2022-03-12 RX ORDER — TOPIRAMATE 25 MG/1
TABLET ORAL
Status: DISPENSED
Start: 2022-03-12 | End: 2022-03-13

## 2022-03-12 RX ORDER — POTASSIUM CHLORIDE 14.9 MG/ML
20 INJECTION INTRAVENOUS
Status: DISPENSED | OUTPATIENT
Start: 2022-03-12 | End: 2022-03-12

## 2022-03-12 RX ADMIN — POTASSIUM CHLORIDE 20 MEQ: 200 INJECTION, SOLUTION INTRAVENOUS at 09:00

## 2022-03-12 RX ADMIN — TRAZODONE HYDROCHLORIDE 50 MG: 50 TABLET ORAL at 21:03

## 2022-03-12 RX ADMIN — TOPIRAMATE 25 MG: 25 CAPSULE, COATED PELLETS ORAL at 08:11

## 2022-03-12 RX ADMIN — TRAZODONE HYDROCHLORIDE 50 MG: 50 TABLET ORAL at 00:51

## 2022-03-12 RX ADMIN — MONTELUKAST 10 MG: 10 TABLET, FILM COATED ORAL at 21:04

## 2022-03-12 RX ADMIN — PRAMIPEXOLE DIHYDROCHLORIDE 0.5 MG: 0.5 TABLET ORAL at 00:51

## 2022-03-12 RX ADMIN — SODIUM CHLORIDE 125 ML/HR: 9 INJECTION, SOLUTION INTRAVENOUS at 00:16

## 2022-03-12 RX ADMIN — INSULIN DETEMIR 15 UNITS: 100 INJECTION, SOLUTION SUBCUTANEOUS at 21:32

## 2022-03-12 RX ADMIN — INSULIN LISPRO 2 UNITS: 100 INJECTION, SOLUTION INTRAVENOUS; SUBCUTANEOUS at 13:44

## 2022-03-12 RX ADMIN — CITALOPRAM HYDROBROMIDE 40 MG: 20 TABLET ORAL at 08:11

## 2022-03-12 RX ADMIN — INSULIN LISPRO 2 UNITS: 100 INJECTION, SOLUTION INTRAVENOUS; SUBCUTANEOUS at 21:30

## 2022-03-12 RX ADMIN — KETOROLAC TROMETHAMINE 15 MG: 30 INJECTION, SOLUTION INTRAMUSCULAR; INTRAVENOUS at 13:00

## 2022-03-12 RX ADMIN — ENOXAPARIN SODIUM 40 MG: 100 INJECTION SUBCUTANEOUS at 08:11

## 2022-03-12 RX ADMIN — TOPIRAMATE 25 MG: 25 CAPSULE, COATED PELLETS ORAL at 18:58

## 2022-03-12 RX ADMIN — INSULIN LISPRO 1 UNITS: 100 INJECTION, SOLUTION INTRAVENOUS; SUBCUTANEOUS at 09:30

## 2022-03-12 RX ADMIN — PRAMIPEXOLE DIHYDROCHLORIDE 0.5 MG: 0.5 TABLET ORAL at 21:06

## 2022-03-12 RX ADMIN — CEFEPIME HYDROCHLORIDE 2000 MG: 2 INJECTION, SOLUTION INTRAVENOUS at 08:11

## 2022-03-12 RX ADMIN — INSULIN LISPRO 2 UNITS: 100 INJECTION, SOLUTION INTRAVENOUS; SUBCUTANEOUS at 18:22

## 2022-03-12 RX ADMIN — KETOROLAC TROMETHAMINE 15 MG: 30 INJECTION, SOLUTION INTRAMUSCULAR; INTRAVENOUS at 18:58

## 2022-03-12 RX ADMIN — ATORVASTATIN CALCIUM 10 MG: 10 TABLET, FILM COATED ORAL at 08:11

## 2022-03-12 RX ADMIN — CEFEPIME HYDROCHLORIDE 2000 MG: 2 INJECTION, SOLUTION INTRAVENOUS at 21:00

## 2022-03-12 NOTE — ED PROVIDER NOTES
History  Chief Complaint   Patient presents with    Abscess     dx with left sided abscess at [facility] today  referred herer for IV antibiotics      HPI      This is a very pleasant, nontoxic, noncompliant diabetic who is morbidly obese 44-year-old female presents the emergency department with a chief complaint of left inner thigh pain times 48 hours  Patient does not check her sugars and not checked her blood sugar in approximately 3 months  Patient is a diabetic  Patient reports shaking chills but no documented fever at home  Patient was concerned she may have been bit by a bug  Patient denies any cough, shortness breath, chest pain  No history of MRSA  No history of trauma  Last hemoglobin A1c was 8 0 as of  of this month  Prior to Admission Medications   Prescriptions Last Dose Informant Patient Reported? Taking? Accu-Chek FastClix Lancets MISC  Self No No   Sig: Test blood sugars once daily  LORazepam (ATIVAN) 0 5 mg tablet  Self Yes No   Sig:     Lancets MISC   No No   Sig: Use once for 1 dose Onetouch Verio  Test once daily or as directed  SUMAtriptan (IMITREX) 25 mg tablet   No No   Sig: TAKE 1 TABLET (25 MG TOTAL) BY MOUTH ONCE AS NEEDED FOR MIGRAINE   albuterol (PROVENTIL HFA,VENTOLIN HFA) 90 mcg/act inhaler   No No   Sig: INHALE 2 PUFFS EVERY 6 (SIX) HOURS AS NEEDED FOR WHEEZING   atorvastatin (LIPITOR) 10 mg tablet   No No   Sig: TAKE 1 TABLET (10 MG TOTAL) BY MOUTH DAILY   azelastine (ASTELIN) 0 1 % nasal spray  Self No No   Si SPRAY INTO EACH NOSTRIL 2 (TWO) TIMES A DAY USE IN EACH NOSTRIL AS DIRECTED   benzonatate (TESSALON) 200 MG capsule   No No   Sig: Take 1 capsule (200 mg total) by mouth 3 (three) times a day as needed for cough   citalopram (CeleXA) 40 mg tablet   No No   Sig: TAKE 1 TABLET (40 MG TOTAL) BY MOUTH DAILY   glucose blood test strip  Self No No   Sig: Use as instructed   glucose blood test strip  Self No No   Sig: Use as instructed   Test up to 3 times a day as needed   hydrOXYzine HCL (ATARAX) 50 mg tablet   No No   Sig: TAKE 1 TABLET (50 MG TOTAL) BY MOUTH EVERY 6 (SIX) HOURS AS NEEDED FOR ITCHING, ALLERGIES OR ANXIETY   metFORMIN (GLUCOPHAGE) 500 mg tablet   No No   Sig: TAKE 2 TABLETS (1,000 MG TOTAL) BY MOUTH DAILY WITH BREAKFAST AND 1 TABLET (500 MG TOTAL) DAILY WITH DINNER  montelukast (SINGULAIR) 10 mg tablet   No No   Sig: TAKE 1 TABLET (10 MG TOTAL) BY MOUTH DAILY AT BEDTIME   multivitamin (THERAGRAN) TABS  Self Yes No   Sig: Take 1 tablet by mouth daily     olopatadine (PATANOL) 0 1 % ophthalmic solution   No No   Sig: Administer 1 drop to both eyes 2 (two) times a day   pramipexole (MIRAPEX) 0 5 mg tablet   No No   Sig: Take 1 tablet (0 5 mg total) by mouth daily at bedtime   topiramate (TOPAMAX) 25 mg sprinkle capsule   No No   Sig: TAKE 1 CAPSULE (25 MG TOTAL) BY MOUTH 2 (TWO) TIMES A DAY   traZODone (DESYREL) 50 mg tablet   No No   Sig: Take 1 tablet (50 mg total) by mouth daily at bedtime      Facility-Administered Medications: None       Past Medical History:   Diagnosis Date    Anxiety     Diabetes mellitus ([facility] [address] )     Hyperlipidemia        Past Surgical History:   Procedure Laterality Date    ARTHROSCOPY KNEE Left     CHOLECYSTECTOMY      ECTOPIC PREGNANCY SURGERY      SHOULDER SURGERY Right     torn bicep       Family History   Problem Relation Age of Onset    Obesity Mother     Obesity Father     Diabetes type II Father     Diabetes Brother     Obesity Brother     No Known Problems Daughter     No Known Problems Maternal Grandmother     No Known Problems Paternal Grandmother     No Known Problems Maternal Aunt     No Known Problems Maternal Aunt     No Known Problems Maternal Aunt      I have reviewed and agree with the history as documented      E-Cigarette/Vaping    E-Cigarette Use Never User      E-Cigarette/Vaping Substances     Social History     Tobacco Use    Smoking status: Never Smoker    Smokeless tobacco: Never Used Vaping Use    Vaping Use: Never used   Substance Use Topics    Alcohol use: No    Drug use: Not Currently       Review of Systems   Constitutional: Positive for chills  HENT: Negative  Eyes: Negative  Respiratory: Negative  Cardiovascular: Positive for leg swelling  Gastrointestinal: Negative  Endocrine: Negative  Genitourinary: Negative  Musculoskeletal: Negative  Skin: Positive for color change, rash and wound  Allergic/Immunologic: Negative  Neurological: Negative  Hematological: Negative  Psychiatric/Behavioral: Negative  Physical Exam  Physical Exam  Vitals and nursing note reviewed  Constitutional:       Appearance: Normal appearance  She is normal weight  HENT:      Head: Normocephalic and atraumatic  Right Ear: External ear normal       Left Ear: External ear normal       Nose: Nose normal       Mouth/Throat:      Mouth: Mucous membranes are moist       Pharynx: Oropharynx is clear  Eyes:      Extraocular Movements: Extraocular movements intact  Conjunctiva/sclera: Conjunctivae normal       Pupils: Pupils are equal, round, and reactive to light  Cardiovascular:      Pulses: Normal pulses  Heart sounds: Normal heart sounds  Pulmonary:      Effort: Pulmonary effort is normal       Breath sounds: Normal breath sounds  Abdominal:      General: Abdomen is flat  Bowel sounds are normal    Musculoskeletal:         General: Swelling and tenderness present  Normal range of motion  Cervical back: Normal range of motion  Comments: See photo inserted into chart: 15 cm x 15 cm area of induration with erythema maximal amount of fluctuance was 6 cm x 3 cm no active drainage L inner thigh      Skin:     General: Skin is warm  Capillary Refill: Capillary refill takes less than 2 seconds  Neurological:      General: No focal deficit present  Mental Status: She is alert and oriented to person, place, and time     Psychiatric: Mood and Affect: Mood normal          Behavior: Behavior normal          Thought Content:  Thought content normal          Judgment: Judgment normal          Vital Signs  ED Triage Vitals   Temperature Pulse Respirations Blood Pressure SpO2   03/11/22 1928 03/11/22 1929 03/11/22 1929 03/11/22 1929 03/11/22 1929   (!) 96 5 °F (35 8 °C) (!) 114 18 135/86 99 %      Temp Source Heart Rate Source Patient Position - Orthostatic VS BP Location FiO2 (%)   03/11/22 1928 03/11/22 1929 03/11/22 1929 03/11/22 1929 --   Temporal Monitor Sitting Left arm       Pain Score       03/11/22 1929       8           Vitals:    03/11/22 1929 03/11/22 2020 03/11/22 2030 03/11/22 2115   BP: 135/86 115/60 116/69 112/59   Pulse: (!) 114 65 100 (!) 106   Patient Position - Orthostatic VS: Sitting            Visual Acuity      ED Medications  Medications   sodium chloride 0 9 % bolus 1,000 mL (0 mL Intravenous Stopped 3/11/22 2217)     Followed by   sodium chloride 0 9 % bolus 1,000 mL (has no administration in time range)   vancomycin (VANCOCIN) 1500 mg in sodium chloride 0 9% 250 mL IVPB (has no administration in time range)   fentanyl citrate (PF) 100 MCG/2ML 50 mcg (has no administration in time range)   tetanus-diphtheria-acellular pertussis (BOOSTRIX) IM injection 0 5 mL (has no administration in time range)   cefepime (MAXIPIME) IVPB (premix in dextrose) 2,000 mg 50 mL (0 mg Intravenous Stopped 3/11/22 2118)   morphine (PF) 4 mg/mL injection 4 mg (4 mg Intravenous Given 3/11/22 2014)   iohexol (OMNIPAQUE) 350 MG/ML injection (SINGLE-DOSE) 100 mL (100 mL Intravenous Given 3/11/22 2107)       Diagnostic Studies  Results Reviewed     Procedure Component Value Units Date/Time    Procalcitonin with AM Reflex [[id number]]  (Normal) Collected: 03/11/22 2000    Lab Status: Final result Specimen: Blood from Arm, Right Updated: 03/11/22 2036     Procalcitonin 0 08 ng/ml     Procalcitonin Reflex [[id number]]     Lab Status: No result Specimen: Blood Comprehensive metabolic panel [[id number]]  (Abnormal) Collected: 03/11/22 2000    Lab Status: Final result Specimen: Blood from Line, Venous Updated: 03/11/22 2035     Sodium 130 mmol/L      Potassium 3 7 mmol/L      Chloride 103 mmol/L      CO2 17 mmol/L      ANION GAP 10 mmol/L      BUN 8 mg/dL      Creatinine 0 68 mg/dL      Glucose 277 mg/dL      Calcium 9 6 mg/dL      AST 21 U/L      ALT 27 U/L      Alkaline Phosphatase 70 U/L      Total Protein 7 4 g/dL      Albumin 4 0 g/dL      Total Bilirubin 0 58 mg/dL      eGFR 106 ml/min/1 73sq m     Narrative:      [place] guidelines for Chronic Kidney Disease (CKD):     Stage 1 with normal or high GFR (GFR > 90 mL/min/1 73 square meters)    Stage 2 Mild CKD (GFR = 60-89 mL/min/1 73 square meters)    Stage 3A Moderate CKD (GFR = 45-59 mL/min/1 73 square meters)    Stage 3B Moderate CKD (GFR = 30-44 mL/min/1 73 square meters)    Stage 4 Severe CKD (GFR = 15-29 mL/min/1 73 square meters)    Stage 5 End Stage CKD (GFR <15 mL/min/1 73 square meters)  Note: GFR calculation is accurate only with a steady state creatinine    Lactic acid [[id number]]  (Normal) Collected: 03/11/22 2000    Lab Status: Final result Specimen: Blood from Line, Venous Updated: 03/11/22 2027     LACTIC ACID 1 8 mmol/L     Narrative:      Result may be elevated if tourniquet was used during collection      Danielbon Homans [[id number]]  (Normal) Collected: 03/11/22 2000    Lab Status: Final result Specimen: Blood from Arm, Right Updated: 03/11/22 2021     Protime 12 9 seconds      INR 0 98    APTT [[id number]]  (Normal) Collected: 03/11/22 2000    Lab Status: Final result Specimen: Blood from Arm, Right Updated: 03/11/22 2021     PTT 30 seconds     UA w Reflex to Microscopic w Reflex to Culture [[id number]]  (Abnormal) Collected: 03/11/22 2009    Lab Status: Final result Specimen: Urine, Clean Catch Updated: 03/11/22 2018     Color, UA Yellow     Clarity, UA Hazy     Specific [place], UA 1 025     pH, UA 6 0     Leukocytes, UA Negative     Nitrite, UA Negative     Protein, UA Negative mg/dl      Glucose, UA 3+ mg/dl      Ketones, UA Trace mg/dl      Urobilinogen, UA 0 2 E U /dl      Bilirubin, UA Negative     Blood, UA Negative    CBC and differential [[id number]]  (Abnormal) Collected: 03/11/22 2000    Lab Status: Final result Specimen: Blood from Line, Venous Updated: 03/11/22 2010     WBC 18 25 Thousand/uL      RBC 4 42 Million/uL      Hemoglobin 13 1 g/dL      Hematocrit 40 8 %      MCV 92 fL      MCH 29 6 pg      MCHC 32 1 g/dL      RDW 13 1 %      MPV 10 2 fL      Platelets 629 Thousands/uL      nRBC 0 /100 WBCs      Neutrophils Relative 73 %      Immat GRANS % 1 %      Lymphocytes Relative 16 %      Monocytes Relative 10 %      Eosinophils Relative 0 %      Basophils Relative 0 %      Neutrophils Absolute 13 44 Thousands/µL      Immature Grans Absolute 0 09 Thousand/uL      Lymphocytes Absolute 2 87 Thousands/µL      Monocytes Absolute 1 74 Thousand/µL      Eosinophils Absolute 0 05 Thousand/µL      Basophils Absolute 0 06 Thousands/µL     Blood culture #2 [[id number]] Collected: 03/11/22 2000    Lab Status: In process Specimen: Blood from Line, Venous Updated: 03/11/22 2007    Blood culture #1 [[id number]] Collected: 03/11/22 2000    Lab Status: In process Specimen: Blood from Line, Venous Updated: 03/11/22 2007                 CT lower extremity w contrast left   Final Result by Lydia Haas DO (03/11 2137)      Cellulitis medial left thigh without abscess or soft tissue gas  No CT evidence of osteomyelitis           Workstation performed: [id number]                    Procedures  ECG 12 Lead Documentation Only    Date/Time: 3/11/2022 8:30 PM  Performed by: Venice Fischer DO  Authorized by: Joelle Stern III, DO     Indications / Diagnosis:  Sinus tachycardia  ECG reviewed by me, the ED Provider: yes    Patient location:  ED  Comments:      I personally reviewed this EKG is performed the patient March 11, 2022, EKG was completed at 8:26 p m  and interpreted by me at 8:30 p m   Normal sinus rhythm, ventricular rate 99 beats per minute, remaining portion of the intervals are within normal limits, no acute ST abnormalities, no evidence of Brugada syndrome  ED Course  ED Course as of 03/11/22 2232   Joan Bhatti Mar 11, 2022   2000 History physical completed  Orders placed  2040 Case discussed with general surgery on call, Dr Oakes Last  2101 Patient to CT imaging   2202 CT imaging negative for obvious abscess, patient be admitted to the hospital for IV antibiotics case referred to [facility] for disposition  Initial Sepsis Screening     Row Name 03/11/22 2210                Is the patient's history suggestive of a new or worsening infection? Yes (Proceed)  -GC        Suspected source of infection wound infection  -GC        Are two or more of the following signs & symptoms of infection both present and new to the patient? Yes (Proceed)  -GC        Indicate SIRS criteria Tachycardia > 90 bpm;Leukocytosis (WBC > 53650 IJL); Hypothermia < 36C (96 8F)  -GC        If the answer is yes to both questions, suspicion of sepsis is present --        If severe sepsis is present AND tissue hypoperfusion perists in the hour after fluid resuscitation or lactate > 4, the patient meets criteria for SEPTIC SHOCK --        Are any of the following organ dysfunction criteria present within 6 hours of suspected infection and SIRS criteria that are NOT considered to be chronic conditions? No  -GC        Organ dysfunction --        Date of presentation of severe sepsis --        Time of presentation of severe sepsis --        Tissue hypoperfusion persists in the hour after crystalloid fluid administration, evidenced, by either: --        Was hypotension present within one hour of the conclusion of crystalloid fluid administration?  No  -GC        Date of presentation of septic shock --        Time of presentation of septic shock --              User Key  (r) = Recorded By, (t) = Taken By, (c) = Cosigned By    [address] Name Provider Type    VICKI Sousa DO Physician                              MDM  Number of Diagnoses or Management Options  DM (diabetes mellitus) ([place] [address] )  Hyperglycemia  Left leg cellulitis  Leukocytosis  Obesity  Diagnosis management comments: 37-year-old female presents the emergency department history diabetes poorly controlled coming hemoglobin A1C >8, 48 hour history of left groin pain with induration erythema, patient was referred here from urgent care for further evaluation  Patient reports transient chills with no documented fever at home  Eighteen thousand white count, lactic acid within normal limits, broad-spectrum antibiotics administered  Admit to [facility]  Portions of the record may have been created with voice recognition software  Occasional wrong word or "sound a like" substitutions may have occurred due to the inherent limitations of voice recognition software  Read the chart carefully and recognize, using context, where substitutions have occurred             Amount and/or Complexity of Data Reviewed  Clinical lab tests: ordered and reviewed  Tests in the radiology section of CPT®: ordered and reviewed  Tests in the medicine section of CPT®: ordered and reviewed        Disposition  Final diagnoses:   Leukocytosis   DM (diabetes mellitus) ([place] [address] )   Hyperglycemia   Left leg cellulitis   Obesity     Time reflects when diagnosis was documented in both MDM as applicable and the Disposition within this note     Time User Action Codes Description Comment    3/11/2022 10:04 PM Deisy Singaporean [S93 [id number]] Leukocytosis     3/11/2022 10:04 PM Sueanne Sieving Add [E11 9] DM (diabetes mellitus) ([facility] )     3/11/2022 10:04 PM Sueanne Sieving Add [R73 9] Hyperglycemia     3/11/2022 10:04 PM Sueanne Sieving Add [S25 667] Left leg cellulitis     3/11/2022 10:07 PM Arvell Scrape [U54 [id number]] Leukocytosis     3/11/2022 10:07 PM Shonda Dub Modify [T99 600] Left leg cellulitis     3/11/2022 10:07 PM Shonda Brown Add [E66 9] Obesity       ED Disposition     ED Disposition Condition Date/Time Comment    Admit Stable Fri Mar 11, 2022 10:05 PM Case was discussed with Srinivasan Rubin PA-C and the patient's admission status was agreed to be Admission Status: inpatient status to the service of Dr Ailyn Gomez   Follow-up Information    None         Patient's Medications   Discharge Prescriptions    No medications on file       No discharge procedures on file      PDMP Review     None          ED Provider  Electronically Signed by           Yoselin Weldon III, DO  03/11/22 9542 Jass Manuel, DO  03/11/22 4659

## 2022-03-12 NOTE — ASSESSMENT & PLAN NOTE
· Noted by tachycardia and leukocytosis present on admission  · Secondary to medial left thigh cellulitis  · Continue cefepime  · Follow-up urine and blood cultures  · Continue IVF status post 2 L in the ED

## 2022-03-12 NOTE — H&P
[address] 1975, 42 y o  female MRN: [id number]  Unit/Bed#: ED 25 Encounter: [id number]  Primary Care Provider: JENNIFER Echols   Date and time admitted to hospital: 3/11/2022  7:34 PM    * Sepsis due to cellulitis [facility])  Assessment & Plan  · Noted by tachycardia and leukocytosis present on admission  · Secondary to medial left thigh cellulitis  · Continue cefepime  · Follow-up urine and blood cultures  · Continue IVF status post 2 L in the ED    Other hyperlipidemia  Assessment & Plan  · Continue atorvastatin    Type 2 diabetes mellitus without complication, without long-term current use of insulin (HCC)  Assessment & Plan  Lab Results   Component Value Date    HGBA1C 8 0 (A) 03/04/2022       No results for input(s): POCGLU in the last 72 hours  Blood Sugar Average: Last 72 hrs:  ·  hold metformin  · Sliding scale insulin coverage while in the hospital    Class 3 severe obesity due to excess calories without serious comorbidity with body mass index (BMI) of 40 0 to 44 9 in adult [facility])  Assessment & Plan  · BMI greater than 40  · Healthy diet recommended    Depression with anxiety  Assessment & Plan  · Continue Celexa and hydroxyzine      VTE Pharmacologic Prophylaxis:   Moderate Risk (Score 3-4) - Pharmacological DVT Prophylaxis Ordered: enoxaparin (Lovenox)  Code Status:  Full code  Discussion with patient    Anticipated Length of Stay: Patient will be admitted on an inpatient basis with an anticipated length of stay of greater than 2 midnights secondary to IV antibiotics, serial exams, follow-up cultures  Chief Complaint:  Left inner thigh pain    History of Present Illness:  Betty Elizabeth is a 42 y o  female with a PMH of morbid obesity, diabetes mellitus type 2 not on insulin, depression anxiety who presents with left inner thigh pain  Patient was seen at care now and was sent to the ED for further evaluation for concern for abscess    CT of the lower extremity was obtained no abscess noted  Patient noted 2-3 days rednesss, swelling, numbness of left upper thigh  She reports she was scratched by her dog in the upper thigh area not at same site  Pain 8-9/10  Throbbing pain  Took tylenol with no relief  ED treatment including cefepime 2000 mg, fentanyl 50 mcg, morphine 4 mg, 2 L IV fluid, tetanus booster, vancomycin    Review of Systems:  Review of Systems   Constitutional: Positive for chills  Negative for fever  HENT: Negative for rhinorrhea, sore throat and trouble swallowing  Eyes: Negative for discharge and redness  Respiratory: Negative for cough and shortness of breath  Cardiovascular: Negative for chest pain and leg swelling  Gastrointestinal: Negative for abdominal distention, diarrhea, nausea and vomiting  Genitourinary: Negative for dysuria and hematuria  Musculoskeletal: Positive for neck pain  Negative for back pain  Skin: Positive for color change  Neurological: Positive for dizziness  Negative for weakness and headaches  Psychiatric/Behavioral: Negative for agitation and confusion  Past Medical and Surgical History:   Past Medical History:   Diagnosis Date    Anxiety     Closed nondisplaced fracture of proximal phalanx of lesser toe of left foot 1/15/2020    Diabetes mellitus ([facility] [address] )     Hyperlipidemia        Past Surgical History:   Procedure Laterality Date    ARTHROSCOPY KNEE Left     CHOLECYSTECTOMY      ECTOPIC PREGNANCY SURGERY      SHOULDER SURGERY Right     torn bicep       Meds/Allergies:  Prior to Admission medications    Medication Sig Start Date End Date Taking? Authorizing Provider   Accu-Chek FastClix Lancets MISC Test blood sugars once daily   6/9/21   JENNIFER Whitfield   albuterol (PROVENTIL HFA,VENTOLIN HFA) 90 mcg/act inhaler INHALE 2 PUFFS EVERY 6 (SIX) HOURS AS NEEDED FOR WHEEZING 3/10/22   JENNIFER Whitfield   atorvastatin (LIPITOR) 10 mg tablet TAKE 1 TABLET (10 MG TOTAL) BY MOUTH DAILY 1/3/22   JENNIFER Burgos   azelastine (ASTELIN) 0 1 % nasal spray 1 SPRAY INTO EACH NOSTRIL 2 (TWO) TIMES A DAY USE IN EACH NOSTRIL AS DIRECTED 3/19/21   JENNIFER Burgos   benzonatate (TESSALON) 200 MG capsule Take 1 capsule (200 mg total) by mouth 3 (three) times a day as needed for cough 11/15/21   Harriet Stover PA-C   citalopram (CeleXA) 40 mg tablet TAKE 1 TABLET (40 MG TOTAL) BY MOUTH DAILY 3/7/22   JENNIFER Burgos   glucose blood test strip Use as instructed 3/8/21   JENNIFER Burgos   glucose blood test strip Use as instructed  Test up to 3 times a day as needed 3/10/21   JENNIFER Burgos   hydrOXYzine HCL (ATARAX) 50 mg tablet TAKE 1 TABLET (50 MG TOTAL) BY MOUTH EVERY 6 (SIX) HOURS AS NEEDED FOR ITCHING, ALLERGIES OR ANXIETY 1/18/22   JENNIFER Burgos   Lancets MISC Use once for 1 dose Onetouch Verio  Test once daily or as directed   6/11/21 6/11/21  JENNIFER Burgos   LORazepam (ATIVAN) 0 5 mg tablet   2/20/21   Historical Provider, MD   metFORMIN (GLUCOPHAGE) 500 mg tablet TAKE 2 TABLETS (1,000 MG TOTAL) BY MOUTH DAILY WITH BREAKFAST AND 1 TABLET (500 MG TOTAL) DAILY WITH DINNER  10/18/21   JENNIFER Burgos   montelukast (SINGULAIR) 10 mg tablet TAKE 1 TABLET (10 MG TOTAL) BY MOUTH DAILY AT BEDTIME 2/18/22   JENNIFER Burgos   multivitamin (THERAGRAN) TABS Take 1 tablet by mouth daily      Historical Provider, MD   olopatadine (PATANOL) 0 1 % ophthalmic solution Administer 1 drop to both eyes 2 (two) times a day 7/7/21   JENNIFER Burgos   pramipexole (MIRAPEX) 0 5 mg tablet Take 1 tablet (0 5 mg total) by mouth daily at bedtime 2/15/22   JENNIFER Burgos   SUMAtriptan (IMITREX) 25 mg tablet TAKE 1 TABLET (25 MG TOTAL) BY MOUTH ONCE AS NEEDED FOR MIGRAINE 3/7/22   JENNIFER Burgos   topiramate (TOPAMAX) 25 mg sprinkle capsule TAKE 1 CAPSULE (25 MG TOTAL) BY MOUTH 2 (TWO) TIMES A DAY 1/18/22   JENNIFER Burgos traZODone (DESYREL) 50 mg tablet Take 1 tablet (50 mg total) by mouth daily at bedtime 3/4/22   JENNIFER Wisdom     I have reviewed home medications with patient personally  Allergies: Allergies   Allergen Reactions    Grass Pollen(K-O-R-T-Swt Justus) Hives, Itching, Shortness Of Breath, Sneezing and Throat Swelling    Dust Mite Extract Hives and Itching    Misc Natural Products Diarrhea, Nausea Only and Vomiting    Red Dye - Food Allergy Hives, Itching and Rash       Social History:  Marital Status: /Civil Union   Occupation: unemployed  Patient Pre-hospital Living Situation: Home  Patient Pre-hospital Level of Mobility: walks  Patient Pre-hospital Diet Restrictions:  None  Substance Use History:   Social History     Substance and Sexual Activity   Alcohol Use No     Social History     Tobacco Use   Smoking Status Never Smoker   Smokeless Tobacco Never Used     Social History     Substance and Sexual Activity   Drug Use Not Currently       Family History:  Family History   Problem Relation Age of Onset    Obesity Mother     Obesity Father     Diabetes type II Father     Diabetes Brother     Obesity Brother     No Known Problems Daughter     No Known Problems Maternal Grandmother     No Known Problems Paternal Grandmother     No Known Problems Maternal Aunt     No Known Problems Maternal Aunt     No Known Problems Maternal Aunt        Physical Exam:     Vitals:   Blood Pressure: 103/54 (03/11/22 2217)  Pulse: 90 (03/11/22 2217)  Temperature: (!) 96 5 °F (35 8 °C) (03/11/22 1928)  Temp Source: Temporal (03/11/22 1928)  Respirations: 17 (03/11/22 2020)  Height: 5' 4" (162 6 cm) (03/11/22 1929)  Weight - Scale: 106 kg (234 lb) (03/11/22 1929)  SpO2: 98 % (03/11/22 2217)    Physical Exam  Vitals reviewed  Constitutional:       Appearance: Normal appearance  She is morbidly obese        Comments: Middle-aged female lying in bed moaning complaining of pain in her left   HENT:      Head: Normocephalic and atraumatic  Nose: Nose normal    Eyes:      General:         Right eye: No discharge  Left eye: No discharge  Extraocular Movements: Extraocular movements intact  Conjunctiva/sclera: Conjunctivae normal    Cardiovascular:      Rate and Rhythm: Normal rate and regular rhythm  Pulmonary:      Effort: Pulmonary effort is normal  No respiratory distress  Breath sounds: Normal breath sounds  No wheezing  Abdominal:      General: Bowel sounds are normal  There is no distension  Palpations: Abdomen is soft  Tenderness: There is no abdominal tenderness  There is no guarding  Musculoskeletal:         General: No swelling or tenderness  Normal range of motion  Cervical back: Normal range of motion  Right lower leg: No edema  Left lower leg: No edema  Skin:     General: Skin is warm and dry  Capillary Refill: Capillary refill takes less than 2 seconds  Comments: Left medial thigh with erythema warmth, tenderness  Small area drainage  Neurological:      General: No focal deficit present  Mental Status: She is alert and oriented to person, place, and time  Mental status is at baseline  Psychiatric:         Mood and Affect: Mood normal          Behavior: Behavior normal          Thought Content:  Thought content normal          Judgment: Judgment normal           Additional Data:     Lab Results:  Results from last 7 days   Lab Units 03/11/22 2000   WBC Thousand/uL 18 25*   HEMOGLOBIN g/dL 13 1   HEMATOCRIT % 40 8   PLATELETS Thousands/uL 220   NEUTROS PCT % 73   LYMPHS PCT % 16   MONOS PCT % 10   EOS PCT % 0     Results from last 7 days   Lab Units 03/11/22 2000   SODIUM mmol/L 130*   POTASSIUM mmol/L 3 7   CHLORIDE mmol/L 103   CO2 mmol/L 17*   BUN mg/dL 8   CREATININE mg/dL 0 68   ANION GAP mmol/L 10   CALCIUM mg/dL 9 6   ALBUMIN g/dL 4 0   TOTAL BILIRUBIN mg/dL 0 58   ALK PHOS U/L 70   ALT U/L 27   AST U/L 21   GLUCOSE RANDOM mg/dL 277*     Results from last 7 days   Lab Units 03/11/22 2000   INR  0 98             Results from last 7 days   Lab Units 03/11/22 2000   LACTIC ACID mmol/L 1 8   PROCALCITONIN ng/ml 0 08       Imaging: Reviewed radiology reports from this admission including: CT extremity  CT lower extremity w contrast left   Final Result by Vilma Gordon DO (03/11 2137)      Cellulitis medial left thigh without abscess or soft tissue gas  No CT evidence of osteomyelitis  Workstation performed: [id number]             ** Please Note: This note has been constructed using a voice recognition system   **

## 2022-03-12 NOTE — ASSESSMENT & PLAN NOTE
Lab Results   Component Value Date    HGBA1C 8 0 (A) 03/04/2022       Recent Labs     03/11/22  2303 03/12/22  0739   POCGLU 193* 178*       Blood Sugar Average: Last 72 hrs:  · (P) 185 5 hold metformin  · Target blood sugar in the hospital should be between 140 and 180  · Will increase Levemir at night from 10 units to 15 units  · Continue Accu-Cheks AC and HS otherwise with sliding scale coverage

## 2022-03-12 NOTE — ASSESSMENT & PLAN NOTE
· Sepsis parameters have improved but not completely resolved  · Patient is no longer tachycardic, but the leukocytosis has improved from 18 25 down to 15 , patient is otherwise afebrile, and not tachypneic  · Secondary to medial left thigh cellulitis  · Left medial thighs cellulitis has improved, the redness is receding from the margins outlined by the admitting team  · Continue IV cefepime day 2  · Okay to discontinue fluids  · Dilutional hyponatremia noted  · Hypokalemia:-will replete and recheck  · Await formal blood culture results  · Continue present management

## 2022-03-12 NOTE — ASSESSMENT & PLAN NOTE
Lab Results   Component Value Date    HGBA1C 8 0 (A) 03/04/2022       No results for input(s): POCGLU in the last 72 hours      Blood Sugar Average: Last 72 hrs:  ·  hold metformin  · Sliding scale insulin coverage while in the hospital

## 2022-03-12 NOTE — CONSULTS
Consultation - General Surgery   Gerri Brice 42 y o  female MRN: [id number]  Unit/Bed#: ED 25 Encounter: [id number]    Assessment/Plan     46F with diabetes, now with left medial thigh cellulitis likely secondary to a spider bite  CT images reviewed directly, no drainable abscess or fluid collection  No soft tissue gas  No urgent indication for operative drainage  Would recommend escalating conservative management as the central portion of the erythema is quite inflamed, indurated, and tender with phlegmonous change  No fluctuance or drainable fluid on exam  Recommend warm compresses at least 4 times a day and scheduled around the clock toradol or ibuprofen for antiinflammatory effect  Will follow with you and do serial exams in case the area declares itself as requiring incision and drainage in order to allow for complete resolution of the infectious process  History of Present Illness     HPI:  Roxane Fulton is a 42 y o  female who presents with multiple days of left medial thigh pain, swelling, redness area a spider bite  There is a central area of discoloration looking like the site of a bite, the surrounding area is warm, erythematous, and tender  The immediate surrounding area is phlegmonous, the remaining cellulitic area is soft  Otherwise, nontoxic, no fevers  Pain and redness improved today per patient  Initially met SIRS critieria  WBC was 18, now 15      Consults    Review of Systems    Historical Information   Past Medical History:   Diagnosis Date    Anxiety     Closed nondisplaced fracture of proximal phalanx of lesser toe of left foot 1/15/2020    Diabetes mellitus ([facility] [address] )     Hyperlipidemia      Past Surgical History:   Procedure Laterality Date    ARTHROSCOPY KNEE Left     CHOLECYSTECTOMY      ECTOPIC PREGNANCY SURGERY      SHOULDER SURGERY Right     torn bicep     Social History   Social History     Substance and Sexual Activity   Alcohol Use No     Social History     Substance and Sexual Activity   Drug Use Not Currently     Social History     Tobacco Use   Smoking Status Never Smoker   Smokeless Tobacco Never Used     Family History: non-contributory    Meds/Allergies   all current active meds have been reviewed  Allergies   Allergen Reactions    Grass Pollen(K-O-R-T-Swt Justus) Hives, Itching, Shortness Of Breath, Sneezing and Throat Swelling    Dust Mite Extract Hives and Itching    Misc Natural Products Diarrhea, Nausea Only and Vomiting    Red Dye - Food Allergy Hives, Itching and Rash       Objective   First Vitals:   Blood Pressure: 135/86 (03/11/22 1929)  Pulse: (!) 114 (03/11/22 1929)  Temperature: (!) 96 5 °F (35 8 °C) (03/11/22 1928)  Temp Source: Temporal (03/11/22 1928)  Respirations: 18 (03/11/22 1929)  Height: 5' 4" (162 6 cm) (03/11/22 1929)  Weight - Scale: 106 kg (234 lb) (03/11/22 1929)  SpO2: 99 % (03/11/22 1929)    Current Vitals:   Blood Pressure: 101/56 (03/11/22 2321)  Pulse: 88 (03/11/22 2321)  Temperature: 98 4 °F (36 9 °C) (03/11/22 2321)  Temp Source: Oral (03/11/22 2321)  Respirations: 17 (03/11/22 2321)  Height: 5' 4" (162 6 cm) (03/11/22 1929)  Weight - Scale: 106 kg (234 lb) (03/11/22 1929)  SpO2: 98 % (03/11/22 2321)      Intake/Output Summary (Last 24 hours) at 3/12/2022 1223  Last data filed at 3/12/2022 0841  Gross per 24 hour   Intake 2151 67 ml   Output --   Net 2151 67 ml       Invasive Devices  Report    Peripheral Intravenous Line            Peripheral IV 03/11/22 Right Antecubital <1 day                Physical Exam  Vitals reviewed  Constitutional:       General: She is not in acute distress  Appearance: She is obese  She is not ill-appearing  Cardiovascular:      Rate and Rhythm: Normal rate  Pulmonary:      Effort: Pulmonary effort is normal  No respiratory distress  Skin:     General: Skin is warm and dry        Comments: Left medial thigh cellulitis, central portion with bite and some superficial skin discoloration and deep phlegmonous change with induration and tenderness, no crepitus, no fluctuance, surrounding cellulitis has receded and is soft, no expressible purulence on exam at this time   Neurological:      General: No focal deficit present  Mental Status: She is alert and oriented to person, place, and time  Lab Results:   I have personally reviewed pertinent lab results  , CBC:   Lab Results   Component Value Date    WBC 15 24 (H) 03/12/2022    HGB 11 8 03/12/2022    HCT 35 3 03/12/2022    MCV 88 03/12/2022     03/12/2022    MCH 29 4 03/12/2022    MCHC 33 4 03/12/2022    RDW 13 0 03/12/2022    MPV 10 1 03/12/2022    NRBC 0 03/11/2022   , CMP:   Lab Results   Component Value Date    SODIUM 134 (L) 03/12/2022    K 3 1 (L) 03/12/2022     03/12/2022    CO2 19 (L) 03/12/2022    BUN 5 03/12/2022    CREATININE 0 59 (L) 03/12/2022    CALCIUM 8 7 03/12/2022    AST 21 03/11/2022    ALT 27 03/11/2022    ALKPHOS 70 03/11/2022    EGFR 111 03/12/2022     Imaging: I have personally reviewed pertinent reports  and I have personally reviewed pertinent films in PACS    Signature:   Yesenia Raphael MD  Date: 3/12/2022 Time: 12:23 PM

## 2022-03-12 NOTE — PROGRESS NOTES
Brittany U  68   Progress Note - Zuleika Cruz 1975, 42 y o  female MRN: [id number]  Unit/Bed#: ED 25 Encounter: [id number]  Primary Care Provider: JENNIFER Ibarra   Date and time admitted to hospital: 3/11/2022  7:34 PM    * Sepsis due to cellulitis [facility])  Assessment & Plan  · Sepsis parameters have improved but not completely resolved  · Patient is no longer tachycardic, but the leukocytosis has improved from 18 25 down to 15 , patient is otherwise afebrile, and not tachypneic  · Secondary to medial left thigh cellulitis  · Left medial thighs cellulitis has improved, the redness is receding from the margins outlined by the admitting team  · Continue IV cefepime day 2  · Okay to discontinue fluids  · Dilutional hyponatremia noted  · Hypokalemia:-will replete and recheck  · Await formal blood culture results  · Continue present management    Type 2 diabetes mellitus without complication, without long-term current use of insulin ([facility] )  Assessment & Plan  Lab Results   Component Value Date    HGBA1C 8 0 (A) 03/04/2022       Recent Labs     03/11/22  2303 03/12/22  0739   POCGLU 193* 178*       Blood Sugar Average: Last 72 hrs:  · (P) 185 5 hold metformin  · Target blood sugar in the hospital should be between 140 and 180  · Will increase Levemir at night from 10 units to 15 units  · Continue Accu-Cheks AC and HS otherwise with sliding scale coverage    Other hyperlipidemia  Assessment & Plan  · Continue atorvastatin    Class 3 severe obesity due to excess calories without serious comorbidity with body mass index (BMI) of 40 0 to 44 9 in adult [facility])  Assessment & Plan  · Actual BMI 40 17  · Dietary, weight loss, and lifestyle modification counseling was provided    Depression with anxiety  Assessment & Plan  · Continue present medications which include Celexa, and trazodone    Migraine without aura, not intractable  Assessment & Plan  · Continue Topamax    Allergic rhinitis  Assessment & Plan  · Continue Singulair        VTE Prophylaxis:  Enoxaparin (Lovenox)    Patient Centered Rounds: I have performed bedside rounds with nursing staff today  Discussions with Specialists or Other Care Team Provider:  Nursing, case management  Education and Discussions with Family / Patient:  Patient was brought up to par with the plan of care for today all questions answered to her satisfaction - she did not ask and or want me to call anyone    Current Length of Stay: 1 day(s)    Current Patient Status: Inpatient   Certification Statement: The patient will continue to require additional inpatient hospital stay due to The need for continued IV antibiotics    Discharge Plan:  Hopeful discharge planning in 24-48 hours based upon symptom resolution    Code Status: Level 1 - Full Code    Subjective:   Patient seen and examined, resting in bed, reports feeling better than prior to coming into the hospital   She felt her initial symptoms were secondary to a spider bite on her left medial thigh  Objective:     Vitals:   Temp (24hrs), Av 4 °F (36 3 °C), Min:96 5 °F (35 8 °C), Max:98 4 °F (36 9 °C)    Temp:  [96 5 °F (35 8 °C)-98 4 °F (36 9 °C)] 98 4 °F (36 9 °C)  HR:  [] 88  Resp:  [17-18] 17  BP: (101-135)/(54-86) 101/56  SpO2:  [95 %-100 %] 98 %  Body mass index is 40 17 kg/m²  Input and Output Summary (last 24 hours): Intake/Output Summary (Last 24 hours) at 3/12/2022 7524  Last data filed at 3/12/2022 0015  Gross per 24 hour   Intake 2101 67 ml   Output --   Net 2101 67 ml       Physical Exam:   Physical Exam  Constitutional:       General: She is not in acute distress  Appearance: Normal appearance  She is normal weight  She is not ill-appearing  HENT:      Head: Normocephalic and atraumatic  Nose: Nose normal       Mouth/Throat:      Mouth: Mucous membranes are moist    Eyes:      Extraocular Movements: Extraocular movements intact        Pupils: Pupils are equal, round, and reactive to light  Cardiovascular:      Rate and Rhythm: Normal rate and regular rhythm  Pulses: Normal pulses  Heart sounds: Normal heart sounds  No murmur heard  No friction rub  No gallop  Pulmonary:      Effort: Pulmonary effort is normal  No respiratory distress  Breath sounds: Normal breath sounds  No wheezing, rhonchi or rales  Abdominal:      General: There is no distension  Palpations: Abdomen is soft  There is no mass  Tenderness: There is no abdominal tenderness  Hernia: No hernia is present  Musculoskeletal:         General: No swelling or tenderness  Normal range of motion  Cervical back: Normal range of motion and neck supple  No rigidity  Right lower leg: No edema  Left lower leg: No edema  Skin:     General: Skin is warm  Capillary Refill: Capillary refill takes less than 2 seconds  Findings: No erythema or rash  Comments: Improved left medial thigh cellulitis   Neurological:      General: No focal deficit present  Mental Status: She is alert and oriented to person, place, and time  Mental status is at baseline  Cranial Nerves: No cranial nerve deficit  Motor: No weakness  Psychiatric:         Mood and Affect: Mood normal          Behavior: Behavior normal          Additional Data:     Labs:    Results from last 7 days   Lab Units 03/12/22  0601 03/11/22 2000 03/11/22 2000   WBC Thousand/uL 15 24*   < > 18 25*   HEMOGLOBIN g/dL 11 8   < > 13 1   HEMATOCRIT % 35 3   < > 40 8   PLATELETS Thousands/uL 223   < > 220   NEUTROS PCT %  --   --  73   LYMPHS PCT %  --   --  16   MONOS PCT %  --   --  10   EOS PCT %  --   --  0    < > = values in this interval not displayed       Results from last 7 days   Lab Units 03/12/22  0601 03/11/22 2000 03/11/22 2000   SODIUM mmol/L 134*   < > 130*   POTASSIUM mmol/L 3 1*   < > 3 7   CHLORIDE mmol/L 107   < > 103   CO2 mmol/L 19*   < > 17*   BUN mg/dL 5   < > 8   CREATININE mg/dL 0 59*   < > 0 68   CALCIUM mg/dL 8 7   < > 9 6   ALK PHOS U/L  --   --  70   ALT U/L  --   --  27   AST U/L  --   --  21    < > = values in this interval not displayed  Results from last 7 days   Lab Units 03/11/22 2000   INR  0 98     Results from last 7 days   Lab Units 03/12/22  0739 03/11/22  2303   POC GLUCOSE mg/dl 178* 193*           * I Have Reviewed All Lab Data Listed Above  * Additional Pertinent Lab Tests Reviewed:  Frank 68 Admission  Reviewed    Imaging:  Imaging Reports Reviewed Today Include:  None    Recent Cultures (last 7 days):           Last 24 Hours Medication List:   Current Facility-Administered Medications   Medication Dose Route Frequency Provider Last Rate    acetaminophen  650 mg Oral Q4H PRN Sung Bun, PA-C      atorvastatin  10 mg Oral Daily [place], [place]      cefepime  2,000 mg Intravenous Q12H Sung Bun, PA-C 2,000 mg (03/12/22 0811)    citalopram  40 mg Oral Daily Sung Bun, PA-C      enoxaparin  40 mg Subcutaneous Daily [place], [place]      hydrOXYzine HCL  50 mg Oral Q6H PRN Sung Bun, PA-C      insulin detemir  10 Units Subcutaneous HS Sung Bun, PA-C      insulin lispro  1-5 Units Subcutaneous HS Sung Bun, PA-C      insulin lispro  1-6 Units Subcutaneous TID UJDE Pruitt-CHATA      ketorolac  30 mg Intravenous Q6H PRN Sung Bun, PA-C      montelukast  10 mg Oral HS Sung Bun, PA-C      ondansetron  4 mg Intravenous Q6H PRN Sung Bun, PA-C      pramipexole  0 5 mg Oral HS Sung Bun, PA-C      sodium chloride  125 mL/hr Intravenous Continuous Sung Bun, PA-C 125 mL/hr (03/12/22 0016)    topiramate  25 mg Oral BID Sung Bun, PA-C      traZODone  50 mg Oral HS Sung Bun, PA-C          Today, Patient Was Seen By: Maryam Mayorga MD    ** Please Note: Dictation voice to text software may have been used in the creation of this document   **

## 2022-03-13 LAB
ANION GAP SERPL CALCULATED.3IONS-SCNC: 7 MMOL/L (ref 4–13)
BASOPHILS # BLD AUTO: 0.04 THOUSANDS/ΜL (ref 0–0.1)
BASOPHILS NFR BLD AUTO: 0 % (ref 0–1)
BUN SERPL-MCNC: 7 MG/DL (ref 5–25)
CALCIUM SERPL-MCNC: 9 MG/DL (ref 8.4–10.2)
CHLORIDE SERPL-SCNC: 108 MMOL/L (ref 96–108)
CO2 SERPL-SCNC: 20 MMOL/L (ref 21–32)
CREAT SERPL-MCNC: 0.55 MG/DL (ref 0.6–1.3)
EOSINOPHIL # BLD AUTO: 0.24 THOUSAND/ΜL (ref 0–0.61)
EOSINOPHIL NFR BLD AUTO: 2 % (ref 0–6)
ERYTHROCYTE [DISTWIDTH] IN BLOOD BY AUTOMATED COUNT: 13.1 % (ref 11.6–15.1)
GFR SERPL CREATININE-BSD FRML MDRD: 114 ML/MIN/1.73SQ M
GLUCOSE SERPL-MCNC: 135 MG/DL (ref 65–140)
GLUCOSE SERPL-MCNC: 176 MG/DL (ref 65–140)
GLUCOSE SERPL-MCNC: 208 MG/DL (ref 65–140)
GLUCOSE SERPL-MCNC: 214 MG/DL (ref 65–140)
GLUCOSE SERPL-MCNC: 216 MG/DL (ref 65–140)
HCT VFR BLD AUTO: 36.4 % (ref 34.8–46.1)
HGB BLD-MCNC: 12.4 G/DL (ref 11.5–15.4)
IMM GRANULOCYTES # BLD AUTO: 0.06 THOUSAND/UL (ref 0–0.2)
IMM GRANULOCYTES NFR BLD AUTO: 1 % (ref 0–2)
LYMPHOCYTES # BLD AUTO: 2.2 THOUSANDS/ΜL (ref 0.6–4.47)
LYMPHOCYTES NFR BLD AUTO: 20 % (ref 14–44)
MCH RBC QN AUTO: 30.3 PG (ref 26.8–34.3)
MCHC RBC AUTO-ENTMCNC: 34.1 G/DL (ref 31.4–37.4)
MCV RBC AUTO: 89 FL (ref 82–98)
MONOCYTES # BLD AUTO: 1.15 THOUSAND/ΜL (ref 0.17–1.22)
MONOCYTES NFR BLD AUTO: 11 % (ref 4–12)
NEUTROPHILS # BLD AUTO: 7.07 THOUSANDS/ΜL (ref 1.85–7.62)
NEUTS SEG NFR BLD AUTO: 66 % (ref 43–75)
NRBC BLD AUTO-RTO: 0 /100 WBCS
PLATELET # BLD AUTO: 243 THOUSANDS/UL (ref 149–390)
PMV BLD AUTO: 10.7 FL (ref 8.9–12.7)
POTASSIUM SERPL-SCNC: 3.5 MMOL/L (ref 3.5–5.3)
RBC # BLD AUTO: 4.09 MILLION/UL (ref 3.81–5.12)
SODIUM SERPL-SCNC: 135 MMOL/L (ref 135–147)
WBC # BLD AUTO: 10.76 THOUSAND/UL (ref 4.31–10.16)

## 2022-03-13 PROCEDURE — 99232 SBSQ HOSP IP/OBS MODERATE 35: CPT | Performed by: PHYSICIAN ASSISTANT

## 2022-03-13 PROCEDURE — 82948 REAGENT STRIP/BLOOD GLUCOSE: CPT

## 2022-03-13 PROCEDURE — 36415 COLL VENOUS BLD VENIPUNCTURE: CPT | Performed by: HOSPITALIST

## 2022-03-13 PROCEDURE — NC001 PR NO CHARGE: Performed by: PHYSICIAN ASSISTANT

## 2022-03-13 PROCEDURE — 85025 COMPLETE CBC W/AUTO DIFF WBC: CPT | Performed by: HOSPITALIST

## 2022-03-13 PROCEDURE — 80048 BASIC METABOLIC PNL TOTAL CA: CPT | Performed by: HOSPITALIST

## 2022-03-13 PROCEDURE — 99232 SBSQ HOSP IP/OBS MODERATE 35: CPT | Performed by: HOSPITALIST

## 2022-03-13 RX ADMIN — KETOROLAC TROMETHAMINE 15 MG: 30 INJECTION, SOLUTION INTRAMUSCULAR; INTRAVENOUS at 11:05

## 2022-03-13 RX ADMIN — INSULIN LISPRO 2 UNITS: 100 INJECTION, SOLUTION INTRAVENOUS; SUBCUTANEOUS at 11:03

## 2022-03-13 RX ADMIN — TOPIRAMATE 25 MG: 25 CAPSULE, COATED PELLETS ORAL at 08:13

## 2022-03-13 RX ADMIN — CEFEPIME HYDROCHLORIDE 2000 MG: 2 INJECTION, SOLUTION INTRAVENOUS at 07:59

## 2022-03-13 RX ADMIN — KETOROLAC TROMETHAMINE 15 MG: 30 INJECTION, SOLUTION INTRAMUSCULAR; INTRAVENOUS at 05:43

## 2022-03-13 RX ADMIN — PRAMIPEXOLE DIHYDROCHLORIDE 0.5 MG: 0.5 TABLET ORAL at 22:03

## 2022-03-13 RX ADMIN — CEFEPIME HYDROCHLORIDE 2000 MG: 2 INJECTION, SOLUTION INTRAVENOUS at 19:42

## 2022-03-13 RX ADMIN — TRAZODONE HYDROCHLORIDE 50 MG: 50 TABLET ORAL at 22:03

## 2022-03-13 RX ADMIN — INSULIN LISPRO 1 UNITS: 100 INJECTION, SOLUTION INTRAVENOUS; SUBCUTANEOUS at 22:02

## 2022-03-13 RX ADMIN — MONTELUKAST 10 MG: 10 TABLET, FILM COATED ORAL at 22:03

## 2022-03-13 RX ADMIN — ENOXAPARIN SODIUM 40 MG: 100 INJECTION SUBCUTANEOUS at 08:01

## 2022-03-13 RX ADMIN — INSULIN DETEMIR 25 UNITS: 100 INJECTION, SOLUTION SUBCUTANEOUS at 22:02

## 2022-03-13 RX ADMIN — INSULIN LISPRO 2 UNITS: 100 INJECTION, SOLUTION INTRAVENOUS; SUBCUTANEOUS at 08:14

## 2022-03-13 RX ADMIN — KETOROLAC TROMETHAMINE 15 MG: 30 INJECTION, SOLUTION INTRAMUSCULAR; INTRAVENOUS at 17:59

## 2022-03-13 RX ADMIN — CITALOPRAM HYDROBROMIDE 40 MG: 20 TABLET ORAL at 08:01

## 2022-03-13 RX ADMIN — ACETAMINOPHEN 650 MG: 325 TABLET ORAL at 14:35

## 2022-03-13 RX ADMIN — TOPIRAMATE 25 MG: 25 CAPSULE, COATED PELLETS ORAL at 18:00

## 2022-03-13 RX ADMIN — ATORVASTATIN CALCIUM 10 MG: 10 TABLET, FILM COATED ORAL at 08:01

## 2022-03-13 NOTE — ASSESSMENT & PLAN NOTE
Lab Results   Component Value Date    HGBA1C 8 0 (A) 03/04/2022       Recent Labs     03/12/22  1229 03/12/22  1726 03/12/22  2111 03/13/22  0720   POCGLU 191* 196* 230* 214*       Blood Sugar Average: Last 72 hrs:  · (P) 301 [id number] hold metformin  · Target blood sugar in the hospital should be between 140 and 180  · Will further increase Levemir at night from 15 units to 25 units  · Continue Accu-Cheks AC and HS otherwise with sliding scale coverage

## 2022-03-13 NOTE — ASSESSMENT & PLAN NOTE
· Sepsis parameters have resolved  · White count is down to 10 76, patient is no longer tachycardic, is otherwise afebrile, and not tachypnea  · Secondary to medial left thigh cellulitis - which is improving slowly  · General surgical service is following along-no surgical intervention required at this time  · Continue IV cefepime day 3  · Hyponatremia-resolved, was dilutional  · Hypokalemia:-resolved with repletion  · Await formal blood culture results  · Continue present management  · Hopeful discharge planning in 24-48 hours

## 2022-03-13 NOTE — PROGRESS NOTES
[address] [address]  Progress Note - Essence Mins 1975, 42 y o  female MRN: [id number]  Unit/Bed#: ED 25 Encounter: [id number]  Primary Care Provider: JENNIFER Lord   Date and time admitted to hospital: 3/11/2022  7:34 PM    * Sepsis due to cellulitis [facility])  Assessment & Plan  · Sepsis parameters have resolved  · White count is down to 10 76, patient is no longer tachycardic, is otherwise afebrile, and not tachypnea  · Secondary to medial left thigh cellulitis - which is improving slowly  · General surgical service is following along-no surgical intervention required at this time  · Continue IV cefepime day 3  · Hyponatremia-resolved, was dilutional  · Hypokalemia:-resolved with repletion  · Await formal blood culture results  · Continue present management  · Hopeful discharge planning in 24-48 hours    Type 2 diabetes mellitus without complication, without long-term current use of insulin [facility])  Assessment & Plan  Lab Results   Component Value Date    HGBA1C 8 0 (A) 03/04/2022       Recent Labs     03/12/22  1229 03/12/22  1726 03/12/22  2111 03/13/22  0720   POCGLU 191* 196* 230* 214*       Blood Sugar Average: Last 72 hrs:  · (P) 520 [id number] hold metformin  · Target blood sugar in the hospital should be between 140 and 180  · Will further increase Levemir at night from 15 units to 25 units  · Continue Accu-Cheks AC and HS otherwise with sliding scale coverage    Other hyperlipidemia  Assessment & Plan  · Continue atorvastatin    Class 3 severe obesity due to excess calories without serious comorbidity with body mass index (BMI) of 40 0 to 44 9 in adult [facility])  Assessment & Plan  · Actual BMI 40 17  · Dietary, weight loss, and lifestyle modification counseling was provided    Depression with anxiety  Assessment & Plan  · Continue present medications which include Celexa, and trazodone    Migraine without aura, not intractable  Assessment & Plan  · Continue Topamax    Allergic rhinitis  Assessment & Plan  · Continue Singulair        VTE Prophylaxis:  Enoxaparin (Lovenox)    Patient Centered Rounds: I have performed bedside rounds with nursing staff today  Discussions with Specialists or Other Care Team Provider:  General surgery, nursing  Education and Discussions with Family / Patient:  Patient was brought up to par with the plan of care for today, she did not ask and or want me to call anyone    Current Length of Stay: 2 day(s)    Current Patient Status: Inpatient   Certification Statement: The patient will continue to require additional inpatient hospital stay due to The continued need for IV antibiotics    Discharge Plan:  Hopeful discharge planning in 24-48 hours    Code Status: Level 1 - Full Code    Subjective:   Patient seen and examined, resting in bed, reports feeling a little bit better than yesterday  Objective:     Vitals:   Temp (24hrs), Av 4 °F (36 9 °C), Min:97 8 °F (36 6 °C), Max:99 °F (37 2 °C)    Temp:  [97 8 °F (36 6 °C)-99 °F (37 2 °C)] 97 8 °F (36 6 °C)  HR:  [79-86] 79  Resp:  [16] 16  BP: (101-126)/(54-56) 101/54  SpO2:  [97 %-98 %] 98 %  Body mass index is 40 17 kg/m²  Input and Output Summary (last 24 hours): Intake/Output Summary (Last 24 hours) at 3/13/2022 1031  Last data filed at 3/12/2022 1100  Gross per 24 hour   Intake 100 ml   Output --   Net 100 ml       Physical Exam:   Physical Exam  Constitutional:       General: She is not in acute distress  Appearance: Normal appearance  She is normal weight  She is not ill-appearing  HENT:      Head: Normocephalic and atraumatic  Nose: Nose normal       Mouth/Throat:      Mouth: Mucous membranes are moist    Eyes:      Extraocular Movements: Extraocular movements intact  Pupils: Pupils are equal, round, and reactive to light  Cardiovascular:      Rate and Rhythm: Normal rate and regular rhythm  Pulses: Normal pulses        Heart sounds: Normal heart sounds  No murmur heard  No friction rub  No gallop  Pulmonary:      Effort: Pulmonary effort is normal  No respiratory distress  Breath sounds: Normal breath sounds  No wheezing, rhonchi or rales  Abdominal:      General: There is no distension  Palpations: Abdomen is soft  There is no mass  Tenderness: There is no abdominal tenderness  Hernia: No hernia is present  Musculoskeletal:         General: No swelling or tenderness  Normal range of motion  Cervical back: Normal range of motion and neck supple  No rigidity  Right lower leg: No edema  Left lower leg: No edema  Skin:     General: Skin is warm  Capillary Refill: Capillary refill takes less than 2 seconds  Findings: No erythema or rash  Comments: Left medial thigh cellulitis has significantly improved   Neurological:      General: No focal deficit present  Mental Status: She is alert and oriented to person, place, and time  Mental status is at baseline  Cranial Nerves: No cranial nerve deficit  Motor: No weakness  Psychiatric:         Mood and Affect: Mood normal          Behavior: Behavior normal          Additional Data:     Labs:    Results from last 7 days   Lab Units 03/13/22  0443   WBC Thousand/uL 10 76*   HEMOGLOBIN g/dL 12 4   HEMATOCRIT % 36 4   PLATELETS Thousands/uL 243   NEUTROS PCT % 66   LYMPHS PCT % 20   MONOS PCT % 11   EOS PCT % 2     Results from last 7 days   Lab Units 03/13/22  0724 03/12/22  0601 03/11/22 2000   SODIUM mmol/L 135   < > 130*   POTASSIUM mmol/L 3 5   < > 3 7   CHLORIDE mmol/L 108   < > 103   CO2 mmol/L 20*   < > 17*   BUN mg/dL 7   < > 8   CREATININE mg/dL 0 55*   < > 0 68   CALCIUM mg/dL 9 0   < > 9 6   ALK PHOS U/L  --   --  70   ALT U/L  --   --  27   AST U/L  --   --  21    < > = values in this interval not displayed       Results from last 7 days   Lab Units 03/11/22 2000   INR  0 98     Results from last 7 days   Lab Units 03/13/22 0720 03/12/22  2111 03/12/22  1726 03/12/22  1229 03/12/22  0739 03/11/22  2303   POC GLUCOSE mg/dl 214* 230* 196* 191* 178* 193*           * I Have Reviewed All Lab Data Listed Above  * Additional Pertinent Lab Tests Reviewed: Frank 66 Admission  Reviewed    Imaging:  Imaging Reports Reviewed Today Include:  None    Recent Cultures (last 7 days):     Results from last 7 days   Lab Units 03/11/22 2000   BLOOD CULTURE  Received in Microbiology Lab  Culture in Progress  Received in Microbiology Lab  Culture in Progress  Last 24 Hours Medication List:   Current Facility-Administered Medications   Medication Dose Route Frequency Provider Last Rate    acetaminophen  650 mg Oral Q4H PRN Allan Rodriguez PA-C      atorvastatin  10 mg Oral Daily Romy [place], [place]      cefepime  2,000 mg Intravenous Q12H Romy Luther PA-C 2,000 mg (03/13/22 0759)    citalopram  40 mg Oral Daily Romy Luther PA-C      enoxaparin  40 mg Subcutaneous Daily Romy Luther [place]      hydrOXYzine HCL  50 mg Oral Q6H PRN Allan Rodriguez PA-C      insulin detemir  25 Units Subcutaneous HS Shorty Victoria MD      insulin lispro  1-5 Units Subcutaneous HS Romy Luther PA-C      insulin lispro  1-6 Units Subcutaneous TID AC Romy Luther PA-C      ketorolac  15 mg Intravenous Q6H [address] Henrry Willis MD      montelukast  10 mg Oral HS Allan Rodriguez PA-C      ondansetron  4 mg Intravenous Q6H PRN Allan Rodriguez PA-C      pramipexole  0 5 mg Oral HS Allan Rodriguez PA-C      topiramate  25 mg Oral BID Allan Rodriguez PA-C      traZODone  50 mg Oral HS Allan Rodriguez PA-C          Today, Patient Was Seen By: Shorty Victoria MD    ** Please Note: Dictation voice to text software may have been used in the creation of this document   **

## 2022-03-13 NOTE — PLAN OF CARE
Problem: PAIN - ADULT  Goal: Verbalizes/displays adequate comfort level or baseline comfort level  Description: Interventions:  - Encourage patient to monitor pain and request assistance  - Assess pain using appropriate pain scale  - Administer analgesics based on type and severity of pain and evaluate response  - Implement non-pharmacological measures as appropriate and evaluate response  - Consider cultural and social influences on pain and pain management  - Notify physician/advanced practitioner if interventions unsuccessful or patient reports new pain  Outcome: Progressing     Problem: INFECTION - ADULT  Goal: Absence or prevention of progression during hospitalization  Description: INTERVENTIONS:  - Assess and monitor for signs and symptoms of infection  - Monitor lab/diagnostic results  - Monitor all insertion sites, i e  indwelling lines, tubes, and drains  - Monitor endotracheal if appropriate and nasal secretions for changes in amount and color  - [place] appropriate cooling/warming therapies per order  - Administer medications as ordered  - Instruct and encourage patient and family to use good hand hygiene technique  - Identify and instruct in appropriate isolation precautions for identified infection/condition  Outcome: Progressing  Goal: Absence of fever/infection during neutropenic period  Description: INTERVENTIONS:  - Monitor WBC    Outcome: Progressing     Problem: SAFETY ADULT  Goal: Patient will remain free of falls  Description: INTERVENTIONS:  - Educate patient/family on patient safety including physical limitations  - Instruct patient to call for assistance with activity   - Consult OT/PT to assist with strengthening/mobility   - Keep Call bell within reach  - Keep bed low and locked with side rails adjusted as appropriate  - Keep care items and personal belongings within reach  - Initiate and maintain comfort rounds  - Make Fall Risk Sign visible to staff  - Offer Toileting every  Hours, in advance of need  - Initiate/Maintain alarm  - Obtain necessary fall risk management equipment:   - Apply yellow socks and bracelet for high fall risk patients  - Consider moving patient to room near nurses station  Outcome: Progressing  Goal: Maintain or return to baseline ADL function  Description: INTERVENTIONS:  -  Assess patient's ability to carry out ADLs; assess patient's baseline for ADL function and identify physical deficits which impact ability to perform ADLs (bathing, care of mouth/teeth, toileting, grooming, dressing, etc )  - Assess/evaluate cause of self-care deficits   - Assess range of motion  - Assess patient's mobility; develop plan if impaired  - Assess patient's need for assistive devices and provide as appropriate  - Encourage maximum independence but intervene and supervise when necessary  - Involve family in performance of ADLs  - Assess for home care needs following discharge   - Consider OT consult to assist with ADL evaluation and planning for discharge  - Provide patient education as appropriate  Outcome: Progressing  Goal: Maintains/Returns to pre admission functional level  Description: INTERVENTIONS:  - Perform BMAT or MOVE assessment daily    - Set and communicate daily mobility goal to care team and patient/family/caregiver  - Collaborate with rehabilitation services on mobility goals if consulted  - Perform Range of Motion  times a day  - Reposition patient every  hours    - Dangle patient  times a day  - Stand patient  times a day  - Ambulate patient  times a day  - Out of bed to chair  times a day   - Out of bed for meals  times a day  - Out of bed for toileting  - Record patient progress and toleration of activity level   Outcome: Progressing     Problem: DISCHARGE PLANNING  Goal: Discharge to home or other facility with appropriate resources  Description: INTERVENTIONS:  - Identify barriers to discharge w/patient and caregiver  - Arrange for needed discharge resources and transportation as appropriate  - Identify discharge learning needs (meds, wound care, etc )  - Arrange for interpretive services to assist at discharge as needed  - Refer to Case Management Department for coordinating discharge planning if the patient needs post-hospital services based on physician/advanced practitioner order or complex needs related to functional status, cognitive ability, or social support system  Outcome: Progressing     Problem: Knowledge Deficit  Goal: Patient/family/caregiver demonstrates understanding of disease process, treatment plan, medications, and discharge instructions  Description: Complete learning assessment and assess knowledge base    Interventions:  - Provide teaching at level of understanding  - Provide teaching via preferred learning methods  Outcome: Progressing

## 2022-03-13 NOTE — ED NOTES
Introduced self to pt  Pt resting comfortably    No complaints at this time     Ranjeet Golden RN  03/12/22 1925

## 2022-03-13 NOTE — PROGRESS NOTES
Progress Note - General Surgery   Yung Mott 42 y o  female MRN: [id number]  Unit/Bed#: ED 25 Encounter: [id number]    Assessment:  41yo F w/ cellulitis of L medial thigh   - afebrile, VSS  - leukocytosis improving, 10 7 (15 2)  - Hemoglobin stable  - pt reports pain in increased today and area is more tender to palpation   - Exam: L medial thigh with erythema present, warm to touch but pt reports she just recently removed warm compress from area, + induration, no fluctuance palpated, area is open and draining      Sepsis, POA  - 2/2 L medial thigh cellulitis   - on IV cefepime    PMH: T2DM, HLD, Depression, migraine headaches, obesity     Plan:  - No acute surgical intervention indicated at this time  - Diet as tolerated   - continue with warm compresses 4x daily  - AM CBC to trend WBC, monitor vitals   - IV abx  - IVF hydration   - PRN analgesia  - medical management per primary team       Subjective/Objective   Subjective: No acute events overnight  Patient states overall she feels well, but still having pain in L medial thigh  Pain is exacerbated by movement and palpation, improved with pain medication and warm compresses  Denies fever/chills  Objective:   Blood pressure 101/54, pulse 79, temperature 97 8 °F (36 6 °C), temperature source Tympanic, resp  rate 16, height 5' 4" (1 626 m), weight 106 kg (234 lb), SpO2 98 %, not currently breastfeeding  ,Body mass index is 40 17 kg/m²        Intake/Output Summary (Last 24 hours) at 3/13/2022 0933  Last data filed at 3/12/2022 1100  Gross per 24 hour   Intake 100 ml   Output --   Net 100 ml       Invasive Devices  Report    Peripheral Intravenous Line            Peripheral IV 03/11/22 Right Forearm 1 day    Peripheral IV 03/13/22 Right Antecubital <1 day                Physical Exam: /60   Pulse 71   Temp 97 7 °F (36 5 °C)   Resp 20   Ht 5' 4" (1 626 m)   Wt 109 kg (240 lb 1 3 oz)   SpO2 96%   BMI 41 21 kg/m²   General appearance: seen and examined while lying in bed, alert and oriented, in no acute distress, appears stated age and cooperative, obese   Head: Normocephalic, without obvious abnormality, atraumatic  Lungs: clear to auscultation bilaterally, no rales, wheeze, rhonchi, no respiratory distress  Heart: regular rate and rhythm, S1, S2 normal, no murmur, click, rub or gallop  Abdomen: soft, non-tender; bowel sounds normal; no masses,  no organomegaly  Extremities: L medial thigh with area of erythema  Induration palpated centrally, no definite area of fluctuance noted  Centrally there is an open area which is draining clear fluid  Warm to touch  Tender to palpation  Lab, Imaging and other studies:  I have personally reviewed pertinent lab results    , CBC:   Lab Results   Component Value Date    WBC 10 76 (H) 03/13/2022    HGB 12 4 03/13/2022    HCT 36 4 03/13/2022    MCV 89 03/13/2022     03/13/2022    MCH 30 3 03/13/2022    MCHC 34 1 03/13/2022    RDW 13 1 03/13/2022    MPV 10 7 03/13/2022    NRBC 0 03/13/2022   , CMP:   Lab Results   Component Value Date    SODIUM 135 03/13/2022    K 3 5 03/13/2022     03/13/2022    CO2 20 (L) 03/13/2022    BUN 7 03/13/2022    CREATININE 0 55 (L) 03/13/2022    CALCIUM 9 0 03/13/2022    EGFR 114 03/13/2022     VTE Pharmacologic Prophylaxis: Enoxaparin (Lovenox)  VTE Mechanical Prophylaxis: sequential compression device    Leidy Ya PA-C  03/13/22

## 2022-03-14 ENCOUNTER — TELEPHONE (OUTPATIENT)
Dept: SURGERY | Facility: CLINIC | Age: 47
End: 2022-03-14

## 2022-03-14 DIAGNOSIS — G25.81 RESTLESS LEG SYNDROME: ICD-10-CM

## 2022-03-14 LAB
ANION GAP SERPL CALCULATED.3IONS-SCNC: 8 MMOL/L (ref 4–13)
BASOPHILS # BLD AUTO: 0.04 THOUSANDS/ΜL (ref 0–0.1)
BASOPHILS NFR BLD AUTO: 0 % (ref 0–1)
BUN SERPL-MCNC: 9 MG/DL (ref 5–25)
CALCIUM SERPL-MCNC: 9 MG/DL (ref 8.4–10.2)
CHLORIDE SERPL-SCNC: 107 MMOL/L (ref 96–108)
CO2 SERPL-SCNC: 21 MMOL/L (ref 21–32)
CREAT SERPL-MCNC: 0.68 MG/DL (ref 0.6–1.3)
EOSINOPHIL # BLD AUTO: 0.33 THOUSAND/ΜL (ref 0–0.61)
EOSINOPHIL NFR BLD AUTO: 4 % (ref 0–6)
ERYTHROCYTE [DISTWIDTH] IN BLOOD BY AUTOMATED COUNT: 13 % (ref 11.6–15.1)
GFR SERPL CREATININE-BSD FRML MDRD: 106 ML/MIN/1.73SQ M
GLUCOSE SERPL-MCNC: 139 MG/DL (ref 65–140)
GLUCOSE SERPL-MCNC: 143 MG/DL (ref 65–140)
GLUCOSE SERPL-MCNC: 186 MG/DL (ref 65–140)
GLUCOSE SERPL-MCNC: 189 MG/DL (ref 65–140)
GLUCOSE SERPL-MCNC: 204 MG/DL (ref 65–140)
HCT VFR BLD AUTO: 37 % (ref 34.8–46.1)
HGB BLD-MCNC: 12.3 G/DL (ref 11.5–15.4)
IMM GRANULOCYTES # BLD AUTO: 0.06 THOUSAND/UL (ref 0–0.2)
IMM GRANULOCYTES NFR BLD AUTO: 1 % (ref 0–2)
LYMPHOCYTES # BLD AUTO: 2.58 THOUSANDS/ΜL (ref 0.6–4.47)
LYMPHOCYTES NFR BLD AUTO: 27 % (ref 14–44)
MCH RBC QN AUTO: 29.2 PG (ref 26.8–34.3)
MCHC RBC AUTO-ENTMCNC: 33.2 G/DL (ref 31.4–37.4)
MCV RBC AUTO: 88 FL (ref 82–98)
MONOCYTES # BLD AUTO: 0.95 THOUSAND/ΜL (ref 0.17–1.22)
MONOCYTES NFR BLD AUTO: 10 % (ref 4–12)
NEUTROPHILS # BLD AUTO: 5.56 THOUSANDS/ΜL (ref 1.85–7.62)
NEUTS SEG NFR BLD AUTO: 58 % (ref 43–75)
NRBC BLD AUTO-RTO: 0 /100 WBCS
PLATELET # BLD AUTO: 257 THOUSANDS/UL (ref 149–390)
PMV BLD AUTO: 10.1 FL (ref 8.9–12.7)
POTASSIUM SERPL-SCNC: 3.9 MMOL/L (ref 3.5–5.3)
RBC # BLD AUTO: 4.21 MILLION/UL (ref 3.81–5.12)
SODIUM SERPL-SCNC: 136 MMOL/L (ref 135–147)
WBC # BLD AUTO: 9.52 THOUSAND/UL (ref 4.31–10.16)

## 2022-03-14 PROCEDURE — 87186 SC STD MICRODIL/AGAR DIL: CPT | Performed by: PHYSICIAN ASSISTANT

## 2022-03-14 PROCEDURE — 99024 POSTOP FOLLOW-UP VISIT: CPT | Performed by: SURGERY

## 2022-03-14 PROCEDURE — 87070 CULTURE OTHR SPECIMN AEROBIC: CPT | Performed by: PHYSICIAN ASSISTANT

## 2022-03-14 PROCEDURE — 99232 SBSQ HOSP IP/OBS MODERATE 35: CPT | Performed by: INTERNAL MEDICINE

## 2022-03-14 PROCEDURE — 80048 BASIC METABOLIC PNL TOTAL CA: CPT | Performed by: HOSPITALIST

## 2022-03-14 PROCEDURE — 10060 I&D ABSCESS SIMPLE/SINGLE: CPT | Performed by: PHYSICIAN ASSISTANT

## 2022-03-14 PROCEDURE — 87205 SMEAR GRAM STAIN: CPT | Performed by: PHYSICIAN ASSISTANT

## 2022-03-14 PROCEDURE — 85025 COMPLETE CBC W/AUTO DIFF WBC: CPT | Performed by: HOSPITALIST

## 2022-03-14 PROCEDURE — 0H9JXZZ DRAINAGE OF LEFT UPPER LEG SKIN, EXTERNAL APPROACH: ICD-10-PCS | Performed by: PHYSICIAN ASSISTANT

## 2022-03-14 PROCEDURE — 82948 REAGENT STRIP/BLOOD GLUCOSE: CPT

## 2022-03-14 RX ORDER — LORAZEPAM 2 MG/ML
0.5 INJECTION INTRAMUSCULAR ONCE
Status: COMPLETED | OUTPATIENT
Start: 2022-03-14 | End: 2022-03-14

## 2022-03-14 RX ORDER — LIDOCAINE HYDROCHLORIDE AND EPINEPHRINE 10; 10 MG/ML; UG/ML
40 INJECTION, SOLUTION INFILTRATION; PERINEURAL ONCE
Status: COMPLETED | OUTPATIENT
Start: 2022-03-14 | End: 2022-03-14

## 2022-03-14 RX ORDER — HYDROCODONE BITARTRATE AND ACETAMINOPHEN 5; 325 MG/1; MG/1
1 TABLET ORAL ONCE AS NEEDED
Status: ACTIVE | OUTPATIENT
Start: 2022-03-14 | End: 2022-03-14

## 2022-03-14 RX ADMIN — CEFEPIME HYDROCHLORIDE 2000 MG: 2 INJECTION, SOLUTION INTRAVENOUS at 08:06

## 2022-03-14 RX ADMIN — INSULIN LISPRO 1 UNITS: 100 INJECTION, SOLUTION INTRAVENOUS; SUBCUTANEOUS at 21:52

## 2022-03-14 RX ADMIN — TOPIRAMATE 25 MG: 25 CAPSULE, COATED PELLETS ORAL at 09:20

## 2022-03-14 RX ADMIN — KETOROLAC TROMETHAMINE 15 MG: 30 INJECTION, SOLUTION INTRAMUSCULAR; INTRAVENOUS at 12:04

## 2022-03-14 RX ADMIN — TRAZODONE HYDROCHLORIDE 50 MG: 50 TABLET ORAL at 21:53

## 2022-03-14 RX ADMIN — LORAZEPAM 0.5 MG: 2 INJECTION INTRAMUSCULAR; INTRAVENOUS at 13:44

## 2022-03-14 RX ADMIN — CEFEPIME HYDROCHLORIDE 2000 MG: 2 INJECTION, SOLUTION INTRAVENOUS at 19:30

## 2022-03-14 RX ADMIN — ATORVASTATIN CALCIUM 10 MG: 10 TABLET, FILM COATED ORAL at 08:07

## 2022-03-14 RX ADMIN — KETOROLAC TROMETHAMINE 15 MG: 30 INJECTION, SOLUTION INTRAMUSCULAR; INTRAVENOUS at 17:20

## 2022-03-14 RX ADMIN — LIDOCAINE HYDROCHLORIDE,EPINEPHRINE BITARTRATE 40 ML: 10; .01 INJECTION, SOLUTION INFILTRATION; PERINEURAL at 14:07

## 2022-03-14 RX ADMIN — KETOROLAC TROMETHAMINE 15 MG: 30 INJECTION, SOLUTION INTRAMUSCULAR; INTRAVENOUS at 00:21

## 2022-03-14 RX ADMIN — ENOXAPARIN SODIUM 40 MG: 100 INJECTION SUBCUTANEOUS at 08:07

## 2022-03-14 RX ADMIN — TOPIRAMATE 25 MG: 25 CAPSULE, COATED PELLETS ORAL at 17:23

## 2022-03-14 RX ADMIN — CITALOPRAM HYDROBROMIDE 40 MG: 20 TABLET ORAL at 08:06

## 2022-03-14 RX ADMIN — PRAMIPEXOLE DIHYDROCHLORIDE 0.5 MG: 0.5 TABLET ORAL at 21:53

## 2022-03-14 RX ADMIN — INSULIN LISPRO 2 UNITS: 100 INJECTION, SOLUTION INTRAVENOUS; SUBCUTANEOUS at 12:03

## 2022-03-14 RX ADMIN — ACETAMINOPHEN 650 MG: 325 TABLET ORAL at 12:55

## 2022-03-14 RX ADMIN — INSULIN LISPRO 1 UNITS: 100 INJECTION, SOLUTION INTRAVENOUS; SUBCUTANEOUS at 17:19

## 2022-03-14 RX ADMIN — INSULIN DETEMIR 25 UNITS: 100 INJECTION, SOLUTION SUBCUTANEOUS at 21:53

## 2022-03-14 RX ADMIN — KETOROLAC TROMETHAMINE 15 MG: 30 INJECTION, SOLUTION INTRAMUSCULAR; INTRAVENOUS at 05:49

## 2022-03-14 NOTE — ASSESSMENT & PLAN NOTE
· Sepsis parameters have resolved  · Secondary to medial left thigh cellulitis  · Patient still with swelling/induration/erythema of left thigh    Surgical team performed I&D on 03/14/2022 with wound culture sent  · Will continue IV antibiotics with cefepime  · Hyponatremia-resolved, was dilutional  · Hypokalemia:-resolved with repletion  · Blood cultures negative to date  · Will continue to monitor clinical progression

## 2022-03-14 NOTE — CASE MANAGEMENT
Case Management Assessment & Discharge Planning Note    Patient name Markell Minaya  Location [address] [id number] MRN [id number]  : 1975 Date 3/14/2022       Current Admission Date: 3/11/2022  Current Admission Diagnosis:Sepsis due to cellulitis [facility])   Patient Active Problem List    Diagnosis Date Noted    Cellulitis of left thigh 2022    Sepsis due to cellulitis ([place] [address] ) 2022    Migraine without aura, not intractable 2021    Other hyperlipidemia 2021    Type 2 diabetes mellitus without complication, without long-term current use of insulin ([place] [address] ) 2018    Allergic rhinitis 2017    Mood disorder of manic type ([place] [address] ) 2017    Disc degeneration, lumbosacral 2016    Depression with anxiety 10/22/2015    Class 3 severe obesity due to excess calories without serious comorbidity with body mass index (BMI) of 40 0 to 44 9 in adult ([place] [address] ) 2014      LOS (days): 3  Geometric Mean LOS (GMLOS) (days):   Days to GMLOS:     OBJECTIVE:    Risk of Unplanned Readmission Score: 8     Current admission status: Inpatient  Referral Reason:  (Discharge planning)    Preferred Pharmacy:   [address] [place] [address]  Phone: [phone] Fax: [phone]    Primary Care Provider: JENNIFER Starr    Primary Insurance: [address] [place] [address]  Secondary Insurance:     ASSESSMENT:  Porsha Caballero Proxies    There are no active Health Care Proxies on file         Advance Directives  Does patient have a Health Care POA?: No  Was patient offered paperwork?: Yes (Papers provided)  Does patient currently have a Health Care decision maker?: Yes, please see Health Care Proxy section  Does patient have Advance Directives?: No  Was patient offered paperwork?: Yes (Papers provided)  Primary Contact: Chantel Womack dtr    Readmission Root Cause  30 Day Readmission: No    Patient Information  Admitted from[de-identified] Home  Mental Status: Alert  During Assessment patient was accompanied by: Daughter,Son  Assessment information provided by[de-identified] Patient  Primary Caregiver: Self  Support Systems: Self  County of Residence: [facility] [address] do you live in?: [address] [employer] entry access options   Select all that apply : Stairs  Number of steps to enter home : 2  Do the steps have railings?: No  Type of Current Residence: 2 story home  Upon entering residence, is there a bedroom on the main floor (no further steps)?: No  A bedroom is located on the following floor levels of residence (select all that apply):: 2nd Floor  Upon entering residence, is there a bathroom on the main floor (no further steps)?: No  Indicate which floors of current residence have a bathroom (select all the apply):: 2nd Floor  Number of steps to 2nd floor from main floor: One Flight  In the last 12 months, was there a time when you were not able to pay the mortgage or rent on time?: Yes  In the last 12 months, how many places have you lived?: 1  In the last 12 months, was there a time when you did not have a steady place to sleep or slept in a shelter (including now)?: No  Homeless/housing insecurity resource given?: N/A  Living Arrangements: Lives w/ Spouse/significant other,Lives w/ [address] w/ Son  Is patient a ?: No    Activities of Daily Living Prior to Admission  Functional Status: Independent  Completes ADLs independently?: Yes  Ambulates independently?: Yes  Does patient use assisted devices?: No  Does patient currently own DME?: Yes  What DME does the patient currently own?: Crutches  Does patient have a history of Outpatient Therapy (PT/OT)?: No  Does the patient have a history of Short-Term Rehab?: No  Does patient have a history of HHC?: No  Does patient currently have [address]?: No    Patient Information Continued  Income Source: Unemployed  Does patient have prescription coverage?: Yes  Within the past 12 months, you worried that your food would run out before you got the money to buy more : Sometimes true (has geno on food [place])  Within the past 12 months, the food you bought just didnt last and you didnt have money to get more : Never true  Food insecurity resource given?: N/A  Does patient receive dialysis treatments?: No  Does patient have a history of substance abuse?: No  Does patient have a history of Mental Health Diagnosis?: Yes  Is patient receiving treatment for mental health?: No  Patient declined treatment information  (PCP)    PHQ 2/9 Screening   Reviewed PHQ 2/9 Depression Screening Score?: No    Means of Transportation  Means of Transport to Appts[de-identified] Drives Self  In the past 12 months, has lack of transportation kept you from medical appointments or from getting medications?: No  In the past 12 months, has lack of transportation kept you from meetings, work, or from getting things needed for daily living?: No  Was application for public transport provided?: N/A        DISCHARGE DETAILS:    Discharge planning discussed with[de-identified] Pt  Treatment Team Recommendation: Home  Discharge Destination Plan[de-identified] Home

## 2022-03-14 NOTE — TELEPHONE ENCOUNTER
Patient pharmacy requesting refill of Mirapex 0 5mg    Last filled: 2/15/22 #30x0  Last appt: 34/22  Next appt: 7/7/22  Pharmacy: [address]

## 2022-03-14 NOTE — TELEPHONE ENCOUNTER
Called patient and left a voicemail letting the patient know to give us a call to schedule a post op appointment with Bin Arias

## 2022-03-14 NOTE — PROGRESS NOTES
Progress Note - General Surgery   Early Feeling 42 y o  female MRN: [id number]  Unit/Bed#: -01 Encounter: [id number]    Assessment/Plan:  39yo F w/ cellulitis of L medial thigh   - afebrile, period of hypotension this AM  - Leukocytosis improving, 9 52 (10 7)  - Hemoglobin stable  - Erythema improved, area is indurated, centralized opening with active drainage     - no abscess collection or soft tissue gas noted on CT, will order US today to r/o any new collection   - Cont IV abx   - PRN analgesia  - medical management per primary team      Subjective/Objective   Subjective: No acute events overnight  Patient still with significant pain in L medial thigh  Tolerating diet, no N/V  No urinary complaints  + BF  Objective: Blood pressure (!) 89/53, pulse 65, temperature 98 4 °F (36 9 °C), resp  rate 18, height 5' 4" (1 626 m), weight 109 kg (240 lb 1 3 oz), SpO2 95 %, not currently breastfeeding  ,Body mass index is 41 21 kg/m²  Intake/Output Summary (Last 24 hours) at 3/14/2022 1023  Last data filed at 3/13/2022 1300  Gross per 24 hour   Intake 120 ml   Output --   Net 120 ml       Invasive Devices  Report    Peripheral Intravenous Line            Peripheral IV 03/13/22 Right Antecubital 1 day                Physical Exam:   Gen:  AAOx3, no acute distress, seen while laying in bed comfortably   Heart: RRR, no murmur  Lungs: no acute resp distress, no wheeze, rhonchi, rales  Skin: L medial thigh with erythema (decreased from previous examination), centralized wound with surrounding induration currently draining            Lab, Imaging and other studies:  I have personally reviewed pertinent lab results    , CBC:   Lab Results   Component Value Date    WBC 9 52 03/14/2022    HGB 12 3 03/14/2022    HCT 37 0 03/14/2022    MCV 88 03/14/2022     03/14/2022    MCH 29 2 03/14/2022    MCHC 33 2 03/14/2022    RDW 13 0 03/14/2022    MPV 10 1 03/14/2022    NRBC 0 03/14/2022   , CMP:   Lab Results   Component Value Date    SODIUM 136 03/14/2022    K 3 9 03/14/2022     03/14/2022    CO2 21 03/14/2022    BUN 9 03/14/2022    CREATININE 0 68 03/14/2022    CALCIUM 9 0 03/14/2022    EGFR 106 03/14/2022     VTE Pharmacologic Prophylaxis: Enoxaparin (Lovenox)  VTE Mechanical Prophylaxis: sequential compression device    Tobi Cash PA-C  03/14/22

## 2022-03-14 NOTE — PLAN OF CARE
Problem: PAIN - ADULT  Goal: Verbalizes/displays adequate comfort level or baseline comfort level  Description: Interventions:  - Encourage patient to monitor pain and request assistance  - Assess pain using appropriate pain scale  - Administer analgesics based on type and severity of pain and evaluate response  - Implement non-pharmacological measures as appropriate and evaluate response  - Consider cultural and social influences on pain and pain management  - Notify physician/advanced practitioner if interventions unsuccessful or patient reports new pain  Outcome: Progressing     Problem: INFECTION - ADULT  Goal: Absence or prevention of progression during hospitalization  Description: INTERVENTIONS:  - Assess and monitor for signs and symptoms of infection  - Monitor lab/diagnostic results  - Monitor all insertion sites, i e  indwelling lines, tubes, and drains  - Monitor endotracheal if appropriate and nasal secretions for changes in amount and color  - [place] appropriate cooling/warming therapies per order  - Administer medications as ordered  - Instruct and encourage patient and family to use good hand hygiene technique  - Identify and instruct in appropriate isolation precautions for identified infection/condition  Outcome: Progressing  Goal: Absence of fever/infection during neutropenic period  Description: INTERVENTIONS:  - Monitor WBC    Outcome: Progressing     Problem: SAFETY ADULT  Goal: Patient will remain free of falls  Description: INTERVENTIONS:  - Educate patient/family on patient safety including physical limitations  - Instruct patient to call for assistance with activity   - Consult OT/PT to assist with strengthening/mobility   - Keep Call bell within reach  - Keep bed low and locked with side rails adjusted as appropriate  - Keep care items and personal belongings within reach  - Initiate and maintain comfort rounds  - Make Fall Risk Sign visible to staff  - Offer Toileting every  Hours, in advance of need  - Initiate/Maintain alarm  - Obtain necessary fall risk management equipment:   - Apply yellow socks and bracelet for high fall risk patients  - Consider moving patient to room near nurses station  Outcome: Progressing  Goal: Maintain or return to baseline ADL function  Description: INTERVENTIONS:  -  Assess patient's ability to carry out ADLs; assess patient's baseline for ADL function and identify physical deficits which impact ability to perform ADLs (bathing, care of mouth/teeth, toileting, grooming, dressing, etc )  - Assess/evaluate cause of self-care deficits   - Assess range of motion  - Assess patient's mobility; develop plan if impaired  - Assess patient's need for assistive devices and provide as appropriate  - Encourage maximum independence but intervene and supervise when necessary  - Involve family in performance of ADLs  - Assess for home care needs following discharge   - Consider OT consult to assist with ADL evaluation and planning for discharge  - Provide patient education as appropriate  Outcome: Progressing  Goal: Maintains/Returns to pre admission functional level  Description: INTERVENTIONS:  - Perform BMAT or MOVE assessment daily    - Set and communicate daily mobility goal to care team and patient/family/caregiver  - Collaborate with rehabilitation services on mobility goals if consulted  - Perform Range of Motion  times a day  - Reposition patient every  hours    - Dangle patient  times a day  - Stand patient  times a day  - Ambulate patient  times a day  - Out of bed to chair  times a day   - Out of bed for meals  times a day  - Out of bed for toileting  - Record patient progress and toleration of activity level   Outcome: Progressing     Problem: DISCHARGE PLANNING  Goal: Discharge to home or other facility with appropriate resources  Description: INTERVENTIONS:  - Identify barriers to discharge w/patient and caregiver  - Arrange for needed discharge resources and transportation as appropriate  - Identify discharge learning needs (meds, wound care, etc )  - Arrange for interpretive services to assist at discharge as needed  - Refer to Case Management Department for coordinating discharge planning if the patient needs post-hospital services based on physician/advanced practitioner order or complex needs related to functional status, cognitive ability, or social support system  Outcome: Progressing     Problem: Knowledge Deficit  Goal: Patient/family/caregiver demonstrates understanding of disease process, treatment plan, medications, and discharge instructions  Description: Complete learning assessment and assess knowledge base  Interventions:  - Provide teaching at level of understanding  - Provide teaching via preferred learning methods  Outcome: Progressing     Problem: MOBILITY - ADULT  Goal: Maintain or return to baseline ADL function  Description: INTERVENTIONS:  -  Assess patient's ability to carry out ADLs; assess patient's baseline for ADL function and identify physical deficits which impact ability to perform ADLs (bathing, care of mouth/teeth, toileting, grooming, dressing, etc )  - Assess/evaluate cause of self-care deficits   - Assess range of motion  - Assess patient's mobility; develop plan if impaired  - Assess patient's need for assistive devices and provide as appropriate  - Encourage maximum independence but intervene and supervise when necessary  - Involve family in performance of ADLs  - Assess for home care needs following discharge   - Consider OT consult to assist with ADL evaluation and planning for discharge  - Provide patient education as appropriate  Outcome: Progressing  Goal: Maintains/Returns to pre admission functional level  Description: INTERVENTIONS:  - Perform BMAT or MOVE assessment daily    - Set and communicate daily mobility goal to care team and patient/family/caregiver     - Collaborate with rehabilitation services on mobility goals if consulted  - Perform Range of Motion  times a day  - Reposition patient every  hours    - Dangle patient  times a day  - Stand patient  times a day  - Ambulate patient  times a day  - Out of bed to chair  times a day   - Out of bed for meals  times a day  - Out of bed for toileting  - Record patient progress and toleration of activity level   Outcome: Progressing

## 2022-03-14 NOTE — PROCEDURES
Incision and drain    Date/Time: 3/14/2022 2:19 PM  Performed by: Chacho Claudio PA-C  Authorized by: Chacho Claudio PA-C   [place] Protocol:  Procedure performed by: (RN)  Consent: Verbal consent obtained  Written consent obtained  Risks and benefits: risks, benefits and alternatives were discussed  Consent given by: patient  Time out: Immediately prior to procedure a "time out" was called to verify the correct patient, procedure, equipment, support staff and site/side marked as required  Patient understanding: patient states understanding of the procedure being performed  Patient consent: the patient's understanding of the procedure matches consent given  Procedure consent: procedure consent matches procedure scheduled  Patient identity confirmed: verbally with patient and arm band      Patient location:  Bedside  Location:     Type:  Abscess    Size:  4 cm    Location:  Lower extremity    Lower extremity location:  L leg (left medial thigh)  Pre-procedure details:     Skin preparation:  Betadine  Sedation:     Sedation type: 0 5 mg Ativan IV x 1  Anesthesia (see MAR for exact dosages): Anesthesia method:  Local infiltration    Local anesthetic:  Lidocaine 1% WITH epi (15 mL)  Procedure details:     Complexity:  Simple    Needle aspiration: no      Incision types:  Single straight    Scalpel blade:  11    Approach:  Open    Incision depth:  Subcutaneous    Wound management:  Probed and deloculated, irrigated with saline and debrided    Irrigation with saline:  20 mL    Hemostat:  Good    Drainage:  Purulent    Drainage amount: Moderate    Wound treatment:  Packing placed    Packing materials:  1/4 in iodoform gauze    Amount 1/4" iodoform:  4 inches  Post-procedure details:     Patient tolerance of procedure: Tolerated well, no immediate complications  Comments:      Informed written consent obtained  Ativan 0 5 mg IV given pre-procedure   Positioned supine with left leg frog-legged for exposure  Skin prepped with betadine swab x 2  Using aseptic technique local anesthesia administered using 15 mL 1% xylocaine with epinephrine in a field block  Single straight incision made through necrotic skin and carried down through full thickness of dermis using #11 scalpel  Necrotic skin at incision edges sharply debrided with forceps and #11 scalpel back to healthy dermis/epidermis  Purulent fluid encountered and cultured  Probed straight into wound about 4 cm deep  Cavity flushed with 20 mL saline  Cavity then packed with 4 inches of 1/4" iodoform strip gauze leaving 1-2 inch tail externally  silvasorb gel applied to skin edges  Wound cleansed and bulky gauze dressing applied  Tolerated well, no immediate complications

## 2022-03-14 NOTE — PROGRESS NOTES
Misti 47  Progress Note - Olivia Loge 1975, 42 y o  female MRN: [id number]  Unit/Bed#: -01 Encounter: [id number]  Primary Care Provider: JENNIFER Horn   Date and time admitted to hospital: 3/11/2022  7:34 PM    * Sepsis due to cellulitis [facility])  Assessment & Plan  · Sepsis parameters have resolved  · Secondary to medial left thigh cellulitis  · Patient still with swelling/induration/erythema of left thigh  Surgical team performed I&D on 03/14/2022 with wound culture sent  · Will continue IV antibiotics with cefepime  · Hyponatremia-resolved, was dilutional  · Hypokalemia:-resolved with repletion  · Blood cultures negative to date  · Will continue to monitor clinical progression    Other hyperlipidemia  Assessment & Plan  · Continue atorvastatin    Migraine without aura, not intractable  Assessment & Plan  · Continue Topamax    Type 2 diabetes mellitus without complication, without long-term current use of insulin (HCC)  Assessment & Plan  · Metformin held  · Target blood sugar in the hospital should be between 140 and 180  · Levemir increased to 25 units  · Continue Accu-Cheks AC and HS otherwise with sliding scale coverage    Class 3 severe obesity due to excess calories without serious comorbidity with body mass index (BMI) of 40 0 to 44 9 in adult [facility])  Assessment & Plan  · Actual BMI 40 17  · Dietary, weight loss, and lifestyle modification counseling was provided    Depression with anxiety  Assessment & Plan  · Continue present medications which include Celexa, and trazodone    Allergic rhinitis  Assessment & Plan  · Continue Singulair      VTE Prophylaxis:  Enoxaparin (Lovenox)    Patient Centered Rounds: I have performed bedside rounds with nursing staff today      Discussions with Specialists or Other Care Team Provider: yes  Education and Discussions with Family / Patient:  Updated patient regarding plan of care    Current Length of Stay: 3 day(s)    Current Patient Status: Inpatient   Certification Statement: The patient will continue to require additional inpatient hospital stay due to Sepsis/cellulitis    Discharge Plan:  Pending hospital course    Code Status: Level 1 - Full Code    Subjective:   I&D performed today at bedside by surgical team   Patient continues with pain affected area  Currently afebrile  Tolerating diet    Objective:     Vitals:   Temp (24hrs), Av °F (36 7 °C), Min:97 3 °F (36 3 °C), Max:98 4 °F (36 9 °C)    Temp:  [97 3 °F (36 3 °C)-98 4 °F (36 9 °C)] 97 3 °F (36 3 °C)  HR:  [65-74] 74  Resp:  [17-18] 18  BP: ()/(53-65) 104/58  SpO2:  [93 %-95 %] 93 %  Body mass index is 41 21 kg/m²  Input and Output Summary (last 24 hours): Intake/Output Summary (Last 24 hours) at 3/14/2022 1628  Last data filed at 3/14/2022 0806  Gross per 24 hour   Intake 290 ml   Output --   Net 290 ml       Physical Exam:   Physical Exam  Constitutional:       General: She is not in acute distress  Appearance: She is obese  HENT:      Head: Normocephalic and atraumatic  Nose: Nose normal       Mouth/Throat:      Mouth: Mucous membranes are moist    Eyes:      Extraocular Movements: Extraocular movements intact  Conjunctiva/sclera: Conjunctivae normal    Cardiovascular:      Rate and Rhythm: Normal rate and regular rhythm  Pulmonary:      Effort: Pulmonary effort is normal  No respiratory distress  Abdominal:      Palpations: Abdomen is soft  Tenderness: There is no abdominal tenderness  Musculoskeletal:         General: Normal range of motion  Cervical back: Normal range of motion and neck supple  Skin:     General: Skin is warm and dry  Comments: Left thigh wound with surrounding erythema noted  Tenderness to palpation  Purulent discharge   Neurological:      General: No focal deficit present  Mental Status: She is alert  Mental status is at baseline  Cranial Nerves: No cranial nerve deficit  Psychiatric:         Mood and Affect: Mood normal          Behavior: Behavior normal          Additional Data:     Labs:    Results from last 7 days   Lab Units 03/14/22  0552   WBC Thousand/uL 9 52   HEMOGLOBIN g/dL 12 3   HEMATOCRIT % 37 0   PLATELETS Thousands/uL 257   NEUTROS PCT % 58   LYMPHS PCT % 27   MONOS PCT % 10   EOS PCT % 4     Results from last 7 days   Lab Units 03/14/22  0552 03/12/22  0601 03/11/22 2000   SODIUM mmol/L 136   < > 130*   POTASSIUM mmol/L 3 9   < > 3 7   CHLORIDE mmol/L 107   < > 103   CO2 mmol/L 21   < > 17*   BUN mg/dL 9   < > 8   CREATININE mg/dL 0 68   < > 0 68   CALCIUM mg/dL 9 0   < > 9 6   ALK PHOS U/L  --   --  70   ALT U/L  --   --  27   AST U/L  --   --  21    < > = values in this interval not displayed  Results from last 7 days   Lab Units 03/11/22 2000   INR  0 98     Results from last 7 days   Lab Units 03/14/22  1519 03/14/22  1115 03/14/22  0555 03/13/22  2052 03/13/22  1612 03/13/22  1050 03/13/22  0720 03/12/22  2111 03/12/22  1726 03/12/22  1229 03/12/22  0739 03/11/22  2303   POC GLUCOSE mg/dl 186* 204* 139 176* 135 208* 214* 230* 196* 191* 178* 193*           * I Have Reviewed All Lab Data Listed Above  * Additional Pertinent Lab Tests Reviewed: [facility] Admission  Reviewed    Imaging:  Imaging Reports Reviewed Today Include:  No new imaging    Recent Cultures (last 7 days):     Results from last 7 days   Lab Units 03/11/22 2000   BLOOD CULTURE  No Growth at 24 hrs  No Growth at 24 hrs         Last 24 Hours Medication List:   Current Facility-Administered Medications   Medication Dose Route Frequency Provider Last Rate    acetaminophen  650 mg Oral Q4H PRN Marine Payne PA-C      atorvastatin  10 mg Oral Daily [place], [place]      cefepime  2,000 mg Intravenous Q12H [place] PA-C 2,000 mg (03/14/22 0806)    citalopram  40 mg Oral Daily [place] PA-C      enoxaparin  40 mg Subcutaneous Daily Laura Shepard      HYDROcodone-acetaminophen  1 tablet Oral Once PRN Fausto Claudio PA-C      hydrOXYzine HCL  50 mg Oral Q6H PRN Tamara Aguilera PA-C      insulin detemir  25 Units Subcutaneous HS Michelet Greene MD      insulin lispro  1-5 Units Subcutaneous HS [facility] MEAGAN Luther      insulin lispro  1-6 Units Subcutaneous TID AC [facility]      ketorolac  15 mg Intravenous Q6H [facility] Patience Huston MD      montelukast  10 mg Oral HS Tamara Aguilera PA-C      ondansetron  4 mg Intravenous Q6H PRN Tamara Aguilera PA-C      pramipexole  0 5 mg Oral HS [facility] MEAGAN Luther      topiramate  25 mg Oral BID Tamara Aguilera PA-C      traZODone  50 mg Oral HS Tamara Aguilera PA-C          Today, Patient Was Seen By: Henna Dasilva MD    ** Please Note: Dictation voice to text software may have been used in the creation of this document   **

## 2022-03-14 NOTE — UTILIZATION REVIEW
Initial Clinical Review    Admission: Date/Time/Statement:   Admission Orders (From admission, onward)     Ordered        03/11/22 2203  INPATIENT ADMISSION  Once                      Orders Placed This Encounter   Procedures    INPATIENT ADMISSION     Standing Status:   Standing     Number of Occurrences:   1     Order Specific Question:   Level of Care     Answer:   Med Surg [16]     Order Specific Question:   Estimated length of stay     Answer:   More than 2 Midnights     Order Specific Question:   Certification     Answer:   I certify that inpatient services are medically necessary for this patient for a duration of greater than two midnights  See H&P and MD Progress Notes for additional information about the patient's course of treatment  ED Arrival Information     Expected Arrival Acuity    3/11/2022  3/11/2022 19:20 Urgent         Means of arrival Escorted by Service Admission type    Wheelchair Family Member Hospitalist Urgent         Arrival complaint    Cellulitis of groin        Chief Complaint   Patient presents with    Abscess     dx with left sided abscess at [facility] today  referred herer for IV antibiotics        Initial Presentation:  42 y o  female from OP clinic presented to the ED with left inner thigh pain  PMH of morbid obesity, diabetes mellitus type 2 not on insulin, depression anxiety  Pt noted 2-3 days of redness, swelling and numbness of left inner thigh  She reports she was scratched by her dog in the upper thigh area not at same site  Pain 8-9/10,  throbbing  Took tylenol with no relief  Seen at OP clinic and was sent to the ED for further evaluation for concern for abscess  On exam, left medial thigh with erythema warmth, tenderness  Small area drainage  Tachycardia and leukocytosis present  Given cefepime 2000 mg, fentanyl 50 mcg, morphine 4 mg, 2 L IV fluid, tetanus booster, vancomycin in ED    Plan: Inpatient admission for evaluation and treatment of sepsis d/t cellulitis: Continue Cefepime  F/u urine and blood cultures  Cont IVF  General Surgery consulted    Date: 03/12   Day 2:   IM Notes: Pt reports feeling better  Reports  initial symptoms were secondary to a spider bite on her left medial thigh  Leukocytosis improved from 18 25 to 15  Left medial thighs cellulitis has improved, the redness is receding from the margins outlined by the admitting team  Dc IVF  Cont IV Cefepime  Awaiting formal blood culture results  Hypokalemia- replete and recheck  General Surgery Consult: left medial thigh cellulitis likely secondary to a spider bite  CT images reviewed directly, no drainable abscess or fluid collection  No soft tissue gas  No urgent indication for operative drainage  Central portion of the erythema is quite inflamed, indurated, and tender with phlegmonous change  No fluctuance or drainable fluid on exam  Warm compress at least 4x/day  Toradol or Ibuprofen fro antiinflammatory effect  Serial exams      ED Triage Vitals   Temperature Pulse Respirations Blood Pressure SpO2   03/11/22 1928 03/11/22 1929 03/11/22 1929 03/11/22 1929 03/11/22 1929   (!) 96 5 °F (35 8 °C) (!) 114 18 135/86 99 %      Temp Source Heart Rate Source Patient Position - Orthostatic VS BP Location FiO2 (%)   03/11/22 1928 03/11/22 1929 03/11/22 1929 03/11/22 1929 --   Temporal Monitor Sitting Left arm       Pain Score       03/11/22 1929       8          Wt Readings from Last 1 Encounters:   03/13/22 109 kg (240 lb 1 3 oz)     Additional Vital Signs:   Date/Time Temp Pulse Resp BP MAP (mmHg) SpO2 O2 Device Patient Position - Orthostatic VS   03/14/22 07:38:45 98 4 °F (36 9 °C) 65 18 89/53 Abnormal  65 95 % -- --   03/14/22 00:26:33 98 3 °F (36 8 °C) -- 17 124/65 85 -- -- --   03/14/22 00:23:38 -- -- -- 124/65 85 -- -- --   03/13/22 1517 98 °F (36 7 °C) 69 16 102/64 -- 95 % None (Room air) Lying   03/13/22 12:36:32 97 7 °F (36 5 °C) 71 20 107/60 76 96 % -- --   03/13/22 1110 -- 68 16 100/54 -- 98 % -- -- 03/13/22 0548 97 8 °F (36 6 °C) 79 16 101/54 -- 98 % -- Lying       Pertinent Labs/Diagnostic Test Results:   CT lower extremity w contrast left   Final Result by Say Preciado DO (03/11 2137)      Cellulitis medial left thigh without abscess or soft tissue gas  No CT evidence of osteomyelitis  Workstation performed: [id number]         US extremity soft tissue    (Results Pending)     03/11 EKG result: NSR      Results from last 7 days   Lab Units 03/14/22  0552 03/13/22  0443 03/12/22  0601 03/11/22 2000 03/11/22 2000   WBC Thousand/uL 9 52 10 76* 15 24*   < > 18 25*   HEMOGLOBIN g/dL 12 3 12 4 11 8  --  13 1   HEMATOCRIT % 37 0 36 4 35 3  --  40 8   PLATELETS Thousands/uL 257 243 223   < > 220   NEUTROS ABS Thousands/µL 5 56 7 07  --   --  13 44*    < > = values in this interval not displayed           Results from last 7 days   Lab Units 03/14/22  0552 03/13/22  0724 03/12/22  0601 03/11/22 2000   SODIUM mmol/L 136 135 134* 130*   POTASSIUM mmol/L 3 9 3 5 3 1* 3 7   CHLORIDE mmol/L 107 108 107 103   CO2 mmol/L 21 20* 19* 17*   ANION GAP mmol/L 8 7 8 10   BUN mg/dL 9 7 5 8   CREATININE mg/dL 0 68 0 55* 0 59* 0 68   EGFR ml/min/1 73sq m 106 114 111 106   CALCIUM mg/dL 9 0 9 0 8 7 9 6     Results from last 7 days   Lab Units 03/11/22 2000   AST U/L 21   ALT U/L 27   ALK PHOS U/L 70   TOTAL PROTEIN g/dL 7 4   ALBUMIN g/dL 4 0   TOTAL BILIRUBIN mg/dL 0 58     Results from last 7 days   Lab Units 03/14/22  1115 03/14/22  0555 03/13/22  2052 03/13/22  1612 03/13/22  1050 03/13/22  0720 03/12/22  2111 03/12/22  1726 03/12/22  1229 03/12/22  0739 03/11/22  2303   POC GLUCOSE mg/dl 204* 139 176* 135 208* 214* 230* 196* 191* 178* 193*     Results from last 7 days   Lab Units 03/14/22  0552 03/13/22  0724 03/12/22  0601 03/11/22 2000   GLUCOSE RANDOM mg/dL 143* 216* 201* 277*       Results from last 7 days   Lab Units 03/11/22 2000   PROTIME seconds 12 9   INR  0 98   PTT seconds 30         Results from last 7 days   Lab Units 03/12/22  0601 03/11/22 2000   PROCALCITONIN ng/ml 0 09 0 08     Results from last 7 days   Lab Units 03/11/22 2000   LACTIC ACID mmol/L 1 8       Results from last 7 days   Lab Units 03/11/22 2009   CLARITY UA  Hazy   COLOR UA  Yellow   SPEC GRAV UA  1 025   PH UA  6 0   GLUCOSE UA mg/dl 3+*   KETONES UA mg/dl Trace*   BLOOD UA  Negative   PROTEIN UA mg/dl Negative   NITRITE UA  Negative   BILIRUBIN UA  Negative   UROBILINOGEN UA E U /dl 0 2   LEUKOCYTES UA  Negative       Results from last 7 days   Lab Units 03/11/22 2000   BLOOD CULTURE  No Growth at 24 hrs  No Growth at 24 hrs                 ED Treatment:   Medication Administration from 03/11/2022 1910 to 03/13/2022 1227       Date/Time Order Dose Route Action     03/11/2022 2217 sodium chloride 0 9 % bolus 1,000 mL 0 mL Intravenous Stopped     03/11/2022 2019 sodium chloride 0 9 % bolus 1,000 mL 1,000 mL Intravenous New Bag     03/12/2022 0015 sodium chloride 0 9 % bolus 1,000 mL 0 mL Intravenous Stopped     03/11/2022 2234 sodium chloride 0 9 % bolus 1,000 mL 1,000 mL Intravenous New Bag     03/11/2022 2118 cefepime (MAXIPIME) IVPB (premix in dextrose) 2,000 mg 50 mL 0 mg Intravenous Stopped     03/11/2022 2017 cefepime (MAXIPIME) IVPB (premix in dextrose) 2,000 mg 50 mL 2,000 mg Intravenous New Bag     03/12/2022 0108 vancomycin (VANCOCIN) 1500 mg in sodium chloride 0 9% 250 mL IVPB 0 mg/kg Intravenous Stopped     03/11/2022 2310 vancomycin (VANCOCIN) 1500 mg in sodium chloride 0 9% 250 mL IVPB 1,500 mg Intravenous New Bag     03/11/2022 2014 morphine (PF) 4 mg/mL injection 4 mg 4 mg Intravenous Given     03/11/2022 2107 iohexol (OMNIPAQUE) 350 MG/ML injection (SINGLE-DOSE) 100 mL 100 mL Intravenous Given     03/11/2022 2233 fentanyl citrate (PF) 100 MCG/2ML 50 mcg 50 mcg Intravenous Given     03/11/2022 2243 tetanus-diphtheria-acellular pertussis (BOOSTRIX) IM injection 0 5 mL 0 5 mL Intramuscular Given     03/11/2022 6424 insulin detemir (LEVEMIR) subcutaneous injection 10 Units 10 Units Subcutaneous Given     03/13/2022 1103 insulin lispro (HumaLOG) 100 units/mL subcutaneous injection 1-6 Units 2 Units Subcutaneous Given     03/13/2022 0814 insulin lispro (HumaLOG) 100 units/mL subcutaneous injection 1-6 Units 2 Units Subcutaneous Given     03/13/2022 0726 insulin lispro (HumaLOG) 100 units/mL subcutaneous injection 1-6 Units 0 Units Subcutaneous Hold     03/12/2022 1822 insulin lispro (HumaLOG) 100 units/mL subcutaneous injection 1-6 Units 2 Units Subcutaneous Given     03/12/2022 1344 insulin lispro (HumaLOG) 100 units/mL subcutaneous injection 1-6 Units 2 Units Subcutaneous Given     03/12/2022 0930 insulin lispro (HumaLOG) 100 units/mL subcutaneous injection 1-6 Units 1 Units Subcutaneous Given     03/12/2022 2130 insulin lispro (HumaLOG) 100 units/mL subcutaneous injection 1-5 Units 2 Units Subcutaneous Given     03/11/2022 2305 insulin lispro (HumaLOG) 100 units/mL subcutaneous injection 1-5 Units 1 Units Subcutaneous Given     03/12/2022 0016 sodium chloride 0 9 % infusion 125 mL/hr Intravenous New Bag     03/13/2022 0801 enoxaparin (LOVENOX) subcutaneous injection 40 mg 40 mg Subcutaneous Given     03/12/2022 0811 enoxaparin (LOVENOX) subcutaneous injection 40 mg 40 mg Subcutaneous Given     03/13/2022 0759 cefepime (MAXIPIME) IVPB (premix in dextrose) 2,000 mg 50 mL 2,000 mg Intravenous New Bag     03/12/2022 2100 cefepime (MAXIPIME) IVPB (premix in dextrose) 2,000 mg 50 mL 2,000 mg Intravenous New Bag     03/12/2022 0841 cefepime (MAXIPIME) IVPB (premix in dextrose) 2,000 mg 50 mL 0 mg Intravenous Stopped     03/12/2022 0811 cefepime (MAXIPIME) IVPB (premix in dextrose) 2,000 mg 50 mL 2,000 mg Intravenous New Bag     03/13/2022 0801 citalopram (CeleXA) tablet 40 mg 40 mg Oral Given     03/12/2022 0811 citalopram (CeleXA) tablet 40 mg 40 mg Oral Given     03/13/2022 0813 topiramate (TOPAMAX) sprinkle capsule 25 mg 25 mg Oral Given     03/12/2022 1858 topiramate (TOPAMAX) sprinkle capsule 25 mg 25 mg Oral Given     03/12/2022 0811 topiramate (TOPAMAX) sprinkle capsule 25 mg 25 mg Oral Given     03/12/2022 2103 traZODone (DESYREL) tablet 50 mg 50 mg Oral Given     03/12/2022 0051 traZODone (DESYREL) tablet 50 mg 50 mg Oral Given     03/13/2022 0801 atorvastatin (LIPITOR) tablet 10 mg 10 mg Oral Given     03/12/2022 0811 atorvastatin (LIPITOR) tablet 10 mg 10 mg Oral Given     03/12/2022 2104 montelukast (SINGULAIR) tablet 10 mg 10 mg Oral Given     03/12/2022 0053 montelukast (SINGULAIR) tablet 10 mg 10 mg Oral Not Given     03/12/2022 2106 pramipexole (MIRAPEX) tablet 0 5 mg 0 5 mg Oral Given     03/12/2022 0051 pramipexole (MIRAPEX) tablet 0 5 mg 0 5 mg Oral Given     03/12/2022 2132 insulin detemir (LEVEMIR) subcutaneous injection 15 Units 15 Units Subcutaneous Given     03/12/2022 1100 potassium chloride 20 mEq IVPB (premix) 0 mEq Intravenous Stopped     03/12/2022 0900 potassium chloride 20 mEq IVPB (premix) 20 mEq Intravenous New Bag     03/13/2022 1105 ketorolac (TORADOL) injection 15 mg 15 mg Intravenous Given     03/13/2022 0543 ketorolac (TORADOL) injection 15 mg 15 mg Intravenous Given     03/13/2022 0056 ketorolac (TORADOL) injection 15 mg 0 mg Intravenous Hold     03/12/2022 1858 ketorolac (TORADOL) injection 15 mg 15 mg Intravenous Given     03/12/2022 1300 ketorolac (TORADOL) injection 15 mg 15 mg Intravenous Given     03/12/2022 1858 topiramate (TOPAMAX) 25 mg tablet **ADS Override Pull**    Not Given        Past Medical History:   Diagnosis Date    Anxiety     Asthma     Closed nondisplaced fracture of proximal phalanx of lesser toe of left foot 1/15/2020    Diabetes mellitus ([facility] )     Hyperlipidemia      Present on Admission:   Sepsis due to cellulitis ([address] )   Type 2 diabetes mellitus without complication, without long-term current use of insulin ([facility] )   Other hyperlipidemia   Depression with anxiety   Migraine without aura, not intractable   Allergic rhinitis      Admitting Diagnosis: Cellulitis [L03 90]  Leukocytosis [D72 829]  Obesity [E66 9]  DM (diabetes mellitus) (HCC) [E11 9]  Hyperglycemia [R73 9]  Left leg cellulitis [L03 116]  Cellulitis of left thigh [L03 116]  Age/Sex: 42 y o  female  Admission Orders:  Apply heat to affected area  Scheduled Medications:  atorvastatin, 10 mg, Oral, Daily  cefepime, 2,000 mg, Intravenous, Q12H  citalopram, 40 mg, Oral, Daily  enoxaparin, 40 mg, Subcutaneous, Daily  insulin detemir, 25 Units, Subcutaneous, HS  insulin lispro, 1-5 Units, Subcutaneous, HS  insulin lispro, 1-6 Units, Subcutaneous, TID AC  ketorolac, 15 mg, Intravenous, Q6H SAHIL  montelukast, 10 mg, Oral, HS  pramipexole, 0 5 mg, Oral, HS  topiramate, 25 mg, Oral, BID  traZODone, 50 mg, Oral, HS      Continuous IV Infusions:     PRN Meds:  acetaminophen, 650 mg, Oral, Q4H PRN  hydrOXYzine HCL, 50 mg, Oral, Q6H PRN  ondansetron, 4 mg, Intravenous, Q6H PRN        IP CONSULT TO ACUTE CARE SURGERY    Mike Jara RN  Network Utilization Review Department  ATTENTION: Please call with any questions or concerns to [phone] and carefully listen to the prompts so that you are directed to the right person  All voicemails are confidential   Farrel Bodily all requests for admission clinical reviews, approved or denied determinations and any other requests to dedicated fax number below belonging to the campus where the patient is receiving treatment   List of dedicated fax numbers for the Facilities:  [address] DENIALS (Administrative/Medical Necessity) [phone]   [address] (Maternity/NICU/Pediatrics) 261 [facility] [address] [address] 150 [facility] [phone] [place] [address] [phone] [place] [address] [phone]

## 2022-03-14 NOTE — PLAN OF CARE
Problem: SKIN/TISSUE INTEGRITY - ADULT  Goal: Incision(s), wounds(s) or drain site(s) healing without S/S of infection  Description: INTERVENTIONS  - Assess and document dressing, incision, wound bed, drain sites and surrounding tissue  - Provide patient and family education  - Perform skin care  Outcome: Progressing     Problem: PAIN - ADULT  Goal: Verbalizes/displays adequate comfort level or baseline comfort level  Description: Interventions:  - Encourage patient to monitor pain and request assistance  - Assess pain using appropriate pain scale  - Administer analgesics based on type and severity of pain and evaluate response  - Implement non-pharmacological measures as appropriate and evaluate response  - Consider cultural and social influences on pain and pain management  - Notify physician/advanced practitioner if interventions unsuccessful or patient reports new pain  Outcome: Progressing     Problem: INFECTION - ADULT  Goal: Absence or prevention of progression during hospitalization  Description: INTERVENTIONS:  - Assess and monitor for signs and symptoms of infection  - Monitor lab/diagnostic results  - Monitor all insertion sites, i e  indwelling lines, tubes, and drains  - Monitor endotracheal if appropriate and nasal secretions for changes in amount and color  - Carter appropriate cooling/warming therapies per order  - Administer medications as ordered  - Instruct and encourage patient and family to use good hand hygiene technique  - Identify and instruct in appropriate isolation precautions for identified infection/condition  Outcome: Progressing     Problem: Knowledge Deficit  Goal: Patient/family/caregiver demonstrates understanding of disease process, treatment plan, medications, and discharge instructions  Description: Complete learning assessment and assess knowledge base    Interventions:  - Provide teaching at level of understanding  - Provide teaching via preferred learning methods  Outcome: Progressing     Problem: DISCHARGE PLANNING  Goal: Discharge to home or other facility with appropriate resources  Description: INTERVENTIONS:  - Identify barriers to discharge w/patient and caregiver  - Arrange for needed discharge resources and transportation as appropriate  - Identify discharge learning needs (meds, wound care, etc )  - Arrange for interpretive services to assist at discharge as needed  - Refer to Case Management Department for coordinating discharge planning if the patient needs post-hospital services based on physician/advanced practitioner order or complex needs related to functional status, cognitive ability, or social support system  Outcome: Progressing

## 2022-03-15 VITALS
RESPIRATION RATE: 18 BRPM | DIASTOLIC BLOOD PRESSURE: 62 MMHG | WEIGHT: 240.08 LBS | HEART RATE: 71 BPM | BODY MASS INDEX: 40.99 KG/M2 | SYSTOLIC BLOOD PRESSURE: 99 MMHG | HEIGHT: 64 IN | OXYGEN SATURATION: 95 % | TEMPERATURE: 98.2 F

## 2022-03-15 LAB
ANION GAP SERPL CALCULATED.3IONS-SCNC: 8 MMOL/L (ref 4–13)
BASOPHILS # BLD AUTO: 0.04 THOUSANDS/ΜL (ref 0–0.1)
BASOPHILS NFR BLD AUTO: 0 % (ref 0–1)
BUN SERPL-MCNC: 13 MG/DL (ref 5–25)
CALCIUM SERPL-MCNC: 9.2 MG/DL (ref 8.4–10.2)
CHLORIDE SERPL-SCNC: 108 MMOL/L (ref 96–108)
CO2 SERPL-SCNC: 20 MMOL/L (ref 21–32)
CREAT SERPL-MCNC: 0.64 MG/DL (ref 0.6–1.3)
EOSINOPHIL # BLD AUTO: 0.3 THOUSAND/ΜL (ref 0–0.61)
EOSINOPHIL NFR BLD AUTO: 3 % (ref 0–6)
ERYTHROCYTE [DISTWIDTH] IN BLOOD BY AUTOMATED COUNT: 12.9 % (ref 11.6–15.1)
GFR SERPL CREATININE-BSD FRML MDRD: 108 ML/MIN/1.73SQ M
GLUCOSE SERPL-MCNC: 124 MG/DL (ref 65–140)
GLUCOSE SERPL-MCNC: 146 MG/DL (ref 65–140)
GLUCOSE SERPL-MCNC: 183 MG/DL (ref 65–140)
HCT VFR BLD AUTO: 35.4 % (ref 34.8–46.1)
HGB BLD-MCNC: 12 G/DL (ref 11.5–15.4)
IMM GRANULOCYTES # BLD AUTO: 0.04 THOUSAND/UL (ref 0–0.2)
IMM GRANULOCYTES NFR BLD AUTO: 0 % (ref 0–2)
LYMPHOCYTES # BLD AUTO: 2.89 THOUSANDS/ΜL (ref 0.6–4.47)
LYMPHOCYTES NFR BLD AUTO: 30 % (ref 14–44)
MCH RBC QN AUTO: 29.7 PG (ref 26.8–34.3)
MCHC RBC AUTO-ENTMCNC: 33.9 G/DL (ref 31.4–37.4)
MCV RBC AUTO: 88 FL (ref 82–98)
MONOCYTES # BLD AUTO: 0.85 THOUSAND/ΜL (ref 0.17–1.22)
MONOCYTES NFR BLD AUTO: 9 % (ref 4–12)
NEUTROPHILS # BLD AUTO: 5.6 THOUSANDS/ΜL (ref 1.85–7.62)
NEUTS SEG NFR BLD AUTO: 58 % (ref 43–75)
NRBC BLD AUTO-RTO: 0 /100 WBCS
PLATELET # BLD AUTO: 282 THOUSANDS/UL (ref 149–390)
PMV BLD AUTO: 10 FL (ref 8.9–12.7)
POTASSIUM SERPL-SCNC: 3.6 MMOL/L (ref 3.5–5.3)
RBC # BLD AUTO: 4.04 MILLION/UL (ref 3.81–5.12)
SODIUM SERPL-SCNC: 136 MMOL/L (ref 135–147)
WBC # BLD AUTO: 9.72 THOUSAND/UL (ref 4.31–10.16)

## 2022-03-15 PROCEDURE — 85025 COMPLETE CBC W/AUTO DIFF WBC: CPT | Performed by: INTERNAL MEDICINE

## 2022-03-15 PROCEDURE — 99024 POSTOP FOLLOW-UP VISIT: CPT | Performed by: PHYSICIAN ASSISTANT

## 2022-03-15 PROCEDURE — 99239 HOSP IP/OBS DSCHRG MGMT >30: CPT | Performed by: INTERNAL MEDICINE

## 2022-03-15 PROCEDURE — 80048 BASIC METABOLIC PNL TOTAL CA: CPT | Performed by: INTERNAL MEDICINE

## 2022-03-15 PROCEDURE — 82948 REAGENT STRIP/BLOOD GLUCOSE: CPT

## 2022-03-15 RX ORDER — PRAMIPEXOLE DIHYDROCHLORIDE 0.5 MG/1
0.5 TABLET ORAL
Refills: 0
Start: 2022-03-15 | End: 2022-04-26

## 2022-03-15 RX ORDER — CEPHALEXIN 500 MG/1
500 CAPSULE ORAL EVERY 6 HOURS SCHEDULED
Qty: 20 CAPSULE | Refills: 0 | Status: SHIPPED | OUTPATIENT
Start: 2022-03-15 | End: 2022-03-17 | Stop reason: HOSPADM

## 2022-03-15 RX ORDER — PRAMIPEXOLE DIHYDROCHLORIDE 0.5 MG/1
0.5 TABLET ORAL
Qty: 30 TABLET | Refills: 0 | Status: SHIPPED | OUTPATIENT
Start: 2022-03-15 | End: 2022-03-18

## 2022-03-15 RX ORDER — DOXYCYCLINE 100 MG/1
100 TABLET ORAL 2 TIMES DAILY
Qty: 10 TABLET | Refills: 0 | Status: SHIPPED | OUTPATIENT
Start: 2022-03-15 | End: 2022-03-17 | Stop reason: HOSPADM

## 2022-03-15 RX ORDER — INSULIN DETEMIR 100 [IU]/ML
20 INJECTION, SOLUTION SUBCUTANEOUS
Qty: 15 ML | Refills: 0 | Status: SHIPPED | OUTPATIENT
Start: 2022-03-15 | End: 2022-05-05 | Stop reason: SDUPTHER

## 2022-03-15 RX ORDER — PEN NEEDLE, DIABETIC 32 GX3/16"
NEEDLE, DISPOSABLE MISCELLANEOUS
Qty: 50 EACH | Refills: 0 | Status: SHIPPED | OUTPATIENT
Start: 2022-03-15 | End: 2022-03-23 | Stop reason: SDUPTHER

## 2022-03-15 RX ADMIN — ATORVASTATIN CALCIUM 10 MG: 10 TABLET, FILM COATED ORAL at 08:25

## 2022-03-15 RX ADMIN — KETOROLAC TROMETHAMINE 15 MG: 30 INJECTION, SOLUTION INTRAMUSCULAR; INTRAVENOUS at 06:17

## 2022-03-15 RX ADMIN — CEFEPIME HYDROCHLORIDE 2000 MG: 2 INJECTION, SOLUTION INTRAVENOUS at 08:25

## 2022-03-15 RX ADMIN — KETOROLAC TROMETHAMINE 15 MG: 30 INJECTION, SOLUTION INTRAMUSCULAR; INTRAVENOUS at 00:00

## 2022-03-15 RX ADMIN — ACETAMINOPHEN 650 MG: 325 TABLET ORAL at 01:12

## 2022-03-15 RX ADMIN — TOPIRAMATE 25 MG: 25 CAPSULE, COATED PELLETS ORAL at 08:25

## 2022-03-15 RX ADMIN — ENOXAPARIN SODIUM 40 MG: 100 INJECTION SUBCUTANEOUS at 08:25

## 2022-03-15 RX ADMIN — CITALOPRAM HYDROBROMIDE 40 MG: 20 TABLET ORAL at 08:25

## 2022-03-15 NOTE — DISCHARGE INSTRUCTIONS
Abscess Incision and Drainage   WHAT YOU NEED TO KNOW:   An abscess incision and drainage (I and D) is a procedure to drain pus from an abscess and clean it out so it can heal   DISCHARGE INSTRUCTIONS:   Contact your healthcare provider if:   · The area around your abscess has red streaks or is warm and painful  · You have a fever or chills  · You have increased redness, swelling, or pain in your wound  · Your wound does not start to heal after a few days  · Your abscess returns  · You have questions or concerns about your condition or care  Medicines:   · NSAIDs , such as ibuprofen, help decrease swelling, pain, and fever  NSAIDs can cause stomach bleeding or kidney problems in certain people  If you take blood thinner medicine, always ask your healthcare provider if NSAIDs are safe for you  Always read the medicine label and follow directions  · Take your medicine as directed  Contact your healthcare provider if you think your medicine is not helping or if you have side effects  Tell him or her if you are allergic to any medicine  Keep a list of the medicines, vitamins, and herbs you take  Include the amounts, and when and why you take them  Bring the list or the pill bottles to follow-up visits  Carry your medicine list with you in case of an emergency  Care for your wound as directed:   · Do not remove your bandage  unless your healthcare provider says it is okay  Keep the bandage clean and dry  Remove your bandage and clean the wound once your healthcare provider gives you directions  · Apply heat  on the bandage over your wound for 20 to 30 minutes every 2 hours for as many days as directed  This will increase blood flow to the area and help it heal     · Elevate  your wound above level of your heart as often as you can  This will help decrease swelling and pain  Prop your wounded area on pillows or blankets to keep it elevated comfortably      Follow up with your healthcare provider as directed: You may need to return in 1 to 3 days to have the gauze in your wound removed and your wound examined  You may be taught how to change the gauze in your wound  Write down your questions so you remember to ask them during your visits  © Copyright [employer] 2022 Information is for End User's use only and may not be sold, redistributed or otherwise used for commercial purposes  All illustrations and images included in CareNotes® are the copyrighted property of [employer]  or Mary Nash  The above information is an  only  It is not intended as medical advice for individual conditions or treatments  Talk to your doctor, nurse or pharmacist before following any medical regimen to see if it is safe and effective for you

## 2022-03-15 NOTE — PLAN OF CARE
Problem: PAIN - ADULT  Goal: Verbalizes/displays adequate comfort level or baseline comfort level  Description: Interventions:  - Encourage patient to monitor pain and request assistance  - Assess pain using appropriate pain scale  - Administer analgesics based on type and severity of pain and evaluate response  - Implement non-pharmacological measures as appropriate and evaluate response  - Consider cultural and social influences on pain and pain management  - Notify physician/advanced practitioner if interventions unsuccessful or patient reports new pain  Outcome: Progressing     Problem: INFECTION - ADULT  Goal: Absence or prevention of progression during hospitalization  Description: INTERVENTIONS:  - Assess and monitor for signs and symptoms of infection  - Monitor lab/diagnostic results  - Monitor all insertion sites, i e  indwelling lines, tubes, and drains  - Monitor endotracheal if appropriate and nasal secretions for changes in amount and color  - [place] appropriate cooling/warming therapies per order  - Administer medications as ordered  - Instruct and encourage patient and family to use good hand hygiene technique  - Identify and instruct in appropriate isolation precautions for identified infection/condition  Outcome: Progressing  Goal: Absence of fever/infection during neutropenic period  Description: INTERVENTIONS:  - Monitor WBC    Outcome: Progressing     Problem: SAFETY ADULT  Goal: Patient will remain free of falls  Description: INTERVENTIONS:  - Educate patient/family on patient safety including physical limitations  - Instruct patient to call for assistance with activity   - Consult OT/PT to assist with strengthening/mobility   - Keep Call bell within reach  - Keep bed low and locked with side rails adjusted as appropriate  - Keep care items and personal belongings within reach  - Initiate and maintain comfort rounds  - Make Fall Risk Sign visible to staff  - Offer Toileting every  Hours, in advance of need  - Initiate/Maintain alarm  - Obtain necessary fall risk management equipment:   - Apply yellow socks and bracelet for high fall risk patients  - Consider moving patient to room near nurses station  Outcome: Progressing  Goal: Maintain or return to baseline ADL function  Description: INTERVENTIONS:  -  Assess patient's ability to carry out ADLs; assess patient's baseline for ADL function and identify physical deficits which impact ability to perform ADLs (bathing, care of mouth/teeth, toileting, grooming, dressing, etc )  - Assess/evaluate cause of self-care deficits   - Assess range of motion  - Assess patient's mobility; develop plan if impaired  - Assess patient's need for assistive devices and provide as appropriate  - Encourage maximum independence but intervene and supervise when necessary  - Involve family in performance of ADLs  - Assess for home care needs following discharge   - Consider OT consult to assist with ADL evaluation and planning for discharge  - Provide patient education as appropriate  Outcome: Progressing  Goal: Maintains/Returns to pre admission functional level  Description: INTERVENTIONS:  - Perform BMAT or MOVE assessment daily    - Set and communicate daily mobility goal to care team and patient/family/caregiver  - Collaborate with rehabilitation services on mobility goals if consulted  - Perform Range of Motion  times a day  - Reposition patient every  hours    - Dangle patient  times a day  - Stand patient  times a day  - Ambulate patient  times a day  - Out of bed to chair  times a day   - Out of bed for meals  times a day  - Out of bed for toileting  - Record patient progress and toleration of activity level   Outcome: Progressing     Problem: DISCHARGE PLANNING  Goal: Discharge to home or other facility with appropriate resources  Description: INTERVENTIONS:  - Identify barriers to discharge w/patient and caregiver  - Arrange for needed discharge resources and transportation as appropriate  - Identify discharge learning needs (meds, wound care, etc )  - Arrange for interpretive services to assist at discharge as needed  - Refer to Case Management Department for coordinating discharge planning if the patient needs post-hospital services based on physician/advanced practitioner order or complex needs related to functional status, cognitive ability, or social support system  Outcome: Progressing     Problem: Knowledge Deficit  Goal: Patient/family/caregiver demonstrates understanding of disease process, treatment plan, medications, and discharge instructions  Description: Complete learning assessment and assess knowledge base  Interventions:  - Provide teaching at level of understanding  - Provide teaching via preferred learning methods  Outcome: Progressing     Problem: MOBILITY - ADULT  Goal: Maintain or return to baseline ADL function  Description: INTERVENTIONS:  -  Assess patient's ability to carry out ADLs; assess patient's baseline for ADL function and identify physical deficits which impact ability to perform ADLs (bathing, care of mouth/teeth, toileting, grooming, dressing, etc )  - Assess/evaluate cause of self-care deficits   - Assess range of motion  - Assess patient's mobility; develop plan if impaired  - Assess patient's need for assistive devices and provide as appropriate  - Encourage maximum independence but intervene and supervise when necessary  - Involve family in performance of ADLs  - Assess for home care needs following discharge   - Consider OT consult to assist with ADL evaluation and planning for discharge  - Provide patient education as appropriate  Outcome: Progressing  Goal: Maintains/Returns to pre admission functional level  Description: INTERVENTIONS:  - Perform BMAT or MOVE assessment daily    - Set and communicate daily mobility goal to care team and patient/family/caregiver     - Collaborate with rehabilitation services on mobility goals if consulted  - Perform Range of Motion  times a day  - Reposition patient every  hours    - Dangle patient  times a day  - Stand patient  times a day  - Ambulate patient  times a day  - Out of bed to chair  times a day   - Out of bed for meals  times a day  - Out of bed for toileting  - Record patient progress and toleration of activity level   Outcome: Progressing     Problem: Potential for Falls  Goal: Patient will remain free of falls  Description: INTERVENTIONS:  - Educate patient/family on patient safety including physical limitations  - Instruct patient to call for assistance with activity   - Consult OT/PT to assist with strengthening/mobility   - Keep Call bell within reach  - Keep bed low and locked with side rails adjusted as appropriate  - Keep care items and personal belongings within reach  - Initiate and maintain comfort rounds  - Make Fall Risk Sign visible to staff  - Offer Toileting every  Hours, in advance of need  - Initiate/Maintain alarm  - Obtain necessary fall risk management equipment:   - Apply yellow socks and bracelet for high fall risk patients  - Consider moving patient to room near nurses station  Outcome: Progressing     Problem: SKIN/TISSUE INTEGRITY - ADULT  Goal: Incision(s), wounds(s) or drain site(s) healing without S/S of infection  Description: INTERVENTIONS  - Assess and document dressing, incision, wound bed, drain sites and surrounding tissue  - Provide patient and family education  - Perform skin care/dressing changes every shift  Outcome: Progressing

## 2022-03-15 NOTE — ASSESSMENT & PLAN NOTE
· Sepsis parameters have resolved  · Secondary to medial left thigh cellulitis  · Patient had I&D done on 03/14/2022 by surgical team at bedside  · Blood cultures negative to date  · Wound culture with Gram-positive cocci  · Will discharge on Keflex and doxycycline for 5 days  · Outpatient follow-up with surgical team for wound care

## 2022-03-15 NOTE — PROGRESS NOTES
[facility]  Progress Note - [place] Casandra 1975, 42 y o  female MRN: [id number]  Unit/Bed#: -01 Encounter: [id number]  Primary Care Provider: JENNIFER Cabrera   Date and time admitted to hospital: 3/11/2022  7:34 PM    Cellulitis of left thigh  Assessment & Plan  41 yo F w/obesity and DM2 HD5 with left thigh cellulitis and abscess POD1 s/p I&D  Doing well today, pain improved  Cellulitis improving, no purulence, packing changed  AFVSS on room air  WBC/BMP noted  Cultures: gram positive cocci, final culture pending    Assessment:  Left thigh cellulitis/abscess    Plan:  Packing changed at bedside this morning  Used 1/4" iodoform strip gauze and silvasorb gel to skin edges  Stable for d/c from surgical perspective  Follow-up on 3/16 and 3/18 in our office in [place] for packing changes  Would continue at least 5 days of PO antibiotics with MRSA coverage pending culture  Subjective/Objective   Chief Complaint: better    Subjective: overall improving, pain improving, able to get sleep last night, no problems with dressing change yesterday    Objective: no overnight events    Blood pressure 99/62, pulse 71, temperature 98 2 °F (36 8 °C), temperature source Temporal, resp  rate 18, height 5' 4" (1 626 m), weight 109 kg (240 lb 1 3 oz), SpO2 95 %, not currently breastfeeding  ,Body mass index is 41 21 kg/m²  Intake/Output Summary (Last 24 hours) at 3/15/2022 0937  Last data filed at 3/15/2022 0847  Gross per 24 hour   Intake 652 5 ml   Output --   Net 652 5 ml       Invasive Devices  Report    Peripheral Intravenous Line            Peripheral IV 03/15/22 Left;Ventral (anterior) Forearm <1 day                Physical Exam  Vitals and nursing note reviewed  Constitutional:       General: She is not in acute distress  Appearance: She is well-developed  She is not diaphoretic  HENT:      Head: Normocephalic and atraumatic     Eyes: Conjunctiva/sclera: Conjunctivae normal       Pupils: Pupils are equal, round, and reactive to light  Pulmonary:      Effort: No respiratory distress  Musculoskeletal:         General: Normal range of motion  Cervical back: Normal range of motion  Skin:     General: Skin is warm and dry  Capillary Refill: Capillary refill takes less than 2 seconds  Neurological:      Mental Status: She is alert and oriented to person, place, and time  Psychiatric:         Behavior: Behavior normal            Lab, Imaging and other studies:  I have personally reviewed pertinent lab results    , CBC:   Lab Results   Component Value Date    WBC 9 72 03/15/2022    HGB 12 0 03/15/2022    HCT 35 4 03/15/2022    MCV 88 03/15/2022     03/15/2022    MCH 29 7 03/15/2022    MCHC 33 9 03/15/2022    RDW 12 9 03/15/2022    MPV 10 0 03/15/2022    NRBC 0 03/15/2022   , CMP:   Lab Results   Component Value Date    SODIUM 136 03/15/2022    K 3 6 03/15/2022     03/15/2022    CO2 20 (L) 03/15/2022    BUN 13 03/15/2022    CREATININE 0 64 03/15/2022    CALCIUM 9 2 03/15/2022    EGFR 108 03/15/2022     VTE Pharmacologic Prophylaxis: Enoxaparin (Lovenox)  VTE Mechanical Prophylaxis: sequential compression device

## 2022-03-15 NOTE — ASSESSMENT & PLAN NOTE
43 yo F w/obesity and DM2 HD5 with left thigh cellulitis and abscess POD1 s/p I&D  Doing well today, pain improved  Cellulitis improving, no purulence, packing changed  AFVSS on room air  WBC/BMP noted  Cultures: gram positive cocci, final culture pending    Assessment:  Left thigh cellulitis/abscess    Plan:  Packing changed at bedside this morning  Used 1/4" iodoform strip gauze and silvasorb gel to skin edges  Stable for d/c from surgical perspective  Follow-up on 3/16 and 3/18 in our office in [place] for packing changes  Would continue at least 5 days of PO antibiotics with MRSA coverage pending culture

## 2022-03-15 NOTE — PLAN OF CARE
Problem: PAIN - ADULT  Goal: Verbalizes/displays adequate comfort level or baseline comfort level  Description: Interventions:  - Encourage patient to monitor pain and request assistance  - Assess pain using appropriate pain scale  - Administer analgesics based on type and severity of pain and evaluate response  - Implement non-pharmacological measures as appropriate and evaluate response  - Consider cultural and social influences on pain and pain management  - Notify physician/advanced practitioner if interventions unsuccessful or patient reports new pain  3/15/2022 1133 by Roz De La Rosa RN  Outcome: Adequate for Discharge  3/15/2022 0755 by Roz De La Rosa RN  Outcome: Progressing     Problem: INFECTION - ADULT  Goal: Absence or prevention of progression during hospitalization  Description: INTERVENTIONS:  - Assess and monitor for signs and symptoms of infection  - Monitor lab/diagnostic results  - Monitor all insertion sites, i e  indwelling lines, tubes, and drains  - Monitor endotracheal if appropriate and nasal secretions for changes in amount and color  - [place] appropriate cooling/warming therapies per order  - Administer medications as ordered  - Instruct and encourage patient and family to use good hand hygiene technique  - Identify and instruct in appropriate isolation precautions for identified infection/condition  3/15/2022 1133 by Roz De La Rosa RN  Outcome: Adequate for Discharge  3/15/2022 0755 by Roz De La Rosa RN  Outcome: Progressing  Goal: Absence of fever/infection during neutropenic period  Description: INTERVENTIONS:  - Monitor WBC    3/15/2022 1133 by Roz De La Rosa RN  Outcome: Adequate for Discharge  3/15/2022 0755 by Roz De La Rosa RN  Outcome: Progressing     Problem: SAFETY ADULT  Goal: Patient will remain free of falls  Description: INTERVENTIONS:  - Educate patient/family on patient safety including physical limitations  - Instruct patient to call for assistance with activity   - Consult OT/PT to assist with strengthening/mobility   - Keep Call bell within reach  - Keep bed low and locked with side rails adjusted as appropriate  - Keep care items and personal belongings within reach  - Initiate and maintain comfort rounds  - Make Fall Risk Sign visible to staff  - Offer Toileting every  Hours, in advance of need  - Initiate/Maintain alarm  - Obtain necessary fall risk management equipment:   - Apply yellow socks and bracelet for high fall risk patients  - Consider moving patient to room near nurses station  3/15/2022 1133 by Juanis Block RN  Outcome: Adequate for Discharge  3/15/2022 0755 by Juanis Block RN  Outcome: Progressing  Goal: Maintain or return to baseline ADL function  Description: INTERVENTIONS:  -  Assess patient's ability to carry out ADLs; assess patient's baseline for ADL function and identify physical deficits which impact ability to perform ADLs (bathing, care of mouth/teeth, toileting, grooming, dressing, etc )  - Assess/evaluate cause of self-care deficits   - Assess range of motion  - Assess patient's mobility; develop plan if impaired  - Assess patient's need for assistive devices and provide as appropriate  - Encourage maximum independence but intervene and supervise when necessary  - Involve family in performance of ADLs  - Assess for home care needs following discharge   - Consider OT consult to assist with ADL evaluation and planning for discharge  - Provide patient education as appropriate  3/15/2022 1133 by Juanis Block RN  Outcome: Adequate for Discharge  3/15/2022 0755 by Juanis Blokc RN  Outcome: Progressing  Goal: Maintains/Returns to pre admission functional level  Description: INTERVENTIONS:  - Perform BMAT or MOVE assessment daily    - Set and communicate daily mobility goal to care team and patient/family/caregiver     - Collaborate with rehabilitation services on mobility goals if consulted  - Perform Range of Motion  times a day  - Reposition patient every  hours  - Dangle patient  times a day  - Stand patient  times a day  - Ambulate patient  times a day  - Out of bed to chair  times a day   - Out of bed for meals  times a day  - Out of bed for toileting  - Record patient progress and toleration of activity level   3/15/2022 1133 by Yuly Jones RN  Outcome: Adequate for Discharge  3/15/2022 0755 by Yuly Jones RN  Outcome: Progressing     Problem: DISCHARGE PLANNING  Goal: Discharge to home or other facility with appropriate resources  Description: INTERVENTIONS:  - Identify barriers to discharge w/patient and caregiver  - Arrange for needed discharge resources and transportation as appropriate  - Identify discharge learning needs (meds, wound care, etc )  - Arrange for interpretive services to assist at discharge as needed  - Refer to Case Management Department for coordinating discharge planning if the patient needs post-hospital services based on physician/advanced practitioner order or complex needs related to functional status, cognitive ability, or social support system  3/15/2022 1133 by Yuly Jones RN  Outcome: Adequate for Discharge  3/15/2022 0755 by Yuly Jones RN  Outcome: Progressing     Problem: Knowledge Deficit  Goal: Patient/family/caregiver demonstrates understanding of disease process, treatment plan, medications, and discharge instructions  Description: Complete learning assessment and assess knowledge base    Interventions:  - Provide teaching at level of understanding  - Provide teaching via preferred learning methods  3/15/2022 1133 by Yuly Jones RN  Outcome: Adequate for Discharge  3/15/2022 0755 by Yuly Jones RN  Outcome: Progressing     Problem: MOBILITY - ADULT  Goal: Maintain or return to baseline ADL function  Description: INTERVENTIONS:  -  Assess patient's ability to carry out ADLs; assess patient's baseline for ADL function and identify physical deficits which impact ability to perform ADLs (bathing, care of mouth/teeth, toileting, grooming, dressing, etc )  - Assess/evaluate cause of self-care deficits   - Assess range of motion  - Assess patient's mobility; develop plan if impaired  - Assess patient's need for assistive devices and provide as appropriate  - Encourage maximum independence but intervene and supervise when necessary  - Involve family in performance of ADLs  - Assess for home care needs following discharge   - Consider OT consult to assist with ADL evaluation and planning for discharge  - Provide patient education as appropriate  3/15/2022 1133 by Freedom Guillen RN  Outcome: Adequate for Discharge  3/15/2022 0755 by Freedom Guillen RN  Outcome: Progressing  Goal: Maintains/Returns to pre admission functional level  Description: INTERVENTIONS:  - Perform BMAT or MOVE assessment daily    - Set and communicate daily mobility goal to care team and patient/family/caregiver  - Collaborate with rehabilitation services on mobility goals if consulted  - Perform Range of Motion  times a day  - Reposition patient every  hours    - Dangle patient  times a day  - Stand patient  times a day  - Ambulate patient  times a day  - Out of bed to chair  times a day   - Out of bed for meals  times a day  - Out of bed for toileting  - Record patient progress and toleration of activity level   3/15/2022 1133 by Freedom Guillen RN  Outcome: Adequate for Discharge  3/15/2022 0755 by Freedom Guillen RN  Outcome: Progressing     Problem: Potential for Falls  Goal: Patient will remain free of falls  Description: INTERVENTIONS:  - Educate patient/family on patient safety including physical limitations  - Instruct patient to call for assistance with activity   - Consult OT/PT to assist with strengthening/mobility   - Keep Call bell within reach  - Keep bed low and locked with side rails adjusted as appropriate  - Keep care items and personal belongings within reach  - Initiate and maintain comfort rounds  - Make Fall Risk Sign visible to staff  - Offer Toileting every  Hours, in advance of need  - Initiate/Maintain alarm  - Obtain necessary fall risk management equipment:   - Apply yellow socks and bracelet for high fall risk patients  - Consider moving patient to room near nurses station  3/15/2022 1133 by Pam Isaacs RN  Outcome: Adequate for Discharge  3/15/2022 0755 by Pam Isaacs RN  Outcome: Progressing     Problem: SKIN/TISSUE INTEGRITY - ADULT  Goal: Incision(s), wounds(s) or drain site(s) healing without S/S of infection  Description: INTERVENTIONS  - Assess and document dressing, incision, wound bed, drain sites and surrounding tissue  - Provide patient and family education  - Perform skin care/dressing changes every shift ( by surgery per order)   3/15/2022 1133 by Pam Isaacs RN  Outcome: Adequate for Discharge  3/15/2022 0755 by Pam Isaacs RN  Outcome: Progressing

## 2022-03-15 NOTE — ASSESSMENT & PLAN NOTE
· Resume home metformin  · Discharged on Levemir  · Referred for diabetic Education and Endocrinology follow-up as outpatient  · Advised to monitor fingersticks at home    Patient states that she does have supplies including glucometer, strips, and lancets

## 2022-03-15 NOTE — DISCHARGE INSTR - AVS FIRST PAGE
Change outer dressing every day and also if it becomes soiled/dirty or saturated with discharge  Packing will be changed in the office 3/16  You can clean surrounding skin with soap/water when changing dressings

## 2022-03-15 NOTE — DISCHARGE SUMMARY
Kenneth 89  Discharge- [address] 1975, 42 y o  female MRN: [id number]  Unit/Bed#: -01 Encounter: [id number]  Primary Care Provider: JENNIFER Moreno   Date and time admitted to hospital: 3/11/2022  7:34 PM    * Sepsis due to cellulitis [facility])  Assessment & Plan  · Sepsis parameters have resolved  · Secondary to medial left thigh cellulitis  · Patient had I&D done on 03/14/2022 by surgical team at bedside  · Blood cultures negative to date  · Wound culture with Gram-positive cocci  · Will discharge on Keflex and doxycycline for 5 days  · Outpatient follow-up with surgical team for wound care    Cellulitis of left thigh  Assessment & Plan  Continue treatment as above    Other hyperlipidemia  Assessment & Plan  · Continue atorvastatin    Migraine without aura, not intractable  Assessment & Plan  · Continue Topamax    Type 2 diabetes mellitus without complication, without long-term current use of insulin ([facility] [address] )  Assessment & Plan  · Resume home metformin  · Discharged on Levemir  · Referred for diabetic Education and Endocrinology follow-up as outpatient  · Advised to monitor fingersticks at home    Patient states that she does have supplies including glucometer, strips, and lancets    Class 3 severe obesity due to excess calories without serious comorbidity with body mass index (BMI) of 40 0 to 44 9 in adult [facility])  Assessment & Plan  · Actual BMI 40 17  · Dietary, weight loss, and lifestyle modification counseling was provided    Depression with anxiety  Assessment & Plan  · Continue home medications    Allergic rhinitis  Assessment & Plan  · Continue Singulair      Discharging Physician / Practitioner: Villa Morales MD  PCP: JENNIFER Moreno  Admission Date:   Admission Orders (From admission, onward)     Ordered        03/11/22 2203  INPATIENT ADMISSION  Once                      Discharge Date: 03/15/22    Medical Problems             Resolved Problems  Date Reviewed: 3/11/2022    None                Consultations During Hospital Stay:  · Surgical team    Procedures Performed:   · Bedside I&D    Significant Findings / Test Results:   · Cellulitis/abscess of left thigh    Incidental Findings:   · None     Test Results Pending at Discharge (will require follow up): Wound culture results     Outpatient Tests Requested:  · Routine labs with PCP as outpatient    Complications:     None    Reason for Admission:  Left thigh abscess    Hospital Course:     Grant Chhaal is a 42 y o  female patient who originally presented to the hospital on 3/11/2022 due to left thigh swelling/erythema  Patient found to have left thigh cellulitis  CT imaging did not show any underlying abscess  Patient was maintained on IV antibiotics  Despite IV antibiotics wound appeared to have increased in size  Patient was seen by surgical team who performed bedside I and D on 03/14/2022  Patient cleared for discharge by surgical team with outpatient wound care follow-up  Patient discharged on Keflex and doxycycline  Wound culture results with preliminary report showing Gram-positive cocci, full results pending  Patient also discharged home on Lantus as her fingersticks were elevated during hospitalization  Patient advised to follow-up with Endocrinology as outpatient  Patient states that she does have diabetic supplies at home to monitor fingersticks  Patient also referred for diabetic Education    Please see above list of diagnoses and related plan for additional information  Condition at Discharge: stable     Discharge Day Visit / Exam:     Subjective:  Patient states she is feeling much better today  No fever chills  Tolerating diet      Vitals: Blood Pressure: 99/62 (03/15/22 0718)  Pulse: 71 (03/15/22 0718)  Temperature: 98 2 °F (36 8 °C) (03/15/22 0718)  Temp Source: Temporal (03/15/22 0718)  Respirations: 18 (03/15/22 0718)  Height: 5' 4" (162 6 cm) (03/13/22 1230)  Weight - Scale: 109 kg (240 lb 1 3 oz) (03/13/22 1230)  SpO2: 95 % (03/15/22 0718)     Exam:   Physical Exam  Constitutional:       General: She is not in acute distress  Appearance: She is obese  HENT:      Head: Normocephalic and atraumatic  Nose: Nose normal       Mouth/Throat:      Mouth: Mucous membranes are moist    Eyes:      Extraocular Movements: Extraocular movements intact  Conjunctiva/sclera: Conjunctivae normal    Cardiovascular:      Rate and Rhythm: Normal rate and regular rhythm  Pulmonary:      Effort: Pulmonary effort is normal  No respiratory distress  Abdominal:      Palpations: Abdomen is soft  Tenderness: There is no abdominal tenderness  Musculoskeletal:         General: Normal range of motion  Cervical back: Normal range of motion and neck supple  Skin:     General: Skin is warm and dry  Comments: Left thigh wound with dressing in place   Neurological:      General: No focal deficit present  Mental Status: She is alert  Mental status is at baseline  Cranial Nerves: No cranial nerve deficit  Gait: Gait normal    Psychiatric:         Mood and Affect: Mood normal          Behavior: Behavior normal          Discharge instructions/Information to patient and family:   See after visit summary for information provided to patient and family  Provisions for Follow-Up Care:  See after visit summary for information related to follow-up care and any pertinent home health orders  Disposition:     Home      Planned Readmission:    no     Discharge Statement:  I spent 35 minutes discharging the patient  This time was spent on the day of discharge  I had direct contact with the patient on the day of discharge  Greater than 50% of the total time was spent examining patient, answering all patient questions, arranging and discussing plan of care with patient as well as directly providing post-discharge instructions    Additional time then spent on discharge activities  Discharge Medications:  See after visit summary for reconciled discharge medications provided to patient and family        ** Please Note: This note has been constructed using a voice recognition system **

## 2022-03-15 NOTE — PLAN OF CARE
Problem: PAIN - ADULT  Goal: Verbalizes/displays adequate comfort level or baseline comfort level  Description: Interventions:  - Encourage patient to monitor pain and request assistance  - Assess pain using appropriate pain scale  - Administer analgesics based on type and severity of pain and evaluate response  - Implement non-pharmacological measures as appropriate and evaluate response  - Consider cultural and social influences on pain and pain management  - Notify physician/advanced practitioner if interventions unsuccessful or patient reports new pain  Outcome: Progressing     Problem: INFECTION - ADULT  Goal: Absence or prevention of progression during hospitalization  Description: INTERVENTIONS:  - Assess and monitor for signs and symptoms of infection  - Monitor lab/diagnostic results  - Monitor all insertion sites, i e  indwelling lines, tubes, and drains  - Monitor endotracheal if appropriate and nasal secretions for changes in amount and color  - [place] appropriate cooling/warming therapies per order  - Administer medications as ordered  - Instruct and encourage patient and family to use good hand hygiene technique  - Identify and instruct in appropriate isolation precautions for identified infection/condition  Outcome: Progressing  Goal: Absence of fever/infection during neutropenic period  Description: INTERVENTIONS:  - Monitor WBC    Outcome: Progressing     Problem: SAFETY ADULT  Goal: Patient will remain free of falls  Description: INTERVENTIONS:  - Educate patient/family on patient safety including physical limitations  - Instruct patient to call for assistance with activity   - Consult OT/PT to assist with strengthening/mobility   - Keep Call bell within reach  - Keep bed low and locked with side rails adjusted as appropriate  - Keep care items and personal belongings within reach  - Initiate and maintain comfort rounds  - Make Fall Risk Sign visible to staff  - Offer Toileting every  Hours, in advance of need  - Initiate/Maintain alarm  - Obtain necessary fall risk management equipment:   - Apply yellow socks and bracelet for high fall risk patients  - Consider moving patient to room near nurses station  Outcome: Progressing  Goal: Maintain or return to baseline ADL function  Description: INTERVENTIONS:  -  Assess patient's ability to carry out ADLs; assess patient's baseline for ADL function and identify physical deficits which impact ability to perform ADLs (bathing, care of mouth/teeth, toileting, grooming, dressing, etc )  - Assess/evaluate cause of self-care deficits   - Assess range of motion  - Assess patient's mobility; develop plan if impaired  - Assess patient's need for assistive devices and provide as appropriate  - Encourage maximum independence but intervene and supervise when necessary  - Involve family in performance of ADLs  - Assess for home care needs following discharge   - Consider OT consult to assist with ADL evaluation and planning for discharge  - Provide patient education as appropriate  Outcome: Progressing  Goal: Maintains/Returns to pre admission functional level  Description: INTERVENTIONS:  - Perform BMAT or MOVE assessment daily    - Set and communicate daily mobility goal to care team and patient/family/caregiver  - Collaborate with rehabilitation services on mobility goals if consulted  - Perform Range of Motion  times a day  - Reposition patient every  hours    - Dangle patient  times a day  - Stand patient  times a day  - Ambulate patient  times a day  - Out of bed to chair  times a day   - Out of bed for meals  times a day  - Out of bed for toileting  - Record patient progress and toleration of activity level   Outcome: Progressing     Problem: DISCHARGE PLANNING  Goal: Discharge to home or other facility with appropriate resources  Description: INTERVENTIONS:  - Identify barriers to discharge w/patient and caregiver  - Arrange for needed discharge resources and transportation as appropriate  - Identify discharge learning needs (meds, wound care, etc )  - Arrange for interpretive services to assist at discharge as needed  - Refer to Case Management Department for coordinating discharge planning if the patient needs post-hospital services based on physician/advanced practitioner order or complex needs related to functional status, cognitive ability, or social support system  Outcome: Progressing     Problem: Knowledge Deficit  Goal: Patient/family/caregiver demonstrates understanding of disease process, treatment plan, medications, and discharge instructions  Description: Complete learning assessment and assess knowledge base  Interventions:  - Provide teaching at level of understanding  - Provide teaching via preferred learning methods  Outcome: Progressing     Problem: MOBILITY - ADULT  Goal: Maintain or return to baseline ADL function  Description: INTERVENTIONS:  -  Assess patient's ability to carry out ADLs; assess patient's baseline for ADL function and identify physical deficits which impact ability to perform ADLs (bathing, care of mouth/teeth, toileting, grooming, dressing, etc )  - Assess/evaluate cause of self-care deficits   - Assess range of motion  - Assess patient's mobility; develop plan if impaired  - Assess patient's need for assistive devices and provide as appropriate  - Encourage maximum independence but intervene and supervise when necessary  - Involve family in performance of ADLs  - Assess for home care needs following discharge   - Consider OT consult to assist with ADL evaluation and planning for discharge  - Provide patient education as appropriate  Outcome: Progressing  Goal: Maintains/Returns to pre admission functional level  Description: INTERVENTIONS:  - Perform BMAT or MOVE assessment daily    - Set and communicate daily mobility goal to care team and patient/family/caregiver     - Collaborate with rehabilitation services on mobility goals if consulted  - Perform Range of Motion  times a day  - Reposition patient every  hours  - Dangle patient  times a day  - Stand patient  times a day  - Ambulate patient  times a day  - Out of bed to chair  times a day   - Out of bed for meals  times a day  - Out of bed for toileting  - Record patient progress and toleration of activity level   Outcome: Progressing     Problem: Potential for Falls  Goal: Patient will remain free of falls  Description: INTERVENTIONS:  - Educate patient/family on patient safety including physical limitations  - Instruct patient to call for assistance with activity   - Consult OT/PT to assist with strengthening/mobility   - Keep Call bell within reach  - Keep bed low and locked with side rails adjusted as appropriate  - Keep care items and personal belongings within reach  - Initiate and maintain comfort rounds  - Make Fall Risk Sign visible to staff  - Offer Toileting every  Hours, in advance of need  - Initiate/Maintain alarm  - Obtain necessary fall risk management equipment:   - Apply yellow socks and bracelet for high fall risk patients  - Consider moving patient to room near nurses station  Outcome: Progressing     Problem: SKIN/TISSUE INTEGRITY - ADULT  Goal: Incision(s), wounds(s) or drain site(s) healing without S/S of infection  Description: INTERVENTIONS  - Assess and document dressing, incision, wound bed, drain sites and surrounding tissue  - Provide patient and family education  - Perform skin care/dressing changes 2x/day, or as needed based off dressing integrity    Outcome: Progressing

## 2022-03-16 ENCOUNTER — TRANSITIONAL CARE MANAGEMENT (OUTPATIENT)
Dept: FAMILY MEDICINE CLINIC | Facility: CLINIC | Age: 47
End: 2022-03-16

## 2022-03-16 ENCOUNTER — OFFICE VISIT (OUTPATIENT)
Dept: SURGERY | Facility: CLINIC | Age: 47
End: 2022-03-16
Payer: COMMERCIAL

## 2022-03-16 VITALS — TEMPERATURE: 97.6 F

## 2022-03-16 DIAGNOSIS — L03.116 CELLULITIS OF LEFT THIGH: Primary | ICD-10-CM

## 2022-03-16 LAB
BACTERIA WND AEROBE CULT: ABNORMAL
GRAM STN SPEC: ABNORMAL
GRAM STN SPEC: ABNORMAL

## 2022-03-16 PROCEDURE — 99213 OFFICE O/P EST LOW 20 MIN: CPT | Performed by: PHYSICIAN ASSISTANT

## 2022-03-16 NOTE — PROGRESS NOTES
Post-Op Note - General Surgery   Mary Carmen Dhillon 42 y o  female MRN: [id number]  Encounter: [id number]    Assessment/Plan    Cellulitis of left thigh  Reports ongoing intermittent pain  On exam wound is clean open and shallow with mild slough at skin edges  Surrounding cellulitis has resolved  No active purulent drainage  Taking antibiotics as directed  Wound was cleansed repacked and bandaged  Wound care instructions reviewed  Will see back on Friday for recheck  Questions answered, agreeable to plan  Diagnoses and all orders for this visit:    Cellulitis of left thigh        Subjective      Chief Complaint   Patient presents with    Post-op     f/u on I & D 03/14/22      Patient came in today for f/u on I & D 03/14/22 and packing change  Patient explain she a little sore and have pain  No fevers or chills  SARAH Dickinson 51-year-old female history of diabetes here for follow-up after I&D of left medial thigh cellulitis and abscess  Reports mild intermittent pains  No fevers  Taking antibiotics as directed  Review of Systems    The following portions of the patient's history were reviewed and updated as appropriate: allergies, current medications, past family history, past medical history, past social history, past surgical history and problem list     Objective      Temperature 97 6 °F (36 4 °C), temperature source Temporal, not currently breastfeeding  Physical Exam  Vitals and nursing note reviewed  Constitutional:       General: She is not in acute distress  Appearance: She is well-developed  She is not diaphoretic  HENT:      Head: Normocephalic and atraumatic  Eyes:      Conjunctiva/sclera: Conjunctivae normal       Pupils: Pupils are equal, round, and reactive to light  Pulmonary:      Effort: No respiratory distress  Musculoskeletal:         General: Normal range of motion  Cervical back: Normal range of motion  Skin:     General: Skin is warm and dry  Capillary Refill: Capillary refill takes less than 2 seconds  Neurological:      Mental Status: She is alert and oriented to person, place, and time     Psychiatric:         Behavior: Behavior normal          Signature:  Fausto Claudio PA-C  Date: 3/16/2022 Time: 1:43 PM

## 2022-03-16 NOTE — ASSESSMENT & PLAN NOTE
Reports ongoing intermittent pain  On exam wound is clean open and shallow with mild slough at skin edges  Surrounding cellulitis has resolved  No active purulent drainage  Taking antibiotics as directed  Wound was cleansed repacked and bandaged  Wound care instructions reviewed  Will see back on Friday for recheck  Questions answered, agreeable to plan

## 2022-03-17 DIAGNOSIS — E11.9 TYPE 2 DIABETES MELLITUS WITHOUT COMPLICATION, WITHOUT LONG-TERM CURRENT USE OF INSULIN (HCC): ICD-10-CM

## 2022-03-17 LAB
BACTERIA BLD CULT: NORMAL
BACTERIA BLD CULT: NORMAL

## 2022-03-17 RX ORDER — SULFAMETHOXAZOLE AND TRIMETHOPRIM 800; 160 MG/1; MG/1
1 TABLET ORAL EVERY 12 HOURS SCHEDULED
Qty: 12 TABLET | Refills: 0 | Status: SHIPPED | OUTPATIENT
Start: 2022-03-17 | End: 2022-03-23

## 2022-03-17 NOTE — QUICK NOTE
Reviewed culture result from patient's recent I&D and culture positive for MRSA which is resistant to tetracycline  Patient was discharged on Keflex and doxycycline  Spoke with Infectious Disease over the phone and ID recommending Bactrim as patient's MRSA infection is sensitive to Bactrim  Called patient and spoke with her over the phone  Advised her to stop the Keflex and doxycycline and Bactrim has been prescribed to her pharmacy  She states that she will  the prescription today and start a prescription once she gets it today  Patient is following with surgical team in the office for wound care  Advised patient to return to the ER if she had any worsening pain, drainage, fever/chills

## 2022-03-17 NOTE — TELEPHONE ENCOUNTER
Patient requesting refill(s) of:  Metformin 500 mg tabs    Last filled:  10/18/21  Last appt:   10/6/21  Next appt:    3/22/22  Pharmacy:  [address] [place] [address]

## 2022-03-21 ENCOUNTER — OFFICE VISIT (OUTPATIENT)
Dept: WOUND CARE | Facility: CLINIC | Age: 47
End: 2022-03-21
Payer: COMMERCIAL

## 2022-03-21 VITALS
HEART RATE: 83 BPM | TEMPERATURE: 96.7 F | DIASTOLIC BLOOD PRESSURE: 81 MMHG | SYSTOLIC BLOOD PRESSURE: 114 MMHG | RESPIRATION RATE: 18 BRPM

## 2022-03-21 DIAGNOSIS — S71.102A OPEN WOUND OF LEFT THIGH, INITIAL ENCOUNTER: Primary | ICD-10-CM

## 2022-03-21 DIAGNOSIS — Z98.890 STATUS POST INCISION AND DRAINAGE: ICD-10-CM

## 2022-03-21 DIAGNOSIS — Z79.4 TYPE 2 DIABETES MELLITUS WITHOUT COMPLICATION, WITH LONG-TERM CURRENT USE OF INSULIN (HCC): ICD-10-CM

## 2022-03-21 DIAGNOSIS — E11.9 TYPE 2 DIABETES MELLITUS WITHOUT COMPLICATION, WITH LONG-TERM CURRENT USE OF INSULIN (HCC): ICD-10-CM

## 2022-03-21 PROBLEM — L03.116 CELLULITIS OF LEFT THIGH: Status: RESOLVED | Noted: 2022-03-11 | Resolved: 2022-03-21

## 2022-03-21 PROCEDURE — 11042 DBRDMT SUBQ TIS 1ST 20SQCM/<: CPT | Performed by: FAMILY MEDICINE

## 2022-03-21 PROCEDURE — 99213 OFFICE O/P EST LOW 20 MIN: CPT | Performed by: FAMILY MEDICINE

## 2022-03-21 RX ORDER — LIDOCAINE 40 MG/G
CREAM TOPICAL ONCE
Status: COMPLETED | OUTPATIENT
Start: 2022-03-21 | End: 2022-03-21

## 2022-03-21 RX ADMIN — LIDOCAINE: 40 CREAM TOPICAL at 09:44

## 2022-03-21 NOTE — PATIENT INSTRUCTIONS
Orders Placed This Encounter   Procedures    Wound cleansing and dressings     Left thigh  Wash your hands with soap and water  Remove old dressing, discard into plastic bag and place in trash  Cleanse the wound with mild soap and water prior to applying a clean dressing  Do not use tissue or cotton balls  Do not scrub the wound  Pat dry using gauze  Shower yes do not scrub wash gently  Apply duoderm to the left thigh wound      Change dressing every four days unless it comes off or you notice the drainage is getting close to the side of the dressing you should change it    Follow up in one week    Continue to take antibiotic until it is completed    May continue with warm moist compresses twice a day     Standing Status:   Future     Standing Expiration Date:   3/21/2023

## 2022-03-21 NOTE — PROGRESS NOTES
Patient ID: Yung Mott is a 42 y o  female Date of Birth 1975       Chief Complaint   Patient presents with   [facility] Patient Visit     Patient here for left thigh wound  Patient reports that wound was present for about two weeks  Was in the hospital for about a week  Was treated with IV and oral antibiotics  Using warm compresses  covering with bandaid       Allergies:  Apple - food allergy, Grass pollen(k-o-r-t-swt milana), Dust mite extract, Misc natural products, Raw vegetable - food allergy, and Red dye - food allergy    Diagnosis:   Diagnosis ICD-10-CM Associated Orders   1  Open wound of left thigh, initial encounter  S71 102A lidocaine (LMX) 4 % cream     Wound cleansing and dressings     Debridement   2  Status post incision and drainage  Z98 890    3  Type 2 diabetes mellitus without complication, with long-term current use of insulin (HCC)  E11 9 lidocaine (LMX) 4 % cream    Z79 4 Wound cleansing and dressings        Assessment  & Plan:     S/p I&D of abscess of left thigh with cellulitis  Significant improvement  Culture from hospital was positive for MRSA resistant to tetracycline  She has was notified and placed on Bactrim DS a few days ago   Surgical debridement   Wound care will consist of hydrocolloid changed every four days and p rosette Lim  Follow up in one week  Subjective:   3/21/22:  Patient is here s/p hospitalization for cellulitis and incision and drainage of abscess of the left medial thigh  She was discharged on Keflex and doxycycline  Hospital record was reviewed and culture was positive for MRSA resistant to doxycycline  Someone had called her and change her antibiotic to Bactrim DS which she is now taking  She was discharged from the hospital in 3/15/22  She still has some pain but is improving  She has no fever or chills  Patient has type 2 diabetes and was started on Levemir  She continues with metformin          The following portions of the patient's history were reviewed and updated as appropriate:   Patient Active Problem List   Diagnosis    Allergic rhinitis    Depression with anxiety    Disc degeneration, lumbosacral    Mood disorder of manic type ([place] [address] )    Class 3 severe obesity due to excess calories without serious comorbidity with body mass index (BMI) of 40 0 to 44 9 in adult [facility])    Migraine without aura, not intractable    Other hyperlipidemia    Sepsis due to cellulitis ([facility] )    Type 2 diabetes mellitus without complication, with long-term current use of insulin ([place] [address] )    Status post incision and drainage     Past Medical History:   Diagnosis Date    Anxiety     Asthma     Closed nondisplaced fracture of proximal phalanx of lesser toe of left foot 1/15/2020    Diabetes mellitus ([place] [address] )     Hyperlipidemia      Past Surgical History:   Procedure Laterality Date    ARTHROSCOPY KNEE Left     CHOLECYSTECTOMY      ECTOPIC PREGNANCY SURGERY      SHOULDER SURGERY Right     torn bicep     Family History   Problem Relation Age of Onset    Obesity Mother     Obesity Father     Diabetes type II Father     Diabetes Brother     Obesity Brother     No Known Problems Daughter     No Known Problems Maternal Grandmother     No Known Problems Paternal Grandmother     No Known Problems Maternal Aunt     No Known Problems Maternal Aunt     No Known Problems Maternal Aunt      Social History     Socioeconomic History    Marital status: /Civil Union     Spouse name: None    Number of children: None    Years of education: None    Highest education level: None   Occupational History    None   Tobacco Use    Smoking status: Never Smoker    Smokeless tobacco: Never Used   Vaping Use    Vaping Use: Never used   Substance and Sexual Activity    Alcohol use: Not Currently     Alcohol/week: 0 0 standard drinks     Comment: 0    Drug use: Not Currently    Sexual activity: None   Other Topics Concern    None   Social History Narrative  None     Social Determinants of Health     Financial Resource Strain: Not on file   Food Insecurity: Food Insecurity Present    Worried About [facility] in the Last Year: Sometimes true    Cheng of Food in the Last Year: Never true   Transportation Needs: No Transportation Needs    Lack of Transportation (Medical): No    Lack of Transportation (Non-Medical): No   Physical Activity: Not on file   Stress: Not on file   Social Connections: Not on file   Intimate Partner Violence: Not on file   Housing Stability: High Risk    Unable to Pay for Housing in the Last Year: Yes    Number of Places Lived in the Last Year: 1    Unstable Housing in the Last Year: No       Current Outpatient Medications:     Accu-Chek FastClix Lancets MISC, Test blood sugars once daily  , Disp: 100 each, Rfl: 0    albuterol (PROVENTIL HFA,VENTOLIN HFA) 90 mcg/act inhaler, INHALE 2 PUFFS EVERY 6 (SIX) HOURS AS NEEDED FOR WHEEZING, Disp: 18 g, Rfl: 6    atorvastatin (LIPITOR) 10 mg tablet, TAKE 1 TABLET (10 MG TOTAL) BY MOUTH DAILY, Disp: 30 tablet, Rfl: 3    citalopram (CeleXA) 40 mg tablet, TAKE 1 TABLET (40 MG TOTAL) BY MOUTH DAILY, Disp: 30 tablet, Rfl: 3    glucose blood test strip, Use as instructed, Disp: 100 each, Rfl: 2    glucose blood test strip, Use as instructed  Test up to 3 times a day as needed, Disp: 200 each, Rfl: 11    hydrOXYzine HCL (ATARAX) 50 mg tablet, TAKE 1 TABLET (50 MG TOTAL) BY MOUTH EVERY 6 (SIX) HOURS AS NEEDED FOR ITCHING, ALLERGIES OR ANXIETY, Disp: 120 tablet, Rfl: 0    insulin detemir (Levemir FlexTouch) 100 Units/mL injection pen, Inject 20 Units under the skin daily at bedtime, Disp: 15 mL, Rfl: 0    Insulin Pen Needle (Easy Touch Pen Needles) 32G X 5 MM MISC, Use daily at bedtime, Disp: 50 each, Rfl: 0    Lancets MISC, Use once for 1 dose Onetouch Verio   Test once daily or as directed , Disp: 100 each, Rfl: 1    metFORMIN (GLUCOPHAGE) 500 mg tablet, TAKE 2 TABLETS (1,000 MG TOTAL) BY MOUTH DAILY WITH BREAKFAST AND 1 TABLET (500 MG TOTAL) DAILY WITH DINNER , Disp: 90 tablet, Rfl: 3    montelukast (SINGULAIR) 10 mg tablet, TAKE 1 TABLET (10 MG TOTAL) BY MOUTH DAILY AT BEDTIME, Disp: 30 tablet, Rfl: 1    multivitamin (THERAGRAN) TABS, Take 1 tablet by mouth daily   (Patient not taking: Reported on 3/16/2022 ), Disp: , Rfl:     olopatadine (PATANOL) 0 1 % ophthalmic solution, Administer 1 drop to both eyes 2 (two) times a day, Disp: 5 mL, Rfl: 5    pramipexole (MIRAPEX) 0 5 mg tablet, Take 1 tablet (0 5 mg total) by mouth daily at bedtime, Disp: , Rfl: 0    sulfamethoxazole-trimethoprim (BACTRIM DS) 800-160 mg per tablet, Take 1 tablet by mouth every 12 (twelve) hours for 6 days, Disp: 12 tablet, Rfl: 0    SUMAtriptan (IMITREX) 25 mg tablet, TAKE 1 TABLET (25 MG TOTAL) BY MOUTH ONCE AS NEEDED FOR MIGRAINE, Disp: 9 tablet, Rfl: 0    topiramate (TOPAMAX) 25 mg sprinkle capsule, TAKE 1 CAPSULE (25 MG TOTAL) BY MOUTH 2 (TWO) TIMES A DAY, Disp: 60 capsule, Rfl: 0    traZODone (DESYREL) 50 mg tablet, Take 1 tablet (50 mg total) by mouth daily at bedtime, Disp: 30 tablet, Rfl: 2  No current facility-administered medications for this visit  Review of Systems   Constitutional: Negative for appetite change, chills, fatigue, fever and unexpected weight change  HENT: Negative for congestion, hearing loss and postnasal drip  Respiratory: Positive for shortness of breath  Negative for cough  Cardiovascular: Negative for leg swelling  Musculoskeletal: Negative for gait problem  Skin: Positive for wound (Left medial thigh)  Negative for rash  Neurological: Negative for numbness  Hematological: Does not bruise/bleed easily  Psychiatric/Behavioral: Negative  Objective:  /81   Pulse 83   Temp (!) 96 7 °F (35 9 °C)   Resp 18   Pain Score:   4     Physical Exam  Vitals and nursing note reviewed  Constitutional:       Appearance: Normal appearance   She is well-developed  She is obese  HENT:      Head: Normocephalic and atraumatic  Cardiovascular:      Rate and Rhythm: Normal rate  Pulmonary:      Effort: Pulmonary effort is normal    Skin:     General: Skin is warm and dry  Findings: Erythema and wound present  Comments: Open wound of the left medial thigh 100% adherent slough and some surrounding erythema and induration  No malodor  See wound description  Neurological:      Mental Status: She is alert and oriented to person, place, and time  Psychiatric:         Attention and Perception: Attention normal          Mood and Affect: Mood and affect normal          Behavior: Behavior is cooperative  Cognition and Memory: Cognition normal              Wound 03/21/22 Other (comment) Thigh Left;Medial (Active)   Wound Image Images linked 03/21/22 0941   Wound Description Granulation tissue;Slough; Epithelialization 03/21/22 0924   Glendy-wound Assessment Dry; Intact; Erythema 03/21/22 0924   Wound Length (cm) 1 1 cm 03/21/22 0924   Wound Width (cm) 1 1 cm 03/21/22 0924   Wound Depth (cm) 0 2 cm 03/21/22 0924   Wound Surface Area (cm^2) 1 21 cm^2 03/21/22 0924   Wound Volume (cm^3) 0 242 cm^3 03/21/22 0924   Calculated Wound Volume (cm^3) 0 24 cm^3 03/21/22 0924   Drainage Amount Small 03/21/22 0924   Drainage Description Serous 03/21/22 0924   Non-staged Wound Description Full thickness 03/21/22 0924   Treatments Cleansed 03/21/22 0924       Debridement   Wound 03/21/22 Other (comment) Thigh Left;Medial    Universal Protocol:  Consent: Verbal consent obtained  Written consent obtained  Consent given by: patient  Time out: Immediately prior to procedure a "time out" was called to verify the correct patient, procedure, equipment, support staff and site/side marked as required    Patient understanding: patient states understanding of the procedure being performed  Patient identity confirmed: verbally with patient      Performed by: physician  Debridement type: surgical  Level of debridement: subcutaneous tissue  Pain control: lidocaine 4%  Post-debridement measurements  Length (cm): 1 1  Width (cm): 1 1  Depth (cm): 0 3  Percent debrided: 100%  Surface Area (cm^2): 1 21  Area debrided (cm^2): 1 21  Volume (cm^3): 0 36  Tissue and other material debrided: dermis and subcutaneous tissue  Devitalized tissue debrided: necrotic debris and slough  Instrument(s) utilized: curette  Bleeding: medium  Hemostasis obtained with: pressure  Procedural pain (0-10): 3  Post-procedural pain: 0   Response to treatment: procedure was tolerated well               Results from last 6 Months   Lab Units 03/14/22  1421   WOUND CULTURE  4+ Growth of Methicillin Resistant Staphylococcus aureus*           Wound Instructions:  Orders Placed This Encounter   Procedures    Wound cleansing and dressings     Left thigh  Wash your hands with soap and water  Remove old dressing, discard into plastic bag and place in trash  Cleanse the wound with mild soap and water prior to applying a clean dressing  Do not use tissue or cotton balls  Do not scrub the wound  Pat dry using gauze  Shower yes do not scrub wash gently  Apply duoderm to the left thigh wound  Change dressing every four days unless it comes off or you notice the drainage is getting close to the side of the dressing you should change it    Follow up in one week    Continue to take antibiotic until it is completed    May continue with warm moist compresses twice a day     Standing Status:   Future     Standing Expiration Date:   3/21/2023    Debridement     This order was created via procedure documentation       Bernardino Lan MD, CHT, CWS       Portions of the record may have been created with voice recognition software  Occasional wrong word or "sound alike" substitutions may have occurred due to the inherent limitations of voice recognition software   Read the chart carefully and recognize, using context, where substitutions have occurred

## 2022-03-23 ENCOUNTER — OFFICE VISIT (OUTPATIENT)
Dept: FAMILY MEDICINE CLINIC | Facility: CLINIC | Age: 47
End: 2022-03-23
Payer: COMMERCIAL

## 2022-03-23 VITALS
DIASTOLIC BLOOD PRESSURE: 81 MMHG | WEIGHT: 240 LBS | HEIGHT: 64 IN | SYSTOLIC BLOOD PRESSURE: 114 MMHG | BODY MASS INDEX: 40.97 KG/M2

## 2022-03-23 DIAGNOSIS — E78.49 OTHER HYPERLIPIDEMIA: Chronic | ICD-10-CM

## 2022-03-23 DIAGNOSIS — G47.09 OTHER INSOMNIA: ICD-10-CM

## 2022-03-23 DIAGNOSIS — Z12.4 CERVICAL CANCER SCREENING: Primary | ICD-10-CM

## 2022-03-23 DIAGNOSIS — E11.9 TYPE 2 DIABETES MELLITUS WITHOUT COMPLICATION, WITHOUT LONG-TERM CURRENT USE OF INSULIN (HCC): Chronic | ICD-10-CM

## 2022-03-23 DIAGNOSIS — A41.9 SEPSIS DUE TO CELLULITIS (HCC): ICD-10-CM

## 2022-03-23 DIAGNOSIS — Z98.890 STATUS POST INCISION AND DRAINAGE: ICD-10-CM

## 2022-03-23 DIAGNOSIS — F41.8 DEPRESSION WITH ANXIETY: Chronic | ICD-10-CM

## 2022-03-23 DIAGNOSIS — L03.90 SEPSIS DUE TO CELLULITIS (HCC): ICD-10-CM

## 2022-03-23 PROCEDURE — 99495 TRANSJ CARE MGMT MOD F2F 14D: CPT | Performed by: NURSE PRACTITIONER

## 2022-03-23 RX ORDER — TRAZODONE HYDROCHLORIDE 100 MG/1
100 TABLET ORAL
Qty: 30 TABLET | Refills: 3 | Status: SHIPPED | OUTPATIENT
Start: 2022-03-23 | End: 2022-07-09

## 2022-03-23 RX ORDER — BLOOD PRESSURE TEST KIT
KIT MISCELLANEOUS
Qty: 90 EACH | Refills: 1 | Status: SHIPPED | OUTPATIENT
Start: 2022-03-23

## 2022-03-23 RX ORDER — PEN NEEDLE, DIABETIC 32 GX3/16"
NEEDLE, DISPOSABLE MISCELLANEOUS
Qty: 90 EACH | Refills: 1 | Status: SHIPPED | OUTPATIENT
Start: 2022-03-23

## 2022-03-23 NOTE — PROGRESS NOTES
Virtual TCM Visit:    Verification of patient location:    Patient is located in the following state in which I hold an active license [place]        Assessment/Plan:        Problem List Items Addressed This Visit        Other    Depression with anxiety (Chronic)    Relevant Medications    traZODone (DESYREL) 100 mg tablet    Other hyperlipidemia (Chronic)    Sepsis due to cellulitis ([facility] )    Status post incision and drainage      Other Visit Diagnoses     Cervical cancer screening    -  Primary    Relevant Orders    Ambulatory Referral to Obstetrics / Gynecology    Type 2 diabetes mellitus without complication, without long-term current use of insulin (HCC)  (Chronic)       Relevant Medications    Insulin Pen Needle (Easy Touch Pen Needles) 32G X 5 MM MISC    Alcohol Swabs PADS    Other Relevant Orders    Ambulatory Referral to Diabetic Education    Other insomnia        Relevant Medications    traZODone (DESYREL) 100 mg tablet         1  Cervical cancer screening  Agreeable to referral    - Ambulatory Referral to Obstetrics / Gynecology; Future    2  Type 2 diabetes mellitus without complication, without long-term current use of insulin (HCC)  - start on levemir in hospital, giving insulin at night  Needs new test strips  will call with meter name  Would like to see diabetic educator  Has follow up with endocrinology 6/28/22   reports she has been trying to eat healthier    - Insulin Pen Needle (Easy Touch Pen Needles) 32G X 5 MM MISC; Use daily at bedtime  Dispense: 90 each; Refill: 1  - Alcohol Swabs PADS; Use once daily when testing finger sticks  Dispense: 90 each; Refill: 1  - Ambulatory Referral to Diabetic Education; Future    3  Status post incision and drainage  Wound still open, dressing is falling off  Has appointment with wound care  discussed that she should call wound care to get an extra dressing if possible  Taking antibiotics, feeling good  Would culture positive for MRSA       4  Sepsis due to cellulitis ([address] )      5  Other hyperlipidemia      6  Depression with anxiety      7  Other insomnia  Needs refill today  Doing well on 100mg nightly  - traZODone (DESYREL) 100 mg tablet; Take 1 tablet (100 mg total) by mouth daily at bedtime  Dispense: 30 tablet; Refill: 3      Reason for visit is TCM phone call visit  Encounter provider JENNIFER Cabrera       Provider located at [address] [place] [id number] [phone]      Recent Visits  No visits were found meeting these conditions  Showing recent visits within past 7 days and meeting all other requirements  Today's Visits  Date Type Provider Dept   03/23/22 Office Visit JENNIFER Cabrera [facility]   Showing today's visits and meeting all other requirements  Future Appointments  No visits were found meeting these conditions  Showing future appointments within next 150 days and meeting all other requirements       After connecting through [employer], the patient was identified by name and date of birth  Kenton Guajardo was informed that this is a telemedicine visit and that the visit is being conducted through Telephone  My office door was closed  No one else was in the room  She acknowledged consent and understanding of privacy and security of the video platform  The patient has agreed to participate and understands they can discontinue the visit at any time  Patient is aware this is a billable service  Subjective:     Patient ID: Kenton Guajardo is a 42 y o  female  Patient for virtual TCM visit         Griselda Chancellor, MD (Physician) [address] [place] [address]  Discharge- Kenton Guajardo 1975, 42 y o  female MRN: [id number]  Unit/Bed#: -01 Encounter: [id number]  Primary Care Provider: JENNIFER Cabrera   Date and time admitted to hospital: 3/11/2022  7:34 PM     * Sepsis due to cellulitis [facility])  Assessment & Plan  · Sepsis parameters have resolved  · Secondary to medial left thigh cellulitis  · Patient had I&D done on 03/14/2022 by surgical team at bedside  · Blood cultures negative to date  · Wound culture with Gram-positive cocci  · Will discharge on Keflex and doxycycline for 5 days  · Outpatient follow-up with surgical team for wound care     Cellulitis of left thigh  Assessment & Plan  Continue treatment as above     Other hyperlipidemia  Assessment & Plan  · Continue atorvastatin     Migraine without aura, not intractable  Assessment & Plan  · Continue Topamax     Type 2 diabetes mellitus without complication, without long-term current use of insulin ([facility] [address] )  Assessment & Plan  · Resume home metformin  · Discharged on Levemir  · Referred for diabetic Education and Endocrinology follow-up as outpatient  · Advised to monitor fingersticks at home  Patient states that she does have supplies including glucometer, strips, and lancets     Class 3 severe obesity due to excess calories without serious comorbidity with body mass index (BMI) of 40 0 to 44 9 in adult [facility])  Assessment & Plan  · Actual BMI 40 17  · Dietary, weight loss, and lifestyle modification counseling was provided     Depression with anxiety  Assessment & Plan  · Continue home medications     Allergic rhinitis  Assessment & Plan  · Continue Singulair                          TCM Call (since 2/20/2022)     Date and time call was made  3/16/2022  3:20 PM    Patient was hospitialized at  [address]        Date of Admission  03/11/22    Date of discharge  03/15/22    Diagnosis  Sepsis due to cellulitus    Disposition  Home    Current Symptoms  None      TCM Call (since 2/20/2022)     Post hospital issues  None    Scheduled for follow up?   Yes    Did you obtain your prescribed medications  Yes    Do you need help managing your prescriptions or medications  No    Is transportation to your appointment needed  No    I have advised the patient to call PCP with any new or worsening symptoms    Andreia LAURA    Living [place]  parents    Support System  Parent    The type of support provided  Emotional; Physical; Other (comment)    Do you have social support  Yes, as much as I need            Review of Systems   Constitutional: Negative for chills, diaphoresis, fatigue and fever  Respiratory: Negative  Negative for shortness of breath and wheezing  Cardiovascular: Negative  Negative for chest pain and leg swelling  Neurological: Negative  Negative for dizziness, light-headedness and headaches  Objective:    Vitals:    03/23/22 1157   BP: 114/81   Weight: 109 kg (240 lb)   Height: 5' 4" (1 626 m)       Physical Exam   unable to examine  Transitional Care Management Review:  Krissy Mazariegos is a 42 y o  female here for TCM follow up  During the TCM phone call patient stated:    TCM Call (since 2/20/2022)     Date and time call was made  3/16/2022  3:20 PM    Patient was hospitialized at  [address]        Date of Admission  03/11/22    Date of discharge  03/15/22    Diagnosis  Sepsis due to cellulitus    Disposition  Home    Current Symptoms  None      TCM Call (since 2/20/2022)     Post hospital issues  None    Scheduled for follow up? Yes    Did you obtain your prescribed medications  Yes    Do you need help managing your prescriptions or medications  No    Is transportation to your appointment needed  No    I have advised the patient to call PCP with any new or worsening symptoms  Andreia Lovell  parents    Support System  Parent    The type of support provided  Emotional; Physical; Other (comment)    Do you have social support  Yes, as much as I need          I spent 10 minutes with the patient during this visit  JENNIFER Rojas      VIRTUAL VISIT DISCLAIMER    Gerri Peguero verbally agrees to participate in [employer]   Pt is aware that Virtual Care Services could be limited without vital signs or the ability to perform a full hands-on physical exam  Gerri Duenas understands she or the provider may request at any time to terminate the video visit and request the patient to seek care or treatment in person

## 2022-03-25 DIAGNOSIS — E11.9 TYPE 2 DIABETES MELLITUS WITHOUT COMPLICATION, WITHOUT LONG-TERM CURRENT USE OF INSULIN (HCC): Primary | ICD-10-CM

## 2022-03-25 NOTE — TELEPHONE ENCOUNTER
Patient called the office requesting test strips and lancets for her True Metrix meter  Patient said that her One Touch Verio meter is broken and at her appointment provider said she could call back with the name of the meter for refills  Patient would like test strips and lancets sent to [facility]  Please advise

## 2022-03-29 RX ORDER — CALCIUM CITRATE/VITAMIN D3 200MG-6.25
TABLET ORAL
Qty: 100 EACH | Refills: 3 | Status: SHIPPED | OUTPATIENT
Start: 2022-03-29

## 2022-03-29 NOTE — UTILIZATION REVIEW
Notification of Discharge   This is a Notification of Discharge from our facility [address]  Please be advised that this patient has been discharge from our facility  Below you will find the admission and discharge date and time including the patients disposition  UTILIZATION REVIEW CONTACT:  Félix Sol  Utilization   Network Utilization Review Department  Phone: [phone] carefully listen to the prompts  All voicemails are confidential   Email: [email or web address]     PHYSICIAN ADVISORY SERVICES:  FOR FIXI-XY-AFVZ REVIEW - MEDICAL NECESSITY DENIAL  Phone: [phone]  Fax: [phone]  Email: [email or web address]     PRESENTATION DATE: 3/11/2022  7:34 PM  OBERVATION ADMISSION DATE:   INPATIENT ADMISSION DATE: 3/11/22 10:03 PM   DISCHARGE DATE: 3/15/2022 12:28 PM  DISPOSITION: Home/Self Care Home/Self Care      IMPORTANT INFORMATION:  Send all requests for admission clinical reviews, approved or denied determinations and any other requests to dedicated fax number below belonging to the campus where the patient is receiving treatment   List of dedicated fax numbers:  [address] DENIALS (Administrative/Medical Necessity) [phone]   [address] (Maternity/NICU/Pediatrics) [phone]   Shivani Tracey [phone]   Thereasa Roys [phone]   Toy Mundo [phone]   [address] [phone]   [facility]  [phone]   [address] [place] [address], [address]  [facility] 1000 [facility] [phone]

## 2022-03-30 ENCOUNTER — TELEPHONE (OUTPATIENT)
Dept: WOUND CARE | Facility: CLINIC | Age: 47
End: 2022-03-30

## 2022-04-03 DIAGNOSIS — G43.109 MIGRAINE WITH AURA AND WITHOUT STATUS MIGRAINOSUS, NOT INTRACTABLE: ICD-10-CM

## 2022-04-04 RX ORDER — SUMATRIPTAN 25 MG/1
25 TABLET, FILM COATED ORAL ONCE AS NEEDED
Qty: 9 TABLET | Refills: 0 | Status: SHIPPED | OUTPATIENT
Start: 2022-04-04

## 2022-04-04 NOTE — TELEPHONE ENCOUNTER
Patient requesting refill(s) of: Sumatriptan     Last filled: 3/7/2022 9 tabs 0 refills  Last appt: 3/23/22  Next appt: 7/7/2022  Pharmacy: [facility]

## 2022-04-12 ENCOUNTER — APPOINTMENT (OUTPATIENT)
Dept: LAB | Facility: CLINIC | Age: 47
End: 2022-04-12
Payer: COMMERCIAL

## 2022-04-12 ENCOUNTER — CONSULT (OUTPATIENT)
Dept: BARIATRICS | Facility: CLINIC | Age: 47
End: 2022-04-12
Payer: COMMERCIAL

## 2022-04-12 VITALS
TEMPERATURE: 98 F | WEIGHT: 228.2 LBS | DIASTOLIC BLOOD PRESSURE: 72 MMHG | RESPIRATION RATE: 20 BRPM | HEIGHT: 64 IN | HEART RATE: 90 BPM | SYSTOLIC BLOOD PRESSURE: 112 MMHG | OXYGEN SATURATION: 99 % | BODY MASS INDEX: 38.96 KG/M2

## 2022-04-12 DIAGNOSIS — Z79.4 TYPE 2 DIABETES MELLITUS WITHOUT COMPLICATION, WITH LONG-TERM CURRENT USE OF INSULIN (HCC): ICD-10-CM

## 2022-04-12 DIAGNOSIS — E78.49 OTHER HYPERLIPIDEMIA: Chronic | ICD-10-CM

## 2022-04-12 DIAGNOSIS — E66.01 SEVERE OBESITY (BMI 35.0-39.9) WITH COMORBIDITY (HCC): ICD-10-CM

## 2022-04-12 DIAGNOSIS — E11.9 TYPE 2 DIABETES MELLITUS WITHOUT COMPLICATION, WITH LONG-TERM CURRENT USE OF INSULIN (HCC): ICD-10-CM

## 2022-04-12 DIAGNOSIS — F41.8 DEPRESSION WITH ANXIETY: Chronic | ICD-10-CM

## 2022-04-12 DIAGNOSIS — G43.009 MIGRAINE WITHOUT AURA AND WITHOUT STATUS MIGRAINOSUS, NOT INTRACTABLE: ICD-10-CM

## 2022-04-12 DIAGNOSIS — E66.01 SEVERE OBESITY (BMI 35.0-39.9) WITH COMORBIDITY (HCC): Primary | ICD-10-CM

## 2022-04-12 LAB — TSH SERPL DL<=0.05 MIU/L-ACNC: 0.76 UIU/ML (ref 0.45–4.5)

## 2022-04-12 PROCEDURE — 36415 COLL VENOUS BLD VENIPUNCTURE: CPT

## 2022-04-12 PROCEDURE — 84443 ASSAY THYROID STIM HORMONE: CPT

## 2022-04-12 PROCEDURE — 3078F DIAST BP <80 MM HG: CPT | Performed by: PHYSICIAN ASSISTANT

## 2022-04-12 PROCEDURE — 99244 OFF/OP CNSLTJ NEW/EST MOD 40: CPT | Performed by: PHYSICIAN ASSISTANT

## 2022-04-12 PROCEDURE — 3074F SYST BP LT 130 MM HG: CPT | Performed by: PHYSICIAN ASSISTANT

## 2022-04-12 NOTE — ASSESSMENT & PLAN NOTE
Lab Results   Component Value Date    HGBA1C 8 0 (A) 03/04/2022   -On Metformin, Levemir  -avoidance of refined carbohydrates  -can consider GLP-1 if covered under her insurance, patient to discuss with PCP to hopefully cut back on insulin

## 2022-04-12 NOTE — ASSESSMENT & PLAN NOTE
-Discussed options of HealthyCORE-Intensive Lifestyle Intervention Program, Very Low Calorie Diet-VLCD, Conservative Program, Lili-En-Y Gastric Bypass and Vertical Sleeve Gastrectomy and the role of weight loss medications   -Initial weight loss goal of 5-10% weight loss for improved health  -Screening labs: Reviewed CMP, Lipid Panel, HgbA1c  Recommend TSH  -Patient is interested in pursuing bariatric surgery   Given info to watch online info seminar    Negative Stop-Bang

## 2022-04-12 NOTE — PROGRESS NOTES
Assessment/Plan:    Severe obesity (BMI 35 0-39  9) with comorbidity ([facility] )  -Discussed options of Healthy[facility]-Intensive Lifestyle Intervention Program, Very Low Calorie Diet-VLCD, Conservative Program, Lili-En-Y Gastric Bypass and Vertical Sleeve Gastrectomy and the role of weight loss medications   -Initial weight loss goal of 5-10% weight loss for improved health  -Screening labs: Reviewed CMP, Lipid Panel, HgbA1c  Recommend TSH  -Patient is interested in pursuing bariatric surgery  Given info to watch online info seminar    Negative Stop-Bang    Type 2 diabetes mellitus without complication, with long-term current use of insulin (HCC)    Lab Results   Component Value Date    HGBA1C 8 0 (A) 03/04/2022   -On Metformin, Levemir  -avoidance of refined carbohydrates  -can consider GLP-1 if covered under her insurance, patient to discuss with PCP to hopefully cut back on insulin    Other hyperlipidemia  -On Lipitor  -avoid trans fats, limit saturated fats and refined carbohydrates    Depression with anxiety  -On Celexa      Migraine without aura, not intractable  -On Topamax    Goals:    Food log (ie ) www myfitnesspal com,sparkpeople  com,loseit com,calorieking  com,etc    No sugary beverages  At least 64oz of water daily  Increase physical activity by 10 minutes daily  Gradually increase physical activity to a goal of 5 days per week for 30 minutes of MODERATE intensity PLUS 2 days per week of FULL BODY resistance training    Patient to watch online seminar and follow up in [facility] for 3 hour eval     Diagnoses and all orders for this visit:    Severe obesity (BMI 35 0-39  9) with comorbidity ([facility] )  -     TSH, 3rd generation with Free T4 reflex; Future    BMI 39 0-39 9,adult  -     Ambulatory Referral to Weight Management    Type 2 diabetes mellitus without complication, with long-term current use of insulin (HCC)  -     TSH, 3rd generation with Free T4 reflex;  Future    Other hyperlipidemia  -     TSH, 3rd generation with Free T4 reflex; Future    Depression with anxiety    Migraine without aura and without status migrainosus, not intractable          Subjective:   Chief Complaint   Patient presents with    Consult       Patient ID: Yazmin Solo  is a 42 y o  female with excess weight/obesity here to pursue weight managment  Past Medical History:   Diagnosis Date    Anxiety     Asthma     Closed nondisplaced fracture of proximal phalanx of lesser toe of left foot 1/15/2020    Diabetes mellitus ([address] )     Hyperlipidemia          HPI:  Obesity/Excess Weight:  Severity: Severe  Onset:  Since age 27     Modifiers: Hydroxycut  Contributing factors: Pregnancy  Associated symptoms: fatigue    Goals:  190 lbs  Highest: 250 lbs    Hydration: water none, Sweetened iced tea  Exercise: denies  Occupation: [employer]  Sleep: 9-11  ETOH: denies  SMoking:denies  DM2: past 2 years, started on insulin in March 2022    Colonoscopy: N/A    The following portions of the patient's history were reviewed and updated as appropriate: allergies, current medications, past family history, past medical history, past social history, past surgical history and problem list     Review of Systems   Constitutional: Negative for chills and fever  HENT: Negative for sore throat  Respiratory: Negative for cough and shortness of breath  Cardiovascular: Negative for chest pain and palpitations  Gastrointestinal: Negative for abdominal pain, nausea and vomiting  Genitourinary: Negative for dysuria  Musculoskeletal: Positive for arthralgias  Skin: Negative for rash  Neurological: Positive for headaches (Hx Migraine)  Negative for dizziness  Psychiatric/Behavioral: The patient is not nervous/anxious  Mood stable       Objective:    /72   Pulse 90   Temp 98 °F (36 7 °C)   Resp 20   Ht 5' 4" (1 626 m)   Wt 104 kg (228 lb 3 2 oz)   SpO2 99%   BMI 39 17 kg/m²     Physical Exam  Vitals and nursing note reviewed  Constitutional   General appearance: Abnormal   well developed and obese  Eyes No conjunctival pallor  Ears, Nose, Mouth, and Throat Oral mucosa moist    Pulmonary   Respiratory effort: No increased work of breathing or signs of respiratory distress  Auscultation of lungs: Clear to auscultation, equal breath sounds bilaterally, no wheezes, no rales, no rhonci  Cardiovascular   Auscultation of heart: Normal rate and rhythm, normal S1 and S2, without murmurs  Examination of extremities for edema and/or varicosities: Normal   no edema  Abdomen   Abdomen: Abnormal   The abdomen was obese  Bowel sounds were normal  The abdomen was soft and nontender     Musculoskeletal   Gait and station: Normal     Psychiatric   Orientation to person, place and time: Normal     Affect: appropriate

## 2022-04-26 DIAGNOSIS — J30.1 ALLERGIC RHINITIS DUE TO POLLEN, UNSPECIFIED SEASONALITY: ICD-10-CM

## 2022-04-26 DIAGNOSIS — E11.9 TYPE 2 DIABETES MELLITUS WITHOUT COMPLICATION, WITHOUT LONG-TERM CURRENT USE OF INSULIN (HCC): ICD-10-CM

## 2022-04-26 DIAGNOSIS — F30.9 MOOD DISORDER OF MANIC TYPE (HCC): ICD-10-CM

## 2022-04-26 RX ORDER — PRAMIPEXOLE DIHYDROCHLORIDE 0.5 MG/1
0.5 TABLET ORAL
Qty: 30 TABLET | Refills: 0 | Status: SHIPPED | OUTPATIENT
Start: 2022-04-26 | End: 2022-05-23

## 2022-04-26 RX ORDER — MONTELUKAST SODIUM 10 MG/1
10 TABLET ORAL
Qty: 30 TABLET | Refills: 1 | Status: SHIPPED | OUTPATIENT
Start: 2022-04-26 | End: 2022-06-17

## 2022-04-26 RX ORDER — ATORVASTATIN CALCIUM 10 MG/1
10 TABLET, FILM COATED ORAL DAILY
Qty: 30 TABLET | Refills: 3 | Status: SHIPPED | OUTPATIENT
Start: 2022-04-26

## 2022-04-30 ENCOUNTER — HOSPITAL ENCOUNTER (EMERGENCY)
Facility: HOSPITAL | Age: 47
Discharge: HOME | End: 2022-04-30
Attending: EMERGENCY MEDICINE
Payer: COMMERCIAL

## 2022-04-30 VITALS
SYSTOLIC BLOOD PRESSURE: 143 MMHG | OXYGEN SATURATION: 95 % | DIASTOLIC BLOOD PRESSURE: 81 MMHG | TEMPERATURE: 98.1 F | RESPIRATION RATE: 18 BRPM | HEART RATE: 88 BPM

## 2022-04-30 DIAGNOSIS — L03.90 CELLULITIS: ICD-10-CM

## 2022-04-30 DIAGNOSIS — L02.91 ABSCESS: Primary | ICD-10-CM

## 2022-04-30 PROCEDURE — 99284 EMERGENCY DEPT VISIT MOD MDM: CPT

## 2022-04-30 PROCEDURE — 99281 EMR DPT VST MAYX REQ PHY/QHP: CPT

## 2022-04-30 RX ORDER — CEPHALEXIN 500 MG/1
500 CAPSULE ORAL ONCE
Status: COMPLETED | OUTPATIENT
Start: 2022-04-30 | End: 2022-04-30

## 2022-04-30 RX ORDER — SULFAMETHOXAZOLE AND TRIMETHOPRIM 800; 160 MG/1; MG/1
2 TABLET ORAL ONCE
Status: COMPLETED | OUTPATIENT
Start: 2022-04-30 | End: 2022-04-30

## 2022-04-30 RX ORDER — CEPHALEXIN 500 MG/1
500 CAPSULE ORAL EVERY 6 HOURS SCHEDULED
Qty: 28 CAPSULE | Refills: 0 | Status: SHIPPED | OUTPATIENT
Start: 2022-04-30 | End: 2022-05-07

## 2022-04-30 RX ORDER — SULFAMETHOXAZOLE AND TRIMETHOPRIM 800; 160 MG/1; MG/1
2 TABLET ORAL EVERY 12 HOURS SCHEDULED
Qty: 14 TABLET | Refills: 0 | Status: SHIPPED | OUTPATIENT
Start: 2022-04-30 | End: 2022-05-07

## 2022-04-30 RX ADMIN — SULFAMETHOXAZOLE AND TRIMETHOPRIM 2 TABLET: 800; 160 TABLET ORAL at 19:29

## 2022-04-30 RX ADMIN — CEPHALEXIN 500 MG: 500 CAPSULE ORAL at 19:29

## 2022-04-30 NOTE — ED PROVIDER NOTES
History  Chief Complaint   Patient presents with    Insect Bite     Pt presents ambulatory c/o "insect bites probably spider bite" to the posterior R and L thighs  +Erythema surrounding bites  Pt denies chills/fevers  Patient is a 44-year-old female coming in with a complaint of right and left-sided skin is abscess ongoing for 10 days  Patient has a history of recent hospitalization secondary abscess  Patient reports that time, the right-sided abscess started as a small pimple type appearance 10 days ago, and has since worsened  Patient reports that she is concerned because she was hospitalized at the beginning of March for a similar appearing abscess that resulted cellulitis, but was worse appearing  Abscess is located on right posterior thigh  Area is indurated but not fluctuant  Evaluated with ultrasound, and no collection fluid was visualized to drain  Patient has a smaller red raised area on her left posterior  Patient denies fever, chills  Patient is a known diabetic  Patient reports that her sugars have been much better controlled since starting on insulin  Patient denies smoking  Right thigh abscess            Prior to Admission Medications   Prescriptions Last Dose Informant Patient Reported? Taking? Alcohol Swabs PADS   No No   Sig: Use once daily when testing finger sticks  Insulin Pen Needle (Easy Touch Pen Needles) 32G X 5 MM MISC   No No   Sig: Use daily at bedtime   Lancets MISC  Self No No   Sig: Use once for 1 dose Onetouch Verio  Test once daily or as directed     SUMAtriptan (IMITREX) 25 mg tablet   No No   Sig: TAKE 1 TABLET (25 MG TOTAL) BY MOUTH ONCE AS NEEDED FOR MIGRAINE   albuterol (PROVENTIL HFA,VENTOLIN HFA) 90 mcg/act inhaler  Self No No   Sig: INHALE 2 PUFFS EVERY 6 (SIX) HOURS AS NEEDED FOR WHEEZING   atorvastatin (LIPITOR) 10 mg tablet   No No   Sig: TAKE 1 TABLET (10 MG TOTAL) BY MOUTH DAILY   citalopram (CeleXA) 40 mg tablet  Self No No   Sig: TAKE 1 TABLET (40 MG TOTAL) BY MOUTH DAILY   glucose blood (True Metrix Blood Glucose Test) test strip   No No   Sig: Use as instructed   glucose blood test strip  Self No No   Sig: Use as instructed   glucose blood test strip  Self No No   Sig: Use as instructed  Test up to 3 times a day as needed   hydrOXYzine HCL (ATARAX) 50 mg tablet  Self No No   Sig: TAKE 1 TABLET (50 MG TOTAL) BY MOUTH EVERY 6 (SIX) HOURS AS NEEDED FOR ITCHING, ALLERGIES OR ANXIETY   insulin detemir (Levemir FlexTouch) 100 Units/mL injection pen  Self No No   Sig: Inject 20 Units under the skin daily at bedtime   metFORMIN (GLUCOPHAGE) 500 mg tablet   No No   Sig: TAKE 2 TABLETS (1,000 MG TOTAL) BY MOUTH DAILY WITH BREAKFAST AND 1 TABLET (500 MG TOTAL) DAILY WITH DINNER     montelukast (SINGULAIR) 10 mg tablet   No No   Sig: TAKE 1 TABLET (10 MG TOTAL) BY MOUTH DAILY AT BEDTIME   olopatadine (PATANOL) 0 1 % ophthalmic solution  Self No No   Sig: Administer 1 drop to both eyes 2 (two) times a day   pramipexole (MIRAPEX) 0 5 mg tablet   No No   Sig: TAKE 1 TABLET (0 5 MG TOTAL) BY MOUTH DAILY AT BEDTIME   topiramate (TOPAMAX) 25 mg sprinkle capsule  Self No No   Sig: TAKE 1 CAPSULE (25 MG TOTAL) BY MOUTH 2 (TWO) TIMES A DAY   traZODone (DESYREL) 100 mg tablet   No No   Sig: Take 1 tablet (100 mg total) by mouth daily at bedtime      Facility-Administered Medications: None       Past Medical History:   Diagnosis Date    Anxiety     Asthma     Closed nondisplaced fracture of proximal phalanx of lesser toe of left foot 1/15/2020    Diabetes mellitus ([facility] )     Hyperlipidemia        Past Surgical History:   Procedure Laterality Date    ARTHROSCOPY KNEE Left     CHOLECYSTECTOMY      ECTOPIC PREGNANCY SURGERY      SHOULDER SURGERY Right     torn bicep       Family History   Problem Relation Age of Onset    Obesity Mother     Obesity Father     Diabetes type II Father     Diabetes Father     Obesity Brother     No Known Problems Daughter     No Known Problems Maternal Grandmother     Diabetes Maternal Grandfather     No Known Problems Paternal Grandmother     No Known Problems Maternal Aunt     No Known Problems Maternal Aunt     No Known Problems Maternal Aunt     Heart disease Neg Hx     Stroke Neg Hx     Cancer Neg Hx      I have reviewed and agree with the history as documented  E-Cigarette/Vaping    E-Cigarette Use Never User      E-Cigarette/Vaping Substances    Nicotine No     THC No     CBD No     Flavoring No     Other No     Unknown No      Social History     Tobacco Use    Smoking status: Never Smoker    Smokeless tobacco: Never Used   Vaping Use    Vaping Use: Never used   Substance Use Topics    Alcohol use: Not Currently     Alcohol/week: 0 0 standard drinks     Comment: 0    Drug use: Not Currently       Review of Systems   Constitutional: Negative  Negative for chills and fever  HENT: Negative  Eyes: Negative  Respiratory: Negative  Cardiovascular: Negative  Gastrointestinal: Negative  Endocrine: Negative  Genitourinary: Negative  Musculoskeletal: Negative  Skin: Positive for wound  Allergic/Immunologic: Negative  Neurological: Negative  Hematological: Negative  Psychiatric/Behavioral: Negative  All other systems reviewed and are negative  Physical Exam  Physical Exam  Vitals and nursing note reviewed  Constitutional:       General: She is not in acute distress  Appearance: Normal appearance  She is obese  She is not ill-appearing, toxic-appearing or diaphoretic  HENT:      Head: Normocephalic  Right Ear: External ear normal       Left Ear: External ear normal       Nose: Nose normal       Mouth/Throat:      Mouth: Mucous membranes are moist    Cardiovascular:      Rate and Rhythm: Normal rate  Pulses: Normal pulses  Pulmonary:      Effort: Pulmonary effort is normal    Abdominal:      General: Abdomen is flat     Musculoskeletal:         General: Normal range of motion  Cervical back: Normal range of motion  Skin:     General: Skin is warm  Capillary Refill: Capillary refill takes less than 2 seconds  Findings: Erythema present  Neurological:      General: No focal deficit present  Mental Status: She is alert and oriented to person, place, and time  Mental status is at baseline  Psychiatric:         Mood and Affect: Mood normal          Vital Signs  ED Triage Vitals [04/30/22 1855]   Temperature Pulse Respirations Blood Pressure SpO2   98 1 °F (36 7 °C) 68 18 136/63 100 %      Temp Source Heart Rate Source Patient Position - Orthostatic VS BP Location FiO2 (%)   Oral Monitor Sitting Right arm --      Pain Score       --           Vitals:    04/30/22 1855 04/30/22 1900   BP: 136/63 143/81   Pulse: 68 88   Patient Position - Orthostatic VS: Sitting          Visual Acuity      ED Medications  Medications   sulfamethoxazole-trimethoprim (BACTRIM DS) 800-160 mg per tablet 2 tablet (2 tablets Oral Given 4/30/22 1929)   cephalexin (KEFLEX) capsule 500 mg (500 mg Oral Given 4/30/22 1929)       Diagnostic Studies  Results Reviewed     None                 No orders to display              Procedures  Procedures         ED Course                                             MDM  Number of Diagnoses or Management Options  Abscess: new and does not require workup  Cellulitis: new and does not require workup  Diagnosis management comments: Patient is a nontoxic, otherwise well-appearing 44-year-old female with a history of recent hospitalization for abscess/cellulitis  Review of records from   3/14 shows that patient was discharged on Keflex and doxy  At that time patient was found to have +4 MRSA and previous wound culture  Today's right and left-sided abscess have no fluctuance, but feel indurated  Left-sided abscess is small and without comedone    Right-sided abscess was ultrasounded to look for pockets of fluid collection for drainage, and none were found  First doses of antibiotics were provided  Patient provided with ambulatory referral to wound care  Patient referred to follow-up with her PCP for frequent wound checks  Discussed with patient the importance of changing razors for hair removal purposes frequently, discussed using a clean washcloth daily, cleaning her sheets  Discussed that wound should be kept clean and dry  Discussed that no peroxide should be utilized  Discussed that patient should use open water to keep area clean  Discussed with patient that she should have tight glucose control, especially at this time with wound  Strict return precautions provided to pt  Discussed tylenol/motrin as necessary for pain  Risk of Complications, Morbidity, and/or Mortality  General comments: Pt arriving for evaluation of abscess of right sided thigh and left sided posterior thigh  Right sided thigh photographed and placed in chart  Pt provided with wound care referral  Pt provided with bactrim, and keflex doses in dept and a prescription  Reviewed reasons to return to ed  Patient verbalized understanding of diagnosis and agreement with discharge plan of care as well as understanding of reasons to return to ed  Patient Progress  Patient progress: stable      Disposition  Final diagnoses:   Abscess   Cellulitis     Time reflects when diagnosis was documented in both MDM as applicable and the Disposition within this note     Time User Action Codes Description Comment    4/30/2022  7:19 PM Loretta Armendariz Add [L02 91] Abscess     4/30/2022  7:22 PM Loretta Armendariz Add [L03 90] Cellulitis       ED Disposition     ED Disposition Condition Date/Time Comment    Discharge Stable Sat Apr 30, 2022  7:19 PM Annye Been discharge to home/self care              Follow-up Information     Follow up With Specialties Details Why [address], [address] [facility], Nurse Practitioner Schedule an appointment as soon as possible for a visit in 1 day  [facility]  [phone]            Discharge Medication List as of 4/30/2022  7:23 PM      START taking these medications    Details   cephalexin (KEFLEX) 500 mg capsule Take 1 capsule (500 mg total) by mouth every 6 (six) hours for 7 days, Starting Sat 4/30/2022, Until Sat 5/7/2022, Normal      sulfamethoxazole-trimethoprim (BACTRIM DS) 800-160 mg per tablet Take 2 tablets by mouth every 12 (twelve) hours for 7 days smx-tmp DS (BACTRIM) 800-160 mg tabs (1tab q12 D10), Starting Sat 4/30/2022, Until Sat 5/7/2022, Normal         CONTINUE these medications which have NOT CHANGED    Details   albuterol (PROVENTIL HFA,VENTOLIN HFA) 90 mcg/act inhaler INHALE 2 PUFFS EVERY 6 (SIX) HOURS AS NEEDED FOR WHEEZING, Starting Thu 3/10/2022, Normal      Alcohol Swabs PADS Use once daily when testing finger sticks , Normal      atorvastatin (LIPITOR) 10 mg tablet TAKE 1 TABLET (10 MG TOTAL) BY MOUTH DAILY, Starting Tue 4/26/2022, Normal      citalopram (CeleXA) 40 mg tablet TAKE 1 TABLET (40 MG TOTAL) BY MOUTH DAILY, Starting Mon 3/7/2022, Normal      !! glucose blood (True Metrix Blood Glucose Test) test strip Use as instructed, Normal      !! glucose blood test strip Use as instructed, Normal      !! glucose blood test strip Use as instructed  Test up to 3 times a day as needed, Normal      hydrOXYzine HCL (ATARAX) 50 mg tablet TAKE 1 TABLET (50 MG TOTAL) BY MOUTH EVERY 6 (SIX) HOURS AS NEEDED FOR ITCHING, ALLERGIES OR ANXIETY, Starting Tue 1/18/2022, Normal      insulin detemir (Levemir FlexTouch) 100 Units/mL injection pen Inject 20 Units under the skin daily at bedtime, Starting Tue 3/15/2022, Normal      Insulin Pen Needle (Easy Touch Pen Needles) 32G X 5 MM MISC Use daily at bedtime, Starting Wed 3/23/2022, Normal      Lancets MISC Use once for 1 dose Onetouch Verio   Test once daily or as directed , Starting Fri 6/11/2021, Normal      metFORMIN (GLUCOPHAGE) 500 mg tablet Multiple Dosages:Starting Thu 3/17/2022TAKE 2 TABLETS (1,000 MG TOTAL) BY MOUTH DAILY WITH BREAKFAST AND 1 TABLET (500 MG TOTAL) DAILY WITH DINNER , Normal      montelukast (SINGULAIR) 10 mg tablet TAKE 1 TABLET (10 MG TOTAL) BY MOUTH DAILY AT BEDTIME, Starting Tue 4/26/2022, Normal      olopatadine (PATANOL) 0 1 % ophthalmic solution Administer 1 drop to both eyes 2 (two) times a day, Starting Wed 7/7/2021, Normal      pramipexole (MIRAPEX) 0 5 mg tablet TAKE 1 TABLET (0 5 MG TOTAL) BY MOUTH DAILY AT BEDTIME, Starting Tue 4/26/2022, Normal      SUMAtriptan (IMITREX) 25 mg tablet TAKE 1 TABLET (25 MG TOTAL) BY MOUTH ONCE AS NEEDED FOR MIGRAINE, Starting Mon 4/4/2022, Normal      topiramate (TOPAMAX) 25 mg sprinkle capsule TAKE 1 CAPSULE (25 MG TOTAL) BY MOUTH 2 (TWO) TIMES A DAY, Starting Tue 1/18/2022, Normal      traZODone (DESYREL) 100 mg tablet Take 1 tablet (100 mg total) by mouth daily at bedtime, Starting Wed 3/23/2022, Normal       !! - Potential duplicate medications found  Please discuss with provider                PDMP Review     None          ED Provider  Electronically Signed by           JENNIFER Mack  04/30/22 1950

## 2022-05-05 DIAGNOSIS — E11.9 TYPE 2 DIABETES MELLITUS WITHOUT COMPLICATION, WITHOUT LONG-TERM CURRENT USE OF INSULIN (HCC): Chronic | ICD-10-CM

## 2022-05-05 RX ORDER — INSULIN DETEMIR 100 [IU]/ML
20 INJECTION, SOLUTION SUBCUTANEOUS
Qty: 15 ML | Refills: 0 | Status: SHIPPED | OUTPATIENT
Start: 2022-05-05

## 2022-05-05 NOTE — TELEPHONE ENCOUNTER
Patient requesting refill(s) of:  Levimir flextouch  Last filled:3/15/22  Last appt:3/23/22  Next appt:7/7/22  Pharmacy:[facility]

## 2022-05-14 ENCOUNTER — OFFICE VISIT (OUTPATIENT)
Dept: URGENT CARE | Facility: CLINIC | Age: 47
End: 2022-05-14
Payer: COMMERCIAL

## 2022-05-14 VITALS
TEMPERATURE: 97.6 F | HEART RATE: 93 BPM | OXYGEN SATURATION: 97 % | DIASTOLIC BLOOD PRESSURE: 70 MMHG | SYSTOLIC BLOOD PRESSURE: 132 MMHG | RESPIRATION RATE: 18 BRPM

## 2022-05-14 DIAGNOSIS — N30.01 ACUTE CYSTITIS WITH HEMATURIA: Primary | ICD-10-CM

## 2022-05-14 LAB
GLUCOSE SERPL-MCNC: 184 MG/DL (ref 65–140)
SL AMB  POCT GLUCOSE, UA: 1000
SL AMB LEUKOCYTE ESTERASE,UA: ABNORMAL
SL AMB POCT BILIRUBIN,UA: ABNORMAL
SL AMB POCT BLOOD,UA: ABNORMAL
SL AMB POCT CLARITY,UA: ABNORMAL
SL AMB POCT COLOR,UA: YELLOW
SL AMB POCT KETONES,UA: ABNORMAL
SL AMB POCT NITRITE,UA: ABNORMAL
SL AMB POCT PH,UA: 6
SL AMB POCT SPECIFIC GRAVITY,UA: 1.02
SL AMB POCT URINE HCG: NEGATIVE
SL AMB POCT URINE PROTEIN: ABNORMAL
SL AMB POCT UROBILINOGEN: 0.2

## 2022-05-14 PROCEDURE — 82948 REAGENT STRIP/BLOOD GLUCOSE: CPT

## 2022-05-14 PROCEDURE — 87086 URINE CULTURE/COLONY COUNT: CPT

## 2022-05-14 PROCEDURE — 81025 URINE PREGNANCY TEST: CPT

## 2022-05-14 PROCEDURE — 81002 URINALYSIS NONAUTO W/O SCOPE: CPT

## 2022-05-14 PROCEDURE — 99213 OFFICE O/P EST LOW 20 MIN: CPT

## 2022-05-14 RX ORDER — CIPROFLOXACIN 500 MG/1
500 TABLET, FILM COATED ORAL EVERY 12 HOURS SCHEDULED
Qty: 20 TABLET | Refills: 0 | Status: SHIPPED | OUTPATIENT
Start: 2022-05-14 | End: 2022-05-24

## 2022-05-14 NOTE — PROGRESS NOTES
[facility]        NAME: Chela Coronado is a 42 y o  female  : 1975    MRN: [id number]  DATE: May 14, 2022  TIME: 2:20 PM      Assessment and Plan     Acute cystitis with hematuria [N30 01]  1  Acute cystitis with hematuria  POCT urine HCG    POCT urine dip    Urine culture    ciprofloxacin (CIPRO) 500 mg tablet     poct hcg negative  poct urine dip shows large amount of blood, and moderate amount of leukocytes  Urine culture sent  Blood sugar 184 mg/dl  Patient was recently treated for keflex and bactrim on  for an abscess    Educated and verbalizes strict guidelines to f/u with obgyn and proceed to ER if symptoms worsen  Patient Instructions   Take antibiotic as prescribed  Recommend probiotic use while taking antibiotic  Avoid strenuous exercises or sudden movement with this medication  Follow-up with your OBGYN  Monitor your blood sugars- if they continue to be elevated or you become symptomatic proceed to the ER  Follow-up with PCP in 3-5 days  Go to ER if symptoms worsen  Chief Complaint     Chief Complaint   Patient presents with    Possible UTI     Pt c/o lower back pain, vaginal itching, and dysuria for three days  History of Present Illness     Patient is a 46-year-old female who presents with pain with urination for the past 4-5 days  Reports low back pain  Reports dizziness secondary to pain  Reports diarrhea  Denies nausea or vomiting  Denies abdominal pain  Denies flank pain  Also reports yellow vaginal discharge  Reports vaginal itching and redness  Denies chance of STI  Patient does have DM history, states her BS yesterday was in the 300's but today it was 140 when she woke up  Denies increased hunger or thirst        Review of Systems     Review of Systems   Endocrine: Negative for polydipsia, polyphagia and polyuria  Genitourinary: Positive for dysuria and vaginal discharge  Musculoskeletal: Positive for back pain     Neurological: Positive for dizziness  All other systems reviewed and are negative  Current Medications       Current Outpatient Medications:     ciprofloxacin (CIPRO) 500 mg tablet, Take 1 tablet (500 mg total) by mouth every 12 (twelve) hours for 10 days, Disp: 20 tablet, Rfl: 0    albuterol (PROVENTIL HFA,VENTOLIN HFA) 90 mcg/act inhaler, INHALE 2 PUFFS EVERY 6 (SIX) HOURS AS NEEDED FOR WHEEZING, Disp: 18 g, Rfl: 6    Alcohol Swabs PADS, Use once daily when testing finger sticks  , Disp: 90 each, Rfl: 1    atorvastatin (LIPITOR) 10 mg tablet, TAKE 1 TABLET (10 MG TOTAL) BY MOUTH DAILY, Disp: 30 tablet, Rfl: 3    citalopram (CeleXA) 40 mg tablet, TAKE 1 TABLET (40 MG TOTAL) BY MOUTH DAILY, Disp: 30 tablet, Rfl: 3    glucose blood (True Metrix Blood Glucose Test) test strip, Use as instructed, Disp: 100 each, Rfl: 3    glucose blood test strip, Use as instructed, Disp: 100 each, Rfl: 2    glucose blood test strip, Use as instructed  Test up to 3 times a day as needed, Disp: 200 each, Rfl: 11    hydrOXYzine HCL (ATARAX) 50 mg tablet, TAKE 1 TABLET (50 MG TOTAL) BY MOUTH EVERY 6 (SIX) HOURS AS NEEDED FOR ITCHING, ALLERGIES OR ANXIETY, Disp: 120 tablet, Rfl: 0    insulin detemir (Levemir FlexTouch) 100 Units/mL injection pen, Inject 20 Units under the skin daily at bedtime, Disp: 15 mL, Rfl: 0    Insulin Pen Needle (Easy Touch Pen Needles) 32G X 5 MM MISC, Use daily at bedtime, Disp: 90 each, Rfl: 1    Lancets MISC, Use once for 1 dose Onetouch Verio   Test once daily or as directed , Disp: 100 each, Rfl: 1    metFORMIN (GLUCOPHAGE) 500 mg tablet, TAKE 2 TABLETS (1,000 MG TOTAL) BY MOUTH DAILY WITH BREAKFAST AND 1 TABLET (500 MG TOTAL) DAILY WITH DINNER , Disp: 90 tablet, Rfl: 3    montelukast (SINGULAIR) 10 mg tablet, TAKE 1 TABLET (10 MG TOTAL) BY MOUTH DAILY AT BEDTIME, Disp: 30 tablet, Rfl: 1    olopatadine (PATANOL) 0 1 % ophthalmic solution, Administer 1 drop to both eyes 2 (two) times a day, Disp: 5 mL, Rfl: 5    pramipexole (MIRAPEX) 0 5 mg tablet, TAKE 1 TABLET (0 5 MG TOTAL) BY MOUTH DAILY AT BEDTIME, Disp: 30 tablet, Rfl: 0    SUMAtriptan (IMITREX) 25 mg tablet, TAKE 1 TABLET (25 MG TOTAL) BY MOUTH ONCE AS NEEDED FOR MIGRAINE, Disp: 9 tablet, Rfl: 0    topiramate (TOPAMAX) 25 mg sprinkle capsule, TAKE 1 CAPSULE (25 MG TOTAL) BY MOUTH 2 (TWO) TIMES A DAY, Disp: 60 capsule, Rfl: 0    traZODone (DESYREL) 100 mg tablet, Take 1 tablet (100 mg total) by mouth daily at bedtime, Disp: 30 tablet, Rfl: 3    Current Allergies     Allergies as of 05/14/2022 - Reviewed 05/14/2022   Allergen Reaction Noted    Apple - food allergy Anaphylaxis 03/13/2022    Grass pollen(k-o-r-t-swt milana) Hives, Itching, Shortness Of Breath, Sneezing, and Throat Swelling 11/17/2021    Dust mite extract Hives and Itching 11/17/2021    Misc natural products Diarrhea, Nausea Only, and Vomiting 11/17/2021    Raw vegetable - food allergy Blisters, Dizziness, Eye Swelling, Fatigue, Headache, Hives, Itching, Rash, Sneezing, Throat Swelling, and Wheezing 03/21/2022    Red dye - food allergy Hives, Itching, and Rash 11/17/2021              The following portions of the patient's history were reviewed and updated as appropriate: allergies, current medications, past family history, past medical history, past social history, past surgical history and problem list      Past Medical History:   Diagnosis Date    Anxiety     Asthma     Closed nondisplaced fracture of proximal phalanx of lesser toe of left foot 1/15/2020    Diabetes mellitus ([address] )     Hyperlipidemia        Past Surgical History:   Procedure Laterality Date    ARTHROSCOPY KNEE Left     CHOLECYSTECTOMY      ECTOPIC PREGNANCY SURGERY      SHOULDER SURGERY Right     torn bicep       Family History   Problem Relation Age of Onset    Obesity Mother     Obesity Father     Diabetes type II Father     Diabetes Father     Obesity Brother     No Known Problems Daughter     No Known Problems Maternal Grandmother     Diabetes Maternal Grandfather     No Known Problems Paternal Grandmother     No Known Problems Maternal Aunt     No Known Problems Maternal Aunt     No Known Problems Maternal Aunt     Heart disease Neg Hx     Stroke Neg Hx     Cancer Neg Hx          Medications have been verified  Objective     /70   Pulse 93   Temp 97 6 °F (36 4 °C)   Resp 18   SpO2 97%   No LMP recorded  Physical Exam     Physical Exam  Vitals and nursing note reviewed  Constitutional:       General: She is not in acute distress  Appearance: Normal appearance  She is not ill-appearing, toxic-appearing or diaphoretic  Cardiovascular:      Rate and Rhythm: Normal rate  Pulses: Normal pulses  Heart sounds: Normal heart sounds, S1 normal and S2 normal    Pulmonary:      Effort: Pulmonary effort is normal       Breath sounds: Normal breath sounds and air entry  Abdominal:      General: Abdomen is flat  Bowel sounds are normal       Palpations: Abdomen is soft  Tenderness: There is abdominal tenderness in the suprapubic area  There is no right CVA tenderness or left CVA tenderness  Skin:     Capillary Refill: Capillary refill takes less than 2 seconds  Neurological:      Mental Status: She is alert  Psychiatric:         Mood and Affect: Mood normal          Behavior: Behavior normal          Thought Content:  Thought content normal

## 2022-05-14 NOTE — PATIENT INSTRUCTIONS
Take antibiotic as prescribed  Recommend probiotic use while taking antibiotic  Avoid strenuous exercises or sudden movement with this medication  Follow-up with your OBGYN  Monitor your blood sugars- if they continue to be elevated or you become symptomatic proceed to the ER  Follow-up with PCP in 3-5 days  Go to ER if symptoms worsen

## 2022-05-14 NOTE — LETTER
May 14, 2022     Patient: Chet Medeiros   YOB: 1975   Date of Visit: 5/14/2022       To Whom It May Concern: It is my medical opinion that Chet Medeiros may return to work on 05/15/2022  If you have any questions or concerns, please don't hesitate to call           Sincerely,        JENNIFER Lynn    CC: No Recipients

## 2022-05-16 LAB — BACTERIA UR CULT: NORMAL

## 2022-05-20 ENCOUNTER — OFFICE VISIT (OUTPATIENT)
Dept: URGENT CARE | Facility: CLINIC | Age: 47
End: 2022-05-20
Payer: COMMERCIAL

## 2022-05-20 VITALS
TEMPERATURE: 96.8 F | RESPIRATION RATE: 18 BRPM | HEART RATE: 108 BPM | BODY MASS INDEX: 39.14 KG/M2 | WEIGHT: 228 LBS | SYSTOLIC BLOOD PRESSURE: 139 MMHG | OXYGEN SATURATION: 98 % | DIASTOLIC BLOOD PRESSURE: 89 MMHG

## 2022-05-20 DIAGNOSIS — R11.0 NAUSEA: Primary | ICD-10-CM

## 2022-05-20 PROCEDURE — 99213 OFFICE O/P EST LOW 20 MIN: CPT

## 2022-05-20 RX ORDER — ONDANSETRON 4 MG/1
4 TABLET, ORALLY DISINTEGRATING ORAL EVERY 6 HOURS PRN
Qty: 20 TABLET | Refills: 0 | Status: SHIPPED | OUTPATIENT
Start: 2022-05-20

## 2022-05-20 NOTE — LETTER
May 20, 2022     Patient: Naheed Jean-Baptiste   YOB: 1975   Date of Visit: 5/20/2022       To Whom it May Concern:    Naheed Jean-Baptiste was seen in my clinic on 5/20/2022  She may return to work on 5/23/2022  If you have any questions or concerns, please don't hesitate to call           Sincerely,          JENNIFER Mckenna        CC: No Recipients

## 2022-05-21 DIAGNOSIS — F41.8 DEPRESSION WITH ANXIETY: ICD-10-CM

## 2022-05-21 RX ORDER — CITALOPRAM 40 MG/1
40 TABLET ORAL DAILY
Qty: 30 TABLET | Refills: 1 | Status: SHIPPED | OUTPATIENT
Start: 2022-05-21

## 2022-05-21 NOTE — PROGRESS NOTES
[facility]        NAME: Viviana Hawthorne is a 42 y o  female  : 1975    MRN: [id number]  DATE: May 20, 2022  TIME: 8:10 PM    Assessment and Plan   Nausea [R11 0]  1  Nausea  ondansetron (Zofran ODT) 4 mg disintegrating tablet         Patient Instructions     Take the zofran as needed for nausea  Take OTC ibuprofen as needed for headache  Follow up with PCP in 3-5 days  Proceed to  ER if symptoms worsen  Chief Complaint   No chief complaint on file  History of Present Illness       Migraine  This is a new problem  The current episode started today  The problem occurs constantly  The problem is unchanged  The pain is present in the bilateral  The pain does not radiate  The pain quality is similar to prior headaches  The quality of the pain is described as shooting and stabbing  The pain is at a severity of 10/10  Associated symptoms include abdominal pain, nausea, neck pain, photophobia and vomiting  Pertinent negatives include no coughing, dizziness, eye pain, eye redness, fever, numbness, seizures, sinus pressure, tingling, visual change or weakness  The symptoms are aggravated by noise and bright light  Past treatments include triptans  The treatment provided no relief  Her past medical history is significant for migraine headaches and obesity  There is no history of a seizure disorder  Review of Systems   Review of Systems   Constitutional: Negative for activity change, appetite change, chills, fatigue and fever  HENT: Negative for sinus pressure and sinus pain  Eyes: Positive for photophobia  Negative for pain and redness  Respiratory: Negative for cough, chest tightness, shortness of breath and wheezing  Cardiovascular: Negative for chest pain and palpitations  Gastrointestinal: Positive for abdominal pain, nausea and vomiting  Musculoskeletal: Positive for neck pain  Neurological: Positive for headaches   Negative for dizziness, tingling, seizures, syncope, weakness, light-headedness and numbness  All other systems reviewed and are negative  Current Medications       Current Outpatient Medications:     ondansetron (Zofran ODT) 4 mg disintegrating tablet, Take 1 tablet (4 mg total) by mouth every 6 (six) hours as needed for nausea or vomiting, Disp: 20 tablet, Rfl: 0    albuterol (PROVENTIL HFA,VENTOLIN HFA) 90 mcg/act inhaler, INHALE 2 PUFFS EVERY 6 (SIX) HOURS AS NEEDED FOR WHEEZING, Disp: 18 g, Rfl: 6    Alcohol Swabs PADS, Use once daily when testing finger sticks  , Disp: 90 each, Rfl: 1    atorvastatin (LIPITOR) 10 mg tablet, TAKE 1 TABLET (10 MG TOTAL) BY MOUTH DAILY, Disp: 30 tablet, Rfl: 3    ciprofloxacin (CIPRO) 500 mg tablet, Take 1 tablet (500 mg total) by mouth every 12 (twelve) hours for 10 days, Disp: 20 tablet, Rfl: 0    citalopram (CeleXA) 40 mg tablet, TAKE 1 TABLET (40 MG TOTAL) BY MOUTH DAILY, Disp: 30 tablet, Rfl: 3    glucose blood (True Metrix Blood Glucose Test) test strip, Use as instructed, Disp: 100 each, Rfl: 3    glucose blood test strip, Use as instructed, Disp: 100 each, Rfl: 2    glucose blood test strip, Use as instructed  Test up to 3 times a day as needed, Disp: 200 each, Rfl: 11    hydrOXYzine HCL (ATARAX) 50 mg tablet, TAKE 1 TABLET (50 MG TOTAL) BY MOUTH EVERY 6 (SIX) HOURS AS NEEDED FOR ITCHING, ALLERGIES OR ANXIETY, Disp: 120 tablet, Rfl: 0    insulin detemir (Levemir FlexTouch) 100 Units/mL injection pen, Inject 20 Units under the skin daily at bedtime, Disp: 15 mL, Rfl: 0    Insulin Pen Needle (Easy Touch Pen Needles) 32G X 5 MM MISC, Use daily at bedtime, Disp: 90 each, Rfl: 1    Lancets MISC, Use once for 1 dose Onetouch Verio   Test once daily or as directed , Disp: 100 each, Rfl: 1    metFORMIN (GLUCOPHAGE) 500 mg tablet, TAKE 2 TABLETS (1,000 MG TOTAL) BY MOUTH DAILY WITH BREAKFAST AND 1 TABLET (500 MG TOTAL) DAILY WITH DINNER , Disp: 90 tablet, Rfl: 3    montelukast (SINGULAIR) 10 mg tablet, TAKE 1 TABLET (10 MG TOTAL) BY MOUTH DAILY AT BEDTIME, Disp: 30 tablet, Rfl: 1    olopatadine (PATANOL) 0 1 % ophthalmic solution, Administer 1 drop to both eyes 2 (two) times a day, Disp: 5 mL, Rfl: 5    pramipexole (MIRAPEX) 0 5 mg tablet, TAKE 1 TABLET (0 5 MG TOTAL) BY MOUTH DAILY AT BEDTIME, Disp: 30 tablet, Rfl: 0    SUMAtriptan (IMITREX) 25 mg tablet, TAKE 1 TABLET (25 MG TOTAL) BY MOUTH ONCE AS NEEDED FOR MIGRAINE, Disp: 9 tablet, Rfl: 0    topiramate (TOPAMAX) 25 mg sprinkle capsule, TAKE 1 CAPSULE (25 MG TOTAL) BY MOUTH 2 (TWO) TIMES A DAY, Disp: 60 capsule, Rfl: 0    traZODone (DESYREL) 100 mg tablet, Take 1 tablet (100 mg total) by mouth daily at bedtime, Disp: 30 tablet, Rfl: 3    Current Allergies     Allergies as of 05/20/2022 - Reviewed 05/20/2022   Allergen Reaction Noted    Apple - food allergy Anaphylaxis 03/13/2022    Grass pollen(k-o-r-t-swt milana) Hives, Itching, Shortness Of Breath, Sneezing, and Throat Swelling 11/17/2021    Dust mite extract Hives and Itching 11/17/2021    Misc natural products Diarrhea, Nausea Only, and Vomiting 11/17/2021    Raw vegetable - food allergy Blisters, Dizziness, Eye Swelling, Fatigue, Headache, Hives, Itching, Rash, Sneezing, Throat Swelling, and Wheezing 03/21/2022    Red dye - food allergy Hives, Itching, and Rash 11/17/2021            The following portions of the patient's history were reviewed and updated as appropriate: allergies, current medications, past family history, past medical history, past social history, past surgical history and problem list      Past Medical History:   Diagnosis Date    Anxiety     Asthma     Closed nondisplaced fracture of proximal phalanx of lesser toe of left foot 1/15/2020    Diabetes mellitus ([facility] [address] )     Hyperlipidemia        Past Surgical History:   Procedure Laterality Date    ARTHROSCOPY KNEE Left     CHOLECYSTECTOMY      ECTOPIC PREGNANCY SURGERY      SHOULDER SURGERY Right     torn bicep       Family History   Problem Relation Age of Onset    Obesity Mother     Obesity Father     Diabetes type II Father     Diabetes Father     Obesity Brother     No Known Problems Daughter     No Known Problems Maternal Grandmother     Diabetes Maternal Grandfather     No Known Problems Paternal Grandmother     No Known Problems Maternal Aunt     No Known Problems Maternal Aunt     No Known Problems Maternal Aunt     Heart disease Neg Hx     Stroke Neg Hx     Cancer Neg Hx          Medications have been verified  Objective   /89   Pulse (!) 108   Temp (!) 96 8 °F (36 °C)   Resp 18   Wt 103 kg (228 lb)   SpO2 98%   BMI 39 14 kg/m²        Physical Exam     Physical Exam  Vitals and nursing note reviewed  Constitutional:       General: She is not in acute distress  Appearance: Normal appearance  She is normal weight  She is not ill-appearing or toxic-appearing  HENT:      Right Ear: Tympanic membrane normal       Left Ear: Tympanic membrane normal       Mouth/Throat:      Pharynx: Oropharynx is clear  Cardiovascular:      Rate and Rhythm: Normal rate and regular rhythm  Pulses: Normal pulses  Heart sounds: Normal heart sounds  Pulmonary:      Effort: Pulmonary effort is normal       Breath sounds: Normal breath sounds  Musculoskeletal:         General: Normal range of motion  Cervical back: Normal range of motion  No rigidity  Skin:     General: Skin is warm and dry  Capillary Refill: Capillary refill takes less than 2 seconds  Neurological:      General: No focal deficit present  Mental Status: She is alert and oriented to person, place, and time  Mental status is at baseline

## 2022-05-23 DIAGNOSIS — G43.109 MIGRAINE WITH AURA AND WITHOUT STATUS MIGRAINOSUS, NOT INTRACTABLE: ICD-10-CM

## 2022-05-23 DIAGNOSIS — F30.9 MOOD DISORDER OF MANIC TYPE (HCC): ICD-10-CM

## 2022-05-23 RX ORDER — PRAMIPEXOLE DIHYDROCHLORIDE 0.5 MG/1
0.5 TABLET ORAL
Qty: 30 TABLET | Refills: 0 | Status: SHIPPED | OUTPATIENT
Start: 2022-05-23 | End: 2022-05-23 | Stop reason: SDUPTHER

## 2022-05-23 RX ORDER — PRAMIPEXOLE DIHYDROCHLORIDE 0.5 MG/1
0.5 TABLET ORAL
Qty: 30 TABLET | Refills: 0 | Status: SHIPPED | OUTPATIENT
Start: 2022-05-23 | End: 2022-06-17

## 2022-05-23 NOTE — TELEPHONE ENCOUNTER
Pt needs refill on her pramipexole 0 5 mg takes 1 bedtime # 30  Refill 0    Needs refill on her Toriramate 25 mg takes 1 2 x daily #60    Refill O      [facility]: [phone]

## 2022-05-25 RX ORDER — TOPIRAMATE 25 MG/1
25 CAPSULE, COATED PELLETS ORAL 2 TIMES DAILY
Qty: 60 CAPSULE | Refills: 0 | Status: SHIPPED | OUTPATIENT
Start: 2022-05-25 | End: 2022-06-17

## 2022-06-17 DIAGNOSIS — G43.109 MIGRAINE WITH AURA AND WITHOUT STATUS MIGRAINOSUS, NOT INTRACTABLE: ICD-10-CM

## 2022-06-17 DIAGNOSIS — J30.1 ALLERGIC RHINITIS DUE TO POLLEN, UNSPECIFIED SEASONALITY: ICD-10-CM

## 2022-06-17 DIAGNOSIS — F30.9 MOOD DISORDER OF MANIC TYPE (HCC): ICD-10-CM

## 2022-06-17 RX ORDER — TOPIRAMATE 25 MG/1
25 CAPSULE, COATED PELLETS ORAL 2 TIMES DAILY
Qty: 60 CAPSULE | Refills: 0 | Status: SHIPPED | OUTPATIENT
Start: 2022-06-17 | End: 2022-07-16

## 2022-06-17 RX ORDER — MONTELUKAST SODIUM 10 MG/1
10 TABLET ORAL
Qty: 30 TABLET | Refills: 1 | Status: SHIPPED | OUTPATIENT
Start: 2022-06-17

## 2022-06-17 RX ORDER — PRAMIPEXOLE DIHYDROCHLORIDE 0.5 MG/1
0.5 TABLET ORAL
Qty: 30 TABLET | Refills: 0 | Status: SHIPPED | OUTPATIENT
Start: 2022-06-17 | End: 2022-07-14

## 2022-07-09 DIAGNOSIS — G47.09 OTHER INSOMNIA: ICD-10-CM

## 2022-07-09 RX ORDER — TRAZODONE HYDROCHLORIDE 100 MG/1
100 TABLET ORAL
Qty: 30 TABLET | Refills: 3 | Status: SHIPPED | OUTPATIENT
Start: 2022-07-09

## 2022-07-13 DIAGNOSIS — E11.9 TYPE 2 DIABETES MELLITUS WITHOUT COMPLICATION, WITHOUT LONG-TERM CURRENT USE OF INSULIN (HCC): ICD-10-CM

## 2022-07-14 DIAGNOSIS — F30.9 MOOD DISORDER OF MANIC TYPE (HCC): ICD-10-CM

## 2022-07-14 RX ORDER — PRAMIPEXOLE DIHYDROCHLORIDE 0.5 MG/1
0.5 TABLET ORAL
Qty: 30 TABLET | Refills: 0 | Status: SHIPPED | OUTPATIENT
Start: 2022-07-14 | End: 2022-08-10

## 2022-07-16 DIAGNOSIS — G43.109 MIGRAINE WITH AURA AND WITHOUT STATUS MIGRAINOSUS, NOT INTRACTABLE: ICD-10-CM

## 2022-07-16 RX ORDER — TOPIRAMATE 25 MG/1
25 CAPSULE, COATED PELLETS ORAL 2 TIMES DAILY
Qty: 60 CAPSULE | Refills: 0 | Status: SHIPPED | OUTPATIENT
Start: 2022-07-16 | End: 2022-08-13

## 2022-08-10 DIAGNOSIS — F30.9 MOOD DISORDER OF MANIC TYPE (HCC): ICD-10-CM

## 2022-08-10 RX ORDER — PRAMIPEXOLE DIHYDROCHLORIDE 0.5 MG/1
0.5 TABLET ORAL
Qty: 30 TABLET | Refills: 0 | Status: SHIPPED | OUTPATIENT
Start: 2022-08-10 | End: 2022-09-06

## 2022-08-12 DIAGNOSIS — J30.1 ALLERGIC RHINITIS DUE TO POLLEN, UNSPECIFIED SEASONALITY: ICD-10-CM

## 2022-08-12 DIAGNOSIS — G43.109 MIGRAINE WITH AURA AND WITHOUT STATUS MIGRAINOSUS, NOT INTRACTABLE: ICD-10-CM

## 2022-08-13 RX ORDER — TOPIRAMATE 25 MG/1
25 CAPSULE, COATED PELLETS ORAL 2 TIMES DAILY
Qty: 60 CAPSULE | Refills: 0 | Status: SHIPPED | OUTPATIENT
Start: 2022-08-13 | End: 2022-09-10

## 2022-08-13 RX ORDER — MONTELUKAST SODIUM 10 MG/1
10 TABLET ORAL
Qty: 30 TABLET | Refills: 1 | Status: SHIPPED | OUTPATIENT
Start: 2022-08-13 | End: 2022-10-06

## 2022-08-26 PROBLEM — E11.65 TYPE 2 DIABETES MELLITUS WITH HYPERGLYCEMIA, WITH LONG-TERM CURRENT USE OF INSULIN (HCC): Status: ACTIVE | Noted: 2022-03-21

## 2022-08-29 DIAGNOSIS — F41.8 DEPRESSION WITH ANXIETY: ICD-10-CM

## 2022-08-29 RX ORDER — CITALOPRAM 40 MG/1
40 TABLET ORAL DAILY
Qty: 30 TABLET | Refills: 3 | Status: SHIPPED | OUTPATIENT
Start: 2022-08-29

## 2022-09-06 DIAGNOSIS — F30.9 MOOD DISORDER OF MANIC TYPE (HCC): ICD-10-CM

## 2022-09-06 RX ORDER — PRAMIPEXOLE DIHYDROCHLORIDE 0.5 MG/1
0.5 TABLET ORAL
Qty: 30 TABLET | Refills: 0 | Status: SHIPPED | OUTPATIENT
Start: 2022-09-06

## 2022-09-09 DIAGNOSIS — G43.109 MIGRAINE WITH AURA AND WITHOUT STATUS MIGRAINOSUS, NOT INTRACTABLE: ICD-10-CM

## 2022-09-10 RX ORDER — TOPIRAMATE 25 MG/1
25 CAPSULE, COATED PELLETS ORAL 2 TIMES DAILY
Qty: 60 CAPSULE | Refills: 0 | Status: SHIPPED | OUTPATIENT
Start: 2022-09-10 | End: 2022-10-08

## 2022-09-14 DIAGNOSIS — E11.9 TYPE 2 DIABETES MELLITUS WITHOUT COMPLICATION, WITHOUT LONG-TERM CURRENT USE OF INSULIN (HCC): ICD-10-CM

## 2022-09-14 RX ORDER — ATORVASTATIN CALCIUM 10 MG/1
10 TABLET, FILM COATED ORAL DAILY
Qty: 30 TABLET | Refills: 3 | Status: SHIPPED | OUTPATIENT
Start: 2022-09-14

## 2022-09-21 DIAGNOSIS — R05.9 COUGH: ICD-10-CM

## 2022-09-22 RX ORDER — ALBUTEROL SULFATE 90 UG/1
2 AEROSOL, METERED RESPIRATORY (INHALATION) EVERY 6 HOURS PRN
Qty: 18 G | Refills: 6 | Status: SHIPPED | OUTPATIENT
Start: 2022-09-22

## 2022-10-06 DIAGNOSIS — J30.1 ALLERGIC RHINITIS DUE TO POLLEN, UNSPECIFIED SEASONALITY: ICD-10-CM

## 2022-10-06 RX ORDER — MONTELUKAST SODIUM 10 MG/1
10 TABLET ORAL
Qty: 30 TABLET | Refills: 1 | Status: SHIPPED | OUTPATIENT
Start: 2022-10-06

## 2022-10-07 DIAGNOSIS — G43.109 MIGRAINE WITH AURA AND WITHOUT STATUS MIGRAINOSUS, NOT INTRACTABLE: ICD-10-CM

## 2022-10-08 RX ORDER — TOPIRAMATE 25 MG/1
25 CAPSULE, COATED PELLETS ORAL 2 TIMES DAILY
Qty: 60 CAPSULE | Refills: 0 | Status: SHIPPED | OUTPATIENT
Start: 2022-10-08

## 2022-10-11 PROBLEM — A41.9 SEPSIS DUE TO CELLULITIS (HCC): Status: RESOLVED | Noted: 2022-03-11 | Resolved: 2022-10-11

## 2022-10-11 PROBLEM — L03.90 SEPSIS DUE TO CELLULITIS (HCC): Status: RESOLVED | Noted: 2022-03-11 | Resolved: 2022-10-11

## 2022-10-28 ENCOUNTER — VBI (OUTPATIENT)
Dept: ADMINISTRATIVE | Facility: OTHER | Age: 47
End: 2022-10-28

## 2022-11-01 DIAGNOSIS — G47.09 OTHER INSOMNIA: ICD-10-CM

## 2022-11-01 RX ORDER — TRAZODONE HYDROCHLORIDE 100 MG/1
100 TABLET ORAL
Qty: 30 TABLET | Refills: 0 | Status: SHIPPED | OUTPATIENT
Start: 2022-11-01 | End: 2022-11-29

## 2022-11-01 NOTE — TELEPHONE ENCOUNTER
Pt called and stated that she needs her refill on her trazodone 100 mg takes 1 at Bedtime    She cannot wait till 11/09/2022 for her appointment    Phone [address] [facility]

## 2022-11-01 NOTE — TELEPHONE ENCOUNTER
Patient requesting refill(s) of: trazodone     Last filled: 7/9/2022 30 tabs 3 refills   Last appt: 3/23/2022  Next appt: 11/9/2022  Pharmacy: [address] [place] [address]

## 2022-11-04 ENCOUNTER — HOSPITAL ENCOUNTER (EMERGENCY)
Facility: HOSPITAL | Age: 47
Discharge: HOME | End: 2022-11-04
Attending: EMERGENCY MEDICINE | Admitting: EMERGENCY MEDICINE

## 2022-11-04 VITALS
RESPIRATION RATE: 18 BRPM | OXYGEN SATURATION: 100 % | HEART RATE: 96 BPM | DIASTOLIC BLOOD PRESSURE: 65 MMHG | SYSTOLIC BLOOD PRESSURE: 113 MMHG | TEMPERATURE: 98.5 F
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
## 2022-11-04 DIAGNOSIS — L03.90 CELLULITIS: ICD-10-CM

## 2022-11-04 RX ORDER — CEPHALEXIN 500 MG/1
500 CAPSULE ORAL EVERY 6 HOURS SCHEDULED
Qty: 28 CAPSULE | Refills: 0 | OUTPATIENT
Start: 2022-11-04 | End: 2022-11-13

## 2022-11-04 RX ORDER — SULFAMETHOXAZOLE AND TRIMETHOPRIM 800; 160 MG/1; MG/1
1 TABLET ORAL 2 TIMES DAILY
Qty: 14 TABLET | Refills: 0 | OUTPATIENT
Start: 2022-11-04 | End: 2022-11-13

## 2022-11-04 NOTE — ED PROVIDER NOTES
History  Chief Complaint   Patient presents with   • Insect Bite     Pt presents c/o 3 bug bites to R axilla that she noticed 3 days ago and reports, "I also have a history of abscesses  "     65-year-old female presents to the emergency department for evaluation a insect bite in the area of the right axilla  Patient states that insect bite was noted approximately 3 days ago  She has a history of cellulitis and potential abscess development  Overall she is well-appearing in no acute distress  The area is tender and erythematous compatible with cellulitis  Allergies reviewed          Prior to Admission Medications   Prescriptions Last Dose Informant Patient Reported? Taking? Alcohol Swabs PADS   No No   Sig: Use once daily when testing finger sticks  Insulin Pen Needle (Easy Touch Pen Needles) 32G X 5 MM MISC   No No   Sig: Use daily at bedtime   Lancets MISC  Self No No   Sig: Use once for 1 dose Onetouch Verio  Test once daily or as directed  SUMAtriptan (IMITREX) 25 mg tablet   No No   Sig: TAKE 1 TABLET (25 MG TOTAL) BY MOUTH ONCE AS NEEDED FOR MIGRAINE   albuterol (PROVENTIL HFA,VENTOLIN HFA) 90 mcg/act inhaler   No No   Sig: INHALE 2 PUFFS EVERY 6 (SIX) HOURS AS NEEDED FOR WHEEZING   atorvastatin (LIPITOR) 10 mg tablet   No No   Sig: TAKE 1 TABLET (10 MG TOTAL) BY MOUTH DAILY   citalopram (CeleXA) 40 mg tablet   No No   Sig: TAKE 1 TABLET (40 MG TOTAL) BY MOUTH DAILY   glucose blood (True Metrix Blood Glucose Test) test strip   No No   Sig: Use as instructed   glucose blood test strip  Self No No   Sig: Use as instructed   glucose blood test strip  Self No No   Sig: Use as instructed   Test up to 3 times a day as needed   hydrOXYzine HCL (ATARAX) 50 mg tablet  Self No No   Sig: TAKE 1 TABLET (50 MG TOTAL) BY MOUTH EVERY 6 (SIX) HOURS AS NEEDED FOR ITCHING, ALLERGIES OR ANXIETY   insulin detemir (Levemir FlexTouch) 100 Units/mL injection pen   No No   Sig: Inject 20 Units under the skin daily at bedtime   metFORMIN (GLUCOPHAGE) 500 mg tablet   No No   Sig: TAKE 2 TABLETS (1,000 MG TOTAL) BY MOUTH DAILY WITH BREAKFAST AND 1 TABLET (500 MG TOTAL) DAILY WITH DINNER  montelukast (SINGULAIR) 10 mg tablet   No No   Sig: TAKE 1 TABLET (10 MG TOTAL) BY MOUTH DAILY AT BEDTIME   olopatadine (PATANOL) 0 1 % ophthalmic solution  Self No No   Sig: Administer 1 drop to both eyes 2 (two) times a day   ondansetron (Zofran ODT) 4 mg disintegrating tablet   No No   Sig: Take 1 tablet (4 mg total) by mouth every 6 (six) hours as needed for nausea or vomiting   pramipexole (MIRAPEX) 0 5 mg tablet   No No   Sig: TAKE 1 TABLET (0 5 MG TOTAL) BY MOUTH DAILY AT BEDTIME   topiramate (TOPAMAX) 25 mg sprinkle capsule   No No   Sig: TAKE 1 CAPSULE (25 MG TOTAL) BY MOUTH IN THE MORNING AND 1 CAPSULE (25 MG TOTAL) IN THE EVENING  traZODone (DESYREL) 100 mg tablet   No No   Sig: Take 1 tablet (100 mg total) by mouth daily at bedtime      Facility-Administered Medications: None       Past Medical History:   Diagnosis Date   • Anxiety    • Asthma    • Closed nondisplaced fracture of proximal phalanx of lesser toe of left foot 1/15/2020   • [facility]    • Hyperlipidemia        Past Surgical History:   Procedure Laterality Date   • ARTHROSCOPY KNEE Left    • CHOLECYSTECTOMY     • ECTOPIC PREGNANCY SURGERY     • SHOULDER SURGERY Right     torn bicep       Family History   Problem Relation Age of Onset   • Obesity Mother    • Obesity Father    • Diabetes type II Father    • Diabetes Father    • Obesity Brother    • No Known Problems Daughter    • No Known Problems Maternal Grandmother    • Diabetes Maternal Grandfather    • No Known Problems Paternal Grandmother    • No Known Problems Maternal Aunt    • No Known Problems Maternal Aunt    • No Known Problems Maternal Aunt    • Heart disease Neg Hx    • Stroke Neg Hx    • Cancer Neg Hx      I have reviewed and agree with the history as documented      E-Cigarette/Vaping   • E-Cigarette Use Never User      E-Cigarette/Vaping Substances   • Nicotine No    • THC No    • CBD No    • Flavoring No    • Other No    • Unknown No      Social History     Tobacco Use   • Smoking status: Never Smoker   • Smokeless tobacco: Never Used   Vaping Use   • Vaping Use: Never used   Substance Use Topics   • Alcohol use: Not Currently     Alcohol/week: 0 0 standard drinks     Comment: 0   • Drug use: Not Currently       Review of Systems   Constitutional: Negative for chills, fatigue and fever  HENT: Negative for congestion, ear pain, rhinorrhea, sinus pressure, sneezing, sore throat and trouble swallowing  Eyes: Negative for discharge and itching  Respiratory: Negative for cough, chest tightness, shortness of breath, wheezing and stridor  Cardiovascular: Negative for chest pain and palpitations  Gastrointestinal: Negative for abdominal pain, diarrhea, nausea and vomiting  Skin: Positive for wound  Neurological: Negative for dizziness, syncope, numbness and headaches  All other systems reviewed and are negative  Physical Exam  Physical Exam  Vitals and nursing note reviewed  Constitutional:       General: She is not in acute distress  Appearance: She is well-developed  She is not diaphoretic  HENT:      Head: Normocephalic and atraumatic  Right Ear: External ear normal       Left Ear: External ear normal       Nose: Nose normal    Eyes:      Conjunctiva/sclera: Conjunctivae normal       Pupils: Pupils are equal, round, and reactive to light  Cardiovascular:      Rate and Rhythm: Normal rate and regular rhythm  Heart sounds: Normal heart sounds  No murmur heard  No friction rub  No gallop  Pulmonary:      Effort: Pulmonary effort is normal  No respiratory distress  Breath sounds: Normal breath sounds  No stridor  No wheezing or rales  Abdominal:      General: Bowel sounds are normal  There is no distension  Palpations: Abdomen is soft  Tenderness: There is no abdominal tenderness  There is no guarding  Musculoskeletal:         General: No tenderness  Normal range of motion  Cervical back: Normal range of motion  Skin:     General: Skin is warm  Capillary Refill: Capillary refill takes less than 2 seconds  Comments: Cellulitis of the right axilla in the area of small punctate bug bite  No abscess appreciated  Neurological:      Mental Status: She is alert and oriented to person, place, and time  Vital Signs  ED Triage Vitals [11/04/22 1656]   Temperature Pulse Respirations Blood Pressure SpO2   98 5 °F (36 9 °C) 96 18 113/65 100 %      Temp Source Heart Rate Source Patient Position - Orthostatic VS BP Location FiO2 (%)   Tympanic Monitor Sitting Left arm --      Pain Score       --           Vitals:    11/04/22 1656   BP: 113/65   Pulse: 96   Patient Position - Orthostatic VS: Sitting         Visual Acuity      ED Medications  Medications - No data to display    Diagnostic Studies  Results Reviewed     None                 No orders to display              Procedures  Procedures         ED Course                               SBIRT 20yo+    Flowsheet Row Most Recent Value   SBIRT (23 yo +)    In order to provide better care to our patients, we are screening all of our patients for alcohol and drug use  Would it be okay to ask you these screening questions? Yes Filed at: 11/04/2022 1708   Initial Alcohol Screen: US AUDIT-C     1  How often do you have a drink containing alcohol? 0 Filed at: 11/04/2022 1708   2  How many drinks containing alcohol do you have on a typical day you are drinking? 0 Filed at: 11/04/2022 1708   3a  Male UNDER 65: How often do you have five or more drinks on one occasion? 0 Filed at: 11/04/2022 1708   3b  FEMALE Any Age, or MALE 65+: How often do you have 4 or more drinks on one occassion?  0 Filed at: 11/04/2022 1708   Audit-C Score 0 Filed at: 11/04/2022 1708   SELMA: How many times in the past year have you    Used an illegal drug or used a prescription medication for non-medical reasons? Never Filed at: 11/04/2022 1708                    [facility]  Number of Diagnoses or Management Options  Cellulitis  Insect bite  Diagnosis management comments: Cellulitis of right axilla  Keflex Bactrim  Follow-up with PCP  Patient educated regarding their diagnosis and given return and follow-up instructions  Patient was advised to returned to the ED with worsening symptoms or concerns  Patient is understanding of and in agreement with the treatment plan  There are no questions at the time of discharge  Risk of Complications, Morbidity, and/or Mortality  Presenting problems: low  Diagnostic procedures: low  Management options: low    Patient Progress  Patient progress: stable      Disposition  Final diagnoses:   Insect bite   Cellulitis     Time reflects when diagnosis was documented in both MDM as applicable and the Disposition within this note     Time User Action Codes Description Comment    11/4/2022  5:11 PM Clara Dessert Add Madelyn Holstein  XXXA] Insect bite     11/4/2022  5:11 PM Clara Dessert Add [V80 59] Cellulitis       ED Disposition     ED Disposition   Discharge    Condition   Stable    Date/Time   Fri Nov 4, 2022  5:11 PM    Comment   Essence Mittal discharge to home/self care                 Follow-up Information     Follow up With Specialties Details Why [address], [address] [place] [address], Nurse Practitioner   [address] [place] [address] [address] [phone]            Discharge Medication List as of 11/4/2022  5:12 PM      START taking these medications    Details   cephalexin (KEFLEX) 500 mg capsule Take 1 capsule (500 mg total) by mouth every 6 (six) hours for 7 days, Starting Fri 11/4/2022, Until Fri 11/11/2022, Normal      sulfamethoxazole-trimethoprim (BACTRIM DS) 800-160 mg per tablet Take 1 tablet by mouth 2 (two) times a day for 7 days smx-tmp DS (BACTRIM) 800-160 mg tabs (1tab q12 D10), Starting Fri 11/4/2022, Until Fri 11/11/2022, Normal         CONTINUE these medications which have NOT CHANGED    Details   albuterol (PROVENTIL HFA,VENTOLIN HFA) 90 mcg/act inhaler INHALE 2 PUFFS EVERY 6 (SIX) HOURS AS NEEDED FOR WHEEZING, Starting Thu 9/22/2022, Normal      Alcohol Swabs PADS Use once daily when testing finger sticks , Normal      atorvastatin (LIPITOR) 10 mg tablet TAKE 1 TABLET (10 MG TOTAL) BY MOUTH DAILY, Starting Wed 9/14/2022, Normal      citalopram (CeleXA) 40 mg tablet TAKE 1 TABLET (40 MG TOTAL) BY MOUTH DAILY, Starting Mon 8/29/2022, Normal      !! glucose blood (True Metrix Blood Glucose Test) test strip Use as instructed, Normal      !! glucose blood test strip Use as instructed, Normal      !! glucose blood test strip Use as instructed  Test up to 3 times a day as needed, Normal      hydrOXYzine HCL (ATARAX) 50 mg tablet TAKE 1 TABLET (50 MG TOTAL) BY MOUTH EVERY 6 (SIX) HOURS AS NEEDED FOR ITCHING, ALLERGIES OR ANXIETY, Starting Tue 1/18/2022, Normal      insulin detemir (Levemir FlexTouch) 100 Units/mL injection pen Inject 20 Units under the skin daily at bedtime, Starting Thu 5/5/2022, Normal      Insulin Pen Needle (Easy Touch Pen Needles) 32G X 5 MM MISC Use daily at bedtime, Starting Wed 3/23/2022, Normal      Lancets MISC Use once for 1 dose Onetouch Verio   Test once daily or as directed , Starting Fri 6/11/2021, Normal      metFORMIN (GLUCOPHAGE) 500 mg tablet Multiple Dosages:Starting Wed 7/13/2022TAKE 2 TABLETS (1,000 MG TOTAL) BY MOUTH DAILY WITH BREAKFAST AND 1 TABLET (500 MG TOTAL) DAILY WITH DINNER , Normal      montelukast (SINGULAIR) 10 mg tablet TAKE 1 TABLET (10 MG TOTAL) BY MOUTH DAILY AT BEDTIME, Starting Thu 10/6/2022, Normal      olopatadine (PATANOL) 0 1 % ophthalmic solution Administer 1 drop to both eyes 2 (two) times a day, Starting Wed 7/7/2021, Normal      ondansetron (Zofran ODT) 4 mg disintegrating tablet Take 1 tablet (4 mg total) by mouth every 6 (six) hours as needed for nausea or vomiting, Starting Fri 5/20/2022, Normal      pramipexole (MIRAPEX) 0 5 mg tablet TAKE 1 TABLET (0 5 MG TOTAL) BY MOUTH DAILY AT BEDTIME, Starting Tue 9/6/2022, Normal      SUMAtriptan (IMITREX) 25 mg tablet TAKE 1 TABLET (25 MG TOTAL) BY MOUTH ONCE AS NEEDED FOR MIGRAINE, Starting Mon 4/4/2022, Normal      topiramate (TOPAMAX) 25 mg sprinkle capsule TAKE 1 CAPSULE (25 MG TOTAL) BY MOUTH IN THE MORNING AND 1 CAPSULE (25 MG TOTAL) IN THE EVENING , Starting Wed 11/2/2022, Normal      traZODone (DESYREL) 100 mg tablet Take 1 tablet (100 mg total) by mouth daily at bedtime, Starting Tue 11/1/2022, Normal       !! - Potential duplicate medications found  Please discuss with provider  No discharge procedures on file      PDMP Review     None          ED Provider  Electronically Signed by           Desire Fisher PA-C  11/04/22 7527 endoscopy

## 2022-11-12 ENCOUNTER — HOSPITAL ENCOUNTER (OUTPATIENT)
Facility: HOSPITAL | Age: 47
Setting detail: OBSERVATION
Discharge: HOME | End: 2022-11-13
Attending: EMERGENCY MEDICINE | Admitting: INTERNAL MEDICINE

## 2022-11-12 ENCOUNTER — APPOINTMENT (EMERGENCY)
Dept: RADIOLOGY | Facility: HOSPITAL | Age: 47
End: 2022-11-12

## 2022-11-12 DIAGNOSIS — J30.1 ALLERGIC RHINITIS DUE TO POLLEN, UNSPECIFIED SEASONALITY: ICD-10-CM

## 2022-11-12 DIAGNOSIS — B33.8 RSV INFECTION: Primary | ICD-10-CM

## 2022-11-12 DIAGNOSIS — R06.02 SOB (SHORTNESS OF BREATH): ICD-10-CM

## 2022-11-12 DIAGNOSIS — R06.2 DIFFUSE WHEEZING: ICD-10-CM

## 2022-11-12 PROBLEM — Z79.4 TYPE 2 DIABETES MELLITUS WITH HYPERGLYCEMIA, WITH LONG-TERM CURRENT USE OF INSULIN (HCC): Chronic | Status: ACTIVE | Noted: 2022-03-21

## 2022-11-12 PROBLEM — E11.65 TYPE 2 DIABETES MELLITUS WITH HYPERGLYCEMIA, WITH LONG-TERM CURRENT USE OF INSULIN (HCC): Chronic | Status: ACTIVE | Noted: 2022-03-21

## 2022-11-12 PROBLEM — Z98.890 STATUS POST INCISION AND DRAINAGE: Status: RESOLVED | Noted: 2022-03-21 | Resolved: 2022-11-12

## 2022-11-12 PROBLEM — J21.0 RSV (ACUTE BRONCHIOLITIS DUE TO RESPIRATORY SYNCYTIAL VIRUS): Status: ACTIVE | Noted: 2022-11-12

## 2022-11-12 PROBLEM — G25.81 RESTLESS LEG SYNDROME: Chronic | Status: ACTIVE | Noted: 2022-11-12

## 2022-11-12 LAB
ALBUMIN SERPL BCP-MCNC: 3.8 G/DL (ref 3.5–5)
ALP SERPL-CCNC: 67 U/L (ref 34–104)
ALT SERPL W P-5'-P-CCNC: 22 U/L (ref 7–52)
ANION GAP SERPL CALCULATED.3IONS-SCNC: 8 MMOL/L (ref 4–13)
AST SERPL W P-5'-P-CCNC: 17 U/L (ref 13–39)
BASOPHILS # BLD AUTO: 0.04 THOUSANDS/ÂΜL (ref 0–0.1)
BASOPHILS NFR BLD AUTO: 1 % (ref 0–1)
BILIRUB SERPL-MCNC: 0.27 MG/DL (ref 0.2–1)
BUN SERPL-MCNC: 6 MG/DL (ref 5–25)
CALCIUM SERPL-MCNC: 8.8 MG/DL (ref 8.4–10.2)
CARDIAC TROPONIN I PNL SERPL HS: <2 NG/L
CHLORIDE SERPL-SCNC: 110 MMOL/L (ref 96–108)
CO2 SERPL-SCNC: 19 MMOL/L (ref 21–32)
CREAT SERPL-MCNC: 0.73 MG/DL (ref 0.6–1.3)
EOSINOPHIL # BLD AUTO: 0.15 THOUSAND/ÂΜL (ref 0–0.61)
EOSINOPHIL NFR BLD AUTO: 2 % (ref 0–6)
ERYTHROCYTE [DISTWIDTH] IN BLOOD BY AUTOMATED COUNT: 12.7 % (ref 11.6–15.1)
EXT PREG TEST URINE: NORMAL
EXT. CONTROL ED NAV: NORMAL
FLUAV RNA RESP QL NAA+PROBE: NEGATIVE
FLUBV RNA RESP QL NAA+PROBE: NEGATIVE
GFR SERPL CREATININE-BSD FRML MDRD: 99 ML/MIN/1.73SQ M
GLUCOSE SERPL-MCNC: 187 MG/DL (ref 65–140)
GLUCOSE SERPL-MCNC: 337 MG/DL (ref 65–140)
HCT VFR BLD AUTO: 37.9 % (ref 34.8–46.1)
HGB BLD-MCNC: 12.5 G/DL (ref 11.5–15.4)
IMM GRANULOCYTES # BLD AUTO: 0.03 THOUSAND/UL (ref 0–0.2)
IMM GRANULOCYTES NFR BLD AUTO: 1 % (ref 0–2)
LYMPHOCYTES # BLD AUTO: 2.24 THOUSANDS/ÂΜL (ref 0.6–4.47)
LYMPHOCYTES NFR BLD AUTO: 37 % (ref 14–44)
MCH RBC QN AUTO: 30.3 PG (ref 26.8–34.3)
MCHC RBC AUTO-ENTMCNC: 33 G/DL (ref 31.4–37.4)
MCV RBC AUTO: 92 FL (ref 82–98)
MONOCYTES # BLD AUTO: 1.1 THOUSAND/ÂΜL (ref 0.17–1.22)
MONOCYTES NFR BLD AUTO: 18 % (ref 4–12)
NEUTROPHILS # BLD AUTO: 2.57 THOUSANDS/ÂΜL (ref 1.85–7.62)
NEUTS SEG NFR BLD AUTO: 41 % (ref 43–75)
NRBC BLD AUTO-RTO: 0 /100 WBCS
PLATELET # BLD AUTO: 274 THOUSANDS/UL (ref 149–390)
PMV BLD AUTO: 9.3 FL (ref 8.9–12.7)
POTASSIUM SERPL-SCNC: 3.3 MMOL/L (ref 3.5–5.3)
PROT SERPL-MCNC: 6.9 G/DL (ref 6.4–8.4)
RBC # BLD AUTO: 4.12 MILLION/UL (ref 3.81–5.12)
RSV RNA RESP QL NAA+PROBE: POSITIVE
SARS-COV-2 RNA RESP QL NAA+PROBE: NEGATIVE
SODIUM SERPL-SCNC: 137 MMOL/L (ref 135–147)
WBC # BLD AUTO: 6.13 THOUSAND/UL (ref 4.31–10.16)

## 2022-11-12 RX ORDER — PREDNISONE 20 MG/1
40 TABLET ORAL DAILY
Status: DISCONTINUED | OUTPATIENT
Start: 2022-11-13 | End: 2022-11-13 | Stop reason: HOSPADM

## 2022-11-12 RX ORDER — HYDROXYZINE 50 MG/1
50 TABLET, FILM COATED ORAL EVERY 6 HOURS PRN
Status: DISCONTINUED | OUTPATIENT
Start: 2022-11-12 | End: 2022-11-13 | Stop reason: HOSPADM

## 2022-11-12 RX ORDER — TRAZODONE HYDROCHLORIDE 50 MG/1
100 TABLET ORAL
Status: DISCONTINUED | OUTPATIENT
Start: 2022-11-12 | End: 2022-11-13 | Stop reason: HOSPADM

## 2022-11-12 RX ORDER — ACETAMINOPHEN 325 MG/1
650 TABLET ORAL EVERY 4 HOURS PRN
Status: DISCONTINUED | OUTPATIENT
Start: 2022-11-12 | End: 2022-11-13 | Stop reason: HOSPADM

## 2022-11-12 RX ORDER — ALBUTEROL SULFATE 2.5 MG/3ML
5 SOLUTION RESPIRATORY (INHALATION) ONCE
Status: COMPLETED | OUTPATIENT
Start: 2022-11-12 | End: 2022-11-12

## 2022-11-12 RX ORDER — PRAMIPEXOLE DIHYDROCHLORIDE 0.5 MG/1
0.5 TABLET ORAL
Status: DISCONTINUED | OUTPATIENT
Start: 2022-11-12 | End: 2022-11-13 | Stop reason: HOSPADM

## 2022-11-12 RX ORDER — GUAIFENESIN 100 MG/5ML
200 SOLUTION ORAL EVERY 4 HOURS PRN
Status: DISCONTINUED | OUTPATIENT
Start: 2022-11-12 | End: 2022-11-13 | Stop reason: HOSPADM

## 2022-11-12 RX ORDER — BENZONATATE 100 MG/1
100 CAPSULE ORAL ONCE
Status: COMPLETED | OUTPATIENT
Start: 2022-11-12 | End: 2022-11-12

## 2022-11-12 RX ORDER — ALBUTEROL SULFATE 90 UG/1
2 AEROSOL, METERED RESPIRATORY (INHALATION) EVERY 6 HOURS PRN
Status: DISCONTINUED | OUTPATIENT
Start: 2022-11-12 | End: 2022-11-13 | Stop reason: HOSPADM

## 2022-11-12 RX ORDER — ONDANSETRON 2 MG/ML
4 INJECTION INTRAMUSCULAR; INTRAVENOUS EVERY 6 HOURS PRN
Status: DISCONTINUED | OUTPATIENT
Start: 2022-11-12 | End: 2022-11-13 | Stop reason: HOSPADM

## 2022-11-12 RX ORDER — MONTELUKAST SODIUM 10 MG/1
10 TABLET ORAL
Status: DISCONTINUED | OUTPATIENT
Start: 2022-11-12 | End: 2022-11-13 | Stop reason: HOSPADM

## 2022-11-12 RX ORDER — ENOXAPARIN SODIUM 100 MG/ML
40 INJECTION SUBCUTANEOUS DAILY
Status: DISCONTINUED | OUTPATIENT
Start: 2022-11-13 | End: 2022-11-13 | Stop reason: HOSPADM

## 2022-11-12 RX ORDER — INSULIN LISPRO 100 [IU]/ML
1-6 INJECTION, SOLUTION INTRAVENOUS; SUBCUTANEOUS
Status: DISCONTINUED | OUTPATIENT
Start: 2022-11-13 | End: 2022-11-13 | Stop reason: HOSPADM

## 2022-11-12 RX ORDER — ATORVASTATIN CALCIUM 10 MG/1
10 TABLET, FILM COATED ORAL DAILY
Status: DISCONTINUED | OUTPATIENT
Start: 2022-11-13 | End: 2022-11-13 | Stop reason: HOSPADM

## 2022-11-12 RX ORDER — METHYLPREDNISOLONE SODIUM SUCCINATE 125 MG/2ML
125 INJECTION, POWDER, LYOPHILIZED, FOR SOLUTION INTRAMUSCULAR; INTRAVENOUS ONCE
Status: COMPLETED | OUTPATIENT
Start: 2022-11-12 | End: 2022-11-12

## 2022-11-12 RX ORDER — INSULIN LISPRO 100 [IU]/ML
5 INJECTION, SOLUTION INTRAVENOUS; SUBCUTANEOUS
Status: DISCONTINUED | OUTPATIENT
Start: 2022-11-13 | End: 2022-11-13 | Stop reason: HOSPADM

## 2022-11-12 RX ORDER — CEPHALEXIN 500 MG/1
500 CAPSULE ORAL EVERY 6 HOURS SCHEDULED
Status: DISCONTINUED | OUTPATIENT
Start: 2022-11-12 | End: 2022-11-13 | Stop reason: HOSPADM

## 2022-11-12 RX ORDER — TOPIRAMATE 25 MG/1
25 CAPSULE, COATED PELLETS ORAL 2 TIMES DAILY
Status: DISCONTINUED | OUTPATIENT
Start: 2022-11-13 | End: 2022-11-13 | Stop reason: HOSPADM

## 2022-11-12 RX ORDER — CEPHALEXIN 500 MG/1
500 CAPSULE ORAL EVERY 12 HOURS SCHEDULED
Status: DISCONTINUED | OUTPATIENT
Start: 2022-11-12 | End: 2022-11-12

## 2022-11-12 RX ORDER — SUMATRIPTAN 25 MG/1
25 TABLET, FILM COATED ORAL ONCE AS NEEDED
Status: DISCONTINUED | OUTPATIENT
Start: 2022-11-12 | End: 2022-11-13 | Stop reason: HOSPADM

## 2022-11-12 RX ORDER — INSULIN LISPRO 100 [IU]/ML
1-5 INJECTION, SOLUTION INTRAVENOUS; SUBCUTANEOUS
Status: DISCONTINUED | OUTPATIENT
Start: 2022-11-12 | End: 2022-11-13 | Stop reason: HOSPADM

## 2022-11-12 RX ORDER — CITALOPRAM 10 MG/1
40 TABLET ORAL DAILY
Status: DISCONTINUED | OUTPATIENT
Start: 2022-11-13 | End: 2022-11-13 | Stop reason: HOSPADM

## 2022-11-12 RX ORDER — HYDROCODONE POLISTIREX AND CHLORPHENIRAMINE POLISTIREX 10; 8 MG/5ML; MG/5ML
5 SUSPENSION, EXTENDED RELEASE ORAL EVERY 12 HOURS PRN
Status: DISCONTINUED | OUTPATIENT
Start: 2022-11-12 | End: 2022-11-13 | Stop reason: HOSPADM

## 2022-11-12 RX ORDER — POTASSIUM CHLORIDE 20 MEQ/1
40 TABLET, EXTENDED RELEASE ORAL ONCE
Status: COMPLETED | OUTPATIENT
Start: 2022-11-12 | End: 2022-11-12

## 2022-11-12 RX ORDER — SODIUM CHLORIDE 9 MG/ML
125 INJECTION, SOLUTION INTRAVENOUS CONTINUOUS
Status: DISCONTINUED | OUTPATIENT
Start: 2022-11-12 | End: 2022-11-13 | Stop reason: HOSPADM

## 2022-11-12 RX ADMIN — IPRATROPIUM BROMIDE 0.5 MG: 0.5 SOLUTION RESPIRATORY (INHALATION) at 18:32

## 2022-11-12 RX ADMIN — SODIUM CHLORIDE 125 ML/HR: 0.9 INJECTION, SOLUTION INTRAVENOUS at 21:32

## 2022-11-12 RX ADMIN — ALBUTEROL SULFATE 5 MG: 2.5 SOLUTION RESPIRATORY (INHALATION) at 18:32

## 2022-11-12 RX ADMIN — INSULIN LISPRO 4 UNITS: 100 INJECTION, SOLUTION INTRAVENOUS; SUBCUTANEOUS at 21:37

## 2022-11-12 RX ADMIN — POTASSIUM CHLORIDE 40 MEQ: 1500 TABLET, EXTENDED RELEASE ORAL at 18:28

## 2022-11-12 RX ADMIN — IPRATROPIUM BROMIDE 0.5 MG: 0.5 SOLUTION RESPIRATORY (INHALATION) at 19:21

## 2022-11-12 RX ADMIN — MONTELUKAST 10 MG: 10 TABLET, FILM COATED ORAL at 23:27

## 2022-11-12 RX ADMIN — METHYLPREDNISOLONE SODIUM SUCCINATE 125 MG: 125 INJECTION, POWDER, FOR SOLUTION INTRAMUSCULAR; INTRAVENOUS at 18:30

## 2022-11-12 RX ADMIN — GUAIFENESIN 200 MG: 100 SOLUTION ORAL at 21:47

## 2022-11-12 RX ADMIN — BENZONATATE 100 MG: 100 CAPSULE ORAL at 19:21

## 2022-11-12 RX ADMIN — TRAZODONE HYDROCHLORIDE 100 MG: 50 TABLET ORAL at 23:27

## 2022-11-12 RX ADMIN — CEPHALEXIN 500 MG: 500 CAPSULE ORAL at 22:04

## 2022-11-12 RX ADMIN — ALBUTEROL SULFATE 5 MG: 2.5 SOLUTION RESPIRATORY (INHALATION) at 19:21

## 2022-11-12 RX ADMIN — INSULIN DETEMIR 20 UNITS: 100 INJECTION, SOLUTION SUBCUTANEOUS at 21:36

## 2022-11-12 RX ADMIN — PRAMIPEXOLE DIHYDROCHLORIDE 0.5 MG: 0.5 TABLET ORAL at 23:27

## 2022-11-12 NOTE — LETTER
[address] [facility] [id number]  Dept: [phone]    November 13, 2022     Patient: Betty Elizabeth   YOB: 1975   Date of Visit: 11/12/2022       To Whom it May Concern:    Betty Elizabeth is under my professional care  She was seen in the hospital from 11/12/2022   to 11/13/22  She may return to work on 11/17/2022 without limitations  If you have any questions or concerns, please don't hesitate to call           Sincerely,          Tung Hart MD

## 2022-11-13 VITALS
OXYGEN SATURATION: 92 % | RESPIRATION RATE: 20 BRPM | WEIGHT: 225 LBS | TEMPERATURE: 97.9 F | HEIGHT: 64 IN | BODY MASS INDEX: 38.41 KG/M2 | HEART RATE: 108 BPM | SYSTOLIC BLOOD PRESSURE: 122 MMHG | DIASTOLIC BLOOD PRESSURE: 59 MMHG

## 2022-11-13 LAB
ANION GAP SERPL CALCULATED.3IONS-SCNC: 10 MMOL/L (ref 4–13)
BUN SERPL-MCNC: 7 MG/DL (ref 5–25)
CALCIUM SERPL-MCNC: 9.7 MG/DL (ref 8.4–10.2)
CHLORIDE SERPL-SCNC: 107 MMOL/L (ref 96–108)
CO2 SERPL-SCNC: 19 MMOL/L (ref 21–32)
CREAT SERPL-MCNC: 0.71 MG/DL (ref 0.6–1.3)
ERYTHROCYTE [DISTWIDTH] IN BLOOD BY AUTOMATED COUNT: 13 % (ref 11.6–15.1)
GFR SERPL CREATININE-BSD FRML MDRD: 103 ML/MIN/1.73SQ M
GLUCOSE SERPL-MCNC: 231 MG/DL (ref 65–140)
GLUCOSE SERPL-MCNC: 244 MG/DL (ref 65–140)
HCT VFR BLD AUTO: 38.8 % (ref 34.8–46.1)
HGB BLD-MCNC: 12.5 G/DL (ref 11.5–15.4)
MCH RBC QN AUTO: 29.8 PG (ref 26.8–34.3)
MCHC RBC AUTO-ENTMCNC: 32.2 G/DL (ref 31.4–37.4)
MCV RBC AUTO: 92 FL (ref 82–98)
PLATELET # BLD AUTO: 287 THOUSANDS/UL (ref 149–390)
PMV BLD AUTO: 9.4 FL (ref 8.9–12.7)
POTASSIUM SERPL-SCNC: 4.1 MMOL/L (ref 3.5–5.3)
RBC # BLD AUTO: 4.2 MILLION/UL (ref 3.81–5.12)
SODIUM SERPL-SCNC: 136 MMOL/L (ref 135–147)
WBC # BLD AUTO: 5.99 THOUSAND/UL (ref 4.31–10.16)

## 2022-11-13 RX ORDER — PREDNISONE 20 MG/1
40 TABLET ORAL DAILY
Qty: 10 TABLET | Refills: 0 | Status: SHIPPED | OUTPATIENT
Start: 2022-11-13 | End: 2022-11-18

## 2022-11-13 RX ADMIN — INSULIN LISPRO 5 UNITS: 100 INJECTION, SOLUTION INTRAVENOUS; SUBCUTANEOUS at 07:47

## 2022-11-13 RX ADMIN — CEPHALEXIN 500 MG: 500 CAPSULE ORAL at 05:07

## 2022-11-13 RX ADMIN — GUAIFENESIN 200 MG: 100 SOLUTION ORAL at 07:47

## 2022-11-13 RX ADMIN — INSULIN LISPRO 3 UNITS: 100 INJECTION, SOLUTION INTRAVENOUS; SUBCUTANEOUS at 07:47

## 2022-11-13 NOTE — ASSESSMENT & PLAN NOTE
Lab Results   Component Value Date    HGBA1C 8 0 (A) 03/04/2022       No results for input(s): POCGLU in the last 72 hours      Blood Sugar Average: Last 72 hrs:  ·  continue Levemir  · Sliding scale insulin coverage with Accu-Chek  · Hold metformin

## 2022-11-13 NOTE — DISCHARGE INSTR - AVS FIRST PAGE
Dear Goyo Lea,     It was our pleasure to care for you here at [facility]  It is our hope that we were always able to exceed the expected standards for your care during your stay  You were hospitalized due to RSV infection  You were cared for on the medical/surgical floor by Griselda Pouch, MD with the [facility] who covers for your primary care physician (PCP), BOB Garvin, while you were hospitalized  If you have any questions or concerns related to this hospitalization, you may contact us at [phone]  For follow up as well as any medication refills, we recommend that you follow up with your primary care physician  A registered nurse will reach out to you by phone within a few days after your discharge to answer any additional questions that you may have after going home  However, at this time we provide for you here, the most important instructions / recommendations at discharge:     Notable Medication Adjustments -   New prescription prednisone - take 40 mg daily by mouth for 5 days then stop  Okay to resume all other pre-admission medications, at the preadmission doses  Testing Required after Discharge -   To be further determined in the outpatient setting by your primary care provider  Important follow up information -   Please follow-up with your provider as outlined in this discharge package  Other Instructions -   Please maintain a healthy diabetic diet  Blood sugars might be slightly elevated while taking the prednisone, please contact your primary care physician if at any point you blood sugars higher than 400  Please review this entire after visit summary as additional general instructions including medication list, appointments, activity, diet, any pertinent wound care, and other additional recommendations from your care team that may be provided for you        Sincerely,     Griselda Pouch, MD

## 2022-11-13 NOTE — ASSESSMENT & PLAN NOTE
· Supportive care for symptoms  · Given ROMERO Neb and steroids in ED  · Respiratory protocol, PO prednisone in AM  · Currently on RA  · CXR pening

## 2022-11-13 NOTE — ASSESSMENT & PLAN NOTE
Lab Results   Component Value Date    HGBA1C 8 0 (A) 03/04/2022       Recent Labs     11/12/22 2135 11/13/22  0727   POCGLU 337* 231*       Blood Sugar Average: Last 72 hrs:  · (P) 284   · Okay for discharge home on pre-admit meds at pre-admit doses  · Patient was counseled on the potential of the blood sugars being a little bit higher while on prednisone, patient verbalized understanding

## 2022-11-13 NOTE — ED NOTES
Patient stated "I am having a hard time breathing due to all the coughing she is having  This nurse applied 1L via NC to the patient  Patient is tolerating well and states it is easier to breathe       Sindy Denson RN  11/12/22 4336

## 2022-11-13 NOTE — ED PROVIDER NOTES
History  Chief Complaint   Patient presents with   • Cough   • Shortness of Breath     Patient reporting a cough, shortness of breath, and "feeling hot "     Patient is a 60-year-old female who presents for evaluation of cough, congestion, shortness of breath  Patient says the symptoms have been going on for the past week  She admits to a cough as productive yellowish mucus  She denies any documented fevers but says that she “feels hot ”  She admits some chest tightness that is worse with coughing  She denies any lightheadedness, dizziness, abdominal pain  She has been eating and drinking  Prior to Admission Medications   Prescriptions Last Dose Informant Patient Reported? Taking? Alcohol Swabs PADS   No No   Sig: Use once daily when testing finger sticks  Insulin Pen Needle (Easy Touch Pen Needles) 32G X 5 MM MISC   No No   Sig: Use daily at bedtime   Lancets MISC  Self No No   Sig: Use once for 1 dose Onetouch Verio  Test once daily or as directed  SUMAtriptan (IMITREX) 25 mg tablet   No No   Sig: TAKE 1 TABLET (25 MG TOTAL) BY MOUTH ONCE AS NEEDED FOR MIGRAINE   albuterol (PROVENTIL HFA,VENTOLIN HFA) 90 mcg/act inhaler   No No   Sig: INHALE 2 PUFFS EVERY 6 (SIX) HOURS AS NEEDED FOR WHEEZING   atorvastatin (LIPITOR) 10 mg tablet   No No   Sig: TAKE 1 TABLET (10 MG TOTAL) BY MOUTH DAILY   cephalexin (KEFLEX) 500 mg capsule   No No   Sig: Take 1 capsule (500 mg total) by mouth every 6 (six) hours for 7 days   citalopram (CeleXA) 40 mg tablet   No No   Sig: TAKE 1 TABLET (40 MG TOTAL) BY MOUTH DAILY   glucose blood (True Metrix Blood Glucose Test) test strip   No No   Sig: Use as instructed   glucose blood test strip  Self No No   Sig: Use as instructed   glucose blood test strip  Self No No   Sig: Use as instructed   Test up to 3 times a day as needed   hydrOXYzine HCL (ATARAX) 50 mg tablet  Self No No   Sig: TAKE 1 TABLET (50 MG TOTAL) BY MOUTH EVERY 6 (SIX) HOURS AS NEEDED FOR ITCHING, ALLERGIES OR ANXIETY   insulin detemir (Levemir FlexTouch) 100 Units/mL injection pen   No No   Sig: Inject 20 Units under the skin daily at bedtime   metFORMIN (GLUCOPHAGE) 500 mg tablet   No No   Sig: TAKE 2 TABLETS (1,000 MG TOTAL) BY MOUTH DAILY WITH BREAKFAST AND 1 TABLET (500 MG TOTAL) DAILY WITH DINNER  montelukast (SINGULAIR) 10 mg tablet   No No   Sig: TAKE 1 TABLET (10 MG TOTAL) BY MOUTH DAILY AT BEDTIME   olopatadine (PATANOL) 0 1 % ophthalmic solution  Self No No   Sig: Administer 1 drop to both eyes 2 (two) times a day   ondansetron (Zofran ODT) 4 mg disintegrating tablet   No No   Sig: Take 1 tablet (4 mg total) by mouth every 6 (six) hours as needed for nausea or vomiting   pramipexole (MIRAPEX) 0 5 mg tablet   No No   Sig: TAKE 1 TABLET (0 5 MG TOTAL) BY MOUTH DAILY AT BEDTIME   sulfamethoxazole-trimethoprim (BACTRIM DS) 800-160 mg per tablet   No No   Sig: Take 1 tablet by mouth 2 (two) times a day for 7 days smx-tmp DS (BACTRIM) 800-160 mg tabs (1tab q12 D10)   topiramate (TOPAMAX) 25 mg sprinkle capsule   No No   Sig: TAKE 1 CAPSULE (25 MG TOTAL) BY MOUTH IN THE MORNING AND 1 CAPSULE (25 MG TOTAL) IN THE EVENING     traZODone (DESYREL) 100 mg tablet   No No   Sig: Take 1 tablet (100 mg total) by mouth daily at bedtime      Facility-Administered Medications: None       Past Medical History:   Diagnosis Date   • Anxiety    • Asthma    • Closed nondisplaced fracture of proximal phalanx of lesser toe of left foot 1/15/2020   • Diabetes mellitus ([facility] [address] )    • Hyperlipidemia        Past Surgical History:   Procedure Laterality Date   • ARTHROSCOPY KNEE Left    • CHOLECYSTECTOMY     • ECTOPIC PREGNANCY SURGERY     • SHOULDER SURGERY Right     torn bicep       Family History   Problem Relation Age of Onset   • Obesity Mother    • Obesity Father    • Diabetes type II Father    • Diabetes Father    • Obesity Brother    • No Known Problems Daughter    • No Known Problems Maternal Grandmother    • Diabetes Maternal Grandfather    • No Known Problems Paternal Grandmother    • No Known Problems Maternal Aunt    • No Known Problems Maternal Aunt    • No Known Problems Maternal Aunt    • Heart disease Neg Hx    • Stroke Neg Hx    • Cancer Neg Hx      I have reviewed and agree with the history as documented  E-Cigarette/Vaping   • E-Cigarette Use Never User      E-Cigarette/Vaping Substances   • Nicotine No    • THC No    • CBD No    • Flavoring No    • Other No    • Unknown No      Social History     Tobacco Use   • Smoking status: Never Smoker   • Smokeless tobacco: Never Used   Vaping Use   • Vaping Use: Never used   Substance Use Topics   • Alcohol use: Not Currently     Alcohol/week: 0 0 standard drinks     Comment: 0   • Drug use: Not Currently       Review of Systems   Constitutional: Negative for chills, diaphoresis and fever  HENT: Positive for congestion  Negative for sinus pressure, sore throat and trouble swallowing  Eyes: Negative for pain, discharge and itching  Respiratory: Positive for cough, chest tightness, shortness of breath and wheezing  Cardiovascular: Negative for chest pain, palpitations and leg swelling  Gastrointestinal: Negative for abdominal distention, abdominal pain, blood in stool, diarrhea, nausea and vomiting  Endocrine: Negative for polyphagia and polyuria  Genitourinary: Negative for difficulty urinating, dysuria, flank pain, hematuria, pelvic pain and vaginal bleeding  Musculoskeletal: Negative for arthralgias and back pain  Skin: Negative for rash  Neurological: Negative for dizziness, syncope, weakness, light-headedness and headaches  Physical Exam  Physical Exam  Vitals and nursing note reviewed  Constitutional:       General: She is not in acute distress  Appearance: She is well-developed  HENT:      Head: Normocephalic and atraumatic        Right Ear: External ear normal       Left Ear: External ear normal       Nose: Nose normal       Mouth/Throat: Mouth: Mucous membranes are moist       Pharynx: No oropharyngeal exudate  Eyes:      Conjunctiva/sclera: Conjunctivae normal       Pupils: Pupils are equal, round, and reactive to light  Neck:      Thyroid: No thyromegaly  Cardiovascular:      Rate and Rhythm: Normal rate and regular rhythm  Heart sounds: Normal heart sounds  No murmur heard  No friction rub  No gallop  Pulmonary:      Effort: Pulmonary effort is normal  No respiratory distress  Breath sounds: Wheezing present  No rales  Abdominal:      General: There is no distension  Palpations: Abdomen is soft  Tenderness: There is no abdominal tenderness  There is no guarding  Musculoskeletal:         General: No swelling, tenderness or deformity  Normal range of motion  Cervical back: Normal range of motion and neck supple  Lymphadenopathy:      Cervical: No cervical adenopathy  Skin:     General: Skin is warm and dry  Neurological:      General: No focal deficit present  Mental Status: She is alert and oriented to person, place, and time  Mental status is at baseline  Cranial Nerves: No cranial nerve deficit  Sensory: No sensory deficit  Motor: No weakness or abnormal muscle tone        Coordination: Coordination normal          Vital Signs  ED Triage Vitals [11/12/22 1742]   Temperature Pulse Respirations Blood Pressure SpO2   98 6 °F (37 °C) 87 18 118/59 97 %      Temp Source Heart Rate Source Patient Position - Orthostatic VS BP Location FiO2 (%)   Oral Monitor -- Left arm --      Pain Score       7           Vitals:    11/12/22 1742 11/12/22 2000 11/12/22 2015   BP: 118/59  119/56   Pulse: 87 (!) 118 (!) 118         Visual Acuity      ED Medications  Medications   albuterol inhalation solution 5 mg (5 mg Nebulization Given 11/12/22 1832)   ipratropium (ATROVENT) 0 02 % inhalation solution 0 5 mg (0 5 mg Nebulization Given 11/12/22 1832)   methylPREDNISolone sodium succinate (Solu-MEDROL) injection 125 mg (125 mg Intravenous Given 11/12/22 1830)   potassium chloride (K-DUR,KLOR-CON) CR tablet 40 mEq (40 mEq Oral Given 11/12/22 1828)   albuterol inhalation solution 5 mg (5 mg Nebulization Given 11/12/22 1921)   ipratropium (ATROVENT) 0 02 % inhalation solution 0 5 mg (0 5 mg Nebulization Given 11/12/22 1921)   benzonatate (TESSALON PERLES) capsule 100 mg (100 mg Oral Given 11/12/22 1921)       Diagnostic Studies  Results Reviewed     Procedure Component Value Units Date/Time    FLU/RSV/COVID - if FLU/RSV clinically relevant [[id number]]  (Abnormal) Collected: 11/12/22 1752    Lab Status: Final result Specimen: Nares from Nose Updated: 11/12/22 1844     SARS-CoV-2 Negative     INFLUENZA A PCR Negative     INFLUENZA B PCR Negative     RSV PCR Positive    Narrative:      FOR PEDIATRIC PATIENTS - copy/paste COVID Guidelines URL to browser: [email or web address]    SARS-CoV-2 assay is a Nucleic Acid Amplification assay intended for the  qualitative detection of nucleic acid from SARS-CoV-2 in nasopharyngeal  swabs  Results are for the presumptive identification of SARS-CoV-2 RNA  Positive results are indicative of infection with SARS-CoV-2, the virus  causing COVID-19, but do not rule out bacterial infection or co-infection  with other viruses  Laboratories within the [place] and its  territories are required to report all positive results to the appropriate  public health authorities  Negative results do not preclude SARS-CoV-2  infection and should not be used as the sole basis for treatment or other  patient management decisions  Negative results must be combined with  clinical observations, patient history, and epidemiological information  This test has not been FDA cleared or approved  This test has been authorized by FDA under an Emergency Use Authorization  (EUA)   This test is only authorized for the duration of time the  declaration that circumstances exist justifying the authorization of the  emergency use of an in vitro diagnostic tests for detection of SARS-CoV-2  virus and/or diagnosis of COVID-19 infection under section 564(b)(1) of  the Act, 21 U  S C  721CJQ-3(F)(5), unless the authorization is terminated  or revoked sooner  The test has been validated but independent review by FDA  and CLIA is pending  Test performed using [employer] GeneXpert: This RT-PCR assay targets N2,  a region unique to SARS-CoV-2  A conserved region in the E-gene was chosen  for pan-Sarbecovirus detection which includes SARS-CoV-2  According to CMS-2020-01-R, this platform meets the definition of high-throughput technology      HS Troponin 0hr (reflex protocol) [[id number]]  (Normal) Collected: 11/12/22 1752    Lab Status: Final result Specimen: Blood from Hand, Left Updated: 11/12/22 1823     hs TnI 0hr <2 ng/L     Comprehensive metabolic panel [[id number]]  (Abnormal) Collected: 11/12/22 1752    Lab Status: Final result Specimen: Blood from Hand, Left Updated: 11/12/22 1820     Sodium 137 mmol/L      Potassium 3 3 mmol/L      Chloride 110 mmol/L      CO2 19 mmol/L      ANION GAP 8 mmol/L      BUN 6 mg/dL      Creatinine 0 73 mg/dL      Glucose 187 mg/dL      Calcium 8 8 mg/dL      AST 17 U/L      ALT 22 U/L      Alkaline Phosphatase 67 U/L      Total Protein 6 9 g/dL      Albumin 3 8 g/dL      Total Bilirubin 0 27 mg/dL      eGFR 99 ml/min/1 73sq m     Narrative:      Nigel guidelines for Chronic Kidney Disease (CKD):   •  Stage 1 with normal or high GFR (GFR > 90 mL/min/1 73 square meters)  •  Stage 2 Mild CKD (GFR = 60-89 mL/min/1 73 square meters)  •  Stage 3A Moderate CKD (GFR = 45-59 mL/min/1 73 square meters)  •  Stage 3B Moderate CKD (GFR = 30-44 mL/min/1 73 square meters)  •  Stage 4 Severe CKD (GFR = 15-29 mL/min/1 73 square meters)  •  Stage 5 End Stage CKD (GFR <15 mL/min/1 73 square meters)  Note: GFR calculation is accurate only with a steady state creatinine    CBC and differential [[id number]]  (Abnormal) Collected: 11/12/22 1752    Lab Status: Final result Specimen: Blood from Hand, Left Updated: 11/12/22 1801     WBC 6 13 Thousand/uL      RBC 4 12 Million/uL      Hemoglobin 12 5 g/dL      Hematocrit 37 9 %      MCV 92 fL      MCH 30 3 pg      MCHC 33 0 g/dL      RDW 12 7 %      MPV 9 3 fL      Platelets 965 Thousands/uL      nRBC 0 /100 WBCs      Neutrophils Relative 41 %      Immat GRANS % 1 %      Lymphocytes Relative 37 %      Monocytes Relative 18 %      Eosinophils Relative 2 %      Basophils Relative 1 %      Neutrophils Absolute 2 57 Thousands/µL      Immature Grans Absolute 0 03 Thousand/uL      Lymphocytes Absolute 2 24 Thousands/µL      Monocytes Absolute 1 10 Thousand/µL      Eosinophils Absolute 0 15 Thousand/µL      Basophils Absolute 0 04 Thousands/µL                  XR chest 1 view portable    (Results Pending)              Procedures  ECG 12 Lead Documentation Only    Date/Time: 11/12/2022 7:36 PM  Performed by: Berna Melendez DO  Authorized by: Berna Melendez DO     Indications / Diagnosis:  Chest tightness  ECG reviewed by me, the ED Provider: yes    Patient location:  ED  Previous ECG:     Previous ECG:  Unavailable  Interpretation:     Interpretation: normal    Rate:     ECG rate:  89    ECG rate assessment: normal    Rhythm:     Rhythm: sinus rhythm    Ectopy:     Ectopy: none    QRS:     QRS axis:  Normal  Conduction:     Conduction: normal    ST segments:     ST segments:  Normal  T waves:     T waves: normal               ED Course                               SBIRT 22yo+    Flowsheet Row Most Recent Value   SBIRT (23 yo +)    In order to provide better care to our patients, we are screening all of our patients for alcohol and drug use  Would it be okay to ask you these screening questions? No Filed at: 11/12/2022 2005   Initial Alcohol Screen: US AUDIT-C     1   How often do you have a drink containing alcohol? 0 Filed at: 11/12/2022 2005   2  How many drinks containing alcohol do you have on a typical day you are drinking? 0 Filed at: 11/12/2022 2005   3a  Male UNDER 65: How often do you have five or more drinks on one occasion? 0 Filed at: 11/12/2022 2005   3b  FEMALE Any Age, or MALE 65+: How often do you have 4 or more drinks on one occassion? 0 Filed at: 11/12/2022 2005   Audit-C Score 0 Filed at: 11/12/2022 2005   SELMA: How many times in the past year have you    Used an illegal drug or used a prescription medication for non-medical reasons? Never Filed at: 11/12/2022 2005                    [facility]  Number of Diagnoses or Management Options  Diffuse wheezing  RSV infection  SOB (shortness of breath)  Diagnosis management comments: 42-year-old female presenting for cough, shortness of breath, chest tightness  Present for the past week  Admits to subjective fevers  Vitals within normal limits  Has diffuse wheezing  Believe symptoms are viral in nature  Will obtain cardiac workup  Will give DuoNeb treatment and steroids  RSV test positive  Lab work within normal limits  Patient with diffuse, significant wheezing despite to breathing treatments and steroids    Due to these symptoms and overall clinical picture, will admit for observation      Disposition  Final diagnoses:   RSV infection   SOB (shortness of breath)   Diffuse wheezing     Time reflects when diagnosis was documented in both MDM as applicable and the Disposition within this note     Time User Action Codes Description Comment    11/12/2022  8:20 PM Ameena Granados Add [B33 8] RSV infection     11/12/2022  8:20 PM Ameena Granados Add [R06 02] SOB (shortness of breath)     11/12/2022  8:20 PM Ameena Darwin Add [R06 2] Wheezing     11/12/2022  8:20 PM Ameenabrannon Granados Remove [R06 2] Wheezing     11/12/2022  8:21 PM Ameena Darwin Add [R06 2] Diffuse wheezing       ED Disposition     ED Disposition   Admit    Condition   Stable    Date/Time Sat Nov 12, 2022  8:20 PM    Comment   Case was discussed with BRAD and the patient's admission status was agreed to be Admission Status: observation status to the service of Dr Nathalia Almonte   Follow-up Information    None         Patient's Medications   Discharge Prescriptions    No medications on file       No discharge procedures on file      PDMP Review     None          ED Provider  Electronically Signed by           Rupert Friedman DO  11/12/22 2039

## 2022-11-13 NOTE — ASSESSMENT & PLAN NOTE
· Patient remains on room air  · Status post treatment with IV steroids at time of arrival in the ER which the patient reports that they helped  · Patient is medically stable for discharge  · Will DC home on a 5 day course of prednisone -40 mg daily by mouth, no need for taper  · Patient counseled on giving herself supportive therapy  · No further inpatient testing, treatment, and or workup is needed  · DC home today on all other pre-admission medications at the preadmission doses

## 2022-11-13 NOTE — DISCHARGE SUMMARY
[address]  Discharge- [address]  1975, 41 y o  female MRN: [id number]  Unit/Bed#: ED 11 Encounter: [id number]  Primary Care Provider: JENNIFER Casillas   Date and time admitted to hospital: 11/12/2022  5:40 PM    * RSV (acute bronchiolitis due to respiratory syncytial virus)  Assessment & Plan  · Patient remains on room air  · Status post treatment with IV steroids at time of arrival in the ER which the patient reports that they helped  · Patient is medically stable for discharge  · Will DC home on a 5 day course of prednisone -40 mg daily by mouth, no need for taper  · Patient counseled on giving herself supportive therapy  · No further inpatient testing, treatment, and or workup is needed  · DC home today on all other pre-admission medications at the preadmission doses    Type 2 diabetes mellitus with hyperglycemia, with long-term current use of insulin [facility])  Assessment & Plan  Lab Results   Component Value Date    HGBA1C 8 0 (A) 03/04/2022       Recent Labs     11/12/22  2135 11/13/22  0727   POCGLU 337* 231*       Blood Sugar Average: Last 72 hrs:  · (P) 284   · Okay for discharge home on pre-admit meds at pre-admit doses  · Patient was counseled on the potential of the blood sugars being a little bit higher while on prednisone, patient verbalized understanding    Other hyperlipidemia  Assessment & Plan  · Continue statin post discharge    Severe obesity (BMI 35 0-39  9) with comorbidity ([address] )  Assessment & Plan  · Actual BMI 38 62  · Dietary, weight loss, and lifestyle modification counseling was provided    Depression with anxiety  Assessment & Plan  · DC home on Celexa, Atarax, and trazodone as the patient was taking prior to arrival    Restless leg syndrome  Assessment & Plan  · Continue Mirapex post discharge        Discharging Physician / Practitioner: Radha Watkins MD  PCP: JENNIFER Casillas  Admission Date:   Admission Orders (From admission, onward) Ordered        11/12/22 2021  Place in Observation  Once                      Discharge Date: 11/13/22    Medical Problems             Resolved Problems  Date Reviewed: 11/12/2022   None                 Consultations During Hospital Stay:  · None    Procedures Performed:   · None    Significant Findings / Test Results:   · Chest x-ray-preliminary read-grossly within normal limits    Incidental Findings:   · None     Test Results Pending at Discharge (will require follow up): · None     Outpatient Tests Requested:  · None    Complications:    • None    Reason for Admission:  Shortness of breath, RSV infection    Hospital Course:     Maria G Hahn is a 41 y o  female patient who originally presented to the hospital on 11/12/2022 due to shortness of breath and a cough  Please refer to the initial history and physical examination completed by Rondel Mohs for the initial presenting features and complaints  In brief, the patient is a 61-year-old 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, who works closely with special needs/mentally challenged patient's, and is a patient that presented to the ER with a chief complaint of a cough and shortness of breath  She was admitted to [facility] observation  She was diagnosed with an RSV infection  She had impaired clear being treated with IV steroids at time of arrival   She remained on room air throughout  She was additionally given supportive therapy with IV fluids, and cough suppressants  On the morning of 11/13/2022, the patient felt a little bit better, and she was deemed medically stable for discharge  She was given a prescription for a total of a 5 day course of prednisone  No other changes were made to any of her pre-admission medications, and or to the preadmission doses  She will follow up in the near future in the outpatient setting with her PCP    Please refer to the assessment/plan portion of this discharge summary as outlined above for the remainder of the details in regard to her stay  Please see above list of diagnoses and related plan for additional information  Condition at Discharge: good     Discharge Day Visit / Exam:     Subjective:  Patient seen, still feels a little “crappy” - but overall is doing a little bit better than since prior to coming into the hospital   Vitals: Blood Pressure: 122/59 (11/13/22 0821)  Pulse: (!) 108 (11/13/22 0821)  Temperature: 97 9 °F (36 6 °C) (11/13/22 0821)  Temp Source: Oral (11/13/22 0821)  Respirations: 20 (11/13/22 0821)  Height: 5' 4" (162 6 cm) (11/12/22 1742)  Weight - Scale: 102 kg (225 lb) (11/12/22 1742)  SpO2: 92 % (11/13/22 0821)  Exam:  In conjunction with nursing  Physical Exam  Constitutional:       General: She is not in acute distress  Appearance: Normal appearance  She is normal weight  She is not ill-appearing  HENT:      Head: Normocephalic and atraumatic  Nose: Nose normal       Mouth/Throat:      Mouth: Mucous membranes are moist    Eyes:      Extraocular Movements: Extraocular movements intact  Pupils: Pupils are equal, round, and reactive to light  Cardiovascular:      Rate and Rhythm: Normal rate and regular rhythm  Pulses: Normal pulses  Heart sounds: Normal heart sounds  No murmur heard  No friction rub  No gallop  Pulmonary:      Effort: Pulmonary effort is normal  No respiratory distress  Breath sounds: Wheezing present  No rhonchi or rales  Abdominal:      General: There is no distension  Palpations: Abdomen is soft  There is no mass  Tenderness: There is no abdominal tenderness  Hernia: No hernia is present  Musculoskeletal:         General: No swelling or tenderness  Normal range of motion  Cervical back: Normal range of motion and neck supple  No rigidity  Right lower leg: No edema  Left lower leg: No edema  Skin:     General: Skin is warm  Capillary Refill: Capillary refill takes less than 2 seconds        Findings: No erythema or rash  Neurological:      General: No focal deficit present  Mental Status: She is alert and oriented to person, place, and time  Mental status is at baseline  Cranial Nerves: No cranial nerve deficit  Motor: No weakness  Psychiatric:         Mood and Affect: Mood normal          Behavior: Behavior normal          Discussion with Family:  No family members were present at the time of my discharge evaluation, nor did the patient ask and or want me to call anyone    Discharge instructions/Information to patient and family:   See after visit summary for information provided to patient and family  Provisions for Follow-Up Care:  See after visit summary for information related to follow-up care and any pertinent home health orders  Disposition:     Home      Planned Readmission:   • None     Discharge Statement:  I spent 40 minutes discharging the patient  This time was spent on the day of discharge  I had direct contact with the patient on the day of discharge  Greater than 50% of the total time was spent examining patient, answering all patient questions, arranging and discussing plan of care with patient as well as directly providing post-discharge instructions  Additional time then spent on discharge activities  Discharge Medications:  See after visit summary for reconciled discharge medications provided to patient and family        ** Please Note: This note has been constructed using a voice recognition system **

## 2022-11-13 NOTE — H&P
[address] 1975, [address] y o  female MRN: [id number]  Unit/Bed#: ED 11 Encounter: [id number]  Primary Care Provider: JENNIFER Burgos   Date and time admitted to hospital: 11/12/2022  5:40 PM    * RSV (acute bronchiolitis due to respiratory syncytial virus)  Assessment & Plan  · Supportive care for symptoms  · Given ROMERO Neb and steroids in ED  · Respiratory protocol, PO prednisone in AM  · Currently on RA  · CXR pening    Restless leg syndrome  Assessment & Plan  · Continue Mirapex    Type 2 diabetes mellitus with hyperglycemia, with long-term current use of insulin ([facility] )  Assessment & Plan  Lab Results   Component Value Date    HGBA1C 8 0 (A) 03/04/2022       No results for input(s): POCGLU in the last 72 hours  Blood Sugar Average: Last 72 hrs:  ·  continue Levemir  · Sliding scale insulin coverage with Accu-Chek  · Hold metformin    Other hyperlipidemia  Assessment & Plan  · Continue statin    Severe obesity (BMI 35 0-39  9) with comorbidity ([facility] )  Assessment & Plan  · BMI 38  · Healthy diet recommended with lifestyle modification    Depression with anxiety  Assessment & Plan  · Continue home Celexa, Atarax and trazodone    VTE Pharmacologic Prophylaxis: VTE Score: 3 Moderate Risk (Score 3-4) - Pharmacological DVT Prophylaxis Ordered: enoxaparin (Lovenox)  Code Status:  Full code  Discussion with patient    Anticipated Length of Stay: Patient will be admitted on an observation basis with an anticipated length of stay of less than 2 midnights secondary to respiratory monitoring, supportive cre  Chief Complaint:  Cough and short of breath    History of Present Illness:  Jose Scott is a [address] y o  female with a PMH of anxiety, asthma, hyperlipidemia, diabetes mellitus type 2 with insulin who presents with cough and short of breath  Patient reports she has SOB, coughing, congestion for about 1 week    She has been on abx for cellulitis and that has improved  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  She was seep in the ED 11/4/22 and completed Bactrim and  Has 2 more days for Keflex for cellulitis infection    Eighty treatment including DuoNeb neb x2, Tessalon Perle, Solu-Medrol 125 mg, potassium 40 mEq    Review of Systems:  Review of Systems   Constitutional: Positive for chills and fatigue  Negative for fever  HENT: Positive for congestion, rhinorrhea and sore throat  Negative for trouble swallowing  Eyes: Negative for discharge and redness  Respiratory: Positive for cough, chest tightness and shortness of breath  Cardiovascular: Negative for chest pain and leg swelling  Gastrointestinal: Negative for abdominal pain, diarrhea, nausea and vomiting  Genitourinary: Negative for dysuria and hematuria  Musculoskeletal: Positive for myalgias  Negative for back pain and neck pain  Skin: Positive for color change (left chest wall resolving)  Negative for rash and wound  Neurological: Positive for dizziness, weakness and headaches  Psychiatric/Behavioral: Negative for agitation and confusion  Past Medical and Surgical History:   Past Medical History:   Diagnosis Date   • Anxiety    • Asthma    • Closed nondisplaced fracture of proximal phalanx of lesser toe of left foot 1/15/2020   • Diabetes mellitus ([facility] )    • Hyperlipidemia        Past Surgical History:   Procedure Laterality Date   • ARTHROSCOPY KNEE Left    • CHOLECYSTECTOMY     • ECTOPIC PREGNANCY SURGERY     • SHOULDER SURGERY Right     torn bicep       Meds/Allergies:  Prior to Admission medications    Medication Sig Start Date End Date Taking? Authorizing Provider   albuterol (PROVENTIL HFA,VENTOLIN HFA) 90 mcg/act inhaler INHALE 2 PUFFS EVERY 6 (SIX) HOURS AS NEEDED FOR WHEEZING 9/22/22   JENNIFER Sidhu   Alcohol Swabs PADS Use once daily when testing finger sticks   3/23/22   JENNIFER Sidhu atorvastatin (LIPITOR) 10 mg tablet TAKE 1 TABLET (10 MG TOTAL) BY MOUTH DAILY 9/14/22   Radha Route, CRNP   cephalexin (KEFLEX) 500 mg capsule Take 1 capsule (500 mg total) by mouth every 6 (six) hours for 7 days 11/4/22 11/11/22  Claire Grimaldo PA-C   citalopram (CeleXA) 40 mg tablet TAKE 1 TABLET (40 MG TOTAL) BY MOUTH DAILY 8/29/22   Radha Route, CRNP   glucose blood (True Metrix Blood Glucose Test) test strip Use as instructed 3/29/22   [facility] Route, CRNP   glucose blood test strip Use as instructed 3/8/21   [facility] Route, CRNP   glucose blood test strip Use as instructed  Test up to 3 times a day as needed 3/10/21   [facility] Route, CRNP   hydrOXYzine HCL (ATARAX) 50 mg tablet TAKE 1 TABLET (50 MG TOTAL) BY MOUTH EVERY 6 (SIX) HOURS AS NEEDED FOR ITCHING, ALLERGIES OR ANXIETY 1/18/22   [facility] Route, CRNP   insulin detemir (Levemir FlexTouch) 100 Units/mL injection pen Inject 20 Units under the skin daily at bedtime 5/5/22   Eloy Joseph MD   Insulin Pen Needle (Easy Touch Pen Needles) 32G X 5 MM MISC Use daily at bedtime 3/23/22   [facility] Route, CRNP   Lancets MISC Use once for 1 dose Onetouch Verio  Test once daily or as directed   6/11/21 3/16/22  [facility] Route, CRNP   metFORMIN (GLUCOPHAGE) 500 mg tablet TAKE 2 TABLETS (1,000 MG TOTAL) BY MOUTH DAILY WITH BREAKFAST AND 1 TABLET (500 MG TOTAL) DAILY WITH DINNER  11/7/22   [facility] Route, CRNP   montelukast (SINGULAIR) 10 mg tablet TAKE 1 TABLET (10 MG TOTAL) BY MOUTH DAILY AT BEDTIME 10/6/22   [facility] Route, CRNP   olopatadine (PATANOL) 0 1 % ophthalmic solution Administer 1 drop to both eyes 2 (two) times a day 7/7/21   [facility] Route, CRNP   ondansetron (Zofran ODT) 4 mg disintegrating tablet Take 1 tablet (4 mg total) by mouth every 6 (six) hours as needed for nausea or vomiting 5/20/22   JENNIFER Escobedo   pramipexole (MIRAPEX) 0 5 mg tablet TAKE 1 TABLET (0 5 MG TOTAL) BY MOUTH DAILY AT BEDTIME 9/6/22 JENNIFER Wisdom   sulfamethoxazole-trimethoprim (BACTRIM DS) 800-160 mg per tablet Take 1 tablet by mouth 2 (two) times a day for 7 days smx-tmp DS (BACTRIM) 800-160 mg tabs (1tab q12 D10) 11/4/22 11/11/22  Obdulia Lopez PA-C   SUMAtriptan (IMITREX) 25 mg tablet TAKE 1 TABLET (25 MG TOTAL) BY MOUTH ONCE AS NEEDED FOR MIGRAINE 4/4/22   JENNIFER Wisdom   topiramate (TOPAMAX) 25 mg sprinkle capsule TAKE 1 CAPSULE (25 MG TOTAL) BY MOUTH IN THE MORNING AND 1 CAPSULE (25 MG TOTAL) IN THE EVENING  11/2/22   JENNIFER Wisdom   traZODone (DESYREL) 100 mg tablet Take 1 tablet (100 mg total) by mouth daily at bedtime 11/1/22   Amira Bains DO     I have reviewed home medications with patient personally  Allergies:    Allergies   Allergen Reactions   • Apple - Food Allergy Anaphylaxis   • Grass Pollen(K-O-R-T-Swt Justus) Hives, Itching, Shortness Of Breath, Sneezing and Throat Swelling   • Dust Mite Extract Hives and Itching   • Misc Natural Products Diarrhea, Nausea Only and Vomiting   • Raw Vegetable - Food Allergy Blisters, Dizziness, Eye Swelling, Fatigue, Headache, Hives, Itching, Rash, Sneezing, Throat Swelling and Wheezing   • Red Dye - Food Allergy Hives, Itching and Rash       Social History:  Marital Status: /Civil Union   Occupation: caretaker  Patient Pre-hospital Living Situation: Home  Patient Pre-hospital Level of Mobility: walks  Patient Pre-hospital Diet Restrictions: none  Substance Use History:   Social History     Substance and Sexual Activity   Alcohol Use Not Currently   • Alcohol/week: 0 0 standard drinks    Comment: 0     Social History     Tobacco Use   Smoking Status Never Smoker   Smokeless Tobacco Never Used     Social History     Substance and Sexual Activity   Drug Use Not Currently       Family History:  Family History   Problem Relation Age of Onset   • Obesity Mother    • Obesity Father    • Diabetes type II Father    • Diabetes Father    • Obesity Brother    • No Known Problems Daughter    • No Known Problems Maternal Grandmother    • Diabetes Maternal Grandfather    • No Known Problems Paternal Grandmother    • No Known Problems Maternal Aunt    • No Known Problems Maternal Aunt    • No Known Problems Maternal Aunt    • Heart disease Neg Hx    • Stroke Neg Hx    • Cancer Neg Hx        Physical Exam:     Vitals:   Blood Pressure: 134/75 (11/12/22 2130)  Pulse: 105 (11/12/22 2130)  Temperature: 98 6 °F (37 °C) (11/12/22 1742)  Temp Source: Oral (11/12/22 1742)  Respirations: 20 (11/12/22 2030)  Height: 5' 4" (162 6 cm) (11/12/22 1742)  Weight - Scale: 102 kg (225 lb) (11/12/22 1742)  SpO2: 91 % (11/12/22 2130)    Physical Exam  Vitals reviewed  Constitutional:       Appearance: Normal appearance  She is obese  She is ill-appearing  HENT:      Head: Normocephalic and atraumatic  Nose: Nose normal    Eyes:      General:         Right eye: No discharge  Left eye: No discharge  Extraocular Movements: Extraocular movements intact  Conjunctiva/sclera: Conjunctivae normal    Cardiovascular:      Rate and Rhythm: Regular rhythm  Tachycardia present  Pulmonary:      Effort: Pulmonary effort is normal  No respiratory distress  Breath sounds: Wheezing present  Abdominal:      General: Bowel sounds are normal  There is no distension  Palpations: Abdomen is soft  Tenderness: There is no abdominal tenderness  There is no guarding  Musculoskeletal:         General: No swelling or tenderness  Normal range of motion  Cervical back: Normal range of motion  Right lower leg: No edema  Left lower leg: No edema  Skin:     General: Skin is warm and dry  Capillary Refill: Capillary refill takes less than 2 seconds  Neurological:      General: No focal deficit present  Mental Status: She is alert and oriented to person, place, and time  Mental status is at baseline     Psychiatric:         Mood and Affect: Mood normal  Behavior: Behavior normal          Thought Content: Thought content normal          Judgment: Judgment normal           Additional Data:     Lab Results:  Results from last 7 days   Lab Units 11/12/22  1752   WBC Thousand/uL 6 13   HEMOGLOBIN g/dL 12 5   HEMATOCRIT % 37 9   PLATELETS Thousands/uL 274   NEUTROS PCT % 41*   LYMPHS PCT % 37   MONOS PCT % 18*   EOS PCT % 2     Results from last 7 days   Lab Units 11/12/22  1752   SODIUM mmol/L 137   POTASSIUM mmol/L 3 3*   CHLORIDE mmol/L 110*   CO2 mmol/L 19*   BUN mg/dL 6   CREATININE mg/dL 0 73   ANION GAP mmol/L 8   CALCIUM mg/dL 8 8   ALBUMIN g/dL 3 8   TOTAL BILIRUBIN mg/dL 0 27   ALK PHOS U/L 67   ALT U/L 22   AST U/L 17   GLUCOSE RANDOM mg/dL 187*       Imaging: formal read pending  XR chest 1 view portable    (Results Pending)       ** Please Note: This note has been constructed using a voice recognition system   **

## 2022-11-14 ENCOUNTER — TRANSITIONAL CARE MANAGEMENT (OUTPATIENT)
Dept: FAMILY MEDICINE CLINIC | Facility: CLINIC | Age: 47
End: 2022-11-14

## 2022-11-15 ENCOUNTER — OFFICE VISIT (OUTPATIENT)
Dept: FAMILY MEDICINE CLINIC | Facility: CLINIC | Age: 47
End: 2022-11-15

## 2022-11-15 VITALS
TEMPERATURE: 98.5 F | DIASTOLIC BLOOD PRESSURE: 70 MMHG | HEART RATE: 78 BPM | BODY MASS INDEX: 39.09 KG/M2 | HEIGHT: 64 IN | SYSTOLIC BLOOD PRESSURE: 120 MMHG | OXYGEN SATURATION: 98 % | WEIGHT: 229 LBS

## 2022-11-15 DIAGNOSIS — B96.89 BACTERIAL URI: ICD-10-CM

## 2022-11-15 DIAGNOSIS — J21.0 RSV (ACUTE BRONCHIOLITIS DUE TO RESPIRATORY SYNCYTIAL VIRUS): Primary | ICD-10-CM

## 2022-11-15 DIAGNOSIS — E11.9 TYPE 2 DIABETES MELLITUS WITHOUT COMPLICATION, WITHOUT LONG-TERM CURRENT USE OF INSULIN (HCC): Chronic | ICD-10-CM

## 2022-11-15 DIAGNOSIS — F30.9 MOOD DISORDER OF MANIC TYPE (HCC): ICD-10-CM

## 2022-11-15 DIAGNOSIS — J06.9 BACTERIAL URI: ICD-10-CM

## 2022-11-15 DIAGNOSIS — J30.1 ALLERGIC RHINITIS DUE TO POLLEN, UNSPECIFIED SEASONALITY: ICD-10-CM

## 2022-11-15 LAB
LEFT EYE DIABETIC RETINOPATHY: NORMAL
LEFT EYE IMAGE QUALITY: NORMAL
LEFT EYE MACULAR EDEMA: NORMAL
LEFT EYE OTHER RETINOPATHY: NORMAL
RIGHT EYE DIABETIC RETINOPATHY: NORMAL
RIGHT EYE IMAGE QUALITY: NORMAL
RIGHT EYE MACULAR EDEMA: NORMAL
RIGHT EYE OTHER RETINOPATHY: NORMAL
SEVERITY (EYE EXAM): NORMAL
SL AMB POCT HEMOGLOBIN AIC: 7.5 (ref ?–6.5)

## 2022-11-15 RX ORDER — DOXYCYCLINE 100 MG/1
100 TABLET ORAL 2 TIMES DAILY
Qty: 14 TABLET | Refills: 0 | Status: SHIPPED | OUTPATIENT
Start: 2022-11-15 | End: 2022-11-22

## 2022-11-15 RX ORDER — MONTELUKAST SODIUM 10 MG/1
10 TABLET ORAL
Qty: 30 TABLET | Refills: 1 | Status: SHIPPED | OUTPATIENT
Start: 2022-11-15

## 2022-11-15 RX ORDER — INSULIN DETEMIR 100 [IU]/ML
20 INJECTION, SOLUTION SUBCUTANEOUS
Qty: 15 ML | Refills: 3 | Status: SHIPPED | OUTPATIENT
Start: 2022-11-15

## 2022-11-15 RX ORDER — BENZONATATE 100 MG/1
100 CAPSULE ORAL 3 TIMES DAILY PRN
Qty: 20 CAPSULE | Refills: 0 | Status: SHIPPED | OUTPATIENT
Start: 2022-11-15

## 2022-11-15 RX ORDER — PRAMIPEXOLE DIHYDROCHLORIDE 0.5 MG/1
0.5 TABLET ORAL
Qty: 30 TABLET | Refills: 3 | Status: SHIPPED | OUTPATIENT
Start: 2022-11-15

## 2022-11-15 NOTE — PROGRESS NOTES
[facility]        NAME: Julio Hyde is a 41 y o  female  : 1975    MRN: [id number]  DATE: November 15, 2022  TIME: 11:07 AM    Assessment and Plan   RSV (acute bronchiolitis due to respiratory syncytial virus) [J21 0]  1  RSV (acute bronchiolitis due to respiratory syncytial virus)  benzonatate (TESSALON PERLES) 100 mg capsule      2  Type 2 diabetes mellitus without complication, without long-term current use of insulin ([facility])  insulin detemir (Levemir FlexTouch) 100 Units/mL injection pen    IRIS Diabetic eye exam    Microalbumin / creatinine urine ratio    POCT hemoglobin A1c      3  Mood disorder of manic type ([facility])  pramipexole (MIRAPEX) 0 5 mg tablet      4  Bacterial URI  doxycycline (ADOXA) 100 MG tablet        1  Type 2 diabetes mellitus without complication, without long-term current use of insulin ([facility])  Needs refills on insulin  HgbA1c improving      - insulin detemir (Levemir FlexTouch) 100 Units/mL injection pen; Inject 20 Units under the skin daily at bedtime  Dispense: 15 mL; Refill: 3  - IRIS Diabetic eye exam  - Microalbumin / creatinine urine ratio  - POCT hemoglobin A1c    2  Mood disorder of manic type (HCC)    - pramipexole (MIRAPEX) 0 5 mg tablet; Take 1 tablet (0 5 mg total) by mouth daily at bedtime  Dispense: 30 tablet; Refill: 3    3  RSV (acute bronchiolitis due to respiratory syncytial virus)    - benzonatate (TESSALON PERLES) 100 mg capsule; Take 1 capsule (100 mg total) by mouth 3 (three) times a day as needed for cough  Dispense: 20 capsule; Refill: 0    4  Bacterial URI    - doxycycline (ADOXA) 100 MG tablet; Take 1 tablet (100 mg total) by mouth 2 (two) times a day for 7 days  Dispense: 14 tablet; Refill: 0      Patient Instructions     There are no Patient Instructions on file for this visit          Chief Complaint     Chief Complaint   Patient presents with   • Transition of Care Management         History of Present Illness       Patient here for hospital follow up visit  Breathing slightly improved  Tested positive for RSV  Still coughing and would like cough medication  [address]  Discharge- [address] 1975, 41 y o  female MRN: [id number]  Unit/Bed#: ED 11 Encounter: [id number]  Primary Care Provider: JENNIFER Medel   Date and time admitted to hospital: 11/12/2022  5:40 PM     * RSV (acute bronchiolitis due to respiratory syncytial virus)  Assessment & Plan  · Patient remains on room air  · Status post treatment with IV steroids at time of arrival in the ER which the patient reports that they helped  · Patient is medically stable for discharge  · Will DC home on a 5 day course of prednisone -40 mg daily by mouth, no need for taper  · Patient counseled on giving herself supportive therapy  · No further inpatient testing, treatment, and or workup is needed  · DC home today on all other pre-admission medications at the preadmission doses     Type 2 diabetes mellitus with hyperglycemia, with long-term current use of insulin [facility])  Assessment & Plan  Lab Results  Component Value Date    HGBA1C 8 0 (A) 03/04/2022        Recent Labs    11/12/22  2135 11/13/22  0727  POCGLU 337* 231*        Blood Sugar Average: Last 72 hrs:  · (P) 284   · Okay for discharge home on pre-admit meds at pre-admit doses  · Patient was counseled on the potential of the blood sugars being a little bit higher while on prednisone, patient verbalized understanding     Other hyperlipidemia  Assessment & Plan  · Continue statin post discharge     Severe obesity (BMI 35 0-39  9) with comorbidity ([address] )  Assessment & Plan  · Actual BMI 38 62  · Dietary, weight loss, and lifestyle modification counseling was provided     Depression with anxiety  Assessment & Plan  · DC home on Celexa, Atarax, and trazodone as the patient was taking prior to arrival     Restless leg syndrome  Assessment & Plan  · Continue Mirapex post discharge        TCM Call Date and time call was made  2022  8:53 AM    Patient was hospitialized at  [address]    Date of Admission  22    Date of discharge  22    Diagnosis  RSV cough sob    Disposition  Home    Current Symptoms  Shortness of breath; Cough    Cough Severity  Moderate    Shortness of breath severity  Moderate      TCM Call     Post hospital issues  None    Scheduled for follow up? Yes    Did you obtain your prescribed medications  Yes    Do you need help managing your prescriptions or medications  No    Is transportation to your appointment needed  No    I have advised the patient to call PCP with any new or worsening symptoms    Andreia [address] [place] [address]    The type of support provided  Emotional; Physical; Other (comment)    Do you have social support  Yes, as much as I need            Review of Systems   Review of Systems   Constitutional: Positive for fatigue  Negative for activity change, appetite change and fever  HENT: Positive for congestion, postnasal drip and rhinorrhea  Negative for ear discharge, ear pain, sinus pressure, sinus pain, sore throat and voice change  Eyes: Negative for discharge and redness  Respiratory: Positive for cough and wheezing  Negative for chest tightness and shortness of breath  Cardiovascular: Negative for chest pain  Gastrointestinal: Negative for abdominal pain, diarrhea, nausea and vomiting  Skin: Negative  Negative for color change and rash  Neurological: Negative for dizziness and headaches  Hematological: Negative for adenopathy         PHQ-2/9 Depression Screening    Little interest or pleasure in doing things: 0 - not at all  Feeling down, depressed, or hopeless: 0 - not at all  Trouble falling or staying asleep, or sleeping too much: 0 - not at all  Feeling tired or having little energy: 0 - not at all  Poor appetite or overeatin - not at all  Feeling bad about yourself - or that you are a failure or have let yourself or your family down: 0 - not at all  Trouble concentrating on things, such as reading the newspaper or watching television: 0 - not at all  Moving or speaking so slowly that other people could have noticed  Or the opposite - being so fidgety or restless that you have been moving around a lot more than usual: 0 - not at all  Thoughts that you would be better off dead, or of hurting yourself in some way: 0 - not at all  PHQ-9 Score: 0   PHQ-9 Interpretation: No or Minimal depression         Current Medications       Current Outpatient Medications:   •  albuterol (PROVENTIL HFA,VENTOLIN HFA) 90 mcg/act inhaler, INHALE 2 PUFFS EVERY 6 (SIX) HOURS AS NEEDED FOR WHEEZING, Disp: 18 g, Rfl: 6  •  Alcohol Swabs PADS, Use once daily when testing finger sticks  , Disp: 90 each, Rfl: 1  •  atorvastatin (LIPITOR) 10 mg tablet, TAKE 1 TABLET (10 MG TOTAL) BY MOUTH DAILY, Disp: 30 tablet, Rfl: 3  •  benzonatate (TESSALON PERLES) 100 mg capsule, Take 1 capsule (100 mg total) by mouth 3 (three) times a day as needed for cough, Disp: 20 capsule, Rfl: 0  •  citalopram (CeleXA) 40 mg tablet, TAKE 1 TABLET (40 MG TOTAL) BY MOUTH DAILY, Disp: 30 tablet, Rfl: 3  •  doxycycline (ADOXA) 100 MG tablet, Take 1 tablet (100 mg total) by mouth 2 (two) times a day for 7 days, Disp: 14 tablet, Rfl: 0  •  glucose blood (True Metrix Blood Glucose Test) test strip, Use as instructed, Disp: 100 each, Rfl: 3  •  glucose blood test strip, Use as instructed, Disp: 100 each, Rfl: 2  •  glucose blood test strip, Use as instructed   Test up to 3 times a day as needed, Disp: 200 each, Rfl: 11  •  hydrOXYzine HCL (ATARAX) 50 mg tablet, TAKE 1 TABLET (50 MG TOTAL) BY MOUTH EVERY 6 (SIX) HOURS AS NEEDED FOR ITCHING, ALLERGIES OR ANXIETY, Disp: 120 tablet, Rfl: 0  •  insulin detemir (Levemir FlexTouch) 100 Units/mL injection pen, Inject 20 Units under the skin daily at bedtime, Disp: 15 mL, Rfl: 3  •  Insulin Pen Needle (Easy Touch Pen Needles) 32G X 5 MM MISC, Use daily at bedtime, Disp: 90 each, Rfl: 1  •  metFORMIN (GLUCOPHAGE) 500 mg tablet, TAKE 2 TABLETS (1,000 MG TOTAL) BY MOUTH DAILY WITH BREAKFAST AND 1 TABLET (500 MG TOTAL) DAILY WITH DINNER , Disp: 90 tablet, Rfl: 0  •  olopatadine (PATANOL) 0 1 % ophthalmic solution, Administer 1 drop to both eyes 2 (two) times a day, Disp: 5 mL, Rfl: 5  •  pramipexole (MIRAPEX) 0 5 mg tablet, Take 1 tablet (0 5 mg total) by mouth daily at bedtime, Disp: 30 tablet, Rfl: 3  •  SUMAtriptan (IMITREX) 25 mg tablet, TAKE 1 TABLET (25 MG TOTAL) BY MOUTH ONCE AS NEEDED FOR MIGRAINE, Disp: 9 tablet, Rfl: 0  •  topiramate (TOPAMAX) 25 mg sprinkle capsule, TAKE 1 CAPSULE (25 MG TOTAL) BY MOUTH IN THE MORNING AND 1 CAPSULE (25 MG TOTAL) IN THE EVENING , Disp: 60 capsule, Rfl: 0  •  traZODone (DESYREL) 100 mg tablet, Take 1 tablet (100 mg total) by mouth daily at bedtime, Disp: 30 tablet, Rfl: 0  •  Lancets MISC, Use once for 1 dose Onetouch Verio   Test once daily or as directed , Disp: 100 each, Rfl: 1  •  montelukast (SINGULAIR) 10 mg tablet, TAKE 1 TABLET (10 MG TOTAL) BY MOUTH DAILY AT BEDTIME, Disp: 30 tablet, Rfl: 1    Current Allergies     Allergies as of 11/15/2022 - Reviewed 11/12/2022   Allergen Reaction Noted   • Apple - food allergy Anaphylaxis 03/13/2022   • Grass pollen(k-o-r-t-swt milana) Hives, Itching, Shortness Of Breath, Sneezing, and Throat Swelling 11/17/2021   • Dust mite extract Hives and Itching 11/17/2021   • Misc natural products Diarrhea, Nausea Only, and Vomiting 11/17/2021   • Raw vegetable - food allergy Blisters, Dizziness, Eye Swelling, Fatigue, Headache, Hives, Itching, Rash, Sneezing, Throat Swelling, and Wheezing 03/21/2022   • Red dye - food allergy Hives, Itching, and Rash 11/17/2021            The following portions of the patient's history were reviewed and updated as appropriate: allergies, current medications, past family history, past medical history, past social history, past surgical history and problem list      Past Medical History:   Diagnosis Date   • Anxiety    • Asthma    • Closed nondisplaced fracture of proximal phalanx of lesser toe of left foot 1/15/2020   • Diabetes mellitus ([facility] )    • Hyperlipidemia        Past Surgical History:   Procedure Laterality Date   • ARTHROSCOPY KNEE Left    • CHOLECYSTECTOMY     • ECTOPIC PREGNANCY SURGERY     • SHOULDER SURGERY Right     torn bicep       Family History   Problem Relation Age of Onset   • Obesity Mother    • Obesity Father    • Diabetes type II Father    • Diabetes Father    • Obesity Brother    • No Known Problems Daughter    • No Known Problems Maternal Grandmother    • Diabetes Maternal Grandfather    • No Known Problems Paternal Grandmother    • No Known Problems Maternal Aunt    • No Known Problems Maternal Aunt    • No Known Problems Maternal Aunt    • Heart disease Neg Hx    • Stroke Neg Hx    • Cancer Neg Hx          Medications have been verified  Objective   /70   Pulse 78   Temp 98 5 °F (36 9 °C)   Ht 5' 4" (1 626 m)   Wt 104 kg (229 lb)   LMP 11/05/2022   SpO2 98%   BMI 39 31 kg/m²        Physical Exam     Physical Exam  Vitals and nursing note reviewed  Constitutional:       General: She is not in acute distress  Appearance: Normal appearance  She is well-developed and well-nourished  She is not ill-appearing  Eyes:      General: Lids are normal    Cardiovascular:      Rate and Rhythm: Normal rate and regular rhythm  Pulses: Intact distal pulses  no weak pulses          Dorsalis pedis pulses are 2+ on the right side and 2+ on the left side  Heart sounds: Normal heart sounds, S1 normal and S2 normal  No murmur heard  Pulmonary:      Effort: Pulmonary effort is normal  No respiratory distress  Breath sounds: Normal breath sounds  No decreased breath sounds or wheezing  Musculoskeletal:         General: No tenderness, deformity or edema   Normal range of motion  Feet:      Right foot:      Skin integrity: No ulcer, skin breakdown, erythema, warmth, callus or dry skin  Left foot:      Skin integrity: No ulcer, skin breakdown, erythema, warmth, callus or dry skin  Skin:     General: Skin is warm  Findings: No erythema or rash  Neurological:      Mental Status: She is alert and oriented to person, place, and time  Psychiatric:         Mood and Affect: Mood and affect normal          Behavior: Behavior normal  Behavior is cooperative  Thought Content: Thought content normal            Patient's shoes and socks removed  Right Foot/Ankle   Right Foot Inspection  Skin Exam: skin normal and skin intact  No dry skin, no warmth, no callus, no erythema, no maceration, no abnormal color, no pre-ulcer, no ulcer and no callus  Toe Exam: ROM and strength within normal limits  Sensory   Monofilament testing: absent    Vascular  Capillary refills: < 3 seconds  The right DP pulse is 2+  Left Foot/Ankle  Left Foot Inspection  Skin Exam: skin normal and skin intact  No dry skin, no warmth, no erythema, no maceration, normal color, no pre-ulcer, no ulcer and no callus  Sensory   Monofilament testing: absent    Vascular  Capillary refills: < 3 seconds  The left DP pulse is 2+       Assign Risk Category  No deformity present  No loss of protective sensation  No weak pulses  Risk: 2

## 2022-11-16 LAB
ATRIAL RATE: 89 BPM
CREAT UR-MCNC: 178 MG/DL
MICROALBUMIN UR-MCNC: 14.5 MG/L (ref 0–20)
MICROALBUMIN/CREAT 24H UR: 8 MG/G CREATININE (ref 0–30)
P AXIS: 54 DEGREES
PR INTERVAL: 152 MS
QRS AXIS: 57 DEGREES
QRSD INTERVAL: 80 MS
QT INTERVAL: 384 MS
QTC INTERVAL: 467 MS
T WAVE AXIS: 47 DEGREES
VENTRICULAR RATE: 89 BPM

## 2022-11-29 ENCOUNTER — VBI (OUTPATIENT)
Dept: ADMINISTRATIVE | Facility: OTHER | Age: 47
End: 2022-11-29

## 2022-11-29 DIAGNOSIS — G47.09 OTHER INSOMNIA: ICD-10-CM

## 2022-11-29 RX ORDER — TRAZODONE HYDROCHLORIDE 100 MG/1
100 TABLET ORAL
Qty: 30 TABLET | Refills: 0 | Status: SHIPPED | OUTPATIENT
Start: 2022-11-29

## 2022-12-04 DIAGNOSIS — E11.9 TYPE 2 DIABETES MELLITUS WITHOUT COMPLICATION, WITHOUT LONG-TERM CURRENT USE OF INSULIN (HCC): ICD-10-CM

## 2022-12-05 ENCOUNTER — TELEPHONE (OUTPATIENT)
Dept: FAMILY MEDICINE CLINIC | Facility: CLINIC | Age: 47
End: 2022-12-05

## 2022-12-05 NOTE — TELEPHONE ENCOUNTER
Received Fax for requested Information on patient for Dental Benfits Limit Exception, Scanned and put in PCP Folder up front

## 2022-12-15 ENCOUNTER — OFFICE VISIT (OUTPATIENT)
Dept: URGENT CARE | Facility: CLINIC | Age: 47
End: 2022-12-15

## 2022-12-15 VITALS
WEIGHT: 229 LBS | OXYGEN SATURATION: 97 % | HEART RATE: 109 BPM | TEMPERATURE: 97.2 F | BODY MASS INDEX: 39.31 KG/M2 | RESPIRATION RATE: 18 BRPM

## 2022-12-15 DIAGNOSIS — R05.1 ACUTE COUGH: ICD-10-CM

## 2022-12-15 DIAGNOSIS — J20.9 ACUTE BRONCHITIS, UNSPECIFIED ORGANISM: Primary | ICD-10-CM

## 2022-12-15 LAB
SARS-COV-2 AG UPPER RESP QL IA: NEGATIVE
VALID CONTROL: NORMAL

## 2022-12-15 RX ORDER — AZITHROMYCIN 250 MG/1
TABLET, FILM COATED ORAL
Qty: 6 TABLET | Refills: 0 | Status: SHIPPED | OUTPATIENT
Start: 2022-12-15 | End: 2022-12-19

## 2022-12-15 RX ORDER — ALBUTEROL SULFATE 90 UG/1
2 AEROSOL, METERED RESPIRATORY (INHALATION) EVERY 6 HOURS PRN
Qty: 8.5 G | Refills: 0 | Status: SHIPPED | OUTPATIENT
Start: 2022-12-15

## 2022-12-15 RX ORDER — IPRATROPIUM BROMIDE AND ALBUTEROL SULFATE 2.5; .5 MG/3ML; MG/3ML
3 SOLUTION RESPIRATORY (INHALATION) ONCE
Status: COMPLETED | OUTPATIENT
Start: 2022-12-15 | End: 2022-12-15

## 2022-12-15 RX ORDER — IPRATROPIUM BROMIDE AND ALBUTEROL SULFATE 2.5; .5 MG/3ML; MG/3ML
3 SOLUTION RESPIRATORY (INHALATION)
Status: DISCONTINUED | OUTPATIENT
Start: 2022-12-15 | End: 2022-12-15

## 2022-12-15 RX ADMIN — IPRATROPIUM BROMIDE AND ALBUTEROL SULFATE 3 ML: 2.5; .5 SOLUTION RESPIRATORY (INHALATION) at 15:57

## 2022-12-15 RX ADMIN — IPRATROPIUM BROMIDE AND ALBUTEROL SULFATE 3 ML: 2.5; .5 SOLUTION RESPIRATORY (INHALATION) at 15:29

## 2022-12-15 NOTE — PROGRESS NOTES
[facility]        NAME: Chantelle Ramírez is a 41 y o  female  : 1975    MRN: [id number]  DATE: December 15, 2022  TIME: 3:31 PM    Assessment and Plan   Acute bronchitis, unspecified organism [J20 [id number]]  1  Acute bronchitis, unspecified organism  ipratropium-albuterol (DUO-NEB) 0 5-2 5 mg/3 mL inhalation solution 3 mL    azithromycin (ZITHROMAX) 250 mg tablet      2  Acute cough  ipratropium-albuterol (DUO-NEB) 0 5-2 5 mg/3 mL inhalation solution 3 mL    Poct Covid 19 Rapid Antigen Test    albuterol (ProAir HFA) 90 mcg/act inhaler        POCT rapid covid: negative  DuoNeb provided in clinic today    Patient Instructions     Start azithromycin as prescribed  Start albuterol as prescribed  Vitamin D3 2000 IU daily  Vitamin C 1000mg twice per day  Multivitamin daily  Fluids and rest  Over the counter cold medication as needed (EX: Coricidin HBP, tylenol/motrin)  Follow up with PCP in 3-5 days  Proceed to ER if symptoms worsen  Eat yogurt with live and active cultures and/or take a probiotic at least 3 hours before or after antibiotic dose  Monitor stool for diarrhea and/or blood  If this occurs, contact primary care doctor ASAP  Chief Complaint     Chief Complaint   Patient presents with   • Cough     X 3 weeks         History of Present Illness       Patient is a 19-year-old female with significant PMH of DM2 presenting in the clinic today for cold symptoms x3 weeks  She was in the hospital about 3 weeks ago and diagnosed with RSV  She states her cold symptoms were improving up until about 10 days ago when her cough began to worsen  Admits congestion, sore throat, cough, SOB, nausea, and diarrhea  Denies fever, ear pain, chest pain, abdominal pain, vomiting  Admits the use of albuterol and Robitussin for symptom management  Positive sick contacts at work as multiple coworkers tested positive for covid  Patient is not vaccinated for flu  Patient is vaccinated against covid    Patient states she took an at home rapid COVID test today which was indeterminate  Review of Systems   Review of Systems   Constitutional: Negative for chills, fatigue and fever  HENT: Positive for congestion and sore throat  Negative for ear pain, postnasal drip, rhinorrhea, sinus pressure and sinus pain  Respiratory: Positive for cough and shortness of breath  Cardiovascular: Negative for chest pain  Gastrointestinal: Positive for diarrhea and nausea  Negative for abdominal pain and vomiting  Musculoskeletal: Negative for myalgias  Skin: Negative for rash  Neurological: Negative for headaches  Current Medications       Current Outpatient Medications:   •  albuterol (ProAir HFA) 90 mcg/act inhaler, Inhale 2 puffs every 6 (six) hours as needed for wheezing or shortness of breath, Disp: 8 5 g, Rfl: 0  •  albuterol (PROVENTIL HFA,VENTOLIN HFA) 90 mcg/act inhaler, INHALE 2 PUFFS EVERY 6 (SIX) HOURS AS NEEDED FOR WHEEZING, Disp: 18 g, Rfl: 6  •  Alcohol Swabs PADS, Use once daily when testing finger sticks  , Disp: 90 each, Rfl: 1  •  atorvastatin (LIPITOR) 10 mg tablet, TAKE 1 TABLET (10 MG TOTAL) BY MOUTH DAILY, Disp: 30 tablet, Rfl: 3  •  azithromycin (ZITHROMAX) 250 mg tablet, Take 2 tablets today then 1 tablet daily x 4 days, Disp: 6 tablet, Rfl: 0  •  benzonatate (TESSALON PERLES) 100 mg capsule, Take 1 capsule (100 mg total) by mouth 3 (three) times a day as needed for cough, Disp: 20 capsule, Rfl: 0  •  citalopram (CeleXA) 40 mg tablet, TAKE 1 TABLET (40 MG TOTAL) BY MOUTH DAILY, Disp: 30 tablet, Rfl: 3  •  glucose blood (True Metrix Blood Glucose Test) test strip, Use as instructed, Disp: 100 each, Rfl: 3  •  glucose blood test strip, Use as instructed, Disp: 100 each, Rfl: 2  •  glucose blood test strip, Use as instructed   Test up to 3 times a day as needed, Disp: 200 each, Rfl: 11  •  hydrOXYzine HCL (ATARAX) 50 mg tablet, TAKE 1 TABLET (50 MG TOTAL) BY MOUTH EVERY 6 (SIX) HOURS AS NEEDED FOR ITCHING, ALLERGIES OR ANXIETY, Disp: 120 tablet, Rfl: 0  •  insulin detemir (Levemir FlexTouch) 100 Units/mL injection pen, Inject 20 Units under the skin daily at bedtime, Disp: 15 mL, Rfl: 3  •  Insulin Pen Needle (Easy Touch Pen Needles) 32G X 5 MM MISC, Use daily at bedtime, Disp: 90 each, Rfl: 1  •  metFORMIN (GLUCOPHAGE) 500 mg tablet, TAKE 2 TABLETS (1,000 MG TOTAL) BY MOUTH DAILY WITH BREAKFAST AND 1 TABLET (500 MG TOTAL) DAILY WITH DINNER , Disp: 90 tablet, Rfl: 0  •  montelukast (SINGULAIR) 10 mg tablet, TAKE 1 TABLET (10 MG TOTAL) BY MOUTH DAILY AT BEDTIME, Disp: 30 tablet, Rfl: 1  •  olopatadine (PATANOL) 0 1 % ophthalmic solution, Administer 1 drop to both eyes 2 (two) times a day, Disp: 5 mL, Rfl: 5  •  pramipexole (MIRAPEX) 0 5 mg tablet, Take 1 tablet (0 5 mg total) by mouth daily at bedtime, Disp: 30 tablet, Rfl: 3  •  SUMAtriptan (IMITREX) 25 mg tablet, TAKE 1 TABLET (25 MG TOTAL) BY MOUTH ONCE AS NEEDED FOR MIGRAINE, Disp: 9 tablet, Rfl: 0  •  topiramate (TOPAMAX) 25 mg sprinkle capsule, TAKE 1 CAPSULE (25 MG TOTAL) BY MOUTH IN THE MORNING AND 1 CAPSULE (25 MG TOTAL) IN THE EVENING , Disp: 60 capsule, Rfl: 0  •  traZODone (DESYREL) 100 mg tablet, TAKE 1 TABLET (100 MG TOTAL) BY MOUTH DAILY AT BEDTIME, Disp: 30 tablet, Rfl: 0  •  Lancets MISC, Use once for 1 dose Onetouch Verio   Test once daily or as directed , Disp: 100 each, Rfl: 1    Current Facility-Administered Medications:   •  ipratropium-albuterol (DUO-NEB) 0 5-2 5 mg/3 mL inhalation solution 3 mL, 3 mL, Nebulization, Q6H, Elysia Contreras PA-C, 3 mL at 12/15/22 1529    Current Allergies     Allergies as of 12/15/2022 - Reviewed 12/15/2022   Allergen Reaction Noted   • Apple - food allergy Anaphylaxis 03/13/2022   • Grass pollen([id number]-swt milana) Hives, Itching, Shortness Of Breath, Sneezing, and Throat Swelling 11/17/2021   • Dust mite extract Hives and Itching 11/17/2021   • Misc natural products Diarrhea, Nausea Only, and Vomiting 11/17/2021 • Raw vegetable - food allergy Blisters, Dizziness, Eye Swelling, Fatigue, Headache, Hives, Itching, Rash, Sneezing, Throat Swelling, and Wheezing 03/21/2022   • Red dye - food allergy Hives, Itching, and Rash 11/17/2021            The following portions of the patient's history were reviewed and updated as appropriate: allergies, current medications, past family history, past medical history, past social history, past surgical history and problem list      Past Medical History:   Diagnosis Date   • Anxiety    • Asthma    • Closed nondisplaced fracture of proximal phalanx of lesser toe of left foot 1/15/2020   • Diabetes mellitus ([facility] [address] )    • Hyperlipidemia        Past Surgical History:   Procedure Laterality Date   • ARTHROSCOPY KNEE Left    • CHOLECYSTECTOMY     • ECTOPIC PREGNANCY SURGERY     • SHOULDER SURGERY Right     torn bicep       Family History   Problem Relation Age of Onset   • Obesity Mother    • Obesity Father    • Diabetes type II Father    • Diabetes Father    • Obesity Brother    • No Known Problems Daughter    • No Known Problems Maternal Grandmother    • Diabetes Maternal Grandfather    • No Known Problems Paternal Grandmother    • No Known Problems Maternal Aunt    • No Known Problems Maternal Aunt    • No Known Problems Maternal Aunt    • Heart disease Neg Hx    • Stroke Neg Hx    • Cancer Neg Hx          Medications have been verified  Objective   Pulse (!) 109   Temp (!) 97 2 °F (36 2 °C)   Resp 18   Wt 104 kg (229 lb)   SpO2 97%   BMI 39 31 kg/m²        Physical Exam     Physical Exam  Vitals reviewed  Constitutional:       General: She is not in acute distress  Appearance: Normal appearance  She is normal weight  She is not ill-appearing  HENT:      Head: Normocephalic  Right Ear: Hearing, tympanic membrane, ear canal and external ear normal  No middle ear effusion  There is no impacted cerumen  Tympanic membrane is not erythematous or bulging        Left Ear: Hearing, tympanic membrane, ear canal and external ear normal   No middle ear effusion  There is no impacted cerumen  Tympanic membrane is not erythematous or bulging  Nose: Congestion present  No rhinorrhea  Right Sinus: No maxillary sinus tenderness or frontal sinus tenderness  Left Sinus: No maxillary sinus tenderness or frontal sinus tenderness  Mouth/Throat:      Lips: Pink  Mouth: Mucous membranes are moist       Pharynx: Oropharynx is clear  No pharyngeal swelling, oropharyngeal exudate or posterior oropharyngeal erythema  Tonsils: No tonsillar exudate or tonsillar abscesses  1+ on the right  1+ on the left  Eyes:      General:         Right eye: No discharge  Left eye: No discharge  Conjunctiva/sclera: Conjunctivae normal    Cardiovascular:      Rate and Rhythm: Normal rate and regular rhythm  Pulses: Normal pulses  Heart sounds: Normal heart sounds  No murmur heard  No friction rub  No gallop  Pulmonary:      Effort: Pulmonary effort is normal  No respiratory distress  Breath sounds: Examination of the right-upper field reveals wheezing  Examination of the left-upper field reveals wheezing  Examination of the right-lower field reveals wheezing  Examination of the left-lower field reveals wheezing  Wheezing present  No decreased breath sounds, rhonchi or rales  Musculoskeletal:      Cervical back: Normal range of motion and neck supple  No tenderness  Lymphadenopathy:      Cervical: No cervical adenopathy  Skin:     General: Skin is warm  Neurological:      Mental Status: She is alert     Psychiatric:         Mood and Affect: Mood normal          Behavior: Behavior normal

## 2022-12-15 NOTE — LETTER
[place] [id number]  Dept: [phone]    December 15, 2022    Patient: Markell Minaya  YOB: 1975    Markell Minaya was seen and evaluated at our [facility]  Patient tested negative for covid today  Patient may return to work on 12/16/2022       Sincerely,    Ivelisse Rosario PA-C

## 2022-12-15 NOTE — PATIENT INSTRUCTIONS
Start azithromycin as prescribed  Start albuterol as prescribed  Vitamin D3 2000 IU daily  Vitamin C 1000mg twice per day  Multivitamin daily  Fluids and rest  Over the counter cold medication as needed (EX: Coricidin HBP, tylenol/motrin)  Follow up with PCP in 3-5 days  Proceed to ER if symptoms worsen  Eat yogurt with live and active cultures and/or take a probiotic at least 3 hours before or after antibiotic dose  Monitor stool for diarrhea and/or blood  If this occurs, contact primary care doctor ASAP

## 2022-12-28 DIAGNOSIS — G47.09 OTHER INSOMNIA: ICD-10-CM

## 2022-12-28 RX ORDER — TRAZODONE HYDROCHLORIDE 100 MG/1
100 TABLET ORAL
Qty: 30 TABLET | Refills: 0 | Status: SHIPPED | OUTPATIENT
Start: 2022-12-28

## 2022-12-31 DIAGNOSIS — E11.9 TYPE 2 DIABETES MELLITUS WITHOUT COMPLICATION, WITHOUT LONG-TERM CURRENT USE OF INSULIN (HCC): ICD-10-CM

## 2023-01-10 ENCOUNTER — APPOINTMENT (EMERGENCY)
Dept: RADIOLOGY | Facility: HOSPITAL | Age: 48
End: 2023-01-10

## 2023-01-10 ENCOUNTER — HOSPITAL ENCOUNTER (EMERGENCY)
Facility: HOSPITAL | Age: 48
Discharge: HOME | End: 2023-01-10
Attending: EMERGENCY MEDICINE

## 2023-01-10 VITALS
BODY MASS INDEX: 39.31 KG/M2 | TEMPERATURE: 98 F | WEIGHT: 229 LBS | RESPIRATION RATE: 16 BRPM | DIASTOLIC BLOOD PRESSURE: 68 MMHG | SYSTOLIC BLOOD PRESSURE: 123 MMHG | OXYGEN SATURATION: 96 % | HEART RATE: 91 BPM

## 2023-01-10 DIAGNOSIS — S91.311A FOOT LACERATION, RIGHT, INITIAL ENCOUNTER: Primary | ICD-10-CM

## 2023-01-10 RX ORDER — LIDOCAINE HYDROCHLORIDE 10 MG/ML
5 INJECTION, SOLUTION EPIDURAL; INFILTRATION; INTRACAUDAL; PERINEURAL ONCE
Status: COMPLETED | OUTPATIENT
Start: 2023-01-10 | End: 2023-01-10

## 2023-01-10 RX ORDER — KETOROLAC TROMETHAMINE 30 MG/ML
30 INJECTION, SOLUTION INTRAMUSCULAR; INTRAVENOUS ONCE
Status: COMPLETED | OUTPATIENT
Start: 2023-01-10 | End: 2023-01-10

## 2023-01-10 RX ORDER — KETOROLAC TROMETHAMINE 30 MG/ML
30 INJECTION, SOLUTION INTRAMUSCULAR; INTRAVENOUS ONCE
Status: DISCONTINUED | OUTPATIENT
Start: 2023-01-10 | End: 2023-01-10

## 2023-01-10 RX ORDER — CEPHALEXIN 500 MG/1
500 CAPSULE ORAL EVERY 6 HOURS SCHEDULED
Qty: 20 CAPSULE | Refills: 0 | Status: SHIPPED | OUTPATIENT
Start: 2023-01-10 | End: 2023-01-15

## 2023-01-10 RX ORDER — CEPHALEXIN 500 MG/1
500 CAPSULE ORAL ONCE
Status: COMPLETED | OUTPATIENT
Start: 2023-01-10 | End: 2023-01-10

## 2023-01-10 RX ORDER — ACETAMINOPHEN 325 MG/1
975 TABLET ORAL ONCE
Status: COMPLETED | OUTPATIENT
Start: 2023-01-10 | End: 2023-01-10

## 2023-01-10 RX ADMIN — KETOROLAC TROMETHAMINE 30 MG: 30 INJECTION, SOLUTION INTRAMUSCULAR at 16:20

## 2023-01-10 RX ADMIN — CEPHALEXIN 500 MG: 500 CAPSULE ORAL at 16:16

## 2023-01-10 RX ADMIN — LIDOCAINE HYDROCHLORIDE 5 ML: 10 INJECTION, SOLUTION EPIDURAL; INFILTRATION; INTRACAUDAL; PERINEURAL at 16:16

## 2023-01-10 RX ADMIN — ACETAMINOPHEN 975 MG: 325 TABLET ORAL at 16:16

## 2023-01-10 NOTE — DISCHARGE INSTRUCTIONS
-Take antibiotic as prescribed and use Tylenol/ibuprofen as needed for pain    -Follow-up with PCP in 2 days to have wound recheck

## 2023-01-10 NOTE — ED PROVIDER NOTES
History  Chief Complaint   Patient presents with   • Foot Pain     Pt reports she stepped on glass two days ago with her right foot     Patient is a 56-year-old female with a history of psychiatric disease, drug abuse, diabetes mellitus presents for evaluation of right foot pain  Patient says that she broke a glass 2 days ago and believes that she may have stepped on a piece of the glass  She is unsure if it is still in there but she is having some pain over small laceration over the plantar aspect of her right foot  Pain is mild, sharp/stabbing, nonradiating  She still been amatory despite her symptoms  She has not take anything for the pain  She denies fever chills rigors  She denies any signs of redness swelling or drainage from the wound  No recent antibiotics  Prior to Admission Medications   Prescriptions Last Dose Informant Patient Reported? Taking? Alcohol Swabs PADS   No No   Sig: Use once daily when testing finger sticks  Insulin Pen Needle (Easy Touch Pen Needles) 32G X 5 MM MISC   No No   Sig: Use daily at bedtime   Lancets MISC   No No   Sig: Use once for 1 dose Onetouch Verio  Test once daily or as directed     SUMAtriptan (IMITREX) 25 mg tablet   No No   Sig: TAKE 1 TABLET (25 MG TOTAL) BY MOUTH ONCE AS NEEDED FOR MIGRAINE   albuterol (PROVENTIL HFA,VENTOLIN HFA) 90 mcg/act inhaler   No No   Sig: INHALE 2 PUFFS EVERY 6 (SIX) HOURS AS NEEDED FOR WHEEZING   albuterol (ProAir HFA) 90 mcg/act inhaler   No No   Sig: Inhale 2 puffs every 6 (six) hours as needed for wheezing or shortness of breath   atorvastatin (LIPITOR) 10 mg tablet   No No   Sig: TAKE 1 TABLET (10 MG TOTAL) BY MOUTH DAILY   benzonatate (TESSALON PERLES) 100 mg capsule   No No   Sig: Take 1 capsule (100 mg total) by mouth 3 (three) times a day as needed for cough   citalopram (CeleXA) 40 mg tablet   No No   Sig: TAKE 1 TABLET (40 MG TOTAL) BY MOUTH DAILY   glucose blood (True Metrix Blood Glucose Test) test strip   No No   Sig: Use as instructed   glucose blood test strip   No No   Sig: Use as instructed   glucose blood test strip   No No   Sig: Use as instructed  Test up to 3 times a day as needed   hydrOXYzine HCL (ATARAX) 50 mg tablet   No No   Sig: TAKE 1 TABLET (50 MG TOTAL) BY MOUTH EVERY 6 (SIX) HOURS AS NEEDED FOR ITCHING, ALLERGIES OR ANXIETY   insulin detemir (Levemir FlexTouch) 100 Units/mL injection pen   No No   Sig: Inject 20 Units under the skin daily at bedtime   metFORMIN (GLUCOPHAGE) 500 mg tablet   No No   Sig: TAKE 2 TABLETS (1,000 MG TOTAL) BY MOUTH DAILY WITH BREAKFAST AND 1 TABLET (500 MG TOTAL) DAILY WITH DINNER  montelukast (SINGULAIR) 10 mg tablet   No No   Sig: TAKE 1 TABLET (10 MG TOTAL) BY MOUTH DAILY AT BEDTIME   olopatadine (PATANOL) 0 1 % ophthalmic solution   No No   Sig: Administer 1 drop to both eyes 2 (two) times a day   pramipexole (MIRAPEX) 0 5 mg tablet   No No   Sig: Take 1 tablet (0 5 mg total) by mouth daily at bedtime   topiramate (TOPAMAX) 25 mg sprinkle capsule   No No   Sig: TAKE 1 CAPSULE (25 MG TOTAL) BY MOUTH IN THE MORNING AND 1 CAPSULE (25 MG TOTAL) IN THE EVENING     traZODone (DESYREL) 100 mg tablet   No No   Sig: TAKE 1 TABLET (100 MG TOTAL) BY MOUTH DAILY AT BEDTIME      Facility-Administered Medications: None       Past Medical History:   Diagnosis Date   • Anxiety    • Asthma    • Closed nondisplaced fracture of proximal phalanx of lesser toe of left foot 1/15/2020   • Diabetes mellitus ([facility] [address] )    • Hyperlipidemia        Past Surgical History:   Procedure Laterality Date   • ARTHROSCOPY KNEE Left    • CHOLECYSTECTOMY     • ECTOPIC PREGNANCY SURGERY     • SHOULDER SURGERY Right     torn bicep       Family History   Problem Relation Age of Onset   • Obesity Mother    • Obesity Father    • Diabetes type II Father    • Diabetes Father    • Obesity Brother    • No Known Problems Daughter    • No Known Problems Maternal Grandmother    • Diabetes Maternal Grandfather    • No Known Problems Paternal Grandmother    • No Known Problems Maternal Aunt    • No Known Problems Maternal Aunt    • No Known Problems Maternal Aunt    • Heart disease Neg Hx    • Stroke Neg Hx    • Cancer Neg Hx      I have reviewed and agree with the history as documented  E-Cigarette/Vaping   • E-Cigarette Use Never User      E-Cigarette/Vaping Substances   • Nicotine No    • THC No    • CBD No    • Flavoring No    • Other No    • Unknown No      Social History     Tobacco Use   • Smoking status: Never   • Smokeless tobacco: Never   Vaping Use   • Vaping Use: Never used   Substance Use Topics   • Alcohol use: Not Currently     Alcohol/week: 0 0 standard drinks     Comment: 0   • Drug use: Not Currently       Review of Systems   Constitutional: Negative for fever  HENT: Negative for sore throat  Respiratory: Negative for shortness of breath  Cardiovascular: Negative for chest pain  Gastrointestinal: Negative for abdominal pain  Genitourinary: Negative for dysuria  Musculoskeletal: Negative for back pain  Right foot wound   Skin: Negative for rash  Neurological: Negative for light-headedness  Psychiatric/Behavioral: Negative for agitation  All other systems reviewed and are negative  Physical Exam  Physical Exam  Vitals reviewed  Constitutional:       General: She is not in acute distress  Appearance: She is well-developed  HENT:      Head: Normocephalic  Eyes:      Pupils: Pupils are equal, round, and reactive to light  Cardiovascular:      Rate and Rhythm: Normal rate and regular rhythm  Heart sounds: Normal heart sounds  Pulmonary:      Effort: Pulmonary effort is normal       Breath sounds: Normal breath sounds  Abdominal:      General: Bowel sounds are normal  There is no distension  Palpations: Abdomen is soft  Tenderness: There is no abdominal tenderness  There is no guarding  Musculoskeletal:         General: Tenderness present  No deformity  Normal range of motion  Cervical back: Normal range of motion and neck supple  Comments: Patient with approximate 0 5 cm superficial laceration of the right plantar surface  No foreign body visualized  Mild tenderness in this area, no extensive erythema to suggest active infection   Skin:     General: Skin is warm and dry  Capillary Refill: Capillary refill takes less than 2 seconds  Neurological:      Mental Status: She is alert and oriented to person, place, and time  Cranial Nerves: No cranial nerve deficit  Sensory: No sensory deficit  Psychiatric:         Behavior: Behavior normal          Thought Content: Thought content normal          Judgment: Judgment normal          Vital Signs  ED Triage Vitals [01/10/23 1558]   Temperature Pulse Respirations Blood Pressure SpO2   98 °F (36 7 °C) 91 16 123/68 96 %      Temp Source Heart Rate Source Patient Position - Orthostatic VS BP Location FiO2 (%)   Temporal Monitor -- Left arm --      Pain Score       No Pain           Vitals:    01/10/23 1558   BP: 123/68   Pulse: 91         Visual Acuity      ED Medications  Medications   acetaminophen (TYLENOL) tablet 975 mg (975 mg Oral Given 1/10/23 1616)   cephalexin (KEFLEX) capsule 500 mg (500 mg Oral Given 1/10/23 1616)   lidocaine (PF) (XYLOCAINE-MPF) 1 % injection 5 mL (5 mL Infiltration Given 1/10/23 1616)   ketorolac (TORADOL) injection 30 mg (30 mg Intramuscular Given 1/10/23 1620)       Diagnostic Studies  Results Reviewed     None                 XR foot 3+ views RIGHT   Final Result by Renetta Barrios MD (01/10 1642)      No visible foreign body  Questionable visualization of tiny skin defect at the sole of the forefoot near the radiopaque indicator at the site of penetrating injury from glass  Plantar and posterior calcaneal bone spurs are moderate size        Workstation performed: [id number]                    Procedures  Procedures         ED Course SBIRT 20yo+    Flowsheet Row Most Recent Value   SBIRT (23 yo +)    In order to provide better care to our patients, we are screening all of our patients for alcohol and drug use  Would it be okay to ask you these screening questions? Yes Filed at: 01/10/2023 1611   Initial Alcohol Screen: US AUDIT-C     1  How often do you have a drink containing alcohol? 0 Filed at: 01/10/2023 1611   2  How many drinks containing alcohol do you have on a typical day you are drinking? 0 Filed at: 01/10/2023 1611   3a  Male UNDER 65: How often do you have five or more drinks on one occasion? 0 Filed at: 01/10/2023 1611   3b  FEMALE Any Age, or MALE 65+: How often do you have 4 or more drinks on one occassion? 0 Filed at: 01/10/2023 1611   Audit-C Score 0 Filed at: 01/10/2023 1611   SELMA: How many times in the past year have you    Used an illegal drug or used a prescription medication for non-medical reasons? Never Filed at: 01/10/2023 1611                    Medical Decision Making  Patient is a 72-year-old female presents for evaluation of right foot laceration and subsequent pain  No obvious foreign body noted on imaging or clinical exam   Patient is somewhat unkept and it does not seem like she cleaned the wound out when it happened  For this reason and her history of diabetes mellitus, patient will be started on Keflex and of several days of prophylax for infection  Tetanus is up-to-date  Advised that the patient should look for signs of infection and have wound recheck in 2 days  Strict return precautions discussed  Foot laceration, right, initial encounter: complicated acute illness or injury  Amount and/or Complexity of Data Reviewed  Radiology: ordered and independent interpretation performed  Details: No evidence of foreign body      Risk  OTC drugs  Prescription drug management  Disposition  Final diagnoses:    Foot laceration, right, initial encounter     Time reflects when diagnosis was documented in both MDM as applicable and the Disposition within this note     Time User Action Codes Description Comment    1/10/2023  4:49 PM Shakira Smith Add [[id number] [id number]] Foot laceration, right, initial encounter       ED Disposition     ED Disposition   Discharge    Condition   Stable    Date/Time   Tue Tyrell 10, 2023  4:49 PM    Comment   [place] discharge to home/self care                 Follow-up Information     Follow up With Specialties Details Why Contact Info Additional [address] Emergency Department Emergency Medicine  If symptoms worsen [address]  [place] [phone]  [facility] Emergency Department, [address] [facility] , [place], [address] [facility] [address], [address] [facility], Nurse Practitioner Schedule an appointment as soon as possible for a visit in 2 days For symptom recheck [address] [facility] [address]  [phone]             Discharge Medication List as of 1/10/2023  4:51 PM      START taking these medications    Details   cephalexin (KEFLEX) 500 mg capsule Take 1 capsule (500 mg total) by mouth every 6 (six) hours for 5 days, Starting Tue 1/10/2023, Until Sun 1/15/2023, Normal         CONTINUE these medications which have NOT CHANGED    Details   !! albuterol (ProAir HFA) 90 mcg/act inhaler Inhale 2 puffs every 6 (six) hours as needed for wheezing or shortness of breath, Starting Thu 12/15/2022, Normal      !! albuterol (PROVENTIL HFA,VENTOLIN HFA) 90 mcg/act inhaler INHALE 2 PUFFS EVERY 6 (SIX) HOURS AS NEEDED FOR WHEEZING, Starting Thu 9/22/2022, Normal      Alcohol Swabs PADS Use once daily when testing finger sticks , Normal      atorvastatin (LIPITOR) 10 mg tablet TAKE 1 TABLET (10 MG TOTAL) BY MOUTH DAILY, Starting Fri 1/6/2023, Normal      benzonatate (TESSALON PERLES) 100 mg capsule Take 1 capsule (100 mg total) by mouth 3 (three) times a day as needed for cough, Starting Tue 11/15/2022, Normal      citalopram (CeleXA) 40 mg tablet TAKE 1 TABLET (40 MG TOTAL) BY MOUTH DAILY, Starting Mon 8/29/2022, Normal      !! glucose blood (True Metrix Blood Glucose Test) test strip Use as instructed, Normal      !! glucose blood test strip Use as instructed, Normal      !! glucose blood test strip Use as instructed  Test up to 3 times a day as needed, Normal      hydrOXYzine HCL (ATARAX) 50 mg tablet TAKE 1 TABLET (50 MG TOTAL) BY MOUTH EVERY 6 (SIX) HOURS AS NEEDED FOR ITCHING, ALLERGIES OR ANXIETY, Starting Tue 1/18/2022, Normal      insulin detemir (Levemir FlexTouch) 100 Units/mL injection pen Inject 20 Units under the skin daily at bedtime, Starting Tue 11/15/2022, Normal      Insulin Pen Needle (Easy Touch Pen Needles) 32G X 5 MM MISC Use daily at bedtime, Starting Wed 3/23/2022, Normal      Lancets MISC Use once for 1 dose Onetouch Verio   Test once daily or as directed , Starting Fri 6/11/2021, Normal      metFORMIN (GLUCOPHAGE) 500 mg tablet Multiple Dosages:Starting Sat 12/31/2022TAKE 2 TABLETS (1,000 MG TOTAL) BY MOUTH DAILY WITH BREAKFAST AND 1 TABLET (500 MG TOTAL) DAILY WITH DINNER , Normal      montelukast (SINGULAIR) 10 mg tablet TAKE 1 TABLET (10 MG TOTAL) BY MOUTH DAILY AT BEDTIME, Starting Tue 11/15/2022, Normal      olopatadine (PATANOL) 0 1 % ophthalmic solution Administer 1 drop to both eyes 2 (two) times a day, Starting Wed 7/7/2021, Normal      pramipexole (MIRAPEX) 0 5 mg tablet Take 1 tablet (0 5 mg total) by mouth daily at bedtime, Starting Tue 11/15/2022, Normal      SUMAtriptan (IMITREX) 25 mg tablet TAKE 1 TABLET (25 MG TOTAL) BY MOUTH ONCE AS NEEDED FOR MIGRAINE, Starting Mon 4/4/2022, Normal      topiramate (TOPAMAX) 25 mg sprinkle capsule TAKE 1 CAPSULE (25 MG TOTAL) BY MOUTH IN THE MORNING AND 1 CAPSULE (25 MG TOTAL) IN THE EVENING , Starting Tue 11/29/2022, Normal      traZODone (DESYREL) 100 mg tablet TAKE 1 TABLET (100 MG TOTAL) BY MOUTH DAILY AT BEDTIME, Starting Wed 12/28/2022, Normal       !! - Potential duplicate medications found  Please discuss with provider  No discharge procedures on file      PDMP Review     None          ED Provider  Electronically Signed by           Jim Estrella MD  01/10/23 0776

## 2023-01-12 PROBLEM — J21.0 RSV (ACUTE BRONCHIOLITIS DUE TO RESPIRATORY SYNCYTIAL VIRUS): Status: RESOLVED | Noted: 2022-11-12 | Resolved: 2023-01-12

## 2023-01-13 DIAGNOSIS — F41.8 DEPRESSION WITH ANXIETY: ICD-10-CM

## 2023-01-13 RX ORDER — CITALOPRAM 40 MG/1
40 TABLET ORAL DAILY
Qty: 30 TABLET | Refills: 3 | Status: SHIPPED | OUTPATIENT
Start: 2023-01-13

## 2023-01-20 DIAGNOSIS — J30.1 ALLERGIC RHINITIS DUE TO POLLEN, UNSPECIFIED SEASONALITY: ICD-10-CM

## 2023-01-20 RX ORDER — MONTELUKAST SODIUM 10 MG/1
10 TABLET ORAL
Qty: 30 TABLET | Refills: 1 | Status: SHIPPED | OUTPATIENT
Start: 2023-01-20

## 2023-01-24 DIAGNOSIS — G47.09 OTHER INSOMNIA: ICD-10-CM

## 2023-01-24 RX ORDER — TRAZODONE HYDROCHLORIDE 100 MG/1
100 TABLET ORAL
Qty: 30 TABLET | Refills: 0 | Status: SHIPPED | OUTPATIENT
Start: 2023-01-24

## 2023-01-26 ENCOUNTER — VBI (OUTPATIENT)
Dept: ADMINISTRATIVE | Facility: OTHER | Age: 48
End: 2023-01-26

## 2023-01-28 DIAGNOSIS — E11.9 TYPE 2 DIABETES MELLITUS WITHOUT COMPLICATION, WITHOUT LONG-TERM CURRENT USE OF INSULIN (HCC): ICD-10-CM

## 2023-02-02 DIAGNOSIS — E11.9 TYPE 2 DIABETES MELLITUS WITHOUT COMPLICATION, WITHOUT LONG-TERM CURRENT USE OF INSULIN (HCC): ICD-10-CM

## 2023-02-03 RX ORDER — ATORVASTATIN CALCIUM 10 MG/1
10 TABLET, FILM COATED ORAL DAILY
Qty: 30 TABLET | Refills: 0 | Status: SHIPPED | OUTPATIENT
Start: 2023-02-03

## 2023-02-20 DIAGNOSIS — G47.09 OTHER INSOMNIA: ICD-10-CM

## 2023-02-20 RX ORDER — TRAZODONE HYDROCHLORIDE 100 MG/1
100 TABLET ORAL
Qty: 30 TABLET | Refills: 0 | Status: SHIPPED | OUTPATIENT
Start: 2023-02-20

## 2023-02-21 ENCOUNTER — TELEPHONE (OUTPATIENT)
Dept: FAMILY MEDICINE CLINIC | Facility: CLINIC | Age: 48
End: 2023-02-21

## 2023-02-21 NOTE — TELEPHONE ENCOUNTER
Patient needs a detailed note from PCP as to why she needs this dental care, received call fron Dentist and needs to be faxed to the dental office [phone]

## 2023-02-22 NOTE — TELEPHONE ENCOUNTER
Patient has appointment on 2/28, Will discuss with patient at this visit and document reason for dentures  Please make patient aware  She has no showed multiple visits in the past, make her aware we need her to come in to document reason for dentures

## 2023-02-23 NOTE — TELEPHONE ENCOUNTER
Called and number [phone]  Is not in service    Called alt   Contact   Abbi Wu [phone]   and left voicemail for call back

## 2023-03-01 ENCOUNTER — APPOINTMENT (EMERGENCY)
Dept: CT IMAGING | Facility: HOSPITAL | Age: 48
End: 2023-03-01

## 2023-03-01 ENCOUNTER — HOSPITAL ENCOUNTER (EMERGENCY)
Facility: HOSPITAL | Age: 48
Discharge: HOME | End: 2023-03-01
Attending: EMERGENCY MEDICINE

## 2023-03-01 ENCOUNTER — APPOINTMENT (EMERGENCY)
Dept: RADIOLOGY | Facility: HOSPITAL | Age: 48
End: 2023-03-01

## 2023-03-01 VITALS
HEART RATE: 103 BPM | RESPIRATION RATE: 18 BRPM | OXYGEN SATURATION: 98 % | SYSTOLIC BLOOD PRESSURE: 152 MMHG | DIASTOLIC BLOOD PRESSURE: 71 MMHG | TEMPERATURE: 97 F

## 2023-03-01 DIAGNOSIS — R42 DIZZINESS: Primary | ICD-10-CM

## 2023-03-01 DIAGNOSIS — R00.0 TACHYCARDIA: ICD-10-CM

## 2023-03-01 LAB
ALBUMIN SERPL BCP-MCNC: 4.1 G/DL (ref 3.5–5)
ALP SERPL-CCNC: 68 U/L (ref 34–104)
ALT SERPL W P-5'-P-CCNC: 20 U/L (ref 7–52)
ANION GAP SERPL CALCULATED.3IONS-SCNC: 14 MMOL/L (ref 4–13)
AST SERPL W P-5'-P-CCNC: 21 U/L (ref 13–39)
BASOPHILS # BLD AUTO: 0.06 THOUSANDS/ÂΜL (ref 0–0.1)
BASOPHILS NFR BLD AUTO: 1 % (ref 0–1)
BILIRUB SERPL-MCNC: 0.28 MG/DL (ref 0.2–1)
BNP SERPL-MCNC: 217 PG/ML (ref 0–100)
BUN SERPL-MCNC: 11 MG/DL (ref 5–25)
CALCIUM SERPL-MCNC: 9.5 MG/DL (ref 8.4–10.2)
CARDIAC TROPONIN I PNL SERPL HS: <2 NG/L
CHLORIDE SERPL-SCNC: 101 MMOL/L (ref 96–108)
CO2 SERPL-SCNC: 17 MMOL/L (ref 21–32)
CREAT SERPL-MCNC: 0.74 MG/DL (ref 0.6–1.3)
D DIMER PPP FEU-MCNC: 0.59 UG/ML FEU
EOSINOPHIL # BLD AUTO: 0.17 THOUSAND/ÂΜL (ref 0–0.61)
EOSINOPHIL NFR BLD AUTO: 2 % (ref 0–6)
ERYTHROCYTE [DISTWIDTH] IN BLOOD BY AUTOMATED COUNT: 12.3 % (ref 11.6–15.1)
EXT PREGNANCY TEST URINE: NEGATIVE
EXT. CONTROL: NORMAL
GFR SERPL CREATININE-BSD FRML MDRD: 98 ML/MIN/1.73SQ M
GLUCOSE SERPL-MCNC: 243 MG/DL (ref 65–140)
GLUCOSE SERPL-MCNC: 250 MG/DL (ref 65–140)
HCT VFR BLD AUTO: 42.5 % (ref 34.8–46.1)
HGB BLD-MCNC: 13.6 G/DL (ref 11.5–15.4)
IMM GRANULOCYTES # BLD AUTO: 0.06 THOUSAND/UL (ref 0–0.2)
IMM GRANULOCYTES NFR BLD AUTO: 1 % (ref 0–2)
LYMPHOCYTES # BLD AUTO: 3.14 THOUSANDS/ÂΜL (ref 0.6–4.47)
LYMPHOCYTES NFR BLD AUTO: 29 % (ref 14–44)
MAGNESIUM SERPL-MCNC: 2 MG/DL (ref 1.9–2.7)
MCH RBC QN AUTO: 30.2 PG (ref 26.8–34.3)
MCHC RBC AUTO-ENTMCNC: 32 G/DL (ref 31.4–37.4)
MCV RBC AUTO: 94 FL (ref 82–98)
MONOCYTES # BLD AUTO: 0.81 THOUSAND/ÂΜL (ref 0.17–1.22)
MONOCYTES NFR BLD AUTO: 8 % (ref 4–12)
NEUTROPHILS # BLD AUTO: 6.5 THOUSANDS/ÂΜL (ref 1.85–7.62)
NEUTS SEG NFR BLD AUTO: 59 % (ref 43–75)
NRBC BLD AUTO-RTO: 0 /100 WBCS
PLATELET # BLD AUTO: 288 THOUSANDS/UL (ref 149–390)
PMV BLD AUTO: 9.8 FL (ref 8.9–12.7)
POTASSIUM SERPL-SCNC: 4 MMOL/L (ref 3.5–5.3)
PROT SERPL-MCNC: 7.2 G/DL (ref 6.4–8.4)
RBC # BLD AUTO: 4.51 MILLION/UL (ref 3.81–5.12)
SODIUM SERPL-SCNC: 132 MMOL/L (ref 135–147)
TSH SERPL DL<=0.05 MIU/L-ACNC: 0.98 UIU/ML (ref 0.45–4.5)
WBC # BLD AUTO: 10.74 THOUSAND/UL (ref 4.31–10.16)

## 2023-03-01 RX ORDER — ONDANSETRON 2 MG/ML
4 INJECTION INTRAMUSCULAR; INTRAVENOUS ONCE
Status: COMPLETED | OUTPATIENT
Start: 2023-03-01 | End: 2023-03-01

## 2023-03-01 RX ORDER — METOPROLOL TARTRATE 5 MG/5ML
2.5 INJECTION INTRAVENOUS ONCE
Status: COMPLETED | OUTPATIENT
Start: 2023-03-01 | End: 2023-03-01

## 2023-03-01 RX ADMIN — IOHEXOL 100 ML: 350 INJECTION, SOLUTION INTRAVENOUS at 14:20

## 2023-03-01 RX ADMIN — SODIUM CHLORIDE 1000 ML: 0.9 INJECTION, SOLUTION INTRAVENOUS at 12:55

## 2023-03-01 RX ADMIN — ONDANSETRON 4 MG: 2 INJECTION INTRAMUSCULAR; INTRAVENOUS at 12:55

## 2023-03-01 RX ADMIN — METOPROLOL TARTRATE 2.5 MG: 5 INJECTION INTRAVENOUS at 17:21

## 2023-03-01 NOTE — Clinical Note
Kamila Buchanan was seen and treated in our emergency department on 3/1/2023  Diagnosis:     Patel Aguilar  may return to work on return date  She may return on this date: 03/03/2023         If you have any questions or concerns, please don't hesitate to call        Yaya Mazariegos DO    ______________________________           _______________          _______________  Hospital Representative                              Date                                Time

## 2023-03-01 NOTE — Clinical Note
Grant Smith was seen and treated in our emergency department on 3/1/2023  Diagnosis:     Rafael Sullivan  may return to work on return date  She may return on this date: 03/03/2023         If you have any questions or concerns, please don't hesitate to call        Scar Braga, DO    ______________________________           _______________          _______________  Hospital Representative                              Date                                Time

## 2023-03-01 NOTE — ED PROVIDER NOTES
History  Chief Complaint   Patient presents with   • Dizziness     Patient states she was at work and felt "over heated and dizzy"  Patient denies CP, SOB  Patient c/o nausea  30-year-old female comes in for evaluation of dizziness chest pain and shortness of breath  Patient states she was at work when she felt "overheated"  She felt dizzy and lightheaded like she was going to pass out  Patient states that she had chest pain and short of breath and felt nauseous  State her symptoms feel somewhat better now but have not completely resolved  Patient denies any previous similar episodes  Patient does admit to being under a lot of stress at work  Patient has a past medical history of diabetes hyperlipidemia and asthma  She states this does not feel like her asthma  History provided by:  Patient   used: No    Dizziness  Quality:  Lightheadedness  Severity:  Moderate  Onset quality:  Sudden  Timing:  Constant  Progression:  Improving  Chronicity:  New  Context: inactivity    Ineffective treatments:  None tried  Associated symptoms: chest pain and palpitations    Associated symptoms: no diarrhea, no headaches and no shortness of breath    Risk factors: multiple medications    Risk factors: no anemia, no Meniere's disease and no new medications        Prior to Admission Medications   Prescriptions Last Dose Informant Patient Reported? Taking? Alcohol Swabs PADS   No No   Sig: Use once daily when testing finger sticks  Insulin Pen Needle (Easy Touch Pen Needles) 32G X 5 MM MISC   No No   Sig: Use daily at bedtime   Lancets MISC   No No   Sig: Use once for 1 dose Onetouch Verio  Test once daily or as directed     SUMAtriptan (IMITREX) 25 mg tablet   No No   Sig: TAKE 1 TABLET (25 MG TOTAL) BY MOUTH ONCE AS NEEDED FOR MIGRAINE   albuterol (PROVENTIL HFA,VENTOLIN HFA) 90 mcg/act inhaler   No No   Sig: INHALE 2 PUFFS EVERY 6 (SIX) HOURS AS NEEDED FOR WHEEZING   albuterol (ProAir HFA) 90 mcg/act inhaler   No No   Sig: Inhale 2 puffs every 6 (six) hours as needed for wheezing or shortness of breath   atorvastatin (LIPITOR) 10 mg tablet   No No   Sig: TAKE 1 TABLET (10 MG TOTAL) BY MOUTH DAILY   benzonatate (TESSALON PERLES) 100 mg capsule   No No   Sig: Take 1 capsule (100 mg total) by mouth 3 (three) times a day as needed for cough   citalopram (CeleXA) 40 mg tablet   No No   Sig: TAKE 1 TABLET (40 MG TOTAL) BY MOUTH DAILY   glucose blood (True Metrix Blood Glucose Test) test strip   No No   Sig: Use as instructed   glucose blood test strip   No No   Sig: Use as instructed   glucose blood test strip   No No   Sig: Use as instructed  Test up to 3 times a day as needed   hydrOXYzine HCL (ATARAX) 50 mg tablet   No No   Sig: TAKE 1 TABLET (50 MG TOTAL) BY MOUTH EVERY 6 (SIX) HOURS AS NEEDED FOR ITCHING, ALLERGIES OR ANXIETY   insulin detemir (Levemir FlexTouch) 100 Units/mL injection pen   No No   Sig: Inject 20 Units under the skin daily at bedtime   metFORMIN (GLUCOPHAGE) 500 mg tablet   No No   Sig: TAKE 2 TABLETS (1,000 MG TOTAL) BY MOUTH DAILY WITH BREAKFAST AND 1 TABLET (500 MG TOTAL) DAILY WITH DINNER  montelukast (SINGULAIR) 10 mg tablet   No No   Sig: TAKE 1 TABLET (10 MG TOTAL) BY MOUTH DAILY AT BEDTIME   olopatadine (PATANOL) 0 1 % ophthalmic solution   No No   Sig: Administer 1 drop to both eyes 2 (two) times a day   pramipexole (MIRAPEX) 0 5 mg tablet   No No   Sig: Take 1 tablet (0 5 mg total) by mouth daily at bedtime   topiramate (TOPAMAX) 25 mg sprinkle capsule   No No   Sig: TAKE 1 CAPSULE (25 MG TOTAL) BY MOUTH IN THE MORNING AND 1 CAPSULE (25 MG TOTAL) IN THE EVENING     traZODone (DESYREL) 100 mg tablet   No No   Sig: TAKE 1 TABLET (100 MG TOTAL) BY MOUTH DAILY AT BEDTIME      Facility-Administered Medications: None       Past Medical History:   Diagnosis Date   • Anxiety    • Asthma    • Closed nondisplaced fracture of proximal phalanx of lesser toe of left foot 1/15/2020   • Diabetes mellitus ([facility] [address] )    • Hyperlipidemia        Past Surgical History:   Procedure Laterality Date   • ARTHROSCOPY KNEE Left    • CHOLECYSTECTOMY     • ECTOPIC PREGNANCY SURGERY     • SHOULDER SURGERY Right     torn bicep       Family History   Problem Relation Age of Onset   • Obesity Mother    • Obesity Father    • Diabetes type II Father    • Diabetes Father    • Obesity Brother    • No Known Problems Daughter    • No Known Problems Maternal Grandmother    • Diabetes Maternal Grandfather    • No Known Problems Paternal Grandmother    • No Known Problems Maternal Aunt    • No Known Problems Maternal Aunt    • No Known Problems Maternal Aunt    • Heart disease Neg Hx    • Stroke Neg Hx    • Cancer Neg Hx      I have reviewed and agree with the history as documented  E-Cigarette/Vaping   • E-Cigarette Use Never User      E-Cigarette/Vaping Substances   • Nicotine No    • THC No    • CBD No    • Flavoring No    • Other No    • Unknown No      Social History     Tobacco Use   • Smoking status: Never   • Smokeless tobacco: Never   Vaping Use   • Vaping Use: Never used   Substance Use Topics   • Alcohol use: Not Currently     Alcohol/week: 0 0 standard drinks     Comment: 0   • Drug use: Not Currently       Review of Systems   Constitutional: Negative for fatigue and fever  HENT: Negative for congestion and ear pain  Eyes: Negative for discharge and redness  Respiratory: Negative for apnea, cough, shortness of breath and wheezing  Cardiovascular: Positive for chest pain and palpitations  Gastrointestinal: Negative for abdominal pain and diarrhea  Endocrine: Negative for cold intolerance and polydipsia  Genitourinary: Negative for difficulty urinating and hematuria  Musculoskeletal: Negative for arthralgias and back pain  Skin: Negative for color change and rash  Allergic/Immunologic: Negative for environmental allergies and immunocompromised state  Neurological: Positive for dizziness  Negative for numbness and headaches  Hematological: Negative for adenopathy  Does not bruise/bleed easily  Psychiatric/Behavioral: Negative for agitation and behavioral problems  Physical Exam  Physical Exam  Vitals and nursing note reviewed  Constitutional:       Appearance: Normal appearance  She is well-developed  She is not toxic-appearing  HENT:      Head: Normocephalic and atraumatic  Right Ear: Tympanic membrane and external ear normal       Left Ear: Tympanic membrane and external ear normal       Nose: Nose normal  No nasal deformity or rhinorrhea  Mouth/Throat:      Dentition: Normal dentition  Pharynx: Uvula midline  Eyes:      General: Lids are normal          Right eye: No discharge  Left eye: No discharge  Conjunctiva/sclera: Conjunctivae normal       Pupils: Pupils are equal, round, and reactive to light  Neck:      Vascular: No carotid bruit or JVD  Trachea: Trachea normal    Cardiovascular:      Rate and Rhythm: Normal rate and regular rhythm  No extrasystoles are present  Chest Wall: PMI is not displaced  Pulses: Normal pulses  Pulmonary:      Effort: Pulmonary effort is normal  No accessory muscle usage or respiratory distress  Breath sounds: Normal breath sounds  No wheezing, rhonchi or rales  Abdominal:      General: Bowel sounds are normal       Palpations: Abdomen is soft  Abdomen is not rigid  There is no mass  Tenderness: There is no abdominal tenderness  There is no guarding or rebound  Musculoskeletal:      Right shoulder: No swelling, deformity or bony tenderness  Normal range of motion  Cervical back: Normal range of motion and neck supple  No deformity, tenderness or bony tenderness  Lymphadenopathy:      Cervical: No cervical adenopathy  Skin:     General: Skin is warm and dry  Findings: No rash  Neurological:      Mental Status: She is alert and oriented to person, place, and time        GCS: GCS eye subscore is 4  GCS verbal subscore is 5  GCS motor subscore is 6  Cranial Nerves: No cranial nerve deficit  Sensory: No sensory deficit  Deep Tendon Reflexes: Reflexes are normal and symmetric  Psychiatric:         Speech: Speech normal          Behavior: Behavior normal          Vital Signs  ED Triage Vitals [03/01/23 1226]   Temperature Pulse Respirations Blood Pressure SpO2   (!) 97 °F (36 1 °C) (!) 114 20 120/84 97 %      Temp Source Heart Rate Source Patient Position - Orthostatic VS BP Location FiO2 (%)   Tympanic Monitor Sitting Right arm --      Pain Score       No Pain           Vitals:    03/01/23 1226 03/01/23 1400   BP: 120/84 152/71   Pulse: (!) 114 103   Patient Position - Orthostatic VS: Sitting          Visual Acuity      ED Medications  Medications   sodium chloride 0 9 % bolus 1,000 mL (0 mL Intravenous Stopped 3/1/23 1420)   ondansetron (ZOFRAN) injection 4 mg (4 mg Intravenous Given 3/1/23 1255)   iohexol (OMNIPAQUE) 350 MG/ML injection (SINGLE-DOSE) 100 mL (100 mL Intravenous Given 3/1/23 1420)   metoprolol (LOPRESSOR) injection 2 5 mg (2 5 mg Intravenous Given 3/1/23 1721)       Diagnostic Studies  Results Reviewed     Procedure Component Value Units Date/Time    TSH [[id number]]  (Normal) Collected: 03/01/23 1537    Lab Status: Final result Specimen: Blood from Arm, Left Updated: 03/01/23 1647     TSH 3RD GENERATON 0 983 uIU/mL     Narrative:      Patients undergoing fluorescein dye angiography may retain small amounts of fluorescein in the body for 48-72 hours post procedure  Samples containing fluorescein can produce falsely depressed TSH values  If the patient had this procedure,a specimen should be resubmitted post fluorescein clearance        Magnesium [[id number]]  (Normal) Collected: 03/01/23 1537    Lab Status: Final result Specimen: Blood from Arm, Left Updated: 03/01/23 1630     Magnesium 2 0 mg/dL     B-Type Natriuretic Peptide(BNP) [[id number]]  (Abnormal) Collected: 03/01/23 1235    Lab Status: Final result Specimen: Blood from Arm, Right Updated: 03/01/23 1442      pg/mL     POCT pregnancy, urine [[id number]]  (Normal) Resulted: 03/01/23 1432    Lab Status: Final result Updated: 03/01/23 1432     EXT Preg Test, Ur Negative     Control Valid    Comprehensive metabolic panel [[id number]]  (Abnormal) Collected: 03/01/23 1235    Lab Status: Final result Specimen: Blood from Arm, Right Updated: 03/01/23 1352     Sodium 132 mmol/L      Potassium 4 0 mmol/L      Chloride 101 mmol/L      CO2 17 mmol/L      ANION GAP 14 mmol/L      BUN 11 mg/dL      Creatinine 0 74 mg/dL      Glucose 243 mg/dL      Calcium 9 5 mg/dL      AST 21 U/L      ALT 20 U/L      Alkaline Phosphatase 68 U/L      Total Protein 7 2 g/dL      Albumin 4 1 g/dL      Total Bilirubin 0 28 mg/dL      eGFR 98 ml/min/1 73sq m     Narrative:      [facility] guidelines for Chronic Kidney Disease (CKD):   •  Stage 1 with normal or high GFR (GFR > 90 mL/min/1 73 square meters)  •  Stage 2 Mild CKD (GFR = 60-89 mL/min/1 73 square meters)  •  Stage 3A Moderate CKD (GFR = 45-59 mL/min/1 73 square meters)  •  Stage 3B Moderate CKD (GFR = 30-44 mL/min/1 73 square meters)  •  Stage 4 Severe CKD (GFR = 15-29 mL/min/1 73 square meters)  •  Stage 5 End Stage CKD (GFR <15 mL/min/1 73 square meters)  Note: GFR calculation is accurate only with a steady state creatinine    HS Troponin 0hr (reflex protocol) [[id number]]  (Normal) Collected: 03/01/23 1235    Lab Status: Final result Specimen: Blood from Arm, Right Updated: 03/01/23 1326     hs TnI 0hr <2 ng/L     D-Dimer [[id number]]  (Abnormal) Collected: 03/01/23 1254    Lab Status: Final result Specimen: Blood from Arm, Right Updated: 03/01/23 1318     D-Dimer, Quant 0 59 ug/ml FEU     CBC and differential [[id number]]  (Abnormal) Collected: 03/01/23 1235    Lab Status: Final result Specimen: Blood from Arm, Right Updated: 03/01/23 1308     WBC 10 74 Thousand/uL      RBC 4 51 Million/uL      Hemoglobin 13 6 g/dL      Hematocrit 42 5 %      MCV 94 fL      MCH 30 2 pg      MCHC 32 0 g/dL      RDW 12 3 %      MPV 9 8 fL      Platelets 821 Thousands/uL      nRBC 0 /100 WBCs      Neutrophils Relative 59 %      Immat GRANS % 1 %      Lymphocytes Relative 29 %      Monocytes Relative 8 %      Eosinophils Relative 2 %      Basophils Relative 1 %      Neutrophils Absolute 6 50 Thousands/µL      Immature Grans Absolute 0 06 Thousand/uL      Lymphocytes Absolute 3 14 Thousands/µL      Monocytes Absolute 0 81 Thousand/µL      Eosinophils Absolute 0 17 Thousand/µL      Basophils Absolute 0 06 Thousands/µL     Fingerstick Glucose (POCT) [[id number]]  (Abnormal) Collected: 03/01/23 1224    Lab Status: Final result Updated: 03/01/23 1225     POC Glucose 250 mg/dl                  CTA ED chest PE study   Final Result by Jerrica Medley MD (03/01 1516)      No pulmonary embolus  No acute pulmonary disease  Hepatic steatosis  Workstation performed: [id number]         XR chest 1 view portable   Final Result by Dedra Severino MD (03/01 1648)      No acute cardiopulmonary disease                    Workstation performed: [id number]                    Procedures  ECG 12 Lead Documentation Only    Date/Time: 3/1/2023 12:40 PM  Performed by: Dalia Aldana DO  Authorized by: Dalia Aldana DO     Patient location:  ED  Previous ECG:     Previous ECG:  Compared to current    Similarity:  No change  Rate:     ECG rate:  113  Rhythm:     Rhythm: sinus tachycardia               ED Course             HEART Risk Score    Flowsheet Row Most Recent Value   Heart Score Risk Calculator    History 0 Filed at: 03/01/2023 2156   ECG 0 Filed at: 03/01/2023 2156   Age 0 Filed at: 03/01/2023 2156   Risk Factors 1 Filed at: 03/01/2023 2156   Troponin 0 Filed at: 03/01/2023 2156   HEART Score 1 Filed at: 03/01/2023 2156                PERC Rule for PE    Flowsheet Row Most Recent Value   PERC Rule for PE    Age >=50 0 Filed at: 03/01/2023 1355   HR >=100 1 Filed at: 03/01/2023 1355   O2 Sat on room air < 95% 0 Filed at: 03/01/2023 1355   History of PE or DVT 0 Filed at: 03/01/2023 1355   Recent trauma or surgery 0 Filed at: 03/01/2023 1355   Hemoptysis 0 Filed at: 03/01/2023 1355   Exogenous estrogen 0 Filed at: 03/01/2023 1355   Unilateral leg swelling 0 Filed at: 03/01/2023 1355   PERC Rule for PE Results 1 Filed at: 03/01/2023 1355                  Sue Chung' Criteria for PE    Flowsheet Row Most Recent Value   Wells' Criteria for PE    Clinical signs and symptoms of DVT 0 Filed at: 03/01/2023 1355   PE is primary diagnosis or equally likely 3 Filed at: 03/01/2023 1355   HR >100 1 5 Filed at: 03/01/2023 1355   Immobilization at least 3 days or Surgery in the previous 4 weeks 0 Filed at: 03/01/2023 1355   Previous, objectively diagnosed PE or DVT 0 Filed at: 03/01/2023 1355   Hemoptysis 0 Filed at: 03/01/2023 1355   Malignancy with treatment within 6 months or palliative 0 Filed at: 03/01/2023 1355   Pedro' Criteria Total 4 5 Filed at: 03/01/2023 1355                Medical Decision Making  Differential diagnosis includes but is not limited to cardiac arrhythmia, ACS, PE, electrolyte abnormality, panic attack,    Decision-making tools risk stratification Wells criteria and heart score      Dizziness: acute illness or injury  Tachycardia: acute illness or injury  Amount and/or Complexity of Data Reviewed  External Data Reviewed: radiology and ECG  Details: Reviewed previous EKG reviewed previous chest x-ray  Labs: ordered  Decision-making details documented in ED Course  Details: Patient had an elevated D-dimer was concerned and sent her for CTA of the chest  Radiology: ordered and independent interpretation performed  Decision-making details documented in ED Course       Details: CTA of the chest shows no pulmonary embolus  Chest x-ray was within normal limits no pneumonia no pneumothorax  ECG/medicine tests: ordered and independent interpretation performed  Decision-making details documented in ED Course  Risk  Prescription drug management  Risk Details: Discussed with patient that work-up in the emergency department is within normal limits or is concerned about her persistent tachycardia although it was in the 90s to 100s  I felt that she should follow-up with cardiology  Disposition  Final diagnoses:   Dizziness   Tachycardia     Time reflects when diagnosis was documented in both MDM as applicable and the Disposition within this note     Time User Action Codes Description Comment    3/1/2023  5:07 PM Yossi HUERTAS Add [R42] Dizziness     3/1/2023  5:07 PM Carine Jackson Add [R00 0] Tachycardia       ED Disposition     ED Disposition   Discharge    Condition   Stable    Date/Time   Wed Mar 1, 2023  5:07 PM    Comment   Gauri Bob discharge to home/self care                 Follow-up Information     Follow up With Specialties Details Why [address] [facility], Nurse Practitioner Schedule an appointment as soon as possible for a visit   [facility] [place] [address] [address]  [phone]            Discharge Medication List as of 3/1/2023  5:17 PM      CONTINUE these medications which have NOT CHANGED    Details   !! albuterol (ProAir HFA) 90 mcg/act inhaler Inhale 2 puffs every 6 (six) hours as needed for wheezing or shortness of breath, Starting Thu 12/15/2022, Normal      !! albuterol (PROVENTIL HFA,VENTOLIN HFA) 90 mcg/act inhaler INHALE 2 PUFFS EVERY 6 (SIX) HOURS AS NEEDED FOR WHEEZING, Starting Thu 9/22/2022, Normal      Alcohol Swabs PADS Use once daily when testing finger sticks , Normal      atorvastatin (LIPITOR) 10 mg tablet TAKE 1 TABLET (10 MG TOTAL) BY MOUTH DAILY, Starting Fri 2/3/2023, Normal      benzonatate (TESSALON PERLES) 100 mg capsule Take 1 capsule (100 mg total) by mouth 3 (three) times a day as needed for cough, Starting Tue 11/15/2022, Normal      citalopram (CeleXA) 40 mg tablet TAKE 1 TABLET (40 MG TOTAL) BY MOUTH DAILY, Starting Fri 1/13/2023, Normal      !! glucose blood (True Metrix Blood Glucose Test) test strip Use as instructed, Normal      !! glucose blood test strip Use as instructed, Normal      !! glucose blood test strip Use as instructed  Test up to 3 times a day as needed, Normal      hydrOXYzine HCL (ATARAX) 50 mg tablet TAKE 1 TABLET (50 MG TOTAL) BY MOUTH EVERY 6 (SIX) HOURS AS NEEDED FOR ITCHING, ALLERGIES OR ANXIETY, Starting Tue 1/18/2022, Normal      insulin detemir (Levemir FlexTouch) 100 Units/mL injection pen Inject 20 Units under the skin daily at bedtime, Starting Tue 11/15/2022, Normal      Insulin Pen Needle (Easy Touch Pen Needles) 32G X 5 MM MISC Use daily at bedtime, Starting Wed 3/23/2022, Normal      Lancets MISC Use once for 1 dose Onetouch Verio   Test once daily or as directed , Starting Fri 6/11/2021, Normal      metFORMIN (GLUCOPHAGE) 500 mg tablet Multiple Dosages:Starting Sat 1/28/2023TAKE 2 TABLETS (1,000 MG TOTAL) BY MOUTH DAILY WITH BREAKFAST AND 1 TABLET (500 MG TOTAL) DAILY WITH DINNER , Normal      montelukast (SINGULAIR) 10 mg tablet TAKE 1 TABLET (10 MG TOTAL) BY MOUTH DAILY AT BEDTIME, Starting Fri 1/20/2023, Normal      olopatadine (PATANOL) 0 1 % ophthalmic solution Administer 1 drop to both eyes 2 (two) times a day, Starting Wed 7/7/2021, Normal      pramipexole (MIRAPEX) 0 5 mg tablet Take 1 tablet (0 5 mg total) by mouth daily at bedtime, Starting Tue 11/15/2022, Normal      SUMAtriptan (IMITREX) 25 mg tablet TAKE 1 TABLET (25 MG TOTAL) BY MOUTH ONCE AS NEEDED FOR MIGRAINE, Starting Mon 4/4/2022, Normal      topiramate (TOPAMAX) 25 mg sprinkle capsule TAKE 1 CAPSULE (25 MG TOTAL) BY MOUTH IN THE MORNING AND 1 CAPSULE (25 MG TOTAL) IN THE EVENING , Starting Tue 11/29/2022, Normal      traZODone (DESYREL) 100 mg tablet TAKE 1 TABLET (100 MG TOTAL) BY MOUTH DAILY AT BEDTIME, Starting Mon 2/20/2023, Normal       !! - Potential duplicate medications found  Please discuss with provider                PDMP Review     None          ED Provider  Electronically Signed by           Anil Herring DO  03/01/23 5744

## 2023-03-02 DIAGNOSIS — E11.9 TYPE 2 DIABETES MELLITUS WITHOUT COMPLICATION, WITHOUT LONG-TERM CURRENT USE OF INSULIN (HCC): ICD-10-CM

## 2023-03-02 LAB
ATRIAL RATE: 113 BPM
P AXIS: 66 DEGREES
PR INTERVAL: 140 MS
QRS AXIS: 74 DEGREES
QRSD INTERVAL: 84 MS
QT INTERVAL: 336 MS
QTC INTERVAL: 460 MS
T WAVE AXIS: 57 DEGREES
VENTRICULAR RATE: 113 BPM

## 2023-03-02 RX ORDER — ATORVASTATIN CALCIUM 10 MG/1
10 TABLET, FILM COATED ORAL DAILY
Qty: 30 TABLET | Refills: 0 | Status: SHIPPED | OUTPATIENT
Start: 2023-03-02

## 2023-03-08 ENCOUNTER — TRANSITIONAL CARE MANAGEMENT (OUTPATIENT)
Dept: FAMILY MEDICINE CLINIC | Facility: CLINIC | Age: 48
End: 2023-03-08

## 2023-03-14 ENCOUNTER — TELEMEDICINE (OUTPATIENT)
Dept: FAMILY MEDICINE CLINIC | Facility: CLINIC | Age: 48
End: 2023-03-14

## 2023-03-14 DIAGNOSIS — R11.2 NAUSEA AND VOMITING, UNSPECIFIED VOMITING TYPE: ICD-10-CM

## 2023-03-14 DIAGNOSIS — R10.84 GENERALIZED ABDOMINAL PAIN: Primary | ICD-10-CM

## 2023-03-14 DIAGNOSIS — R50.9 FEVER, UNSPECIFIED FEVER CAUSE: ICD-10-CM

## 2023-03-14 RX ORDER — SUMATRIPTAN 25 MG/1
25 TABLET, FILM COATED ORAL ONCE AS NEEDED
Qty: 9 TABLET | Refills: 0 | Status: CANCELLED | OUTPATIENT
Start: 2023-03-14

## 2023-03-14 NOTE — PROGRESS NOTES
Virtual TCM Visit:    Verification of patient location:    Patient is located in the following state in which I hold an active license [place]    Assessment/Plan:        Problem List Items Addressed This Visit    None  Visit Diagnoses     Generalized abdominal pain    -  Primary    Nausea and vomiting, unspecified vomiting type        Fever, unspecified fever cause                Recommended go to ED today, patient's  to transport her  Recommended to call once she is home for follow-up  Reason for visit is TCM    Encounter provider Beatris Roche DO     Provider located at [address] [place] [id number] [phone]    Recent Visits  No visits were found meeting these conditions  Showing recent visits within past 7 days and meeting all other requirements  Today's Visits  Date Type Provider Dept   03/14/23 Telemedicine Jimy Owusu DO [facility]   Showing today's visits and meeting all other requirements  Future Appointments  No visits were found meeting these conditions  Showing future appointments within next 150 days and meeting all other requirements       After connecting through [employer], the patient was identified by name and date of birth  Yuliana Delgado was informed that this is a telemedicine visit and that the visit is being conducted through the [employer]  She agrees to proceed     My office door was closed  No one else was in the room  She acknowledged consent and understanding of privacy and security of the video platform  The patient has agreed to participate and understands they can discontinue the visit at any time  Patient is aware this is a billable service  Transitional Care Management Review:  Yuliana Delgado is a 41 y o  female here for TCM follow up       Seen in the ED 3/6 for abdominal pain, cystitis, diarrhea, hypokalemia, gastroenteritis and mesenteric adenitis  3/1 seen in ED for dizziness and tachycardia  Tachycardia, lightheadedness, dizziness  Getting bad pains in stomach, reports they found 2 cysts on ovaries and kidney  Pain really bad, lower both sides, sharp pain  Pain constant since 3/6  8/10 pain  Tylenol and Motrin, helps little bit  Moving makes the pain worse, heating pad helps  Constipated yesterday, diarrhea today, last 2 days projectile vomiting  Fevers last 2 nights  Heart beat always fast  Coughing  During the TCM phone call patient stated:    TCM Call     Date and time call was made  3/8/2023 12:35 PM    Patient was hospitialized at  Other (comment)    [facility]    Date of Admission  03/01/23    Date of discharge  03/05/23    Diagnosis  UTI    Disposition  Home    Current Symptoms  Shortness of breath; Dizziness  Still has the stomache pain/ heart beating fast    Cough Severity  Moderate    Shortness of breath severity  Moderate    Dizziness severity  Moderate      TCM Call     Post hospital issues  None    Scheduled for follow up? Yes    Did you obtain your prescribed medications  Yes    Do you need help managing your prescriptions or medications  No    Is transportation to your appointment needed  No    I have advised the patient to call PCP with any new or worsening symptoms  Andreia [address] [place] [address]    The type of support provided  Emotional; Physical; Other (comment)    Do you have social support  Yes, as much as I need        Subjective:     Patient ID: Shamir Sutton is a 41 y o  female  HPI  Review of Systems  - as in HPI    Objective: There were no vitals filed for this visit  Physical Exam  Constitutional:       General: She is not in acute distress  Appearance: Normal appearance  She is not ill-appearing, toxic-appearing or diaphoretic  Pulmonary:      Effort: Pulmonary effort is normal  No respiratory distress     Neurological:      Mental Status: She is alert and oriented to person, place, and time  Medications have been reviewed by provider in current encounter    I spent 10 minutes with the patient during this visit  Elysia Simmons DO      VIRTUAL VISIT [facility] 53 verbally agrees to participate in [facility]  Pt is aware that [facility] could be limited without vital signs or the ability to perform a full hands-on physical exam  Gerri Stephens understands she or the provider may request at any time to terminate the video visit and request the patient to seek care or treatment in person      Ceasar Salinas DO  [address] [place] [address]

## 2023-03-15 DIAGNOSIS — J30.1 ALLERGIC RHINITIS DUE TO POLLEN, UNSPECIFIED SEASONALITY: ICD-10-CM

## 2023-03-15 RX ORDER — MONTELUKAST SODIUM 10 MG/1
10 TABLET ORAL
Qty: 30 TABLET | Refills: 1 | Status: SHIPPED | OUTPATIENT
Start: 2023-03-15

## 2023-03-19 ENCOUNTER — HOSPITAL ENCOUNTER (EMERGENCY)
Facility: HOSPITAL | Age: 48
Discharge: HOME | End: 2023-03-19
Attending: FAMILY MEDICINE

## 2023-03-19 VITALS
OXYGEN SATURATION: 96 % | DIASTOLIC BLOOD PRESSURE: 93 MMHG | SYSTOLIC BLOOD PRESSURE: 130 MMHG | BODY MASS INDEX: 39.31 KG/M2 | HEART RATE: 125 BPM | TEMPERATURE: 97.9 F | RESPIRATION RATE: 20 BRPM | HEIGHT: 64 IN

## 2023-03-19 DIAGNOSIS — F15.10 METHAMPHETAMINE USE (HCC): Primary | ICD-10-CM

## 2023-03-19 LAB
ALBUMIN SERPL BCP-MCNC: 4 G/DL (ref 3.5–5)
ALP SERPL-CCNC: 69 U/L (ref 34–104)
ALT SERPL W P-5'-P-CCNC: 32 U/L (ref 7–52)
AMPHETAMINES SERPL QL SCN: POSITIVE
ANION GAP SERPL CALCULATED.3IONS-SCNC: 11 MMOL/L (ref 4–13)
AST SERPL W P-5'-P-CCNC: 27 U/L (ref 13–39)
ATRIAL RATE: 128 BPM
BACTERIA UR QL AUTO: ABNORMAL /HPF
BARBITURATES UR QL: NEGATIVE
BASOPHILS # BLD AUTO: 0.05 THOUSANDS/ÂΜL (ref 0–0.1)
BASOPHILS NFR BLD AUTO: 1 % (ref 0–1)
BENZODIAZ UR QL: NEGATIVE
BILIRUB SERPL-MCNC: 0.96 MG/DL (ref 0.2–1)
BILIRUB UR QL STRIP: ABNORMAL
BUN SERPL-MCNC: 13 MG/DL (ref 5–25)
CALCIUM SERPL-MCNC: 9.5 MG/DL (ref 8.4–10.2)
CHLORIDE SERPL-SCNC: 102 MMOL/L (ref 96–108)
CLARITY UR: ABNORMAL
CO2 SERPL-SCNC: 24 MMOL/L (ref 21–32)
COCAINE UR QL: NEGATIVE
COLOR UR: YELLOW
CREAT SERPL-MCNC: 0.71 MG/DL (ref 0.6–1.3)
CRYSTALS URNS QL MICRO: ABNORMAL /HPF
EOSINOPHIL # BLD AUTO: 0.33 THOUSAND/ÂΜL (ref 0–0.61)
EOSINOPHIL NFR BLD AUTO: 3 % (ref 0–6)
ERYTHROCYTE [DISTWIDTH] IN BLOOD BY AUTOMATED COUNT: 11.9 % (ref 11.6–15.1)
ETHANOL SERPL-MCNC: <10 MG/DL
EXT PREGNANCY TEST URINE: NEGATIVE
EXT. CONTROL: NORMAL
GFR SERPL CREATININE-BSD FRML MDRD: 103 ML/MIN/1.73SQ M
GLUCOSE SERPL-MCNC: 153 MG/DL (ref 65–140)
GLUCOSE UR STRIP-MCNC: NEGATIVE MG/DL
HCT VFR BLD AUTO: 39.7 % (ref 34.8–46.1)
HGB BLD-MCNC: 13.3 G/DL (ref 11.5–15.4)
HGB UR QL STRIP.AUTO: ABNORMAL
IMM GRANULOCYTES # BLD AUTO: 0.03 THOUSAND/UL (ref 0–0.2)
IMM GRANULOCYTES NFR BLD AUTO: 0 % (ref 0–2)
KETONES UR STRIP-MCNC: ABNORMAL MG/DL
LEUKOCYTE ESTERASE UR QL STRIP: NEGATIVE
LYMPHOCYTES # BLD AUTO: 2.99 THOUSANDS/ÂΜL (ref 0.6–4.47)
LYMPHOCYTES NFR BLD AUTO: 31 % (ref 14–44)
MCH RBC QN AUTO: 30 PG (ref 26.8–34.3)
MCHC RBC AUTO-ENTMCNC: 33.5 G/DL (ref 31.4–37.4)
MCV RBC AUTO: 90 FL (ref 82–98)
METHADONE UR QL: NEGATIVE
MONOCYTES # BLD AUTO: 1.12 THOUSAND/ÂΜL (ref 0.17–1.22)
MONOCYTES NFR BLD AUTO: 12 % (ref 4–12)
NEUTROPHILS # BLD AUTO: 5.08 THOUSANDS/ÂΜL (ref 1.85–7.62)
NEUTS SEG NFR BLD AUTO: 53 % (ref 43–75)
NITRITE UR QL STRIP: NEGATIVE
NON-SQ EPI CELLS URNS QL MICRO: ABNORMAL /HPF
NRBC BLD AUTO-RTO: 0 /100 WBCS
OPIATES UR QL SCN: NEGATIVE
OXYCODONE+OXYMORPHONE UR QL SCN: NEGATIVE
P AXIS: 78 DEGREES
PCP UR QL: NEGATIVE
PH UR STRIP.AUTO: 6.5 [PH]
PLATELET # BLD AUTO: 302 THOUSANDS/UL (ref 149–390)
PMV BLD AUTO: 9.6 FL (ref 8.9–12.7)
POTASSIUM SERPL-SCNC: 3.4 MMOL/L (ref 3.5–5.3)
PR INTERVAL: 136 MS
PROT SERPL-MCNC: 7.1 G/DL (ref 6.4–8.4)
PROT UR STRIP-MCNC: NEGATIVE MG/DL
QRS AXIS: 69 DEGREES
QRSD INTERVAL: 84 MS
QT INTERVAL: 314 MS
QTC INTERVAL: 458 MS
RBC # BLD AUTO: 4.43 MILLION/UL (ref 3.81–5.12)
RBC #/AREA URNS AUTO: ABNORMAL /HPF
SODIUM SERPL-SCNC: 137 MMOL/L (ref 135–147)
SP GR UR STRIP.AUTO: 1.02
T WAVE AXIS: 47 DEGREES
THC UR QL: NEGATIVE
TSH SERPL DL<=0.05 MIU/L-ACNC: 1.56 UIU/ML (ref 0.45–4.5)
UROBILINOGEN UR QL STRIP.AUTO: 0.2 E.U./DL
VENTRICULAR RATE: 128 BPM
WBC # BLD AUTO: 9.6 THOUSAND/UL (ref 4.31–10.16)
WBC #/AREA URNS AUTO: ABNORMAL /HPF

## 2023-03-19 RX ORDER — POTASSIUM CHLORIDE 20 MEQ/1
40 TABLET, EXTENDED RELEASE ORAL ONCE
Status: COMPLETED | OUTPATIENT
Start: 2023-03-19 | End: 2023-03-19

## 2023-03-19 RX ADMIN — SODIUM CHLORIDE 1000 ML: 0.9 INJECTION, SOLUTION INTRAVENOUS at 08:12

## 2023-03-19 RX ADMIN — POTASSIUM CHLORIDE 40 MEQ: 1500 TABLET, EXTENDED RELEASE ORAL at 09:32

## 2023-03-19 NOTE — ED NOTES
Patient ambulated to bathroom independently to attempt to void at this time       Jett Carias RN  03/19/23 2521
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
## 2023-03-19 NOTE — ED NOTES
This writer discussed the patients current presentation and recommended discharge plan with Dr Sarahi Lange  They agree with the patient being discharged at this time with referrals and/or information about local drug & alcohol programs, [facility] OP health providers, intake, emergency shelters, etc      The patient was Instructed to follow up with their PCP who continues to treat depression and schedule with psychiatry and therapy  The patient was provided with referral information for:  local drug & alcohol programs, [facility] OP health providers, intake, emergency shelters, etc      This writer and the patient completed a safety plan  The patient was provided with a copy of their safety plan with encouragement to utilize the plan following discharge  In addition, the patient was instructed to call [facility] crisis, other crisis services, [facility] or to go to the nearest ER immediately if their situation changes at any time  This writer discussed discharge plans with the patient who agrees with and understands the discharge plans  SAFETY PLAN  Warning Signs (thoughts, images, mood, behavior, situations) of a potential crisis: verbal altercation with , being around people who are using drugs  Coping Skills (what can I do to take my mind off the problem, or to keep myself safe): Going to work and talking with her "second family", walking away from unhealthy situations        Outside Support (who can I reach out to for support and help): Friends and [facility] Suicide Prevention Hotline:  [address] 4-484-355-328-099-3030 - [facility] Crisis/Mobile Crisis   [address]: [facility]: [address] [place] [address] [address] [facility] [phone] - Crisis   [phone] - Peer [address] [place] [address] (1-9pm daily)  [phone] - Teen Support Talk Line (1-9pm daily)  [phone] [facility] [address] ([place]) [phone] - [facility]
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
## 2023-03-27 ENCOUNTER — TELEPHONE (OUTPATIENT)
Dept: FAMILY MEDICINE CLINIC | Facility: CLINIC | Age: 48
End: 2023-03-27

## 2023-03-27 NOTE — TELEPHONE ENCOUNTER
FMLA form scanned in media  Placed ing [employer] folder    Pt stated was due 3/25/23, but she stated that is just was received in the mail    She did inform the company that she did just receive the form

## 2023-03-28 NOTE — TELEPHONE ENCOUNTER
Patient left message requesting a call back in regards to forms. Called and spoke with patient. She was inqurining about status of form. Informed her of 5-7 business day office policy. Also made pt aware that provider is out of office until Thursday. I asked patient who took her out of work she stated the ED.

## 2023-03-29 DIAGNOSIS — E11.9 TYPE 2 DIABETES MELLITUS WITHOUT COMPLICATION, WITHOUT LONG-TERM CURRENT USE OF INSULIN (HCC): ICD-10-CM

## 2023-03-30 RX ORDER — ATORVASTATIN CALCIUM 10 MG/1
10 TABLET, FILM COATED ORAL DAILY
Qty: 30 TABLET | Refills: 0 | Status: SHIPPED | OUTPATIENT
Start: 2023-03-30

## 2023-03-30 NOTE — TELEPHONE ENCOUNTER
Pt called again about forms. States she was given an extension until 4/6, if not done by then, she will lose her job. Notified her of 7 day form policy. She verbalized understanding.

## 2023-03-30 NOTE — TELEPHONE ENCOUNTER
Patient called asking if the [place] paper work was done as of yet, I informed her there is nothing that reflects that so far in the chart and [employer] may be working on it, patient then abruptly hung up

## 2023-03-31 DIAGNOSIS — R05.9 COUGH: ICD-10-CM

## 2023-04-01 RX ORDER — ALBUTEROL SULFATE 90 UG/1
2 AEROSOL, METERED RESPIRATORY (INHALATION) EVERY 6 HOURS PRN
Qty: 18 G | Refills: 6 | Status: SHIPPED | OUTPATIENT
Start: 2023-04-01

## 2023-04-01 NOTE — TELEPHONE ENCOUNTER
This is the first I am hearing about these forms. I do not recall seeing anything on my desk. Do we know if anyone started these forms for me?  Also patient no showed her last appointment and should really be seen and reschedule an appointment

## 2023-04-04 ENCOUNTER — OFFICE VISIT (OUTPATIENT)
Dept: FAMILY MEDICINE CLINIC | Facility: CLINIC | Age: 48
End: 2023-04-04

## 2023-04-04 VITALS
RESPIRATION RATE: 20 BRPM | WEIGHT: 221 LBS | BODY MASS INDEX: 37.73 KG/M2 | HEIGHT: 64 IN | SYSTOLIC BLOOD PRESSURE: 128 MMHG | DIASTOLIC BLOOD PRESSURE: 84 MMHG | TEMPERATURE: 98 F | HEART RATE: 119 BPM | OXYGEN SATURATION: 98 %

## 2023-04-04 DIAGNOSIS — F15.10 METHAMPHETAMINE USE (HCC): ICD-10-CM

## 2023-04-04 DIAGNOSIS — Z12.11 SCREENING FOR COLON CANCER: ICD-10-CM

## 2023-04-04 DIAGNOSIS — N39.0 URINARY TRACT INFECTION WITHOUT HEMATURIA, SITE UNSPECIFIED: ICD-10-CM

## 2023-04-04 DIAGNOSIS — N94.89 PAIN OF OVARY: ICD-10-CM

## 2023-04-04 DIAGNOSIS — Z12.31 ENCOUNTER FOR SCREENING MAMMOGRAM FOR MALIGNANT NEOPLASM OF BREAST: ICD-10-CM

## 2023-04-04 DIAGNOSIS — R10.30 LOWER ABDOMINAL PAIN: Primary | ICD-10-CM
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
## 2023-04-05 DIAGNOSIS — F30.9 MOOD DISORDER OF MANIC TYPE (HCC): ICD-10-CM

## 2023-04-05 RX ORDER — PRAMIPEXOLE DIHYDROCHLORIDE 0.5 MG/1
0.5 TABLET ORAL
Qty: 30 TABLET | Refills: 0 | Status: SHIPPED | OUTPATIENT
Start: 2023-04-05

## 2023-04-26 ENCOUNTER — OFFICE VISIT (OUTPATIENT)
Dept: URGENT CARE | Facility: CLINIC | Age: 48
End: 2023-04-26

## 2023-04-26 VITALS
RESPIRATION RATE: 18 BRPM | BODY MASS INDEX: 37.94 KG/M2 | OXYGEN SATURATION: 96 % | SYSTOLIC BLOOD PRESSURE: 113 MMHG | TEMPERATURE: 97.4 F | HEIGHT: 64 IN | HEART RATE: 99 BPM | WEIGHT: 222.2 LBS | DIASTOLIC BLOOD PRESSURE: 63 MMHG

## 2023-04-26 DIAGNOSIS — F41.8 DEPRESSION WITH ANXIETY: ICD-10-CM

## 2023-04-26 DIAGNOSIS — H65.112 ACUTE MUCOID OTITIS MEDIA OF LEFT EAR: Primary | ICD-10-CM

## 2023-04-26 DIAGNOSIS — J02.9 SORE THROAT: ICD-10-CM

## 2023-04-26 DIAGNOSIS — E11.9 TYPE 2 DIABETES MELLITUS WITHOUT COMPLICATION, WITHOUT LONG-TERM CURRENT USE OF INSULIN (HCC): ICD-10-CM

## 2023-04-26 LAB — S PYO AG THROAT QL: NEGATIVE

## 2023-04-26 RX ORDER — ATORVASTATIN CALCIUM 10 MG/1
10 TABLET, FILM COATED ORAL DAILY
Qty: 30 TABLET | Refills: 0 | Status: SHIPPED | OUTPATIENT
Start: 2023-04-26

## 2023-04-26 RX ORDER — CITALOPRAM 40 MG/1
40 TABLET ORAL DAILY
Qty: 30 TABLET | Refills: 3 | Status: SHIPPED | OUTPATIENT
Start: 2023-04-26

## 2023-04-26 RX ORDER — AMOXICILLIN 875 MG/1
875 TABLET, COATED ORAL 2 TIMES DAILY
Qty: 20 TABLET | Refills: 0 | Status: SHIPPED | OUTPATIENT
Start: 2023-04-26 | End: 2023-05-06

## 2023-04-26 NOTE — PROGRESS NOTES
[facility]    NAME: Beltran Barahona is a 41 y o  female  : 1975    MRN: [id number]  DATE: 2023  TIME: 7:53 PM    Assessment and Plan   Acute mucoid otitis media of left ear [H65 112]  1  Acute mucoid otitis media of left ear  amoxicillin (AMOXIL) 875 mg tablet      2  Sore throat  POCT rapid strepA          Patient Instructions     Patient Instructions   I have prescribed an antibiotic for the infection  Please take the antibiotic as prescribed and finish the entire prescription  I recommend that the patient takes an over the counter probiotic or eats yogurt with live cultures in it [place]) to keep good bacteria in the gut and help prevent diarrhea  Wash hands frequently to prevent the spread of infection  Can use over the counter cough and cold medications to help with symptoms  Ibuprofen and/or tylenol as needed for pain or fever  If not improving over the next 7-10 days, follow up with PCP  Chief Complaint     Chief Complaint   Patient presents with   • Cough   • Sore Throat   • sinus congestion     For 3 days        History of Present Illness   72-year-old female here with complaint of cough, sore throat and sinus congestion has been getting worse over the last 3 to 4 days  No fever or chills  Has pain in both of her ears as well  Review of Systems   Review of Systems   Constitutional: Positive for fatigue  Negative for appetite change, chills and fever  HENT: Positive for congestion, ear pain, postnasal drip, sinus pressure and sore throat  Negative for ear discharge, facial swelling and sneezing  Respiratory: Positive for cough  Negative for shortness of breath and wheezing  Neurological: Positive for headaches         Current Medications     Current Outpatient Medications:   •  albuterol (ProAir HFA) 90 mcg/act inhaler, Inhale 2 puffs every 6 (six) hours as needed for wheezing or shortness of breath, Disp: 8 5 g, Rfl: 0  •  amoxicillin (AMOXIL) 875 mg tablet, Take 1 tablet (875 mg total) by mouth 2 (two) times a day for 10 days, Disp: 20 tablet, Rfl: 0  •  atorvastatin (LIPITOR) 10 mg tablet, TAKE 1 TABLET (10 MG TOTAL) BY MOUTH DAILY, Disp: 30 tablet, Rfl: 0  •  citalopram (CeleXA) 40 mg tablet, TAKE 1 TABLET (40 MG TOTAL) BY MOUTH DAILY, Disp: 30 tablet, Rfl: 3  •  hydrOXYzine HCL (ATARAX) 50 mg tablet, TAKE 1 TABLET (50 MG TOTAL) BY MOUTH EVERY 6 (SIX) HOURS AS NEEDED FOR ITCHING, ALLERGIES OR ANXIETY, Disp: 120 tablet, Rfl: 0  •  insulin detemir (Levemir FlexTouch) 100 Units/mL injection pen, Inject 20 Units under the skin daily at bedtime, Disp: 15 mL, Rfl: 3  •  metFORMIN (GLUCOPHAGE) 500 mg tablet, TAKE 2 TABLETS (1,000 MG TOTAL) BY MOUTH DAILY WITH BREAKFAST AND 1 TABLET (500 MG TOTAL) DAILY WITH DINNER , Disp: 90 tablet, Rfl: 0  •  montelukast (SINGULAIR) 10 mg tablet, TAKE 1 TABLET (10 MG TOTAL) BY MOUTH DAILY AT BEDTIME, Disp: 30 tablet, Rfl: 1  •  SUMAtriptan (IMITREX) 25 mg tablet, TAKE 1 TABLET (25 MG TOTAL) BY MOUTH ONCE AS NEEDED FOR MIGRAINE, Disp: 9 tablet, Rfl: 0  •  topiramate (TOPAMAX) 25 mg sprinkle capsule, TAKE 1 CAPSULE (25 MG TOTAL) BY MOUTH IN THE MORNING AND 1 CAPSULE (25 MG TOTAL) IN THE EVENING , Disp: 60 capsule, Rfl: 0  •  traZODone (DESYREL) 100 mg tablet, TAKE 1 TABLET (100 MG TOTAL) BY MOUTH DAILY AT BEDTIME, Disp: 30 tablet, Rfl: 0  •  albuterol (PROVENTIL HFA,VENTOLIN HFA) 90 mcg/act inhaler, INHALE 2 PUFFS EVERY 6 (SIX) HOURS AS NEEDED FOR WHEEZING, Disp: 18 g, Rfl: 6  •  Alcohol Swabs PADS, Use once daily when testing finger sticks  , Disp: 90 each, Rfl: 1  •  benzonatate (TESSALON PERLES) 100 mg capsule, Take 1 capsule (100 mg total) by mouth 3 (three) times a day as needed for cough (Patient not taking: Reported on 3/14/2023), Disp: 20 capsule, Rfl: 0  •  glucose blood (True Metrix Blood Glucose Test) test strip, Use as instructed, Disp: 100 each, Rfl: 3  •  glucose blood test strip, Use as instructed, Disp: 100 each, Rfl: 2  •  glucose blood test strip, Use as instructed  Test up to 3 times a day as needed, Disp: 200 each, Rfl: 11  •  Insulin Pen Needle (Easy Touch Pen Needles) 32G X 5 MM MISC, Use daily at bedtime, Disp: 90 each, Rfl: 1  •  Lancets MISC, Use once for 1 dose Onetouch Verio   Test once daily or as directed , Disp: 100 each, Rfl: 1  •  olopatadine (PATANOL) 0 1 % ophthalmic solution, Administer 1 drop to both eyes 2 (two) times a day (Patient not taking: Reported on 3/14/2023), Disp: 5 mL, Rfl: 5  •  pramipexole (MIRAPEX) 0 5 mg tablet, TAKE 1 TABLET (0 5 MG TOTAL) BY MOUTH DAILY AT BEDTIME, Disp: 30 tablet, Rfl: 0    Current Allergies     Allergies as of 04/26/2023 - Reviewed 04/26/2023   Allergen Reaction Noted   • Apple - food allergy Anaphylaxis 03/13/2022   • Apple fruit extract - food allergy Anaphylaxis 03/29/2023   • Grass pollen(k-o-r-t-swt milana) Hives, Itching, Shortness Of Breath, Sneezing, and Throat Swelling 11/17/2021   • Dust mite extract Hives and Itching 11/17/2021   • Misc natural products Diarrhea, Nausea Only, and Vomiting 11/17/2021   • Methylprednisolone Hives and Rash 03/19/2023   • Raw vegetable - food allergy Blisters, Dizziness, Eye Swelling, Fatigue, Headache, Hives, Itching, Rash, Sneezing, Throat Swelling, and Wheezing 03/21/2022   • Red dye - food allergy Hives, Itching, and Rash 11/17/2021          The following portions of the patient's history were reviewed and updated as appropriate: allergies, current medications, past family history, past medical history, past social history, past surgical history and problem list    Past Medical History:   Diagnosis Date   • Anxiety    • Asthma    • Closed nondisplaced fracture of proximal phalanx of lesser toe of left foot 1/15/2020   • Diabetes mellitus ([facility] [address] )    • Hyperlipidemia      Past Surgical History:   Procedure Laterality Date   • ARTHROSCOPY KNEE Left    • CHOLECYSTECTOMY     • ECTOPIC PREGNANCY SURGERY     • SHOULDER SURGERY Right     torn "bicep     Family History   Problem Relation Age of Onset   • Obesity Mother    • Obesity Father    • Diabetes type II Father    • Diabetes Father    • Obesity Brother    • No Known Problems Daughter    • No Known Problems Maternal Grandmother    • Diabetes Maternal Grandfather    • No Known Problems Paternal Grandmother    • No Known Problems Maternal Aunt    • No Known Problems Maternal Aunt    • No Known Problems Maternal Aunt    • Heart disease Neg Hx    • Stroke Neg Hx    • Cancer Neg Hx      Social History     Socioeconomic History   • Marital status: /Civil Union     Spouse name: Not on file   • Number of children: Not on file   • Years of education: Not on file   • Highest education level: Not on file   Occupational History   • Not on file   Tobacco Use   • Smoking status: Never   • Smokeless tobacco: Never   Vaping Use   • Vaping Use: Never used   Substance and Sexual Activity   • Alcohol use: Not Currently     Alcohol/week: 0 0 standard drinks     Comment: 0   • Drug use: Not Currently   • Sexual activity: Not on file   Other Topics Concern   • Not on file   Social History Narrative   • Not on file     Social Determinants of Health     Financial Resource Strain: Not on file   Food Insecurity: Not on file   Transportation Needs: Not on file   Physical Activity: Not on file   Stress: Not on file   Social Connections: Not on file   Intimate Partner Violence: Not on file   Housing Stability: Not on file     Medications have been verified  Objective   /63 (BP Location: Left arm, Patient Position: Sitting, Cuff Size: Standard)   Pulse 99   Temp (!) 97 4 °F (36 3 °C)   Resp 18   Ht 5' 4\" (1 626 m)   Wt 101 kg (222 lb 3 2 oz)   SpO2 96%   BMI 38 14 kg/m²      Physical Exam   Physical Exam  Vitals and nursing note reviewed  Constitutional:       General: She is not in acute distress  Appearance: She is well-developed  HENT:      Head: Normocephalic and atraumatic        Right Ear: " Tympanic membrane normal       Left Ear: Tympanic membrane normal       Nose: Congestion present  No mucosal edema  Right Sinus: No maxillary sinus tenderness or frontal sinus tenderness  Left Sinus: No maxillary sinus tenderness or frontal sinus tenderness  Mouth/Throat:      Pharynx: Posterior oropharyngeal erythema present  No oropharyngeal exudate  Eyes:      Conjunctiva/sclera: Conjunctivae normal    Cardiovascular:      Rate and Rhythm: Normal rate and regular rhythm  Heart sounds: Normal heart sounds  No murmur heard

## 2023-04-26 NOTE — LETTER
April 26, 2023     Patient: Vesta Back   YOB: 1975   Date of Visit: 4/26/2023       To Whom It May Concern: It is my medical opinion that Vesta Back should not return to work until 4/27/23  If you have any questions or concerns, please don't hesitate to call           Sincerely,        René Valdivia PA-C    CC: No Recipients

## 2023-05-02 DIAGNOSIS — F30.9 MOOD DISORDER OF MANIC TYPE (HCC): ICD-10-CM

## 2023-05-02 RX ORDER — PRAMIPEXOLE DIHYDROCHLORIDE 0.5 MG/1
0.5 TABLET ORAL
Qty: 30 TABLET | Refills: 0 | Status: SHIPPED | OUTPATIENT
Start: 2023-05-02

## 2023-05-29 DIAGNOSIS — F30.9 MOOD DISORDER OF MANIC TYPE (HCC): ICD-10-CM

## 2023-05-29 RX ORDER — PRAMIPEXOLE DIHYDROCHLORIDE 0.5 MG/1
0.5 TABLET ORAL
Qty: 30 TABLET | Refills: 0 | Status: SHIPPED | OUTPATIENT
Start: 2023-05-29

## 2023-06-21 DIAGNOSIS — F30.9 MOOD DISORDER OF MANIC TYPE (HCC): ICD-10-CM

## 2023-06-21 DIAGNOSIS — J30.1 ALLERGIC RHINITIS DUE TO POLLEN, UNSPECIFIED SEASONALITY: ICD-10-CM

## 2023-06-21 DIAGNOSIS — E11.9 TYPE 2 DIABETES MELLITUS WITHOUT COMPLICATION, WITHOUT LONG-TERM CURRENT USE OF INSULIN (HCC): Chronic | ICD-10-CM

## 2023-06-21 RX ORDER — MONTELUKAST SODIUM 10 MG/1
10 TABLET ORAL
Qty: 30 TABLET | Refills: 1 | Status: SHIPPED | OUTPATIENT
Start: 2023-06-21

## 2023-06-21 RX ORDER — INSULIN DETEMIR 100 [IU]/ML
INJECTION, SOLUTION SUBCUTANEOUS
Qty: 15 ML | Refills: 3 | Status: SHIPPED | OUTPATIENT
Start: 2023-06-21

## 2023-06-21 RX ORDER — PRAMIPEXOLE DIHYDROCHLORIDE 0.5 MG/1
0.5 TABLET ORAL
Qty: 30 TABLET | Refills: 0 | Status: SHIPPED | OUTPATIENT
Start: 2023-06-21

## 2023-07-07 ENCOUNTER — HOSPITAL ENCOUNTER (INPATIENT)
Facility: HOSPITAL | Age: 48
LOS: 1 days | Discharge: HOME | End: 2023-07-10
Attending: EMERGENCY MEDICINE | Admitting: INTERNAL MEDICINE
Payer: COMMERCIAL
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
## 2023-07-07 DIAGNOSIS — T63.301A SPIDER BITE: Primary | ICD-10-CM

## 2023-07-07 DIAGNOSIS — L03.116 CELLULITIS OF LEFT LOWER EXTREMITY: ICD-10-CM

## 2023-07-07 DIAGNOSIS — S30.860A: ICD-10-CM

## 2023-07-07 LAB
APTT PPP: 28 SECONDS (ref 23–37)
BASOPHILS # BLD AUTO: 0.02 THOUSANDS/ÂΜL (ref 0–0.1)
BASOPHILS NFR BLD AUTO: 0 % (ref 0–1)
EOSINOPHIL # BLD AUTO: 0.3 THOUSAND/ÂΜL (ref 0–0.61)
EOSINOPHIL NFR BLD AUTO: 3 % (ref 0–6)
ERYTHROCYTE [DISTWIDTH] IN BLOOD BY AUTOMATED COUNT: 12.5 % (ref 11.6–15.1)
HCT VFR BLD AUTO: 37.7 % (ref 34.8–46.1)
HGB BLD-MCNC: 12.3 G/DL (ref 11.5–15.4)
IMM GRANULOCYTES # BLD AUTO: 0.04 THOUSAND/UL (ref 0–0.2)
IMM GRANULOCYTES NFR BLD AUTO: 0 % (ref 0–2)
INR PPP: 0.9 (ref 0.84–1.19)
LYMPHOCYTES # BLD AUTO: 2.52 THOUSANDS/ÂΜL (ref 0.6–4.47)
LYMPHOCYTES NFR BLD AUTO: 21 % (ref 14–44)
MCH RBC QN AUTO: 29.6 PG (ref 26.8–34.3)
MCHC RBC AUTO-ENTMCNC: 32.6 G/DL (ref 31.4–37.4)
MCV RBC AUTO: 91 FL (ref 82–98)
MONOCYTES # BLD AUTO: 1.12 THOUSAND/ÂΜL (ref 0.17–1.22)
MONOCYTES NFR BLD AUTO: 10 % (ref 4–12)
NEUTROPHILS # BLD AUTO: 7.78 THOUSANDS/ÂΜL (ref 1.85–7.62)
NEUTS SEG NFR BLD AUTO: 66 % (ref 43–75)
NRBC BLD AUTO-RTO: 0 /100 WBCS
PLATELET # BLD AUTO: 220 THOUSANDS/UL (ref 149–390)
PMV BLD AUTO: 9.9 FL (ref 8.9–12.7)
PROTHROMBIN TIME: 12.2 SECONDS (ref 11.6–14.5)
RBC # BLD AUTO: 4.15 MILLION/UL (ref 3.81–5.12)
WBC # BLD AUTO: 11.78 THOUSAND/UL (ref 4.31–10.16)

## 2023-07-07 PROCEDURE — 85730 THROMBOPLASTIN TIME PARTIAL: CPT | Performed by: EMERGENCY MEDICINE

## 2023-07-07 PROCEDURE — 83036 HEMOGLOBIN GLYCOSYLATED A1C: CPT | Performed by: PHYSICIAN ASSISTANT

## 2023-07-07 PROCEDURE — 84145 PROCALCITONIN (PCT): CPT | Performed by: EMERGENCY MEDICINE

## 2023-07-07 PROCEDURE — 87040 BLOOD CULTURE FOR BACTERIA: CPT | Performed by: EMERGENCY MEDICINE

## 2023-07-07 PROCEDURE — 36415 COLL VENOUS BLD VENIPUNCTURE: CPT | Performed by: EMERGENCY MEDICINE

## 2023-07-07 PROCEDURE — 99285 EMERGENCY DEPT VISIT HI MDM: CPT | Performed by: EMERGENCY MEDICINE

## 2023-07-07 PROCEDURE — 85610 PROTHROMBIN TIME: CPT | Performed by: EMERGENCY MEDICINE

## 2023-07-07 PROCEDURE — 83605 ASSAY OF LACTIC ACID: CPT | Performed by: EMERGENCY MEDICINE

## 2023-07-07 PROCEDURE — 96365 THER/PROPH/DIAG IV INF INIT: CPT

## 2023-07-07 PROCEDURE — 99283 EMERGENCY DEPT VISIT LOW MDM: CPT

## 2023-07-07 PROCEDURE — 85025 COMPLETE CBC W/AUTO DIFF WBC: CPT | Performed by: EMERGENCY MEDICINE

## 2023-07-07 PROCEDURE — 80053 COMPREHEN METABOLIC PANEL: CPT | Performed by: EMERGENCY MEDICINE

## 2023-07-07 RX ORDER — CEFEPIME HYDROCHLORIDE 2 G/50ML
2000 INJECTION, SOLUTION INTRAVENOUS ONCE
Status: COMPLETED | OUTPATIENT
Start: 2023-07-07 | End: 2023-07-08

## 2023-07-07 RX ADMIN — SODIUM CHLORIDE 1000 ML: 0.9 INJECTION, SOLUTION INTRAVENOUS at 23:48

## 2023-07-07 RX ADMIN — CEFEPIME HYDROCHLORIDE 2000 MG: 2 INJECTION, SOLUTION INTRAVENOUS at 23:49

## 2023-07-08 PROBLEM — N73.0 PID (ACUTE PELVIC INFLAMMATORY DISEASE): Status: ACTIVE | Noted: 2023-03-29

## 2023-07-08 PROBLEM — Z86.39 HISTORY OF HYPERLIPIDEMIA: Status: ACTIVE | Noted: 2021-03-06

## 2023-07-08 PROBLEM — F19.90 DRUG USE: Status: ACTIVE | Noted: 2023-03-29

## 2023-07-08 PROBLEM — N70.11 HYDROSALPINX: Status: ACTIVE | Noted: 2023-03-29

## 2023-07-08 LAB
ALBUMIN SERPL BCP-MCNC: 3.7 G/DL (ref 3.5–5)
ALP SERPL-CCNC: 74 U/L (ref 34–104)
ALT SERPL W P-5'-P-CCNC: 36 U/L (ref 7–52)
ANION GAP SERPL CALCULATED.3IONS-SCNC: 7 MMOL/L
AST SERPL W P-5'-P-CCNC: 31 U/L (ref 13–39)
BILIRUB SERPL-MCNC: 0.38 MG/DL (ref 0.2–1)
BUN SERPL-MCNC: 10 MG/DL (ref 5–25)
CALCIUM SERPL-MCNC: 9.3 MG/DL (ref 8.4–10.2)
CHLORIDE SERPL-SCNC: 102 MMOL/L (ref 96–108)
CO2 SERPL-SCNC: 24 MMOL/L (ref 21–32)
CREAT SERPL-MCNC: 0.67 MG/DL (ref 0.6–1.3)
GFR SERPL CREATININE-BSD FRML MDRD: 106 ML/MIN/1.73SQ M
GLUCOSE SERPL-MCNC: 167 MG/DL (ref 65–140)
GLUCOSE SERPL-MCNC: 185 MG/DL (ref 65–140)
GLUCOSE SERPL-MCNC: 191 MG/DL (ref 65–140)
GLUCOSE SERPL-MCNC: 212 MG/DL (ref 65–140)
GLUCOSE SERPL-MCNC: 224 MG/DL (ref 65–140)
LACTATE SERPL-SCNC: 1 MMOL/L (ref 0.5–2)
POTASSIUM SERPL-SCNC: 3.6 MMOL/L (ref 3.5–5.3)
PROCALCITONIN SERPL-MCNC: 0.06 NG/ML
PROT SERPL-MCNC: 7.3 G/DL (ref 6.4–8.4)
SODIUM SERPL-SCNC: 133 MMOL/L (ref 135–147)

## 2023-07-08 PROCEDURE — 82948 REAGENT STRIP/BLOOD GLUCOSE: CPT

## 2023-07-08 PROCEDURE — 99223 1ST HOSP IP/OBS HIGH 75: CPT | Performed by: INTERNAL MEDICINE

## 2023-07-08 RX ORDER — ALBUTEROL SULFATE 90 UG/1
2 AEROSOL, METERED RESPIRATORY (INHALATION) EVERY 6 HOURS PRN
Status: DISCONTINUED | OUTPATIENT
Start: 2023-07-08 | End: 2023-07-10 | Stop reason: HOSPADM

## 2023-07-08 RX ORDER — HYDROXYZINE HYDROCHLORIDE 25 MG/1
50 TABLET, FILM COATED ORAL EVERY 6 HOURS PRN
Status: DISCONTINUED | OUTPATIENT
Start: 2023-07-08 | End: 2023-07-10 | Stop reason: HOSPADM

## 2023-07-08 RX ORDER — CITALOPRAM 20 MG/1
40 TABLET ORAL DAILY
Status: DISCONTINUED | OUTPATIENT
Start: 2023-07-08 | End: 2023-07-10 | Stop reason: HOSPADM

## 2023-07-08 RX ORDER — INSULIN LISPRO 100 [IU]/ML
1-6 INJECTION, SOLUTION INTRAVENOUS; SUBCUTANEOUS
Status: DISCONTINUED | OUTPATIENT
Start: 2023-07-08 | End: 2023-07-10 | Stop reason: HOSPADM

## 2023-07-08 RX ORDER — INSULIN LISPRO 100 [IU]/ML
1-5 INJECTION, SOLUTION INTRAVENOUS; SUBCUTANEOUS
Status: DISCONTINUED | OUTPATIENT
Start: 2023-07-08 | End: 2023-07-10 | Stop reason: HOSPADM

## 2023-07-08 RX ORDER — SUMATRIPTAN 25 MG/1
25 TABLET, FILM COATED ORAL ONCE AS NEEDED
Status: DISCONTINUED | OUTPATIENT
Start: 2023-07-08 | End: 2023-07-10 | Stop reason: HOSPADM

## 2023-07-08 RX ORDER — PRAMIPEXOLE DIHYDROCHLORIDE 0.5 MG/1
0.5 TABLET ORAL
Status: DISCONTINUED | OUTPATIENT
Start: 2023-07-08 | End: 2023-07-10 | Stop reason: HOSPADM

## 2023-07-08 RX ORDER — ACETAMINOPHEN 325 MG/1
650 TABLET ORAL EVERY 6 HOURS PRN
Status: DISCONTINUED | OUTPATIENT
Start: 2023-07-08 | End: 2023-07-10 | Stop reason: HOSPADM

## 2023-07-08 RX ORDER — ATORVASTATIN CALCIUM 10 MG/1
10 TABLET, FILM COATED ORAL DAILY
Status: DISCONTINUED | OUTPATIENT
Start: 2023-07-08 | End: 2023-07-10 | Stop reason: HOSPADM

## 2023-07-08 RX ORDER — TOPIRAMATE 25 MG/1
25 CAPSULE, COATED PELLETS ORAL 2 TIMES DAILY
Status: DISCONTINUED | OUTPATIENT
Start: 2023-07-08 | End: 2023-07-10 | Stop reason: HOSPADM

## 2023-07-08 RX ORDER — TRAZODONE HYDROCHLORIDE 100 MG/1
100 TABLET ORAL
Status: DISCONTINUED | OUTPATIENT
Start: 2023-07-08 | End: 2023-07-10 | Stop reason: HOSPADM

## 2023-07-08 RX ORDER — MONTELUKAST SODIUM 10 MG/1
10 TABLET ORAL
Status: DISCONTINUED | OUTPATIENT
Start: 2023-07-08 | End: 2023-07-10 | Stop reason: HOSPADM

## 2023-07-08 RX ORDER — CEFAZOLIN SODIUM 2 G/50ML
2000 SOLUTION INTRAVENOUS EVERY 8 HOURS
Status: DISCONTINUED | OUTPATIENT
Start: 2023-07-08 | End: 2023-07-10 | Stop reason: HOSPADM

## 2023-07-08 RX ORDER — ONDANSETRON 2 MG/ML
4 INJECTION INTRAMUSCULAR; INTRAVENOUS EVERY 6 HOURS PRN
Status: DISCONTINUED | OUTPATIENT
Start: 2023-07-08 | End: 2023-07-10 | Stop reason: HOSPADM

## 2023-07-08 RX ORDER — FENTANYL CITRATE 50 UG/ML
50 INJECTION, SOLUTION INTRAMUSCULAR; INTRAVENOUS ONCE
Status: COMPLETED | OUTPATIENT
Start: 2023-07-08 | End: 2023-07-08

## 2023-07-08 RX ADMIN — PRAMIPEXOLE DIHYDROCHLORIDE 0.5 MG: 0.5 TABLET ORAL at 21:20

## 2023-07-08 RX ADMIN — INSULIN LISPRO 2 UNITS: 100 INJECTION, SOLUTION INTRAVENOUS; SUBCUTANEOUS at 17:15

## 2023-07-08 RX ADMIN — ATORVASTATIN CALCIUM 10 MG: 10 TABLET, FILM COATED ORAL at 09:17

## 2023-07-08 RX ADMIN — CITALOPRAM HYDROBROMIDE 40 MG: 20 TABLET ORAL at 09:17

## 2023-07-08 RX ADMIN — MONTELUKAST 10 MG: 10 TABLET, FILM COATED ORAL at 21:20

## 2023-07-08 RX ADMIN — CEFAZOLIN SODIUM 2000 MG: 2 SOLUTION INTRAVENOUS at 18:14

## 2023-07-08 RX ADMIN — INSULIN LISPRO 1 UNITS: 100 INJECTION, SOLUTION INTRAVENOUS; SUBCUTANEOUS at 21:20

## 2023-07-08 RX ADMIN — ACETAMINOPHEN 650 MG: 325 TABLET ORAL at 18:13

## 2023-07-08 RX ADMIN — CEFAZOLIN SODIUM 2000 MG: 2 SOLUTION INTRAVENOUS at 11:35

## 2023-07-08 RX ADMIN — FENTANYL CITRATE 50 MCG: 50 INJECTION, SOLUTION INTRAMUSCULAR; INTRAVENOUS at 01:06

## 2023-07-08 RX ADMIN — TOPIRAMATE 25 MG: 25 CAPSULE, COATED PELLETS ORAL at 17:15

## 2023-07-08 RX ADMIN — CEFAZOLIN SODIUM 2000 MG: 2 SOLUTION INTRAVENOUS at 04:12

## 2023-07-08 RX ADMIN — TOPIRAMATE 25 MG: 25 CAPSULE, COATED PELLETS ORAL at 09:17

## 2023-07-08 RX ADMIN — VANCOMYCIN HYDROCHLORIDE 1500 MG: 1 INJECTION, POWDER, LYOPHILIZED, FOR SOLUTION INTRAVENOUS at 01:08

## 2023-07-08 RX ADMIN — TRAZODONE HYDROCHLORIDE 100 MG: 100 TABLET ORAL at 21:20

## 2023-07-08 RX ADMIN — INSULIN LISPRO 2 UNITS: 100 INJECTION, SOLUTION INTRAVENOUS; SUBCUTANEOUS at 12:11

## 2023-07-08 RX ADMIN — SODIUM CHLORIDE 1000 ML: 0.9 INJECTION, SOLUTION INTRAVENOUS at 01:09

## 2023-07-08 NOTE — PLAN OF CARE
Problem: PAIN - ADULT  Goal: Verbalizes/displays adequate comfort level or baseline comfort level  Description: Interventions:  - Encourage patient to monitor pain and request assistance  - Assess pain using appropriate pain scale  - Administer analgesics based on type and severity of pain and evaluate response  - Implement non-pharmacological measures as appropriate and evaluate response  - Consider cultural and social influences on pain and pain management  - Notify physician/advanced practitioner if interventions unsuccessful or patient reports new pain  Outcome: Progressing     Problem: INFECTION - ADULT  Goal: Absence or prevention of progression during hospitalization  Description: INTERVENTIONS:  - Assess and monitor for signs and symptoms of infection  - Monitor lab/diagnostic results  - Monitor all insertion sites, i.e. indwelling lines, tubes, and drains  - Monitor endotracheal if appropriate and nasal secretions for changes in amount and color  - [place] appropriate cooling/warming therapies per order  - Administer medications as ordered  - Instruct and encourage patient and family to use good hand hygiene technique  - Identify and instruct in appropriate isolation precautions for identified infection/condition  Outcome: Progressing  Goal: Absence of fever/infection during neutropenic period  Description: INTERVENTIONS:  - Monitor WBC    Outcome: Progressing     Problem: SAFETY ADULT  Goal: Patient will remain free of falls  Description: INTERVENTIONS:  - Educate patient/family on patient safety including physical limitations  - Instruct patient to call for assistance with activity   - Consult OT/PT to assist with strengthening/mobility   - Keep Call bell within reach  - Keep bed low and locked with side rails adjusted as appropriate  - Keep care items and personal belongings within reach  - Initiate and maintain comfort rounds  - Make Fall Risk Sign visible to staff  - Offer Toileting every 2 Hours, in advance of need  - Initiate/Maintain bed alarm  - Obtain necessary fall risk management equipment:   - Apply yellow socks and bracelet for high fall risk patients  - Consider moving patient to room near nurses station  Outcome: Progressing  Goal: Maintain or return to baseline ADL function  Description: INTERVENTIONS:  -  Assess patient's ability to carry out ADLs; assess patient's baseline for ADL function and identify physical deficits which impact ability to perform ADLs (bathing, care of mouth/teeth, toileting, grooming, dressing, etc.)  - Assess/evaluate cause of self-care deficits   - Assess range of motion  - Assess patient's mobility; develop plan if impaired  - Assess patient's need for assistive devices and provide as appropriate  - Encourage maximum independence but intervene and supervise when necessary  - Involve family in performance of ADLs  - Assess for home care needs following discharge   - Consider OT consult to assist with ADL evaluation and planning for discharge  - Provide patient education as appropriate  Outcome: Progressing  Goal: Maintains/Returns to pre admission functional level  Description: INTERVENTIONS:  - Perform BMAT or MOVE assessment daily.   - Set and communicate daily mobility goal to care team and patient/family/caregiver. - Collaborate with rehabilitation services on mobility goals if consulted  - Perform Range of Motion 3 times a day. - Reposition patient every 2 hours.   - Dangle patient 3 times a day  - Stand patient 3 times a day  - Ambulate patient 3 times a day  - Out of bed to chair 3 times a day   - Out of bed for meals 3 times a day  - Out of bed for toileting  - Record patient progress and toleration of activity level   Outcome: Progressing     Problem: DISCHARGE PLANNING  Goal: Discharge to home or other facility with appropriate resources  Description: INTERVENTIONS:  - Identify barriers to discharge w/patient and caregiver  - Arrange for needed discharge resources and transportation as appropriate  - Identify discharge learning needs (meds, wound care, etc.)  - Arrange for interpretive services to assist at discharge as needed  - Refer to Case Management Department for coordinating discharge planning if the patient needs post-hospital services based on physician/advanced practitioner order or complex needs related to functional status, cognitive ability, or social support system  Outcome: Progressing     Problem: Knowledge Deficit  Goal: Patient/family/caregiver demonstrates understanding of disease process, treatment plan, medications, and discharge instructions  Description: Complete learning assessment and assess knowledge base.   Interventions:  - Provide teaching at level of understanding  - Provide teaching via preferred learning methods  Outcome: Progressing     Problem: SKIN/TISSUE INTEGRITY - ADULT  Goal: Incision(s), wounds(s) or drain site(s) healing without S/S of infection  Description: INTERVENTIONS  - Assess and document dressing, incision, wound bed, drain sites and surrounding tissue  - Provide patient and family education  - Perform skin care/dressing changes every tcome: Progressing

## 2023-07-08 NOTE — PLAN OF CARE
Problem: PAIN - ADULT  Goal: Verbalizes/displays adequate comfort level or baseline comfort level  Description: Interventions:  - Encourage patient to monitor pain and request assistance  - Assess pain using appropriate pain scale  - Administer analgesics based on type and severity of pain and evaluate response  - Implement non-pharmacological measures as appropriate and evaluate response  - Consider cultural and social influences on pain and pain management  - Notify physician/advanced practitioner if interventions unsuccessful or patient reports new pain  Outcome: Progressing     Problem: INFECTION - ADULT  Goal: Absence or prevention of progression during hospitalization  Description: INTERVENTIONS:  - Assess and monitor for signs and symptoms of infection  - Monitor lab/diagnostic results  - Monitor all insertion sites, i.e. indwelling lines, tubes, and drains  - Monitor endotracheal if appropriate and nasal secretions for changes in amount and color  - [place] appropriate cooling/warming therapies per order  - Administer medications as ordered  - Instruct and encourage patient and family to use good hand hygiene technique  - Identify and instruct in appropriate isolation precautions for identified infection/condition  Outcome: Progressing  Goal: Absence of fever/infection during neutropenic period  Description: INTERVENTIONS:  - Monitor WBC    Outcome: Progressing     Problem: SAFETY ADULT  Goal: Patient will remain free of falls  Description: INTERVENTIONS:  - Educate patient/family on patient safety including physical limitations  - Instruct patient to call for assistance with activity   - Consult OT/PT to assist with strengthening/mobility   - Keep Call bell within reach  - Keep bed low and locked with side rails adjusted as appropriate  - Keep care items and personal belongings within reach  - Initiate and maintain comfort rounds  - Make Fall Risk Sign visible to staff  - Offer Toileting every 2 Hours, in advance of need  - Initiate/Maintain bed alarm  - Obtain necessary fall risk management equipment:   - Apply yellow socks and bracelet for high fall risk patients  - Consider moving patient to room near nurses station  Outcome: Progressing  Goal: Maintain or return to baseline ADL function  Description: INTERVENTIONS:  -  Assess patient's ability to carry out ADLs; assess patient's baseline for ADL function and identify physical deficits which impact ability to perform ADLs (bathing, care of mouth/teeth, toileting, grooming, dressing, etc.)  - Assess/evaluate cause of self-care deficits   - Assess range of motion  - Assess patient's mobility; develop plan if impaired  - Assess patient's need for assistive devices and provide as appropriate  - Encourage maximum independence but intervene and supervise when necessary  - Involve family in performance of ADLs  - Assess for home care needs following discharge   - Consider OT consult to assist with ADL evaluation and planning for discharge  - Provide patient education as appropriate  Outcome: Progressing  Goal: Maintains/Returns to pre admission functional level  Description: INTERVENTIONS:  - Perform BMAT or MOVE assessment daily.   - Set and communicate daily mobility goal to care team and patient/family/caregiver. - Collaborate with rehabilitation services on mobility goals if consulted  - Perform Range of Motion 2 times a day. - Reposition patient every 2 hours.   - Dangle patient 2 times a day  - Stand patient 2 times a day  - Ambulate patient 2 times a day  - Out of bed to chair 2 times a day   - Out of bed for meals 2 times a day  - Out of bed for toileting  - Record patient progress and toleration of activity level   Outcome: Progressing     Problem: DISCHARGE PLANNING  Goal: Discharge to home or other facility with appropriate resources  Description: INTERVENTIONS:  - Identify barriers to discharge w/patient and caregiver  - Arrange for needed discharge resources and transportation as appropriate  - Identify discharge learning needs (meds, wound care, etc.)  - Arrange for interpretive services to assist at discharge as needed  - Refer to Case Management Department for coordinating discharge planning if the patient needs post-hospital services based on physician/advanced practitioner order or complex needs related to functional status, cognitive ability, or social support system  Outcome: Progressing     Problem: Knowledge Deficit  Goal: Patient/family/caregiver demonstrates understanding of disease process, treatment plan, medications, and discharge instructions  Description: Complete learning assessment and assess knowledge base.   Interventions:  - Provide teaching at level of understanding  - Provide teaching via preferred learning methods  Outcome: Progressing     Problem: SKIN/TISSUE INTEGRITY - ADULT  Goal: Incision(s), wounds(s) or drain site(s) healing without S/S of infection  Description: INTERVENTIONS  - Assess and document dressing, incision, wound bed, drain sites and surrounding tissue  - Provide patient and family education  - Perform skin care/dressing changes every shift  Outcome: Progressing

## 2023-07-08 NOTE — CASE MANAGEMENT
Case Management Assessment & Discharge Planning Note    Patient name Lenetta Ganser  Location /-10 MRN [id number]  : 1975 Date 2023       Current Admission Date: 2023  Current Admission Diagnosis:Cellulitis of left hip   Patient Active Problem List    Diagnosis Date Noted   • Drug use 2023   • Hydrosalpinx 2023   • PID (acute pelvic inflammatory disease) 2023   • Restless leg syndrome 2022   • Type 2 diabetes mellitus with hyperglycemia, with long-term current use of insulin ([address]) 2022   • Cellulitis of left hip 2022   • Migraine without aura, not intractable 2021   • Other hyperlipidemia 2021   • History of hyperlipidemia 2021   • Allergic rhinitis 2017   • Mood disorder of manic type ([address]) 2017   • Disc degeneration, lumbosacral 2016   • Depression with anxiety 10/22/2015   • Severe obesity (BMI 35.0-39. 9) with comorbidity ([address]) 2014      LOS (days): 0  Geometric Mean LOS (GMLOS) (days):   Days to GMLOS:     OBJECTIVE:     Current admission status: Observation       Preferred Pharmacy:   [address] [place] [address], [address] [place] [address]  Phone: [phone] Fax: [phone]    Primary Care Provider: JENNIFER Varela    Primary Insurance: Storm Niru  Secondary Insurance:     ASSESSMENT:   [place], [address] [place] [address] Representative - Daughter   Primary Phone: [phone] (Mobile)               Advance Directives  Does patient have a [facility]?: No  Was patient offered paperwork?: Yes (Patient declined paperwork at this time.)  Does patient currently have a Health Care decision maker?: Yes, please see Health Care Proxy section (Patient identified her daughter as her health care representative.)  Does patient have Advance Directives?: No  Was patient offered paperwork?: Yes (Patient declined paperwork at this time.)  Primary Contact: Patient's Daughter    Readmission Root Cause  30 Day Readmission: No    Patient Information  Admitted from[de-identified] Home  Mental Status: Alert  During Assessment patient was accompanied by: Not accompanied during assessment  Assessment information provided by[de-identified] Patient  Primary Caregiver: Self  Support Systems: Self, Family members  [place]  Residence: [facility] do you live in?: [address] entry access options.  Select all that apply.: No steps to enter home  Type of Current Residence: Homeless  In the last 12 months, was there a time when you were not able to pay the mortgage or rent on time?: Yes  In the last 12 months, how many places have you lived?: 1  In the last 12 months, was there a time when you did not have a steady place to sleep or slept in a shelter (including now)?: Yes  Homeless/housing insecurity resource given?: Yes ([address] [employer] and [facility])  Living Arrangements: Lives Alone  Is patient a ?: No    Activities of Daily Living Prior to Admission  Functional Status: Independent  Completes ADLs independently?: Yes  Ambulates independently?: Yes  Does patient use assisted devices?: No  Does patient currently own DME?: No  Does patient have a history of Outpatient Therapy (PT/OT)?: No  Does the patient have a history of Short-Term Rehab?: No  Does patient have a history of HHC?: No  Does patient currently have [address]?: No    Patient Information Continued  Income Source: Employed  Does patient have prescription coverage?: Yes (Patient confirmed that she uses [address] in [facility], and she denied any barriers to obtaining or affording prescriptions.)  Within the past 12 months, you worried that your food would run out before you got the money to buy more.: Never true  Within the past 12 months, the food you bought just didn't last and you didn't have money to get more.: Never true  Food insecurity resource given?: N/A  Does patient receive dialysis treatments?: No  Does patient have a history of substance abuse?: No  Does patient have a history of Mental Health Diagnosis?: Yes (anxiety, depression, bipolar)  Is patient receiving treatment for mental health?: Yes  Has patient received inpatient treatment related to mental health in the last 2 years?: No    PHQ 2/9 Screening   Reviewed PHQ 2/9 Depression Screening Score?: No    Means of Transportation  Means of Transport to Appts[de-identified] Drives Self  In the past 12 months, has lack of transportation kept you from medical appointments or from getting medications?: No  In the past 12 months, has lack of transportation kept you from meetings, work, or from getting things needed for daily living?: No  Was application for public transport provided?: N/A    DISCHARGE DETAILS:    Discharge planning discussed with[de-identified] Patient  Freedom of Choice: Yes  Comments - Freedom of Choice: CM discussed freedom of choice as it pertains to discharge planning. Patient reported that she is currently homeless and interested in any housing or homeless shelter resources. She denied any other needs at this time and confirmed that her father or her daughter will provide transportation at discharge.   CM contacted family/caregiver?: No- see comments (Patient declined phone call at this time.)  Were Treatment Team discharge recommendations reviewed with patient/caregiver?: Yes  Did patient/caregiver verbalize understanding of patient care needs?: Yes  Were patient/caregiver advised of the risks associated with not following Treatment Team discharge recommendations?: Yes    [address]         Is the patient interested in [facility] at discharge?: No    DME Referral Provided  Referral made for DME?: No    Other Referral/Resources/Interventions Provided:  Interventions: [facility], Shelter    Would you like to participate in our [address] service program?  : No - Declined    Treatment Team Recommendation: Home  Discharge Destination Plan[de-identified] Home  Transport at Discharge : Family

## 2023-07-08 NOTE — PLAN OF CARE
Problem: PAIN - ADULT  Goal: Verbalizes/displays adequate comfort level or baseline comfort level  Description: Interventions:  - Encourage patient to monitor pain and request assistance  - Assess pain using appropriate pain scale  - Administer analgesics based on type and severity of pain and evaluate response  - Implement non-pharmacological measures as appropriate and evaluate response  - Consider cultural and social influences on pain and pain management  - Notify physician/advanced practitioner if interventions unsuccessful or patient reports new pain  Outcome: Progressing     Problem: INFECTION - ADULT  Goal: Absence or prevention of progression during hospitalization  Description: INTERVENTIONS:  - Assess and monitor for signs and symptoms of infection  - Monitor lab/diagnostic results  - Monitor all insertion sites, i.e. indwelling lines, tubes, and drains  - Monitor endotracheal if appropriate and nasal secretions for changes in amount and color  - [place] appropriate cooling/warming therapies per order  - Administer medications as ordered  - Instruct and encourage patient and family to use good hand hygiene technique  - Identify and instruct in appropriate isolation precautions for identified infection/condition  Outcome: Progressing  Goal: Absence of fever/infection during neutropenic period  Description: INTERVENTIONS:  - Monitor WBC    Outcome: Progressing     Problem: SAFETY ADULT  Goal: Patient will remain free of falls  Description: INTERVENTIONS:  - Educate patient/family on patient safety including physical limitations  - Instruct patient to call for assistance with activity   - Consult OT/PT to assist with strengthening/mobility   - Keep Call bell within reach  - Keep bed low and locked with side rails adjusted as appropriate  - Keep care items and personal belongings within reach  - Initiate and maintain comfort rounds  - Make Fall Risk Sign visible to staff  - Offer Toileting every 2 Hours, in advance of need  - Initiate/Maintain bed alarm  - Obtain necessary fall risk management equipment:   - Apply yellow socks and bracelet for high fall risk patients  - Consider moving patient to room near nurses station  Outcome: Progressing  Goal: Maintain or return to baseline ADL function  Description: INTERVENTIONS:  -  Assess patient's ability to carry out ADLs; assess patient's baseline for ADL function and identify physical deficits which impact ability to perform ADLs (bathing, care of mouth/teeth, toileting, grooming, dressing, etc.)  - Assess/evaluate cause of self-care deficits   - Assess range of motion  - Assess patient's mobility; develop plan if impaired  - Assess patient's need for assistive devices and provide as appropriate  - Encourage maximum independence but intervene and supervise when necessary  - Involve family in performance of ADLs  - Assess for home care needs following discharge   - Consider OT consult to assist with ADL evaluation and planning for discharge  - Provide patient education as appropriate  Outcome: Progressing  Goal: Maintains/Returns to pre admission functional level  Description: INTERVENTIONS:  - Perform BMAT or MOVE assessment daily.   - Set and communicate daily mobility goal to care team and patient/family/caregiver. - Collaborate with rehabilitation services on mobility goals if consulted  - Perform Range of Motion 2 times a day. - Reposition patient every 2 hours.   - Dangle patient 2 times a day  - Stand patient 2 times a day  - Ambulate patient 2 times a day  - Out of bed to chair 2 times a day   - Out of bed for meals 2 times a day  - Out of bed for toileting  - Record patient progress and toleration of activity level   Outcome: Progressing     Problem: DISCHARGE PLANNING  Goal: Discharge to home or other facility with appropriate resources  Description: INTERVENTIONS:  - Identify barriers to discharge w/patient and caregiver  - Arrange for needed discharge resources and transportation as appropriate  - Identify discharge learning needs (meds, wound care, etc.)  - Arrange for interpretive services to assist at discharge as needed  - Refer to Case Management Department for coordinating discharge planning if the patient needs post-hospital services based on physician/advanced practitioner order or complex needs related to functional status, cognitive ability, or social support system  Outcome: Progressing     Problem: Knowledge Deficit  Goal: Patient/family/caregiver demonstrates understanding of disease process, treatment plan, medications, and discharge instructions  Description: Complete learning assessment and assess knowledge base.   Interventions:  - Provide teaching at level of understanding  - Provide teaching via preferred learning methods  Outcome: Progressing     Problem: SKIN/TISSUE INTEGRITY - ADULT  Goal: Incision(s), wounds(s) or drain site(s) healing without S/S of infection  Description: INTERVENTIONS  - Assess and document dressing, incision, wound bed, drain sites and surrounding tissue  - Provide patient and family education  - Perform skin care/dressing changes every shift  Outcome: Progressing

## 2023-07-08 NOTE — ED PROVIDER NOTES
History  Chief Complaint   Patient presents with   • Spider Bite     Pt reports spider bite 3 days ago. Was seen at [facility] and [facility] and states pain is worse. Asking for pain meds to take at home, [facility] only prescrbed antibiotics     HPI      This is a very pleasant, nontoxic-appearing, 44-year-old mildly obese  female who arrives via [place] with her daughter, both of which are reliable competent historians, with a past medical history significant for anxiety, severe obesity, hyperlipidemia, type 2 diabetes with long-term use of insulin presents with 4-day history of left buttocks pain. Patient was seen evaluated on 5 by an outside hospital prescribed Keflex patient states that she recalls seeing a spider at that time when she feels that she may have been bitten. She notes erythema, edema, pain at the site with a small pustule patient been taking NSAIDs at home and Tylenol for fever and chills that started today. Prior to Admission Medications   Prescriptions Last Dose Informant Patient Reported? Taking? Alcohol Swabs PADS   No No   Sig: Use once daily when testing finger sticks. B-D UF III MINI PEN NEEDLES 31G X 5 MM MISC   No No   Sig: USE DAILY AT BEDTIME   Lancets MISC  Self No No   Sig: Use once for 1 dose Onetouch Verio. Test once daily or as directed.    Levemir FlexPen 100 units/mL injection pen   No No   Sig: INJECT 20 UNITS UNDER THE SKIN DAILY AT BEDTIME   SUMAtriptan (IMITREX) 25 mg tablet   No No   Sig: TAKE 1 TABLET (25 MG TOTAL) BY MOUTH ONCE AS NEEDED FOR MIGRAINE   albuterol (PROVENTIL HFA,VENTOLIN HFA) 90 mcg/act inhaler   No No   Sig: INHALE 2 PUFFS EVERY 6 (SIX) HOURS AS NEEDED FOR WHEEZING   albuterol (ProAir HFA) 90 mcg/act inhaler   No No   Sig: Inhale 2 puffs every 6 (six) hours as needed for wheezing or shortness of breath   atorvastatin (LIPITOR) 10 mg tablet   No No   Sig: TAKE 1 TABLET (10 MG TOTAL) BY MOUTH DAILY   benzonatate (TESSALON PERLES) 100 mg capsule   No No   Sig: Take 1 capsule (100 mg total) by mouth 3 (three) times a day as needed for cough   Patient not taking: Reported on 3/14/2023   citalopram (CeleXA) 40 mg tablet   No No   Sig: TAKE 1 TABLET (40 MG TOTAL) BY MOUTH DAILY   glucose blood (True Metrix Blood Glucose Test) test strip   No No   Sig: Use as instructed   glucose blood test strip  Self No No   Sig: Use as instructed   glucose blood test strip  Self No No   Sig: Use as instructed. Test up to 3 times a day as needed   hydrOXYzine HCL (ATARAX) 50 mg tablet  Self No No   Sig: TAKE 1 TABLET (50 MG TOTAL) BY MOUTH EVERY 6 (SIX) HOURS AS NEEDED FOR ITCHING, ALLERGIES OR ANXIETY   metFORMIN (GLUCOPHAGE) 500 mg tablet   No No   Sig: TAKE 2 TABLETS (1,000 MG TOTAL) BY MOUTH DAILY WITH BREAKFAST AND 1 TABLET (500 MG TOTAL) DAILY WITH DINNER. montelukast (SINGULAIR) 10 mg tablet   No No   Sig: TAKE 1 TABLET (10 MG TOTAL) BY MOUTH DAILY AT BEDTIME   olopatadine (PATANOL) 0.1 % ophthalmic solution  Self No No   Sig: Administer 1 drop to both eyes 2 (two) times a day   Patient not taking: Reported on 3/14/2023   pramipexole (MIRAPEX) 0.5 mg tablet   No No   Sig: TAKE 1 TABLET (0.5 MG TOTAL) BY MOUTH DAILY AT BEDTIME   topiramate (TOPAMAX) 25 mg sprinkle capsule   No No   Sig: TAKE 1 CAPSULE (25 MG TOTAL) BY MOUTH IN THE MORNING AND 1 CAPSULE (25 MG TOTAL) IN THE EVENING.    traZODone (DESYREL) 100 mg tablet   No No   Sig: TAKE 1 TABLET (100 MG TOTAL) BY MOUTH DAILY AT BEDTIME      Facility-Administered Medications: None       Past Medical History:   Diagnosis Date   • Anxiety    • Asthma    • Closed nondisplaced fracture of proximal phalanx of lesser toe of left foot 1/15/2020   • Diabetes mellitus ([address])    • Hyperlipidemia        Past Surgical History:   Procedure Laterality Date   • ARTHROSCOPY KNEE Left    • CHOLECYSTECTOMY     • ECTOPIC PREGNANCY SURGERY     • SHOULDER SURGERY Right     torn bicep       Family History   Problem Relation Age of Onset   • Obesity Mother    • Obesity Father    • Diabetes type II Father    • Diabetes Father    • Obesity Brother    • No Known Problems Daughter    • No Known Problems Maternal Grandmother    • Diabetes Maternal Grandfather    • No Known Problems Paternal Grandmother    • No Known Problems Maternal Aunt    • No Known Problems Maternal Aunt    • No Known Problems Maternal Aunt    • Heart disease Neg Hx    • Stroke Neg Hx    • Cancer Neg Hx      I have reviewed and agree with the history as documented. E-Cigarette/Vaping   • E-Cigarette Use Never User      E-Cigarette/Vaping Substances   • Nicotine No    • THC No    • CBD No    • Flavoring No    • Other No    • Unknown No      Social History     Tobacco Use   • Smoking status: Never   • Smokeless tobacco: Never   Vaping Use   • Vaping Use: Never used   Substance Use Topics   • Alcohol use: Not Currently     Alcohol/week: 0.0 standard drinks of alcohol     Comment: 0   • Drug use: Not Currently       Review of Systems   Constitutional: Negative. HENT: Negative. Eyes: Negative. Respiratory: Negative. Cardiovascular: Negative. Gastrointestinal: Negative. Endocrine: Negative. Genitourinary: Negative. Musculoskeletal: Negative. Negative for joint swelling. Skin: Positive for color change and wound. Negative for pallor and rash. Allergic/Immunologic: Negative. Neurological: Negative. Hematological: Negative. Psychiatric/Behavioral: Negative. Physical Exam  Physical Exam  Vitals and nursing note reviewed. Constitutional:       Appearance: Normal appearance. She is normal weight. HENT:      Head: Normocephalic and atraumatic. Right Ear: External ear normal.      Left Ear: External ear normal.      Nose: Nose normal.      Mouth/Throat:      Mouth: Mucous membranes are moist.      Pharynx: Oropharynx is clear. Eyes:      Extraocular Movements: Extraocular movements intact.       Conjunctiva/sclera: Conjunctivae normal.      Pupils: Pupils are equal, round, and reactive to light. Cardiovascular:      Rate and Rhythm: Normal rate. Pulses: Normal pulses. Pulmonary:      Effort: Pulmonary effort is normal.   Abdominal:      General: Abdomen is flat. Bowel sounds are normal.   Musculoskeletal:         General: Swelling and tenderness present. Normal range of motion. Cervical back: Normal range of motion. Skin:     Findings: Lesion present. Comments: See photo inserted into chart, large area of induration erythema to the left buttocks with a central area of small pinhole area of necrosis, active drainage on the dressing that was removed at the bedside. Neurological:      General: No focal deficit present. Mental Status: She is alert and oriented to person, place, and time. Mental status is at baseline. Psychiatric:         Mood and Affect: Mood normal.         Behavior: Behavior normal.         Thought Content:  Thought content normal.               Vital Signs  ED Triage Vitals   Temperature Pulse Respirations Blood Pressure SpO2   07/07/23 2303 07/07/23 2300 07/07/23 2300 07/07/23 2300 07/07/23 2300   98.5 °F (36.9 °C) (!) 107 17 130/76 96 %      Temp Source Heart Rate Source Patient Position - Orthostatic VS BP Location FiO2 (%)   07/07/23 2303 07/07/23 2300 07/07/23 2300 07/07/23 2300 --   Tympanic Monitor Lying Left arm       Pain Score       07/07/23 2300       9           Vitals:    07/07/23 2300   BP: 130/76   Pulse: (!) 107   Patient Position - Orthostatic VS: Lying         Visual Acuity      ED Medications  Medications   sodium chloride 0.9 % bolus 1,000 mL (0 mL Intravenous Stopped 7/8/23 0109)     Followed by   sodium chloride 0.9 % bolus 1,000 mL (1,000 mL Intravenous New Bag 7/8/23 0109)     Followed by   sodium chloride 0.9 % bolus 1,000 mL (has no administration in time range)   vancomycin (VANCOCIN) 1500 mg in sodium chloride 0.9% 250 mL IVPB (1,500 mg Intravenous New Bag 7/8/23 0108)   cefepime (MAXIPIME) IVPB (premix in dextrose) 2,000 mg 50 mL (0 mg Intravenous Stopped 7/8/23 0102)   fentanyl citrate (PF) 100 MCG/2ML 50 mcg (50 mcg Intravenous Given 7/8/23 0106)       Diagnostic Studies  Results Reviewed     Procedure Component Value Units Date/Time    Procalcitonin [[id number]]  (Normal) Collected: 07/07/23 2338    Lab Status: Final result Specimen: Blood from Arm, Left Updated: 07/08/23 0012     Procalcitonin 0.06 ng/ml     Lactic acid [[id number]]  (Normal) Collected: 07/07/23 2338    Lab Status: Final result Specimen: Blood from Arm, Left Updated: 07/08/23 0005     LACTIC ACID 1.0 mmol/L     Narrative:      Result may be elevated if tourniquet was used during collection.     Comprehensive metabolic panel [[id number]]  (Abnormal) Collected: 07/07/23 2338    Lab Status: Final result Specimen: Blood from Arm, Left Updated: 07/08/23 0004     Sodium 133 mmol/L      Potassium 3.6 mmol/L      Chloride 102 mmol/L      CO2 24 mmol/L      ANION GAP 7 mmol/L      BUN 10 mg/dL      Creatinine 0.67 mg/dL      Glucose 224 mg/dL      Calcium 9.3 mg/dL      AST 31 U/L      ALT 36 U/L      Alkaline Phosphatase 74 U/L      Total Protein 7.3 g/dL      Albumin 3.7 g/dL      Total Bilirubin 0.38 mg/dL      eGFR 106 ml/min/1.73sq m     Narrative:      [facility] guidelines for Chronic Kidney Disease (CKD):   •  Stage 1 with normal or high GFR (GFR > 90 mL/min/1.73 square meters)  •  Stage 2 Mild CKD (GFR = 60-89 mL/min/1.73 square meters)  •  Stage 3A Moderate CKD (GFR = 45-59 mL/min/1.73 square meters)  •  Stage 3B Moderate CKD (GFR = 30-44 mL/min/1.73 square meters)  •  Stage 4 Severe CKD (GFR = 15-29 mL/min/1.73 square meters)  •  Stage 5 End Stage CKD (GFR <15 mL/min/1.73 square meters)  Note: GFR calculation is accurate only with a steady state creatinine    Protime-INR [[id number]]  (Normal) Collected: 07/07/23 2338    Lab Status: Final result Specimen: Blood from Arm, Left Updated: 07/07/23 5899     Protime 12.2 seconds      INR 0.90    APTT [[id number]]  (Normal) Collected: 07/07/23 2338    Lab Status: Final result Specimen: Blood from Arm, Left Updated: 07/07/23 2357     PTT 28 seconds     CBC and differential [[id number]]  (Abnormal) Collected: 07/07/23 2338    Lab Status: Final result Specimen: Blood from Arm, Left Updated: 07/07/23 2345     WBC 11.78 Thousand/uL      RBC 4.15 Million/uL      Hemoglobin 12.3 g/dL      Hematocrit 37.7 %      MCV 91 fL      MCH 29.6 pg      MCHC 32.6 g/dL      RDW 12.5 %      MPV 9.9 fL      Platelets 998 Thousands/uL      nRBC 0 /100 WBCs      Neutrophils Relative 66 %      Immat GRANS % 0 %      Lymphocytes Relative 21 %      Monocytes Relative 10 %      Eosinophils Relative 3 %      Basophils Relative 0 %      Neutrophils Absolute 7.78 Thousands/µL      Immature Grans Absolute 0.04 Thousand/uL      Lymphocytes Absolute 2.52 Thousands/µL      Monocytes Absolute 1.12 Thousand/µL      Eosinophils Absolute 0.30 Thousand/µL      Basophils Absolute 0.02 Thousands/µL     Blood culture #2 [[id number]] Collected: 07/07/23 2338    Lab Status: In process Specimen: Blood from Arm, Left Updated: 07/07/23 2342    Blood culture #1 [[id number]] Collected: 07/07/23 2338    Lab Status: In process Specimen: Blood from Arm, Left Updated: 07/07/23 2341                 No orders to display              Procedures  Procedures         ED Course  ED Course as of 07/08/23 0123   Fri Jul 07, 2023   2309 Patient seen and evaluated, orders placed, previously seen and evaluated in outside hospital placed on Keflex 4 times a day, antibiotics for 4 days no resolution of symptoms shaking chills rigors this morning without any documented fever, last Tylenol was at approximately 1300 photo inserted in the chart. Differential diagnosis in this patient is as follows: Superficial abscess versus cellulitis of the left buttocks.    Sat Jul 08, 2023   0050 Due to the of the pain and the patient is in, no clinical improvement after 48-hour of appropriate antibiotics we will proceed with IV antibiotic admission and as needed pain control. SLIM contacted for admission. SBIRT 20yo+    Flowsheet Row Most Recent Value   Initial Alcohol Screen: US AUDIT-C     1. How often do you have a drink containing alcohol? 0 Filed at: 07/07/2023 2302   2. How many drinks containing alcohol do you have on a typical day you are drinking? 0 Filed at: 07/07/2023 2302   3a. Male UNDER 65: How often do you have five or more drinks on one occasion? 0 Filed at: 07/07/2023 2302   3b. FEMALE Any Age, or MALE 65+: How often do you have 4 or more drinks on one occassion? 0 Filed at: 07/07/2023 2302   Audit-C Score 0 Filed at: 07/07/2023 2302   SELMA: How many times in the past year have you. .. Used an illegal drug or used a prescription medication for non-medical reasons? Never Filed at: 07/07/2023 2302                    Medical Decision Making  48 hours of outpatient antibiotics, failed or partially failed treatment regiment, started on Keflex by an outside provider emergency department visit, history of diabetes, significant mount of pain in terms the patient is feeling pain worse with ambulation, will admit for IV antibiotics, tetanus is updated previously. Portions of the record may have been created with voice recognition software. Occasional wrong word or "sound a like" substitutions may have occurred due to the inherent limitations of voice recognition software. Read the chart carefully and recognize, using context, where substitutions have occurred. Amount and/or Complexity of Data Reviewed  Labs: ordered. Risk  Prescription drug management. Decision regarding hospitalization.           Disposition  Final diagnoses:   Spider bite   Cellulitis of left lower extremity   Arthropod bite of buttock, initial encounter     Time reflects when diagnosis was documented in both MDM as applicable and the Disposition within this note     Time User Action Codes Description Comment    7/8/2023 12:54 AM Manuelito Flair Add [X62.046V] Spider bite     7/8/2023 12:55 AM Manuelito Flair Add [V91.159] Cellulitis of left lower extremity     7/8/2023 12:55 AM Manuelito Flair Add [S30.860A,  W57. XXXA] Arthropod bite of buttock, initial encounter       ED Disposition     ED Disposition   Admit    Condition   Stable    Date/Time   Sat Jul 8, 2023 12:56 AM    Comment   Case was discussed with Howie Pierce PA-C and the patient's admission status was agreed to be Admission Status: observation status to the service of Dr. Bolton Roles . Follow-up Information    None         Patient's Medications   Discharge Prescriptions    No medications on file       No discharge procedures on file.     PDMP Review     None          ED Provider  Electronically Signed by           Perla Sales III, DO  07/08/23 31 [facility] Erica COHEN, DO  07/08/23 6077

## 2023-07-08 NOTE — CONSULTS
Consultation - General Surgery   Dawn Oddis Dubin 41 y.o. female MRN: [id number]  Unit/Bed#: -01 Encounter: [id number]    Assessment/Plan     Assessment:  47-year-old female with PMH of T2DM presenting with insect bite and surrounding cellulitis  -Afebrile, VSS on room air  -WBC 11.7  -Area of erythema present overlying left hip/buttock with central scab/possible insect bite. Area is warm, tender to palpation, indurated, no obvious fluctuance on examination. Plan:  -Personally outlined area of erythema to monitor for improvement  -Recommend warm compresses to affected area  -Continue IV antibiotics  -Repeat examination tomorrow to evaluate for any newly evolving fluctuance/drainage to indicate need for I&D  -Rest of management per primary team    History of Present Illness     HPI:  Dionisio Davey is a 41 y.o. female with PMH of T2DM who presents with skin changes to left hip/buttock . Patient states she noticed new redness and swelling to left hip approximately 5 days ago. She believes she was bit by a spider and reports that she did see an insect nearby prior to onset of symptoms. She was previously seen at [facility] started on a course of Keflex which she states she has been compliant with, however her symptoms have been progressively worsening while on oral antibiotic therapy. Reports increasing redness,pain, and subjective fever at home. Due to failure of p.o. regimen, patient was admitted to [facility] for IV antibiotic therapy and consultation with general surgery. Inpatient consult to Acute Care Surgery  Consult performed by: Darren Aponte PA-C  Consult ordered by: Maurizio Srinivasan PA-C          Review of Systems   Constitutional: Positive for fever. Negative for chills. HENT: Negative for congestion. Respiratory: Negative for shortness of breath. Cardiovascular: Negative for chest pain. Gastrointestinal: Negative for abdominal pain, nausea and vomiting. Genitourinary: Negative for difficulty urinating. Skin: Positive for color change and wound. Neurological: Negative for dizziness and weakness. Psychiatric/Behavioral: Negative for confusion.    All other ROS negative unless stated above    Historical Information   Past Medical History:   Diagnosis Date   • Anxiety    • Asthma    • Closed nondisplaced fracture of proximal phalanx of lesser toe of left foot 1/15/2020   • Diabetes mellitus ([address])    • Hyperlipidemia      Past Surgical History:   Procedure Laterality Date   • ARTHROSCOPY KNEE Left    • CHOLECYSTECTOMY     • ECTOPIC PREGNANCY SURGERY     • SHOULDER SURGERY Right     torn bicep     Social History   Social History     Substance and Sexual Activity   Alcohol Use Not Currently   • Alcohol/week: 0.0 standard drinks of alcohol    Comment: 0     Social History     Substance and Sexual Activity   Drug Use Not Currently     E-Cigarette/Vaping   • E-Cigarette Use Never User      E-Cigarette/Vaping Substances   • Nicotine No    • THC No    • CBD No    • Flavoring No    • Other No    • Unknown No      Social History     Tobacco Use   Smoking Status Never   Smokeless Tobacco Never     Family History: non-contributory    Meds/Allergies   all current active meds have been reviewed  Allergies   Allergen Reactions   • Apple - Food Allergy Anaphylaxis   • Apple Fruit Extract - Food Allergy Anaphylaxis     Throat closes off   • Grass Pollen(K-O-R-T-Swt Justus) Hives, Itching, Shortness Of Breath, Sneezing and Throat Swelling   • Dust Mite Extract Hives and Itching   • Misc Natural Products Diarrhea, Nausea Only and Vomiting   • Methylprednisolone Hives and Rash   • Raw Vegetable - Food Allergy Blisters, Dizziness, Eye Swelling, Fatigue, Headache, Hives, Itching, Rash, Sneezing, Throat Swelling and Wheezing   • Red Dye - Food Allergy Hives, Itching and Rash       Objective   First Vitals:   Blood Pressure: 130/76 (07/07/23 2300)  Pulse: (!) 107 (07/07/23 2300)  Temperature: 98.5 °F (36.9 °C) (07/07/23 2303)  Temp Source: Tympanic (07/07/23 2303)  Respirations: 17 (07/07/23 2300)  Height: 5' 4" (162.6 cm) (07/08/23 0135)  Weight - Scale: 101 kg (222 lb 10.6 oz) (07/08/23 0135)  SpO2: 96 % (07/07/23 2300)    Current Vitals:   Blood Pressure: 116/72 (07/08/23 0712)  Pulse: 86 (07/08/23 0712)  Temperature: 98.7 °F (37.1 °C) (07/08/23 0712)  Temp Source: Tympanic (07/07/23 2303)  Respirations: 18 (07/08/23 0712)  Height: 5' 4" (162.6 cm) (07/08/23 0135)  Weight - Scale: 101 kg (222 lb 10.6 oz) (07/08/23 0135)  SpO2: 96 % (07/08/23 0712)      Intake/Output Summary (Last 24 hours) at 7/8/2023 1347  Last data filed at 7/8/2023 0900  Gross per 24 hour   Intake 1290 ml   Output --   Net 1290 ml       Invasive Devices     Peripheral Intravenous Line  Duration           Peripheral IV 07/07/23 Left;Upper;Ventral (anterior) Arm <1 day                Physical Exam  Vitals reviewed. Constitutional:       General: She is not in acute distress. Appearance: She is not ill-appearing or toxic-appearing. Comments: Resting comfortably in bed   HENT:      Head: Normocephalic and atraumatic. Eyes:      General: No scleral icterus. Cardiovascular:      Rate and Rhythm: Normal rate and regular rhythm. Pulmonary:      Effort: No respiratory distress. Breath sounds: Normal breath sounds. Musculoskeletal:      Right lower leg: No edema. Left lower leg: No edema. Skin:     General: Skin is warm and dry. Comments: Erythema present overlying left hip/buttock small scab noted centrally, no active drainage, not malodorous  Area is tender to palpation, indurated, no obvious fluctuance on examination today   Neurological:      General: No focal deficit present. Mental Status: She is alert and oriented to person, place, and time.    Psychiatric:         Mood and Affect: Mood normal.         Behavior: Behavior normal.         Lab Results:   I have personally reviewed pertinent lab results. , CBC:   Lab Results   Component Value Date    WBC 11.78 (H) 07/07/2023    HGB 12.3 07/07/2023    HCT 37.7 07/07/2023    MCV 91 07/07/2023     07/07/2023    RBC 4.15 07/07/2023    MCH 29.6 07/07/2023    MCHC 32.6 07/07/2023    RDW 12.5 07/07/2023    MPV 9.9 07/07/2023    NRBC 0 07/07/2023   , CMP:   Lab Results   Component Value Date    SODIUM 133 (L) 07/07/2023    K 3.6 07/07/2023     07/07/2023    CO2 24 07/07/2023    BUN 10 07/07/2023    CREATININE 0.67 07/07/2023    CALCIUM 9.3 07/07/2023    AST 31 07/07/2023    ALT 36 07/07/2023    ALKPHOS 74 07/07/2023    EGFR 106 07/07/2023     Imaging: I have personally reviewed pertinent reports. EKG, Pathology, and Other Studies: I have personally reviewed pertinent reports. Counseling / Coordination of Care  Total floor / unit time spent today 35 minutes. Greater than 50% of total time was spent with the patient and / or family counseling and / or coordination of care. Corina Estevez, [facility]  07/08/23  **Please Note: Portions of the record may have been created using voice recognition software. Occasional wrong word or "sound a like" substitutions may have occurred due to the inherent limitations of voice recognition software. Read the chart carefully and recognize, using context, where substitutions have occurred. **

## 2023-07-08 NOTE — H&P
[address]  H&P  Name: Obdulia Gomes 41 y.o. female I MRN: [id number]  Unit/Bed#: -01 I Date of Admission: 7/7/2023   Date of Service: 7/8/2023 I Hospital Day: 0      Assessment/Plan   * Cellulitis of left hip  Assessment & Plan  · Placed in observation medicine  · Patient reports possible spider bite  · We will give Ancef 2 g IV every 8 hours  · Blood cultures are currently pending  · Give antipyretics and pain control. · Consult general surgery    Type 2 diabetes mellitus with hyperglycemia, with long-term current use of insulin ([facility])  Assessment & Plan  Lab Results   Component Value Date    HGBA1C 8.0 (H) 03/29/2023       No results for input(s): "POCGLU" in the last 72 hours. Blood Sugar Average: Last 72 hrs:     · Placed on [facility] step 2 diet  · Hold prehospital metformin  · Levemir currently on hold secondary to infection and possibility of labile sugars  · Obtain Accu-Cheks before meals and at bedtime with Humalog correction dose before meals and at bedtime    Restless leg syndrome  Assessment & Plan  Continue prehospital Mirapex 0.5 mg p.o. nightly         VTE Prophylaxis: Patient is low risk, will ambulate  Code Status: Level 1  POLST: There is no POLST form on file for this patient (pre-hospital)  Discussion with family: None present at bedside at time of exam    Anticipated Length of Stay:  Patient will be admitted on an Observation basis with an anticipated length of stay of  < 2 midnights. Justification for Hospital Stay: Left hip cellulitis requiring IV antibiotics and further surgical evaluation    Chief Complaint:   Left hip redness and swelling x5 days    History of Present Illness:    Obdulia Gomes is a 41 y.o. female who presents with left hip redness and swelling x5 days. Patient presents ER for further evaluation treatment of a 5-day history of left hip redness and swelling.   Patient states that she believes she was bit by a spider as she saw 1 in the area prior to onset of symptoms. Patient was seen previously at [facility] and started on a course of Keflex which she states she has been compliant with and has been on it for approximately 2 or 3 days. Patient presented to the ER this evening as she has had increasing redness and swelling as well as increased pain. Patient does report a subjective fever at home but she was unable to quantify, she is a known insulin dependent diabetic. Review of Systems:  Review of Systems   Constitutional: Positive for fever. Negative for chills. HENT: Negative for congestion and sore throat. Respiratory: Negative for cough, shortness of breath and wheezing. Cardiovascular: Negative for chest pain and palpitations. Gastrointestinal: Negative for diarrhea, nausea and vomiting. Genitourinary: Negative for dysuria, frequency, hematuria and urgency. Musculoskeletal: Negative for arthralgias and myalgias. Skin: Positive for color change and wound. Neurological: Negative for dizziness, syncope, light-headedness and headaches. All other systems reviewed and are negative. Past Medical and Surgical History:   Past Medical History:   Diagnosis Date   • Anxiety    • Asthma    • Closed nondisplaced fracture of proximal phalanx of lesser toe of left foot 1/15/2020   • Diabetes mellitus ([address])    • Hyperlipidemia        Past Surgical History:   Procedure Laterality Date   • ARTHROSCOPY KNEE Left    • CHOLECYSTECTOMY     • ECTOPIC PREGNANCY SURGERY     • SHOULDER SURGERY Right     torn bicep       Meds/Allergies:  Prior to Admission medications    Medication Sig Start Date End Date Taking? Authorizing Provider   albuterol (ProAir HFA) 90 mcg/act inhaler Inhale 2 puffs every 6 (six) hours as needed for wheezing or shortness of breath 12/15/22  Yes Elysia Contreras PA-C   Alcohol Swabs PADS Use once daily when testing finger sticks.  3/23/22  Yes Renetta Stain, CRNP   atorvastatin (LIPITOR) 10 mg tablet TAKE 1 TABLET (10 MG TOTAL) BY MOUTH DAILY 5/25/23  Yes JENNIFER Archibald   B-D UF III MINI PEN NEEDLES 31G X 5 MM MISC USE DAILY AT BEDTIME 5/7/23  Yes JENNIFER Botello   citalopram (CeleXA) 40 mg tablet TAKE 1 TABLET (40 MG TOTAL) BY MOUTH DAILY 4/26/23  Yes Juaquin MeterJENNIFER   glucose blood (True Metrix Blood Glucose Test) test strip Use as instructed 3/29/22  Yes Juaquin MeterJENNIFER   glucose blood test strip Use as instructed 3/8/21  Yes JENNIFER Botello   glucose blood test strip Use as instructed.  Test up to 3 times a day as needed 3/10/21  Yes Juaquin MeterJENNIFER   Levemir FlexPen 100 units/mL injection pen INJECT 20 UNITS UNDER THE SKIN DAILY AT BEDTIME 6/21/23  Yes JENNIFER Botello   metFORMIN (GLUCOPHAGE) 500 mg tablet TAKE 2 TABLETS (1,000 MG TOTAL) BY MOUTH DAILY WITH BREAKFAST AND 1 TABLET (500 MG TOTAL) DAILY WITH DINNER. 1/28/23  Yes Juaquin MeterJENNIFER   montelukast (SINGULAIR) 10 mg tablet TAKE 1 TABLET (10 MG TOTAL) BY MOUTH DAILY AT BEDTIME 6/21/23  Yes JENNIFER Botello   pramipexole (MIRAPEX) 0.5 mg tablet TAKE 1 TABLET (0.5 MG TOTAL) BY MOUTH DAILY AT BEDTIME 6/21/23  Yes Juaquin MeterJENNIFER   SUMAtriptan (IMITREX) 25 mg tablet TAKE 1 TABLET (25 MG TOTAL) BY MOUTH ONCE AS NEEDED FOR MIGRAINE 4/4/22  Yes Juaquin MeterJENNIFER   topiramate (TOPAMAX) 25 mg sprinkle capsule TAKE 1 CAPSULE (25 MG TOTAL) BY MOUTH IN THE MORNING AND 1 CAPSULE (25 MG TOTAL) IN THE EVENING. 11/29/22  Yes Juaquin MeterJENNIFER   traZODone (DESYREL) 100 mg tablet TAKE 1 TABLET (100 MG TOTAL) BY MOUTH DAILY AT BEDTIME 2/20/23  Yes Shell Leon,    albuterol (PROVENTIL HFA,VENTOLIN HFA) 90 mcg/act inhaler INHALE 2 PUFFS EVERY 6 (SIX) HOURS AS NEEDED FOR WHEEZING  Patient not taking: Reported on 7/8/2023 4/1/23   Juaquin MeterJENNIFER   benzonatate (TESSALON PERLES) 100 mg capsule Take 1 capsule (100 mg total) by mouth 3 (three) times a day as needed for cough  Patient not taking: Reported on 3/14/2023 11/15/22   Nelson Call, CRNP   hydrOXYzine HCL (ATARAX) 50 mg tablet TAKE 1 TABLET (50 MG TOTAL) BY MOUTH EVERY 6 (SIX) HOURS AS NEEDED FOR ITCHING, ALLERGIES OR ANXIETY 1/18/22   Nelson Call, CRNP   Lancets MISC Use once for 1 dose Onetouch Verio. Test once daily or as directed. 6/11/21 3/14/23  Nelson Call, CRNP   olopatadine (PATANOL) 0.1 % ophthalmic solution Administer 1 drop to both eyes 2 (two) times a day  Patient not taking: Reported on 3/14/2023 7/7/21   Nelson Call, CRNP   ondansetron (Zofran ODT) 4 mg disintegrating tablet Take 1 tablet (4 mg total) by mouth every 6 (six) hours as needed for nausea or vomiting 5/20/22 11/12/22  JENNIFER Light     I have reviewed home medications with patient personally. Allergies:    Allergies   Allergen Reactions   • Apple - Food Allergy Anaphylaxis   • Apple Fruit Extract - Food Allergy Anaphylaxis     Throat closes off   • Grass Pollen(K-O-R-T-Swt Justus) Hives, Itching, Shortness Of Breath, Sneezing and Throat Swelling   • Dust Mite Extract Hives and Itching   • Misc Natural Products Diarrhea, Nausea Only and Vomiting   • Methylprednisolone Hives and Rash   • Raw Vegetable - Food Allergy Blisters, Dizziness, Eye Swelling, Fatigue, Headache, Hives, Itching, Rash, Sneezing, Throat Swelling and Wheezing   • Red Dye - Food Allergy Hives, Itching and Rash       Social History:  Marital Status: /Civil Union   Occupation: Support person at [employer]  Patient Pre-hospital Living Situation: Resides at home with son  Patient Pre-hospital Level of Mobility: Full without assist  Patient Pre-hospital Diet Restrictions: diabetic  Substance Use History: Prior methamphetamine use  Social History     Substance and Sexual Activity   Alcohol Use Not Currently   • Alcohol/week: 0.0 standard drinks of alcohol    Comment: 0     Social History     Tobacco Use   Smoking Status Never   Smokeless Tobacco Never     Social History     Substance and Sexual Activity   Drug Use Not Currently       Family History:  I have reviewed the patients family history    Physical Exam:   Vitals:   Blood Pressure: 121/73 (07/08/23 0158)  Pulse: 94 (07/08/23 0158)  Temperature: 98.5 °F (36.9 °C) (07/08/23 0158)  Temp Source: Tympanic (07/07/23 2303)  Respirations: 18 (07/08/23 0158)  Height: 5' 4" (162.6 cm) (07/08/23 0135)  Weight - Scale: 101 kg (222 lb 10.6 oz) (07/08/23 0135)  SpO2: 98 % (07/08/23 0158)    Physical Exam  Vitals and nursing note reviewed. Constitutional:       General: She is not in acute distress. Appearance: Normal appearance. HENT:      Head: Normocephalic and atraumatic. Right Ear: Tympanic membrane normal.      Left Ear: Tympanic membrane normal.      Nose: Nose normal.      Mouth/Throat:      Mouth: Mucous membranes are moist.      Pharynx: Oropharynx is clear. No posterior oropharyngeal erythema. Eyes:      Extraocular Movements: Extraocular movements intact. Conjunctiva/sclera: Conjunctivae normal.      Pupils: Pupils are equal, round, and reactive to light. Cardiovascular:      Rate and Rhythm: Normal rate and regular rhythm. Pulses: Normal pulses. Heart sounds: Normal heart sounds. No murmur heard. Pulmonary:      Effort: Pulmonary effort is normal. No respiratory distress. Breath sounds: Normal breath sounds. No rhonchi or rales. Abdominal:      General: Bowel sounds are normal.      Palpations: Abdomen is soft. Tenderness: There is no abdominal tenderness. Musculoskeletal:      Cervical back: Normal range of motion and neck supple. No muscular tenderness. Left lower leg: No edema. Skin:     General: Skin is warm and dry. Capillary Refill: Capillary refill takes less than 2 seconds. Findings: Erythema and lesion present.       Comments: Erythematous lesion left lateral posterior hip with surrounding induration and warmth and small ulceration at center   Neurological:      General: No focal deficit present. Mental Status: She is alert and oriented to person, place, and time. Additional Data:   Lab Results: I have personally reviewed pertinent reports. Results from last 7 days   Lab Units 07/07/23  2338   WBC Thousand/uL 11.78*   HEMOGLOBIN g/dL 12.3   HEMATOCRIT % 37.7   PLATELETS Thousands/uL 220   NEUTROS PCT % 66   LYMPHS PCT % 21   MONOS PCT % 10   EOS PCT % 3     Results from last 7 days   Lab Units 07/07/23  2338   SODIUM mmol/L 133*   POTASSIUM mmol/L 3.6   CHLORIDE mmol/L 102   CO2 mmol/L 24   BUN mg/dL 10   CREATININE mg/dL 0.67   CALCIUM mg/dL 9.3   ALK PHOS U/L 74   ALT U/L 36   AST U/L 31     Results from last 7 days   Lab Units 07/07/23  2338   INR  0.90               Imaging: I have personally reviewed pertinent reports. No orders to display       EKG, Pathology, and Other Studies Reviewed on Admission:   · EKG: N/A    Epic Records Reviewed: Yes     ** Please Note: This note has been constructed using a voice recognition system.  **

## 2023-07-08 NOTE — ASSESSMENT & PLAN NOTE
· Placed in observation medicine  · Patient reports possible spider bite  · We will give Ancef 2 g IV every 8 hours  · Blood cultures are currently pending  · Give antipyretics and pain control.   · Consult general surgery

## 2023-07-08 NOTE — NURSING NOTE
Pt admitted to room 313 from ER. Alert and oriented, pleasant and cooperative. Assessment benign except for left buttock spider ? Bite. Large area pink, warm and tender, small rincon in the middle. Pt sleeping soundly since admission. Call bell within reach.

## 2023-07-08 NOTE — ASSESSMENT & PLAN NOTE
Lab Results   Component Value Date    HGBA1C 8.0 (H) 03/29/2023       No results for input(s): "POCGLU" in the last 72 hours.     Blood Sugar Average: Last 72 hrs:     · Placed on [facility] step 2 diet  · Hold prehospital metformin  · Levemir currently on hold secondary to infection and possibility of labile sugars  · Obtain Accu-Cheks before meals and at bedtime with Humalog correction dose before meals and at bedtime

## 2023-07-08 NOTE — UTILIZATION REVIEW
Initial Clinical Review    WAS OBSERVATION 07/08/2023 @ 0056 CONVERTED TO INPATIENT ADMISSION 07/09/2023 DUE TO CONTINUED STAY REQUIRED TO CARE FOR PATIENT WITH Insect bite and surrounding cellulitis. Admission: Date/Time/Statement:   Admission Orders (From admission, onward)     Ordered        07/09/23 1152  Inpatient Admission  Once            07/08/23 0056  Place in Observation  Once                      Orders Placed This Encounter   Procedures   • Inpatient Admission     Standing Status:   Standing     Number of Occurrences:   1     Order Specific Question:   Level of Care     Answer:   Med Surg [16]     Order Specific Question:   Estimated length of stay     Answer:   More than 2 Midnights     Order Specific Question:   Certification     Answer:   I certify that inpatient services are medically necessary for this patient for a duration of greater than two midnights. See H&P and MD Progress Notes for additional information about the patient's course of treatment. ED Arrival Information     Expected   -    Arrival   7/7/2023 21:55    Acuity   Urgent            Means of arrival   Ambulance    Escorted by   Family Member    Service   Hospitalist    Admission type   Emergency            Arrival complaint   spider bite pain           Chief Complaint   Patient presents with   • Spider Bite     Pt reports spider bite 3 days ago. Was seen at [facility] and [facility] and states pain is worse. Asking for pain meds to take at home, [facility] only prescrbed antibiotics       Initial Presentation: 41 y.o. female , presented to the ED @ [facility], from home via EMS, with family member. Admitted as Observation due to Insect (Spider) bite with surrounding cellulitis. Date: 07/08/2023    Presents with left hip redness and swelling x5 days. Patient presents ER for further evaluation treatment of a 5-day history of left hip redness and swelling.   Patient states that she believes she was bit by a spider as she saw 1 in the area prior to onset of symptoms. Patient was seen previously at [facility] and started on a course of Keflex which she states she has been compliant with and has been on it for approximately 2 or 3 days. Patient presented to the ER this evening as she has had increasing redness and swelling as well as increased pain. Start IV Ancef q8h. Obtain blood cultures. Analgesia controll and antipyretics PRN. Consult general surgery. 07/08/2023  Consult General Surgery:  Outlined area of erythema to monitor for improvement. Recommend warm compresses to affected area. Continue IV antibiotics. Repeat examination tomorrow to evaluate for any newly evolving fluctuance/drainage to indicate need for I&D. Day 1: 07/09/2023   Continue IV Ancef q8h. Continue supportive care with warm compresses. Need for I&D today. 07/09/2023  Bedside I&D Abscess Left Hip. Anesthesia:  Local Lido 1% w epi  Drainage bloody and purulent. Moderate amount.     Wound packed w 1/2 in iodoform gauze      ED Triage Vitals   Temperature Pulse Respirations Blood Pressure SpO2   07/07/23 2303 07/07/23 2300 07/07/23 2300 07/07/23 2300 07/07/23 2300   98.5 °F (36.9 °C) (!) 107 17 130/76 96 %      Temp Source Heart Rate Source Patient Position - Orthostatic VS BP Location FiO2 (%)   07/07/23 2303 07/07/23 2300 07/07/23 2300 07/07/23 2300 --   Tympanic Monitor Lying Left arm       Pain Score       07/07/23 2300       9          Wt Readings from Last 1 Encounters:   07/08/23 101 kg (222 lb 10.6 oz)     Additional Vital Signs:   Date/Time Temp Pulse Resp BP MAP (mmHg) SpO2 O2 Device Patient Position - Orthostatic VS   07/09/23 07:27:02 98.2 °F (36.8 °C) 77 20 103/54 70 95 % -- --   07/08/23 22:21:01 -- 92 17 94/56 69 94 % -- --   07/08/23 15:33:23 98.4 °F (36.9 °C) 84 20 95/59 71 96 % -- --   07/08/23 07:12:09 98.7 °F (37.1 °C) 86 18 116/72 87 96 % -- --   07/08/23 01:58:50 98.5 °F (36.9 °C) 94 18 121/73 89 98 % -- --   07/07/23 2303 98.5 °F (36.9 °C) -- -- -- -- -- -- --       Pertinent Labs/Diagnostic Test Results:   Results from last 7 days   Lab Units 07/09/23  0617 07/07/23  2338   WBC Thousand/uL 8.13 11.78*   HEMOGLOBIN g/dL 12.3 12.3   HEMATOCRIT % 38.1 37.7   PLATELETS Thousands/uL 253 220   NEUTROS ABS Thousands/µL  --  7.78*     Results from last 7 days   Lab Units 07/09/23  0617 07/07/23  2338   SODIUM mmol/L 133* 133*   POTASSIUM mmol/L 3.9 3.6   CHLORIDE mmol/L 105 102   CO2 mmol/L 21 24   ANION GAP mmol/L 7 7   BUN mg/dL 9 10   CREATININE mg/dL 0.54* 0.67   EGFR ml/min/1.73sq m 114 106   CALCIUM mg/dL 9.3 9.3     Results from last 7 days   Lab Units 07/07/23  2338   AST U/L 31   ALT U/L 36   ALK PHOS U/L 74   TOTAL PROTEIN g/dL 7.3   ALBUMIN g/dL 3.7   TOTAL BILIRUBIN mg/dL 0.38     Results from last 7 days   Lab Units 07/09/23  1612 07/09/23  1128 07/09/23  0725 07/08/23  2039 07/08/23  1609 07/08/23  1105 07/08/23  0724   POC GLUCOSE mg/dl 195* 229* 203* 185* 212* 191* 167*     Results from last 7 days   Lab Units 07/09/23  0617 07/07/23  2338   GLUCOSE RANDOM mg/dL 215* 224*       Results from last 7 days   Lab Units 07/07/23  2338   PROTIME seconds 12.2   INR  0.90   PTT seconds 28     Results from last 7 days   Lab Units 07/07/23  2338   PROCALCITONIN ng/ml 0.06     Results from last 7 days   Lab Units 07/07/23  2338   LACTIC ACID mmol/L 1.0     ED Treatment:   Medication Administration from 07/07/2023 2154 to 07/08/2023 0132       Date/Time Order Dose Route Action     07/07/2023 2348 EDT sodium chloride 0.9 % bolus 1,000 mL 1,000 mL Intravenous New Bag     07/08/2023 0109 EDT sodium chloride 0.9 % bolus 1,000 mL 1,000 mL Intravenous New Bag     07/07/2023 2349 EDT cefepime (MAXIPIME) IVPB (premix in dextrose) 2,000 mg 50 mL 2,000 mg Intravenous New Bag     07/08/2023 0108 EDT vancomycin (VANCOCIN) 1500 mg in sodium chloride 0.9% 250 mL IVPB 1,500 mg Intravenous New Bag     07/08/2023 0106 EDT fentanyl citrate (PF) 100 MCG/2ML 50 mcg 50 mcg Intravenous Given        Past Medical History:   Diagnosis Date   • Anxiety    • Asthma    • Closed nondisplaced fracture of proximal phalanx of lesser toe of left foot 1/15/2020   • Diabetes mellitus ([address])    • Hyperlipidemia      Present on Admission:  • Restless leg syndrome      Admitting Diagnosis: Bite [T14. 8XXA]  Spider bite [T63.301A]  Cellulitis of left lower extremity [P81.805]  Arthropod bite of buttock, initial encounter [S30.860A, W57. XXXA]  Age/Sex: 41 y.o. female  Admission Orders:  Omid/CHO Controlled Diet  Up & OOB as tolerated  Apply heat to affected sita  Ender SCDs    Scheduled Medications:  atorvastatin, 10 mg, Oral, Daily  cefazolin, 2,000 mg, Intravenous, Q8H  citalopram, 40 mg, Oral, Daily  insulin glargine, 10 Units, Subcutaneous, HS  insulin lispro, 1-5 Units, Subcutaneous, HS  insulin lispro, 1-6 Units, Subcutaneous, TID AC  montelukast, 10 mg, Oral, HS  pramipexole, 0.5 mg, Oral, HS  sodium chloride, 1,000 mL, Intravenous, Once  topiramate, 25 mg, Oral, BID  traZODone, 100 mg, Oral, HS      Continuous IV Infusions:     PRN Meds:  acetaminophen, 650 mg, Oral, Q6H PRN  albuterol, 2 puff, Inhalation, Q6H PRN  hydrOXYzine HCL, 50 mg, Oral, Q6H PRN  ondansetron, 4 mg, Intravenous, Q6H PRN  SUMAtriptan, 25 mg, Oral, Once PRN        IP CONSULT TO ACUTE CARE SURGERY    Network Utilization Review Department  ATTENTION: Please call with any questions or concerns to [phone] and carefully listen to the prompts so that you are directed to the right person. All voicemails are confidential.  Cb Sanabria all requests for admission clinical reviews, approved or denied determinations and any other requests to dedicated fax number below belonging to the campus where the patient is receiving treatment.  List of dedicated fax numbers for the Facilities:  FACILITY NAME UR FAX NUMBER   ADMISSION DENIALS (Administrative/Medical Necessity) [phone]   [address] [place] [address] (Maternity/NICU/Pediatrics) [address] [phone]   [address] [phone]   [address] [facility] [address] [address] [facility] [address] [phone]

## 2023-07-09 LAB
ANION GAP SERPL CALCULATED.3IONS-SCNC: 7 MMOL/L
BUN SERPL-MCNC: 9 MG/DL (ref 5–25)
CALCIUM SERPL-MCNC: 9.3 MG/DL (ref 8.4–10.2)
CHLORIDE SERPL-SCNC: 105 MMOL/L (ref 96–108)
CO2 SERPL-SCNC: 21 MMOL/L (ref 21–32)
CREAT SERPL-MCNC: 0.54 MG/DL (ref 0.6–1.3)
ERYTHROCYTE [DISTWIDTH] IN BLOOD BY AUTOMATED COUNT: 12.5 % (ref 11.6–15.1)
GFR SERPL CREATININE-BSD FRML MDRD: 114 ML/MIN/1.73SQ M
GLUCOSE P FAST SERPL-MCNC: 215 MG/DL (ref 65–99)
GLUCOSE SERPL-MCNC: 195 MG/DL (ref 65–140)
GLUCOSE SERPL-MCNC: 200 MG/DL (ref 65–140)
GLUCOSE SERPL-MCNC: 203 MG/DL (ref 65–140)
GLUCOSE SERPL-MCNC: 215 MG/DL (ref 65–140)
GLUCOSE SERPL-MCNC: 229 MG/DL (ref 65–140)
HCT VFR BLD AUTO: 38.1 % (ref 34.8–46.1)
HGB BLD-MCNC: 12.3 G/DL (ref 11.5–15.4)
MCH RBC QN AUTO: 29.6 PG (ref 26.8–34.3)
MCHC RBC AUTO-ENTMCNC: 32.3 G/DL (ref 31.4–37.4)
MCV RBC AUTO: 92 FL (ref 82–98)
PLATELET # BLD AUTO: 253 THOUSANDS/UL (ref 149–390)
PMV BLD AUTO: 9.7 FL (ref 8.9–12.7)
POTASSIUM SERPL-SCNC: 3.9 MMOL/L (ref 3.5–5.3)
RBC # BLD AUTO: 4.15 MILLION/UL (ref 3.81–5.12)
SODIUM SERPL-SCNC: 133 MMOL/L (ref 135–147)
WBC # BLD AUTO: 8.13 THOUSAND/UL (ref 4.31–10.16)

## 2023-07-09 PROCEDURE — 87186 SC STD MICRODIL/AGAR DIL: CPT | Performed by: PHYSICIAN ASSISTANT

## 2023-07-09 PROCEDURE — 0J9M0ZZ DRAINAGE OF LEFT UPPER LEG SUBCUTANEOUS TISSUE AND FASCIA, OPEN APPROACH: ICD-10-PCS | Performed by: STUDENT IN AN ORGANIZED HEALTH CARE EDUCATION/TRAINING PROGRAM

## 2023-07-09 PROCEDURE — 87205 SMEAR GRAM STAIN: CPT | Performed by: PHYSICIAN ASSISTANT

## 2023-07-09 PROCEDURE — 82948 REAGENT STRIP/BLOOD GLUCOSE: CPT

## 2023-07-09 PROCEDURE — 2W4MX5Z PACKING OF LEFT LOWER EXTREMITY USING PACKING MATERIAL: ICD-10-PCS | Performed by: STUDENT IN AN ORGANIZED HEALTH CARE EDUCATION/TRAINING PROGRAM

## 2023-07-09 PROCEDURE — 99232 SBSQ HOSP IP/OBS MODERATE 35: CPT | Performed by: INTERNAL MEDICINE

## 2023-07-09 PROCEDURE — 87070 CULTURE OTHR SPECIMN AEROBIC: CPT | Performed by: PHYSICIAN ASSISTANT

## 2023-07-09 PROCEDURE — 87147 CULTURE TYPE IMMUNOLOGIC: CPT | Performed by: PHYSICIAN ASSISTANT

## 2023-07-09 PROCEDURE — 80048 BASIC METABOLIC PNL TOTAL CA: CPT | Performed by: INTERNAL MEDICINE

## 2023-07-09 PROCEDURE — 85027 COMPLETE CBC AUTOMATED: CPT | Performed by: INTERNAL MEDICINE

## 2023-07-09 PROCEDURE — 10061 I&D ABSCESS COMP/MULTIPLE: CPT | Performed by: PHYSICIAN ASSISTANT

## 2023-07-09 RX ORDER — INSULIN GLARGINE 100 [IU]/ML
10 INJECTION, SOLUTION SUBCUTANEOUS
Status: DISCONTINUED | OUTPATIENT
Start: 2023-07-09 | End: 2023-07-10 | Stop reason: HOSPADM

## 2023-07-09 RX ORDER — LIDOCAINE HYDROCHLORIDE AND EPINEPHRINE 10; 10 MG/ML; UG/ML
20 INJECTION, SOLUTION INFILTRATION; PERINEURAL ONCE
Status: COMPLETED | OUTPATIENT
Start: 2023-07-09 | End: 2023-07-09

## 2023-07-09 RX ADMIN — ATORVASTATIN CALCIUM 10 MG: 10 TABLET, FILM COATED ORAL at 08:30

## 2023-07-09 RX ADMIN — INSULIN LISPRO 2 UNITS: 100 INJECTION, SOLUTION INTRAVENOUS; SUBCUTANEOUS at 12:05

## 2023-07-09 RX ADMIN — INSULIN LISPRO 2 UNITS: 100 INJECTION, SOLUTION INTRAVENOUS; SUBCUTANEOUS at 17:58

## 2023-07-09 RX ADMIN — INSULIN LISPRO 1 UNITS: 100 INJECTION, SOLUTION INTRAVENOUS; SUBCUTANEOUS at 21:45

## 2023-07-09 RX ADMIN — CITALOPRAM HYDROBROMIDE 40 MG: 20 TABLET ORAL at 08:30

## 2023-07-09 RX ADMIN — INSULIN LISPRO 2 UNITS: 100 INJECTION, SOLUTION INTRAVENOUS; SUBCUTANEOUS at 08:30

## 2023-07-09 RX ADMIN — CEFAZOLIN SODIUM 2000 MG: 2 SOLUTION INTRAVENOUS at 18:01

## 2023-07-09 RX ADMIN — INSULIN GLARGINE 10 UNITS: 100 INJECTION, SOLUTION SUBCUTANEOUS at 21:44

## 2023-07-09 RX ADMIN — TOPIRAMATE 25 MG: 25 CAPSULE, COATED PELLETS ORAL at 08:30

## 2023-07-09 RX ADMIN — MONTELUKAST 10 MG: 10 TABLET, FILM COATED ORAL at 21:44

## 2023-07-09 RX ADMIN — CEFAZOLIN SODIUM 2000 MG: 2 SOLUTION INTRAVENOUS at 12:02

## 2023-07-09 RX ADMIN — TOPIRAMATE 25 MG: 25 CAPSULE, COATED PELLETS ORAL at 17:57

## 2023-07-09 RX ADMIN — ACETAMINOPHEN 650 MG: 325 TABLET ORAL at 21:44

## 2023-07-09 RX ADMIN — CEFAZOLIN SODIUM 2000 MG: 2 SOLUTION INTRAVENOUS at 02:42

## 2023-07-09 RX ADMIN — PRAMIPEXOLE DIHYDROCHLORIDE 0.5 MG: 0.5 TABLET ORAL at 21:44

## 2023-07-09 RX ADMIN — ACETAMINOPHEN 650 MG: 325 TABLET ORAL at 13:48

## 2023-07-09 RX ADMIN — LIDOCAINE HYDROCHLORIDE,EPINEPHRINE BITARTRATE 20 ML: 10; .01 INJECTION, SOLUTION INFILTRATION; PERINEURAL at 12:02

## 2023-07-09 NOTE — ASSESSMENT & PLAN NOTE
· Insect bite with surrounding erythema and discomfort  · Continue Ancef 2G Q8H  · Continue supportive care with warm compresses  · General surgery evaluation appreciated  · Need for I&D today

## 2023-07-09 NOTE — PROGRESS NOTES
Progress Note - General Surgery   Angela Lawson 41 y.o. female MRN: [id number]  Unit/Bed#: -Nguyen Encounter: [id number]    Assessment:  53-year-old female with PMH of T2DM presenting with insect bite and surrounding cellulitis  - afeb, VSS on RA, resting comfortably this morning   - leukocytosis resolved   -After application of warm compress, left thigh was examined and noted to have improving erythema and new area of central fluctuance    Plan:  -Plan for bedside incision and drainage with local anesthetic. Please see separate procedure note for further details  -Warm compresses to affected area  -Continue IV antibiotics  -Repeat evaluation tomorrow for any newly evolving areas  -Discussed with primary team    Subjective/Objective   Subjective: No acute events overnight. Patient reports ongoing left hip discomfort today. Denies fever/chills overnight. Objective:   Blood pressure 103/54, pulse 77, temperature 98.2 °F (36.8 °C), resp. rate 20, height 5' 4" (1.626 m), weight 101 kg (222 lb 10.6 oz), SpO2 95 %, not currently breastfeeding. ,Body mass index is 38.22 kg/m². Intake/Output Summary (Last 24 hours) at 7/9/2023 0826  Last data filed at 7/8/2023 1841  Gross per 24 hour   Intake 720 ml   Output --   Net 720 ml       Invasive Devices     Peripheral Intravenous Line  Duration           Peripheral IV 07/07/23 Left;Upper;Ventral (anterior) Arm 1 day                Physical Exam  Vitals reviewed. Constitutional:       General: She is not in acute distress. Appearance: She is not toxic-appearing. HENT:      Head: Normocephalic and atraumatic. Cardiovascular:      Rate and Rhythm: Normal rate. Pulmonary:      Effort: No respiratory distress. Skin:     General: Skin is warm and dry. Findings: Erythema (Left hip) present. Comments: erythrema of left hip within previously outlined area, notable area of fluctuance at center of redness.     Neurological:      General: No focal deficit present. Mental Status: She is alert and oriented to person, place, and time. Psychiatric:         Behavior: Behavior is cooperative. Lab, Imaging and other studies:  I have personally reviewed pertinent lab results. , CBC:   Lab Results   Component Value Date    WBC 8.13 07/09/2023    HGB 12.3 07/09/2023    HCT 38.1 07/09/2023    MCV 92 07/09/2023     07/09/2023    RBC 4.15 07/09/2023    MCH 29.6 07/09/2023    MCHC 32.3 07/09/2023    RDW 12.5 07/09/2023    MPV 9.7 07/09/2023   , CMP:   Lab Results   Component Value Date    SODIUM 133 (L) 07/09/2023    K 3.9 07/09/2023     07/09/2023    CO2 21 07/09/2023    BUN 9 07/09/2023    CREATININE 0.54 (L) 07/09/2023    CALCIUM 9.3 07/09/2023    EGFR 114 07/09/2023     VTE Pharmacologic Prophylaxis: Heparin  VTE Mechanical Prophylaxis: sequential compression device    Alize Orn  07/09/23  **Please Note: Portions of the record may have been created using voice recognition software. Occasional wrong word or "sound a like" substitutions may have occurred due to the inherent limitations of voice recognition software. Read the chart carefully and recognize, using context, where substitutions have occurred. **

## 2023-07-09 NOTE — PLAN OF CARE
Problem: PAIN - ADULT  Goal: Verbalizes/displays adequate comfort level or baseline comfort level  Description: Interventions:  - Encourage patient to monitor pain and request assistance  - Assess pain using appropriate pain scale  - Administer analgesics based on type and severity of pain and evaluate response  - Implement non-pharmacological measures as appropriate and evaluate response  - Consider cultural and social influences on pain and pain management  - Notify physician/advanced practitioner if interventions unsuccessful or patient reports new pain  Outcome: Progressing     Problem: INFECTION - ADULT  Goal: Absence or prevention of progression during hospitalization  Description: INTERVENTIONS:  - Assess and monitor for signs and symptoms of infection  - Monitor lab/diagnostic results  - Monitor all insertion sites, i.e. indwelling lines, tubes, and drains  - Monitor endotracheal if appropriate and nasal secretions for changes in amount and color  - [place] appropriate cooling/warming therapies per order  - Administer medications as ordered  - Instruct and encourage patient and family to use good hand hygiene technique  - Identify and instruct in appropriate isolation precautions for identified infection/condition  Outcome: Progressing  Goal: Absence of fever/infection during neutropenic period  Description: INTERVENTIONS:  - Monitor WBC    Outcome: Progressing     Problem: SAFETY ADULT  Goal: Patient will remain free of falls  Description: INTERVENTIONS:  - Educate patient/family on patient safety including physical limitations  - Instruct patient to call for assistance with activity   - Consult OT/PT to assist with strengthening/mobility   - Keep Call bell within reach  - Keep bed low and locked with side rails adjusted as appropriate  - Keep care items and personal belongings within reach  - Initiate and maintain comfort rounds  - Make Fall Risk Sign visible to staff  - Offer Toileting every 2 Hours, in advance of need  - Initiate/Maintain alarm  - Obtain necessary fall risk management equipment:   - Apply yellow socks and bracelet for high fall risk patients  - Consider moving patient to room near nurses station  Outcome: Progressing  Goal: Maintain or return to baseline ADL function  Description: INTERVENTIONS:  -  Assess patient's ability to carry out ADLs; assess patient's baseline for ADL function and identify physical deficits which impact ability to perform ADLs (bathing, care of mouth/teeth, toileting, grooming, dressing, etc.)  - Assess/evaluate cause of self-care deficits   - Assess range of motion  - Assess patient's mobility; develop plan if impaired  - Assess patient's need for assistive devices and provide as appropriate  - Encourage maximum independence but intervene and supervise when necessary  - Involve family in performance of ADLs  - Assess for home care needs following discharge   - Consider OT consult to assist with ADL evaluation and planning for discharge  - Provide patient education as appropriate  Outcome: Progressing  Goal: Maintains/Returns to pre admission functional level  Description: INTERVENTIONS:  - Perform BMAT or MOVE assessment daily.   - Set and communicate daily mobility goal to care team and patient/family/caregiver. - Collaborate with rehabilitation services on mobility goals if consulted  - Perform Range of Motion 3 times a day. - Reposition patient every 2 hours.   - Dangle patient 3 times a day  - Stand patient 3 times a day  - Ambulate patient 3 times a day  - Out of bed to chair 3 times a day   - Out of bed for meals 3 times a day  - Out of bed for toileting  - Record patient progress and toleration of activity level   Outcome: Progressing     Problem: DISCHARGE PLANNING  Goal: Discharge to home or other facility with appropriate resources  Description: INTERVENTIONS:  - Identify barriers to discharge w/patient and caregiver  - Arrange for needed discharge resources and transportation as appropriate  - Identify discharge learning needs (meds, wound care, etc.)  - Arrange for interpretive services to assist at discharge as needed  - Refer to Case Management Department for coordinating discharge planning if the patient needs post-hospital services based on physician/advanced practitioner order or complex needs related to functional status, cognitive ability, or social support system  Outcome: Progressing     Problem: Knowledge Deficit  Goal: Patient/family/caregiver demonstrates understanding of disease process, treatment plan, medications, and discharge instructions  Description: Complete learning assessment and assess knowledge base.   Interventions:  - Provide teaching at level of understanding  - Provide teaching via preferred learning methods  Outcome: Progressing     Problem: SKIN/TISSUE INTEGRITY - ADULT  Goal: Incision(s), wounds(s) or drain site(s) healing without S/S of infection  Description: INTERVENTIONS  - Assess and document dressing, incision, wound bed, drain sites and surrounding tissue  - Provide patient and family education  - Perform skin care/dressing changes every utcome: Progressing

## 2023-07-09 NOTE — PROCEDURES
Incision and drain    Date/Time: 7/9/2023 12:05 PM    Performed by: Bailey Art PA-C  Authorized by: Bailey Art PA-C  Universal Protocol:  Consent: Verbal consent obtained. Risks and benefits: risks, benefits and alternatives were discussed  Consent given by: patient  Time out: Immediately prior to procedure a "time out" was called to verify the correct patient, procedure, equipment, support staff and site/side marked as required. Timeout called at: 7/9/2023 12:10 PM.  Patient understanding: patient states understanding of the procedure being performed  Patient identity confirmed: verbally with patient and arm band      Patient location:  Bedside  Location:     Type:  Abscess    Location:  Lower extremity    Lower extremity location:  L hip  Pre-procedure details:     Skin preparation:  Betadine  Anesthesia (see MAR for exact dosages): Anesthesia method:  Local infiltration    Local anesthetic:  Lidocaine 1% WITH epi  Procedure details:     Complexity:  Simple    Needle aspiration: no      Incision types:  Single straight    Scalpel blade:  11    Wound management:  Probed and deloculated and irrigated with saline    Drainage:  Purulent and bloody    Drainage amount: Moderate    Wound treatment:  Packing placed    Packing materials:  1/2 in iodoform gauze  Post-procedure details:     Patient tolerance of procedure:   Tolerated well, no immediate complications        Bailey Art  07/09/23

## 2023-07-09 NOTE — PROGRESS NOTES
[address]  Progress Note  Name: Quynh Matias  MRN: [id number]  Unit/Bed#: -01 I Date of Admission: 7/7/2023   Date of Service: 7/9/2023  Hospital Day: 0    Assessment/Plan   Restless leg syndrome  Assessment & Plan  · Continue Mirapex 0.5mg nightly    Type 2 diabetes mellitus with hyperglycemia, with long-term current use of insulin [facility])  Assessment & Plan  Lab Results   Component Value Date    HGBA1C 8.0 (H) 03/29/2023       Recent Labs     07/08/23  1609 07/08/23  2039 07/09/23  0725 07/09/23  1128   POCGLU 212* 185* 203* 229*       Blood Sugar Average: Last 72 hrs:  (P) 052.1037533479845389   · Hold home metformin  · Is prescribed 20 units of Levemir nightly but has been noncompliant with this  · We will begin Lantus 10 units nightly  · Continue corrective sliding scale insulin, Accu-Cheks, and hypoglycemic protocol  · Carb controlled diet    * Cellulitis of left hip  Assessment & Plan  · Insect bite with surrounding erythema and discomfort  · Continue Ancef 2G Q8H  · Continue supportive care with warm compresses  · General surgery evaluation appreciated  · Need for I&D today        VTE Pharmacologic Prophylaxis: VTE Score: 2 Low Risk (Score 0-2) - Encourage Ambulation. Patient Centered Rounds: I performed bedside rounds with nursing staff today. Discussions with Specialists or Other Care Team Provider: General surgery    Education and Discussions with Family / Patient: Patient updated on plan of care. Total Time Spent on Date of Encounter in care of patient: 35 minutes This time was spent on one or more of the following: performing physical exam; counseling and coordination of care; obtaining or reviewing history; documenting in the medical record; reviewing/ordering tests, medications or procedures; communicating with other healthcare professionals and discussing with patient's family/caregivers.     Current Length of Stay: 0 day(s)  Current Patient Status: Inpatient   Certification Statement: The patient will continue to require additional inpatient hospital stay due to Left hip insect bite with surrounding cellulitis; and I&D today  Discharge Plan: Anticipate discharge in 24-48 hrs to home. Code Status: Level 1 - Full Code    Subjective:   Patient resting comfortably in bed. She continues to report discomfort at the hip area but there has been no significant worsening/changing. Redness remains within previously demarcated area. No significant overnight events reported by nursing. Objective:     Vitals:   Temp (24hrs), Av.3 °F (36.8 °C), Min:98.2 °F (36.8 °C), Max:98.4 °F (36.9 °C)    Temp:  [98.2 °F (36.8 °C)-98.4 °F (36.9 °C)] 98.2 °F (36.8 °C)  HR:  [77-92] 77  Resp:  [17-20] 20  BP: ()/(54-59) 103/54  SpO2:  [94 %-96 %] 95 %  Body mass index is 38.22 kg/m². Input and Output Summary (last 24 hours): Intake/Output Summary (Last 24 hours) at 2023 1203  Last data filed at 2023 1841  Gross per 24 hour   Intake 480 ml   Output --   Net 480 ml       Physical Exam:   Physical Exam  Vitals and nursing note reviewed. Constitutional:       General: She is not in acute distress. Appearance: She is well-developed. She is obese. HENT:      Head: Normocephalic and atraumatic. Mouth/Throat:      Mouth: Mucous membranes are moist.      Pharynx: Oropharynx is clear. Eyes:      Extraocular Movements: Extraocular movements intact. Conjunctiva/sclera: Conjunctivae normal.   Cardiovascular:      Rate and Rhythm: Normal rate and regular rhythm. Pulses: Normal pulses. Heart sounds: Normal heart sounds. No murmur heard. Pulmonary:      Effort: Pulmonary effort is normal. No respiratory distress. Breath sounds: Normal breath sounds. Abdominal:      General: Bowel sounds are normal. There is no distension. Palpations: Abdomen is soft. Tenderness: There is no abdominal tenderness.    Musculoskeletal: General: Normal range of motion. Cervical back: Normal range of motion and neck supple. Skin:     Capillary Refill: Capillary refill takes less than 2 seconds. Comments: Left hip with pustule and surrounding erythema/edema   Neurological:      General: No focal deficit present. Mental Status: She is alert and oriented to person, place, and time. Mental status is at baseline. Psychiatric:         Mood and Affect: Mood normal.         Behavior: Behavior normal.         Judgment: Judgment normal.          Labs:  Results from last 7 days   Lab Units 07/09/23 0617 07/07/23 2338   WBC Thousand/uL 8.13 11.78*   HEMOGLOBIN g/dL 12.3 12.3   HEMATOCRIT % 38.1 37.7   PLATELETS Thousands/uL 253 220   NEUTROS PCT %  --  66   LYMPHS PCT %  --  21   MONOS PCT %  --  10   EOS PCT %  --  3     Results from last 7 days   Lab Units 07/09/23  0617 07/07/23  2338   SODIUM mmol/L 133* 133*   POTASSIUM mmol/L 3.9 3.6   CHLORIDE mmol/L 105 102   CO2 mmol/L 21 24   BUN mg/dL 9 10   CREATININE mg/dL 0.54* 0.67   ANION GAP mmol/L 7 7   CALCIUM mg/dL 9.3 9.3   ALBUMIN g/dL  --  3.7   TOTAL BILIRUBIN mg/dL  --  0.38   ALK PHOS U/L  --  74   ALT U/L  --  36   AST U/L  --  31   GLUCOSE RANDOM mg/dL 215* 224*     Results from last 7 days   Lab Units 07/07/23  2338   INR  0.90     Results from last 7 days   Lab Units 07/09/23  1128 07/09/23  0725 07/08/23  2039 07/08/23  1609 07/08/23  1105 07/08/23  0724   POC GLUCOSE mg/dl 229* 203* 185* 212* 191* 167*         Results from last 7 days   Lab Units 07/07/23  2338   LACTIC ACID mmol/L 1.0   PROCALCITONIN ng/ml 0.06       Lines/Drains:  Invasive Devices     Peripheral Intravenous Line  Duration           Peripheral IV 07/07/23 Left;Upper;Ventral (anterior) Arm 1 day                Imaging: No new imaging. Recent Cultures (last 7 days):   Results from last 7 days   Lab Units 07/07/23  2338   BLOOD CULTURE  Received in Microbiology Lab. Culture in Progress.   Received in Microbiology Lab. Culture in Progress. Last 24 Hours Medication List:   Current Facility-Administered Medications   Medication Dose Route Frequency Provider Last Rate   • acetaminophen  650 mg Oral Q6H PRN Norah Shine PA-C     • albuterol  2 puff Inhalation Q6H PRN Norah Shine PA-C     • atorvastatin  10 mg Oral Daily Norah Shine PA-C     • cefazolin  2,000 mg Intravenous Q8H Norah Shine PA-C 2,000 mg (07/09/23 1202)   • citalopram  40 mg Oral Daily Norah Shine PA-C     • hydrOXYzine HCL  50 mg Oral Q6H PRN Norah Shine PA-C     • insulin glargine  10 Units Subcutaneous HS [facility], DO     • insulin lispro  1-5 Units Subcutaneous HS [facility], DO     • insulin lispro  1-6 Units Subcutaneous TID [facility], DO     • lidocaine-epinephrine  20 mL Infiltration Once Desiree Ybarra PA-C     • montelukast  10 mg Oral HS Norah Shine PA-C     • ondansetron  4 mg Intravenous Q6H PRN Norah Shine PA-C     • pramipexole  0.5 mg Oral HS Norah Shine PA-C     • sodium chloride  1,000 mL Intravenous Once Charl Marinelli III, DO     • SUMAtriptan  25 mg Oral Once PRN Norah Shine PA-C     • topiramate  25 mg Oral BID Norah Shine PA-C     • traZODone  100 mg Oral HS Norah Shine PA-C          Today, Patient Was Seen By: Lorraine Lozano DO    **Please Note: This note may have been constructed using a voice recognition system. **

## 2023-07-09 NOTE — ASSESSMENT & PLAN NOTE
Lab Results   Component Value Date    HGBA1C 8.0 (H) 03/29/2023       Recent Labs     07/08/23  1609 07/08/23 2039 07/09/23  0725 07/09/23  1128   POCGLU 212* 185* 203* 229*       Blood Sugar Average: Last 72 hrs:  (P) [id number]   · Hold home metformin  · Is prescribed 20 units of Levemir nightly but has been noncompliant with this  · We will begin Lantus 10 units nightly  · Continue corrective sliding scale insulin, Accu-Cheks, and hypoglycemic protocol  · Carb controlled diet

## 2023-07-10 ENCOUNTER — TELEPHONE (OUTPATIENT)
Dept: SURGERY | Facility: CLINIC | Age: 48
End: 2023-07-10

## 2023-07-10 VITALS
RESPIRATION RATE: 19 BRPM | HEIGHT: 64 IN | BODY MASS INDEX: 38.01 KG/M2 | OXYGEN SATURATION: 95 % | WEIGHT: 222.66 LBS | TEMPERATURE: 98 F | SYSTOLIC BLOOD PRESSURE: 105 MMHG | HEART RATE: 83 BPM | DIASTOLIC BLOOD PRESSURE: 72 MMHG

## 2023-07-10 LAB
ANION GAP SERPL CALCULATED.3IONS-SCNC: 9 MMOL/L
BUN SERPL-MCNC: 9 MG/DL (ref 5–25)
CALCIUM SERPL-MCNC: 9.1 MG/DL (ref 8.4–10.2)
CHLORIDE SERPL-SCNC: 105 MMOL/L (ref 96–108)
CO2 SERPL-SCNC: 20 MMOL/L (ref 21–32)
CREAT SERPL-MCNC: 0.57 MG/DL (ref 0.6–1.3)
ERYTHROCYTE [DISTWIDTH] IN BLOOD BY AUTOMATED COUNT: 12.5 % (ref 11.6–15.1)
GFR SERPL CREATININE-BSD FRML MDRD: 112 ML/MIN/1.73SQ M
GLUCOSE SERPL-MCNC: 168 MG/DL (ref 65–140)
GLUCOSE SERPL-MCNC: 195 MG/DL (ref 65–140)
GLUCOSE SERPL-MCNC: 224 MG/DL (ref 65–140)
HCT VFR BLD AUTO: 38.1 % (ref 34.8–46.1)
HGB BLD-MCNC: 12.3 G/DL (ref 11.5–15.4)
MCH RBC QN AUTO: 29.8 PG (ref 26.8–34.3)
MCHC RBC AUTO-ENTMCNC: 32.3 G/DL (ref 31.4–37.4)
MCV RBC AUTO: 92 FL (ref 82–98)
PLATELET # BLD AUTO: 273 THOUSANDS/UL (ref 149–390)
PMV BLD AUTO: 10 FL (ref 8.9–12.7)
POTASSIUM SERPL-SCNC: 3.7 MMOL/L (ref 3.5–5.3)
RBC # BLD AUTO: 4.13 MILLION/UL (ref 3.81–5.12)
SODIUM SERPL-SCNC: 134 MMOL/L (ref 135–147)
WBC # BLD AUTO: 7.25 THOUSAND/UL (ref 4.31–10.16)

## 2023-07-10 PROCEDURE — 85027 COMPLETE CBC AUTOMATED: CPT | Performed by: INTERNAL MEDICINE

## 2023-07-10 PROCEDURE — 99239 HOSP IP/OBS DSCHRG MGMT >30: CPT | Performed by: INTERNAL MEDICINE

## 2023-07-10 PROCEDURE — 99232 SBSQ HOSP IP/OBS MODERATE 35: CPT | Performed by: SURGERY

## 2023-07-10 PROCEDURE — 80048 BASIC METABOLIC PNL TOTAL CA: CPT | Performed by: INTERNAL MEDICINE

## 2023-07-10 PROCEDURE — 82948 REAGENT STRIP/BLOOD GLUCOSE: CPT

## 2023-07-10 PROCEDURE — 2W0MX5Z CHANGE PACKING MATERIAL ON LEFT LOWER EXTREMITY: ICD-10-PCS | Performed by: SURGERY

## 2023-07-10 RX ORDER — SENNOSIDES 8.6 MG
1300 CAPSULE ORAL EVERY 8 HOURS PRN
Qty: 30 TABLET | Refills: 0 | Status: SHIPPED | OUTPATIENT
Start: 2023-07-10

## 2023-07-10 RX ORDER — OXYCODONE HYDROCHLORIDE 5 MG/1
5 TABLET ORAL EVERY 4 HOURS PRN
Status: DISCONTINUED | OUTPATIENT
Start: 2023-07-10 | End: 2023-07-10 | Stop reason: HOSPADM

## 2023-07-10 RX ORDER — DOXYCYCLINE HYCLATE 100 MG/1
100 CAPSULE ORAL EVERY 12 HOURS SCHEDULED
Qty: 14 CAPSULE | Refills: 0 | Status: SHIPPED | OUTPATIENT
Start: 2023-07-10 | End: 2023-07-17

## 2023-07-10 RX ADMIN — ONDANSETRON 4 MG: 2 INJECTION INTRAMUSCULAR; INTRAVENOUS at 07:32

## 2023-07-10 RX ADMIN — CITALOPRAM HYDROBROMIDE 40 MG: 20 TABLET ORAL at 09:01

## 2023-07-10 RX ADMIN — CEFAZOLIN SODIUM 2000 MG: 2 SOLUTION INTRAVENOUS at 11:31

## 2023-07-10 RX ADMIN — CEFAZOLIN SODIUM 2000 MG: 2 SOLUTION INTRAVENOUS at 02:52

## 2023-07-10 RX ADMIN — ATORVASTATIN CALCIUM 10 MG: 10 TABLET, FILM COATED ORAL at 09:01

## 2023-07-10 RX ADMIN — OXYCODONE HYDROCHLORIDE 5 MG: 5 TABLET ORAL at 09:01

## 2023-07-10 RX ADMIN — ACETAMINOPHEN 650 MG: 325 TABLET ORAL at 11:30

## 2023-07-10 RX ADMIN — SUMATRIPTAN SUCCINATE 25 MG: 25 TABLET ORAL at 07:32

## 2023-07-10 RX ADMIN — INSULIN LISPRO 2 UNITS: 100 INJECTION, SOLUTION INTRAVENOUS; SUBCUTANEOUS at 11:44

## 2023-07-10 RX ADMIN — TOPIRAMATE 25 MG: 25 CAPSULE, COATED PELLETS ORAL at 09:01

## 2023-07-10 RX ADMIN — ACETAMINOPHEN 650 MG: 325 TABLET ORAL at 03:23

## 2023-07-10 RX ADMIN — INSULIN LISPRO 1 UNITS: 100 INJECTION, SOLUTION INTRAVENOUS; SUBCUTANEOUS at 07:38

## 2023-07-10 NOTE — NURSING NOTE
Pt discharged home, AVS read and explained to pt all questions answered. IV removed without complications and tip intact. All belongings with pt. Pt escorted to front entrance and assisted into car of family.

## 2023-07-10 NOTE — PLAN OF CARE

## 2023-07-10 NOTE — DISCHARGE INSTR - AVS FIRST PAGE
You must remove packing on or by 7/11/2023, it should come out easily when wet. Clean the wound twice a day with soap/water. Change dressing as needed for drainage/soilage. Once packing is out can apply Neosporin to wound. Follow-up in our [address] [place] [address] office for dressing changes. Call sooner with any concerns.

## 2023-07-10 NOTE — PLAN OF CARE
Problem: PAIN - ADULT  Goal: Verbalizes/displays adequate comfort level or baseline comfort level  Description: Interventions:  - Encourage patient to monitor pain and request assistance  - Assess pain using appropriate pain scale  - Administer analgesics based on type and severity of pain and evaluate response  - Implement non-pharmacological measures as appropriate and evaluate response  - Consider cultural and social influences on pain and pain management  - Notify physician/advanced practitioner if interventions unsuccessful or patient reports new pain  Outcome: Progressing     Problem: INFECTION - ADULT  Goal: Absence or prevention of progression during hospitalization  Description: INTERVENTIONS:  - Assess and monitor for signs and symptoms of infection  - Monitor lab/diagnostic results  - Monitor all insertion sites, i.e. indwelling lines, tubes, and drains  - Monitor endotracheal if appropriate and nasal secretions for changes in amount and color  - [place] appropriate cooling/warming therapies per order  - Administer medications as ordered  - Instruct and encourage patient and family to use good hand hygiene technique  - Identify and instruct in appropriate isolation precautions for identified infection/condition  Outcome: Progressing  Goal: Absence of fever/infection during neutropenic period  Description: INTERVENTIONS:  - Monitor WBC    Outcome: Progressing     Problem: SAFETY ADULT  Goal: Patient will remain free of falls  Description: INTERVENTIONS:  - Educate patient/family on patient safety including physical limitations  - Instruct patient to call for assistance with activity   - Consult OT/PT to assist with strengthening/mobility   - Keep Call bell within reach  - Keep bed low and locked with side rails adjusted as appropriate  - Keep care items and personal belongings within reach  - Initiate and maintain comfort rounds  - Make Fall Risk Sign visible to staff  - Offer Toileting every 2 Hours, in advance of need  - Initiate/Maintain bed alarm  - Obtain necessary fall risk management equipment:   - Apply yellow socks and bracelet for high fall risk patients  - Consider moving patient to room near nurses station  Outcome: Progressing  Goal: Maintain or return to baseline ADL function  Description: INTERVENTIONS:  -  Assess patient's ability to carry out ADLs; assess patient's baseline for ADL function and identify physical deficits which impact ability to perform ADLs (bathing, care of mouth/teeth, toileting, grooming, dressing, etc.)  - Assess/evaluate cause of self-care deficits   - Assess range of motion  - Assess patient's mobility; develop plan if impaired  - Assess patient's need for assistive devices and provide as appropriate  - Encourage maximum independence but intervene and supervise when necessary  - Involve family in performance of ADLs  - Assess for home care needs following discharge   - Consider OT consult to assist with ADL evaluation and planning for discharge  - Provide patient education as appropriate  Outcome: Progressing  Goal: Maintains/Returns to pre admission functional level  Description: INTERVENTIONS:  - Perform BMAT or MOVE assessment daily.   - Set and communicate daily mobility goal to care team and patient/family/caregiver. - Collaborate with rehabilitation services on mobility goals if consulted  - Perform Range of Motion 2 times a day. - Reposition patient every 2 hours.   - Dangle patient 2 times a day  - Stand patient 2 times a day  - Ambulate patient 2 times a day  - Out of bed to chair 2 times a day   - Out of bed for meals 2 times a day  - Out of bed for toileting  - Record patient progress and toleration of activity level   Outcome: Progressing     Problem: DISCHARGE PLANNING  Goal: Discharge to home or other facility with appropriate resources  Description: INTERVENTIONS:  - Identify barriers to discharge w/patient and caregiver  - Arrange for needed discharge resources and transportation as appropriate  - Identify discharge learning needs (meds, wound care, etc.)  - Arrange for interpretive services to assist at discharge as needed  - Refer to Case Management Department for coordinating discharge planning if the patient needs post-hospital services based on physician/advanced practitioner order or complex needs related to functional status, cognitive ability, or social support system  Outcome: Progressing     Problem: Knowledge Deficit  Goal: Patient/family/caregiver demonstrates understanding of disease process, treatment plan, medications, and discharge instructions  Description: Complete learning assessment and assess knowledge base.   Interventions:  - Provide teaching at level of understanding  - Provide teaching via preferred learning methods  Outcome: Progressing     Problem: SKIN/TISSUE INTEGRITY - ADULT  Goal: Incision(s), wounds(s) or drain site(s) healing without S/S of infection  Description: INTERVENTIONS  - Assess and document dressing, incision, wound bed, drain sites and surrounding tissue  - Provide patient and family education  - Perform skin care/dressing changes every shift  Outcome: Progressing

## 2023-07-10 NOTE — ASSESSMENT & PLAN NOTE
Lab Results   Component Value Date    HGBA1C 8.0 (H) 03/29/2023       Recent Labs     07/09/23  1128 07/09/23  1612 07/09/23  2105 07/10/23  0719   POCGLU 229* 195* 200* 168*       Blood Sugar Average: Last 72 hrs:  (P) 699.8566909717954275   · Hold home metformin  · Is prescribed 20 units of Levemir nightly but has been noncompliant with this  · We will begin Lantus 10 units nightly  · Continue corrective sliding scale insulin, Accu-Cheks, and hypoglycemic protocol  · Carb controlled diet

## 2023-07-10 NOTE — PROGRESS NOTES
[address]   Progress Note  Name: Enrrique Carr  MRN: [id number]  Unit/Bed#: -01 I Date of Admission: 7/7/2023   Date of Service: 7/10/2023 I Hospital Day: 1    Assessment/Plan   * Cellulitis of left hip  Assessment & Plan  Assessment:  Left hip cellulitis/abscess s/p I&D. Plan:  Packing changed this morning, she is agreeable to remove at home tomorrow. Cellulitis is fading, no purulence from the wound. Tunnels into deep soft tissue space with tenderness suggesting possible sciatic nerve irritation. Flushed with saline, repacked, dressed. Will follow-up in office M/W/F for dressing changes. Subjective/Objective   Chief Complaint: pain    Subjective: ongoing pain with dressing changes, no fevers/chills, pain with ambulation and with some pain radiating down her leg    Objective:     Blood pressure 105/72, pulse 83, temperature 98 °F (36.7 °C), resp. rate 19, height 5' 4" (1.626 m), weight 101 kg (222 lb 10.6 oz), SpO2 95 %, not currently breastfeeding. ,Body mass index is 38.22 kg/m². Intake/Output Summary (Last 24 hours) at 7/10/2023 1112  Last data filed at 7/9/2023 1758  Gross per 24 hour   Intake 360 ml   Output --   Net 360 ml       Invasive Devices     Peripheral Intravenous Line  Duration           Peripheral IV 07/07/23 Left;Upper;Ventral (anterior) Arm 2 days                Physical Exam  Vitals and nursing note reviewed. Constitutional:       General: She is not in acute distress. Appearance: She is well-developed. She is not diaphoretic. HENT:      Head: Normocephalic and atraumatic. Eyes:      Conjunctiva/sclera: Conjunctivae normal.      Pupils: Pupils are equal, round, and reactive to light. Pulmonary:      Effort: No respiratory distress. Musculoskeletal:         General: Normal range of motion. Cervical back: Normal range of motion. Skin:     General: Skin is warm and dry.       Capillary Refill: Capillary refill takes less than 2 seconds. Comments: Left hip, small clean open wound without purulence, mild residual cellulitis. Packing removed from deep soft tissue space in 9 o'clock direction. Unable to tolerate probing/deep repacking due to pain. Packed superficially and bandaged. Neurological:      Mental Status: She is alert and oriented to person, place, and time. Psychiatric:         Behavior: Behavior normal.                   Lab, Imaging and other studies:I have personally reviewed pertinent lab results.     VTE Pharmacologic Prophylaxis: Reason for no pharmacologic prophylaxis per SLIM  VTE Mechanical Prophylaxis: sequential compression device

## 2023-07-10 NOTE — CASE MANAGEMENT
Case Management Discharge Planning Note    Patient name Alia James  Location /-34 MRN [id number]  : 1975 Date 7/10/2023       Current Admission Date: 2023  Current Admission Diagnosis:Cellulitis of left hip   Patient Active Problem List    Diagnosis Date Noted   • Drug use 2023   • Hydrosalpinx 2023   • PID (acute pelvic inflammatory disease) 2023   • Restless leg syndrome 2022   • Type 2 diabetes mellitus with hyperglycemia, with long-term current use of insulin ([address]) 2022   • Cellulitis of left hip 2022   • Migraine without aura, not intractable 2021   • Other hyperlipidemia 2021   • History of hyperlipidemia 2021   • Allergic rhinitis 2017   • Mood disorder of manic type ([address]) 2017   • Disc degeneration, lumbosacral 2016   • Depression with anxiety 10/22/2015   • Severe obesity (BMI 35.0-39. 9) with comorbidity ([address]) 2014      LOS (days): 1  Geometric Mean LOS (GMLOS) (days):   Days to GMLOS:     OBJECTIVE:  Risk of Unplanned Readmission Score: 16.18         Current admission status: Inpatient   Preferred Pharmacy:   [address] [place] [address]  Phone: [phone] Fax: [phone]    Primary Care Provider: JENNIFER Archibald    Primary Insurance: [address] [place] [address]  Secondary Insurance:     DISCHARGE DETAILS:    Discharge planning discussed with[de-identified] Patient  Freedom of Choice: Yes     Additional Comments: Update from surgery that patient [place] [facility] [place]. Met with patient at bedside to discuss [facility] and patient reports that she is returning to work tomorrow. Patient not eligible for [facility] since she works FT. Message to surgery AP and discussed same. Surgery will see patient in office for wound care. Patient confirms she has transportation to office.

## 2023-07-10 NOTE — ASSESSMENT & PLAN NOTE
Assessment:  Left hip cellulitis/abscess s/p I&D. Plan:  Packing changed this morning, she is agreeable to remove at home tomorrow. Cellulitis is fading, no purulence from the wound. Tunnels into deep soft tissue space with tenderness suggesting possible sciatic nerve irritation. Flushed with saline, repacked, dressed. Will follow-up in office M/W/F for dressing changes.

## 2023-07-10 NOTE — NURSING NOTE
Staff notifies RN pt c/o nausea. Upon assessment pt ambulating to bathroom, crying, c/o 9/10 pain to L hip, pt states tylenol effective "only for a little." c/o 10/10 migraine 10/10. Prns administered as available, surgery team notified. Warm compress applied to L hip as ordered.

## 2023-07-10 NOTE — ASSESSMENT & PLAN NOTE
· Insect bite with surrounding erythema and discomfort  · Continue Ancef 2G Q8H  · Continue supportive care with warm compresses  · General surgery evaluation appreciated  · S/P I & D

## 2023-07-10 NOTE — DISCHARGE SUMMARY
[address] [facility]  Discharge- [place] 1975, 41 y.o. female MRN: [id number]  Unit/Bed#: MS Chris-Nguyen Encounter: [id number]  Primary Care Provider: JENNIFER Azevedo   Date and time admitted to hospital: 7/7/2023 10:57 PM    Restless leg syndrome  Assessment & Plan  · Continue Mirapex 0.5mg nightly    Type 2 diabetes mellitus with hyperglycemia, with long-term current use of insulin [facility])  Assessment & Plan  Lab Results   Component Value Date    HGBA1C 8.0 (H) 03/29/2023       Recent Labs     07/09/23  1128 07/09/23  1612 07/09/23  2105 07/10/23  0719   POCGLU 229* 195* 200* 168*       Blood Sugar Average: Last 72 hrs:  (P) [id number]   · Hold home metformin  · Is prescribed 20 units of Levemir nightly but has been noncompliant with this  · We will begin Lantus 10 units nightly  · Continue corrective sliding scale insulin, Accu-Cheks, and hypoglycemic protocol  · Carb controlled diet    * Cellulitis of left hip  Assessment & Plan  · Insect bite with surrounding erythema and discomfort  · Continue Ancef 2G Q8H  · Continue supportive care with warm compresses  · General surgery evaluation appreciated  · S/P I & D      Discharging Physician / Practitioner: Sohail Mix DO  PCP: JENNIFER Azevedo  Admission Date:   Admission Orders (From admission, onward)     Ordered        07/09/23 1152  Inpatient Admission  Once            07/08/23 0056  Place in Observation  Once                      Discharge Date: 07/10/23    Medical Problems     Resolved Problems  Date Reviewed: 7/10/2023   None         Consultations During Hospital Stay:      Procedures Performed: Incision and drainage    Significant Findings / Test Results:     No results found. Incidental Findings: Not applicable    Test Results Pending at Discharge (will require follow up):  Not applicable     Outpatient Tests Requested: Not applicable    Reason for Admission: Left hip erythema and edema    Hospital Course: Chelita Hooker is a 41 y.o. female who presents with left hip redness and swelling x5 days. Patient presents ER for further evaluation treatment of a 5-day history of left hip redness and swelling. Patient states that she believes she was bit by a spider as she saw 1 in the area prior to onset of symptoms. Patient was seen previously at [facility] and started on a course of Keflex which she states she has been compliant with and has been on it for approximately 2 or 3 days. Patient presented to the ER this evening as she has had increasing redness and swelling as well as increased pain.     Patient does report a subjective fever at home but she was unable to quantify, she is a known insulin dependent diabetic. The patient underwent incision and drainage with general surgery in the setting of abscess. She has remained hemodynamically stable and saturating well on room air throughout her hospital course. Doxycycline in the setting of insect bite and cellulitis was sent to the patient's pharmacy as well as Tylenol for pain. She was strongly encouraged to resume all preadmission medications at all preadmission dosages. She was also encouraged to follow-up with her PCP within 7 days 14 days. Condition at Discharge: stable     Discharge Day Visit / Exam:     Subjective:  Patient seen and examined at bedside. No acute events overnight. Denies chest pain, SOB, diaphoresis, nausea/vomiting/diarrhea, fevers/chills. Vitals: Blood Pressure: 105/72 (07/10/23 0719)  Pulse: 83 (07/10/23 0719)  Temperature: 98 °F (36.7 °C) (07/10/23 0719)  Temp Source: Tympanic (07/07/23 2303)  Respirations: 19 (07/10/23 0719)  Height: 5' 4" (162.6 cm) (07/08/23 0135)  Weight - Scale: 101 kg (222 lb 10.6 oz) (07/08/23 0135)  SpO2: 95 % (07/10/23 0719)     Exam:   Physical Exam  Vitals and nursing note reviewed. Constitutional:       General: She is not in acute distress. Appearance: She is well-developed.    HENT: Head: Normocephalic and atraumatic. Eyes:      Conjunctiva/sclera: Conjunctivae normal.   Cardiovascular:      Rate and Rhythm: Normal rate and regular rhythm. Heart sounds: No murmur heard. Pulmonary:      Effort: Pulmonary effort is normal. No respiratory distress. Breath sounds: Normal breath sounds. Abdominal:      Palpations: Abdomen is soft. Tenderness: There is no abdominal tenderness. Musculoskeletal:         General: No swelling. Cervical back: Neck supple. Skin:     General: Skin is warm and dry. Capillary Refill: Capillary refill takes less than 2 seconds. Neurological:      Mental Status: She is alert. Psychiatric:         Mood and Affect: Mood normal.           Discharge instructions/Information to patient and family:   See after visit summary for information provided to patient and family. Provisions for Follow-Up Care:  See after visit summary for information related to follow-up care and any pertinent home health orders. Disposition:     Home with family support  Outpatient follow-up with PCP  Continue home medications  Doxycycline x7 days     Discharge Statement:  I spent 60 minutes discharging the patient. This time was spent on the day of discharge. I had direct contact with the patient on the day of discharge. Greater than 50% of the total time was spent examining patient, answering all patient questions, arranging and discussing plan of care with patient as well as directly providing post-discharge instructions. Additional time then spent on discharge activities. Discharge Medications:  See after visit summary for reconciled discharge medications provided to patient and family.       ** Please Note: This note has been constructed using a voice recognition system **

## 2023-07-11 ENCOUNTER — TELEPHONE (OUTPATIENT)
Dept: SURGERY | Facility: CLINIC | Age: 48
End: 2023-07-11

## 2023-07-11 LAB
EST. AVERAGE GLUCOSE BLD GHB EST-MCNC: 169 MG/DL
HBA1C MFR BLD: 7.5 %

## 2023-07-11 NOTE — TELEPHONE ENCOUNTER
Called patient to schedule appointment for a packing change s/p I&D @ [facility]. Could not leave a message as the mailbox was full. Also called patient's daughter Saurabh Posey), and received the same response.

## 2023-07-11 NOTE — UTILIZATION REVIEW
Initial Clinical Review    WAS OBSERVATION 07/08/2023 @ 0056 CONVERTED TO INPATIENT ADMISSION 07/09/2023 DUE TO CONTINUED STAY REQUIRED TO CARE FOR PATIENT WITH Insect bite and surrounding cellulitis. Admission: Date/Time/Statement:   Admission Orders (From admission, onward)     Ordered        07/09/23 1152  Inpatient Admission  Once            07/08/23 0056  Place in Observation  Once                      Orders Placed This Encounter   Procedures   • Inpatient Admission     Standing Status:   Standing     Number of Occurrences:   1     Order Specific Question:   Level of Care     Answer:   Med Surg [16]     Order Specific Question:   Estimated length of stay     Answer:   More than 2 Midnights     Order Specific Question:   Certification     Answer:   I certify that inpatient services are medically necessary for this patient for a duration of greater than two midnights. See H&P and MD Progress Notes for additional information about the patient's course of treatment. ED Arrival Information     Expected   -    Arrival   7/7/2023 21:55    Acuity   Urgent            Means of arrival   Ambulance    Escorted by   Family Member    Service   Hospitalist    Admission type   Emergency            Arrival complaint   spider bite pain           Chief Complaint   Patient presents with   • Spider Bite     Pt reports spider bite 3 days ago. Was seen at [facility] and [facility] and states pain is worse. Asking for pain meds to take at home, [facility] only prescrbed antibiotics       Initial Presentation: 41 y.o. female , presented to the ED @ [facility], from home via EMS, with family member. Admitted as Observation due to Insect (Spider) bite with surrounding cellulitis. Date: 07/08/2023    Presents with left hip redness and swelling x5 days. Patient presents ER for further evaluation treatment of a 5-day history of left hip redness and swelling.   Patient states that she believes she was bit by a spider as she saw 1 in the area prior to onset of symptoms. Patient was seen previously at [facility] and started on a course of Keflex which she states she has been compliant with and has been on it for approximately 2 or 3 days. Patient presented to the ER this evening as she has had increasing redness and swelling as well as increased pain. Start IV Ancef q8h. Obtain blood cultures. Analgesia controll and antipyretics PRN. Consult general surgery. 07/08/2023  Consult General Surgery:  Outlined area of erythema to monitor for improvement. Recommend warm compresses to affected area. Continue IV antibiotics. Repeat examination tomorrow to evaluate for any newly evolving fluctuance/drainage to indicate need for I&D. Day 1: 07/09/2023   Continue IV Ancef q8h. Continue supportive care with warm compresses. Need for I&D today. 07/09/2023  Bedside I&D Abscess Left Hip. Anesthesia:  Local Lido 1% w epi  Drainage bloody and purulent. Moderate amount.     Wound packed w 1/2 in iodoform gauze      ED Triage Vitals   Temperature Pulse Respirations Blood Pressure SpO2   07/07/23 2303 07/07/23 2300 07/07/23 2300 07/07/23 2300 07/07/23 2300   98.5 °F (36.9 °C) (!) 107 17 130/76 96 %      Temp Source Heart Rate Source Patient Position - Orthostatic VS BP Location FiO2 (%)   07/07/23 2303 07/07/23 2300 07/07/23 2300 07/07/23 2300 --   Tympanic Monitor Lying Left arm       Pain Score       07/07/23 2300       9          Wt Readings from Last 1 Encounters:   07/08/23 101 kg (222 lb 10.6 oz)     Additional Vital Signs:   Date/Time Temp Pulse Resp BP MAP (mmHg) SpO2 O2 Device Patient Position - Orthostatic VS   07/09/23 07:27:02 98.2 °F (36.8 °C) 77 20 103/54 70 95 % -- --   07/08/23 22:21:01 -- 92 17 94/56 69 94 % -- --   07/08/23 15:33:23 98.4 °F (36.9 °C) 84 20 95/59 71 96 % -- --   07/08/23 07:12:09 98.7 °F (37.1 °C) 86 18 116/72 87 96 % -- --   07/08/23 01:58:50 98.5 °F (36.9 °C) 94 18 121/73 89 98 % -- --   07/07/23 2303 98.5 °F (36.9 °C) -- -- -- -- -- -- --       Pertinent Labs/Diagnostic Test Results:   Results from last 7 days   Lab Units 07/10/23  0439 07/09/23 0617 07/07/23  2338   WBC Thousand/uL 7.25 8.13 11.78*   HEMOGLOBIN g/dL 12.3 12.3 12.3   HEMATOCRIT % 38.1 38.1 37.7   PLATELETS Thousands/uL 273 253 220   NEUTROS ABS Thousands/µL  --   --  7.78*     Results from last 7 days   Lab Units 07/10/23  0439 07/09/23  0617 07/07/23  2338   SODIUM mmol/L 134* 133* 133*   POTASSIUM mmol/L 3.7 3.9 3.6   CHLORIDE mmol/L 105 105 102   CO2 mmol/L 20* 21 24   ANION GAP mmol/L 9 7 7   BUN mg/dL 9 9 10   CREATININE mg/dL 0.57* 0.54* 0.67   EGFR ml/min/1.73sq m 112 114 106   CALCIUM mg/dL 9.1 9.3 9.3     Results from last 7 days   Lab Units 07/07/23  2338   AST U/L 31   ALT U/L 36   ALK PHOS U/L 74   TOTAL PROTEIN g/dL 7.3   ALBUMIN g/dL 3.7   TOTAL BILIRUBIN mg/dL 0.38     Results from last 7 days   Lab Units 07/10/23  1103 07/10/23  0719 07/09/23  2105 07/09/23  1612 07/09/23  1128 07/09/23  0725 07/08/23  2039 07/08/23  1609 07/08/23  1105 07/08/23  0724   POC GLUCOSE mg/dl 224* 168* 200* 195* 229* 203* 185* 212* 191* 167*     Results from last 7 days   Lab Units 07/10/23  0439 07/09/23  0617 07/07/23  2338   GLUCOSE RANDOM mg/dL 195* 215* 224*       Results from last 7 days   Lab Units 07/07/23  2338   PROTIME seconds 12.2   INR  0.90   PTT seconds 28     Results from last 7 days   Lab Units 07/07/23  2338   PROCALCITONIN ng/ml 0.06     Results from last 7 days   Lab Units 07/07/23  2338   LACTIC ACID mmol/L 1.0     ED Treatment:   Medication Administration from 07/07/2023 2154 to 07/08/2023 0132       Date/Time Order Dose Route Action     07/07/2023 2348 EDT sodium chloride 0.9 % bolus 1,000 mL 1,000 mL Intravenous New Bag     07/08/2023 0109 EDT sodium chloride 0.9 % bolus 1,000 mL 1,000 mL Intravenous New Bag     07/07/2023 2349 EDT cefepime (MAXIPIME) IVPB (premix in dextrose) 2,000 mg 50 mL 2,000 mg Intravenous New Bag 07/08/2023 0108 EDT vancomycin (VANCOCIN) 1500 mg in sodium chloride 0.9% 250 mL IVPB 1,500 mg Intravenous New Bag     07/08/2023 0106 EDT fentanyl citrate (PF) 100 MCG/2ML 50 mcg 50 mcg Intravenous Given        Past Medical History:   Diagnosis Date   • Anxiety    • Asthma    • Closed nondisplaced fracture of proximal phalanx of lesser toe of left foot 1/15/2020   • Diabetes mellitus ([address])    • Hyperlipidemia      Present on Admission:  • Restless leg syndrome      Admitting Diagnosis: Bite [T14. 8XXA]  Spider bite [T63.301A]  Cellulitis of left lower extremity [L03.116]  Age/Sex: 41 y.o. female  Admission Orders:  Omid/CHO Controlled Diet  Up & OOB as tolerated  Apply heat to affected sita  Ender SCDs    Scheduled Medications:  No current facility-administered medications for this encounter. Continuous IV Infusions:  No current facility-administered medications for this encounter. PRN Meds:  No current facility-administered medications for this encounter. IP CONSULT TO ACUTE CARE SURGERY  IP CONSULT TO CASE MANAGEMENT    Network Utilization Review Department  ATTENTION: Please call with any questions or concerns to [phone] and carefully listen to the prompts so that you are directed to the right person. All voicemails are confidential.  Cyndee Mercer all requests for admission clinical reviews, approved or denied determinations and any other requests to dedicated fax number below belonging to the campus where the patient is receiving treatment.  List of dedicated fax numbers for the Facilities:  [place] DENIALS (Administrative/Medical Necessity) [phone]   [address] [place] [address] (Maternity/NICU/Pediatrics) [phone]   [address] [place] [address] [address] [phone] [address] [facility] [address] [address] [facility] [address] 1300 [facility]  [address]  [phone]

## 2023-07-11 NOTE — TELEPHONE ENCOUNTER
I was able to speak with patient's daughter Stoo Mendoza), she will try to get in touch with Gerri. I told her she needs to be seen in the office tomorrow. I gave her an option of either 8:30 or 2:15 on 07/12/2023. Jeanie Esteves states she will have her call back. Also, supplied the address.

## 2023-07-12 ENCOUNTER — TRANSITIONAL CARE MANAGEMENT (OUTPATIENT)
Dept: FAMILY MEDICINE CLINIC | Facility: CLINIC | Age: 48
End: 2023-07-12

## 2023-07-12 LAB
BACTERIA WND AEROBE CULT: ABNORMAL
GRAM STN SPEC: ABNORMAL

## 2023-07-14 LAB
BACTERIA BLD CULT: NORMAL
BACTERIA BLD CULT: NORMAL

## 2023-07-20 NOTE — TELEPHONE ENCOUNTER
Called patient and was unable to leave message and Dreama Apley was also made aware that we cant no to get a hold of this patient

## 2023-07-21 DIAGNOSIS — F30.9 MOOD DISORDER OF MANIC TYPE (HCC): ICD-10-CM

## 2023-07-21 RX ORDER — PRAMIPEXOLE DIHYDROCHLORIDE 0.5 MG/1
0.5 TABLET ORAL
Qty: 30 TABLET | Refills: 0 | Status: SHIPPED | OUTPATIENT
Start: 2023-07-21

## 2023-08-02 ENCOUNTER — HOSPITAL ENCOUNTER (EMERGENCY)
Facility: HOSPITAL | Age: 48
Discharge: HOME | End: 2023-08-02
Attending: EMERGENCY MEDICINE
Payer: COMMERCIAL

## 2023-08-02 VITALS
RESPIRATION RATE: 18 BRPM | DIASTOLIC BLOOD PRESSURE: 67 MMHG | OXYGEN SATURATION: 97 % | HEIGHT: 64 IN | TEMPERATURE: 97.1 F | WEIGHT: 220 LBS | BODY MASS INDEX: 37.56 KG/M2 | SYSTOLIC BLOOD PRESSURE: 132 MMHG | HEART RATE: 112 BPM

## 2023-08-02 DIAGNOSIS — L03.311 CELLULITIS OF ABDOMINAL WALL: ICD-10-CM

## 2023-08-02 DIAGNOSIS — E11.9 TYPE 2 DIABETES MELLITUS WITHOUT COMPLICATION, WITHOUT LONG-TERM CURRENT USE OF INSULIN (HCC): Chronic | ICD-10-CM

## 2023-08-02 DIAGNOSIS — L02.91 ABSCESS: Primary | ICD-10-CM

## 2023-08-02 LAB — GLUCOSE SERPL-MCNC: 207 MG/DL (ref 65–140)

## 2023-08-02 PROCEDURE — 99283 EMERGENCY DEPT VISIT LOW MDM: CPT

## 2023-08-02 PROCEDURE — 99284 EMERGENCY DEPT VISIT MOD MDM: CPT | Performed by: EMERGENCY MEDICINE

## 2023-08-02 PROCEDURE — 82948 REAGENT STRIP/BLOOD GLUCOSE: CPT

## 2023-08-02 RX ORDER — SULFAMETHOXAZOLE AND TRIMETHOPRIM 800; 160 MG/1; MG/1
1 TABLET ORAL ONCE
Status: COMPLETED | OUTPATIENT
Start: 2023-08-02 | End: 2023-08-02

## 2023-08-02 RX ORDER — SULFAMETHOXAZOLE AND TRIMETHOPRIM 800; 160 MG/1; MG/1
1 TABLET ORAL 2 TIMES DAILY
Qty: 14 TABLET | Refills: 0 | Status: SHIPPED | OUTPATIENT
Start: 2023-08-02 | End: 2023-08-09

## 2023-08-02 RX ORDER — CEPHALEXIN 500 MG/1
500 CAPSULE ORAL ONCE
Status: COMPLETED | OUTPATIENT
Start: 2023-08-02 | End: 2023-08-02

## 2023-08-02 RX ORDER — INSULIN DETEMIR 100 [IU]/ML
INJECTION, SOLUTION SUBCUTANEOUS
Qty: 15 ML | Refills: 3 | Status: SHIPPED | OUTPATIENT
Start: 2023-08-02

## 2023-08-02 RX ORDER — CEPHALEXIN 500 MG/1
500 CAPSULE ORAL EVERY 6 HOURS SCHEDULED
Qty: 28 CAPSULE | Refills: 0 | Status: SHIPPED | OUTPATIENT
Start: 2023-08-02 | End: 2023-08-09

## 2023-08-02 RX ADMIN — SULFAMETHOXAZOLE AND TRIMETHOPRIM 1 TABLET: 800; 160 TABLET ORAL at 22:38

## 2023-08-02 RX ADMIN — CEPHALEXIN 500 MG: 500 CAPSULE ORAL at 22:38

## 2023-08-03 NOTE — ED PROVIDER NOTES
History  Chief Complaint   Patient presents with   • Abscess     Patient presents to er with compliant of two abscesses one on left hip and one near left side of belly button. Denies fevers but reports being cold and hot last night. Patient noticed them approx 1-2 weeks ago. 63-year-old female presents emergency room due to complaints of the beginning of 2 abscesses that are forming on her her left abdomen and left buttock. The patient notes that she has had these before and usually antibiotics help them. The patient notes that she is a diabetic and has not been taking her insulin but is taking her metformin. Patient notes she has significant pain to these areas and is here for evaluation. Patient denies any fever chills or drainage. Prior to Admission Medications   Prescriptions Last Dose Informant Patient Reported? Taking? Alcohol Swabs PADS   No No   Sig: Use once daily when testing finger sticks. B-D UF III MINI PEN NEEDLES 31G X 5 MM MISC   No No   Sig: USE DAILY AT BEDTIME   Lancets MISC  Self No No   Sig: Use once for 1 dose Onetouch Verio. Test once daily or as directed.    Levemir FlexPen 100 units/mL injection pen   No No   Sig: INJECT 20 UNITS UNDER THE SKIN DAILY AT BEDTIME   SUMAtriptan (IMITREX) 25 mg tablet   No No   Sig: TAKE 1 TABLET (25 MG TOTAL) BY MOUTH ONCE AS NEEDED FOR MIGRAINE   acetaminophen (TYLENOL) 650 mg CR tablet   No No   Sig: Take 2 tablets (1,300 mg total) by mouth every 8 (eight) hours as needed for mild pain   albuterol (ProAir HFA) 90 mcg/act inhaler   No No   Sig: Inhale 2 puffs every 6 (six) hours as needed for wheezing or shortness of breath   atorvastatin (LIPITOR) 10 mg tablet   No No   Sig: TAKE 1 TABLET (10 MG TOTAL) BY MOUTH DAILY   cephalexin (KEFLEX) 500 mg capsule   Yes No   citalopram (CeleXA) 40 mg tablet   No No   Sig: TAKE 1 TABLET (40 MG TOTAL) BY MOUTH DAILY   glucose blood (True Metrix Blood Glucose Test) test strip   No No   Sig: Use as instructed   glucose blood test strip  Self No No   Sig: Use as instructed   glucose blood test strip  Self No No   Sig: Use as instructed. Test up to 3 times a day as needed   hydrOXYzine HCL (ATARAX) 50 mg tablet  Self No No   Sig: TAKE 1 TABLET (50 MG TOTAL) BY MOUTH EVERY 6 (SIX) HOURS AS NEEDED FOR ITCHING, ALLERGIES OR ANXIETY   metFORMIN (GLUCOPHAGE) 500 mg tablet   No No   Sig: TAKE 2 TABLETS (1,000 MG TOTAL) BY MOUTH DAILY WITH BREAKFAST AND 1 TABLET (500 MG TOTAL) DAILY WITH DINNER. methocarbamol (ROBAXIN) 750 mg tablet   Yes No   montelukast (SINGULAIR) 10 mg tablet   No No   Sig: TAKE 1 TABLET (10 MG TOTAL) BY MOUTH DAILY AT BEDTIME   pramipexole (MIRAPEX) 0.5 mg tablet   No No   Sig: TAKE 1 TABLET (0.5 MG TOTAL) BY MOUTH DAILY AT BEDTIME   topiramate (TOPAMAX) 25 mg sprinkle capsule   No No   Sig: TAKE 1 CAPSULE (25 MG TOTAL) BY MOUTH IN THE MORNING AND 1 CAPSULE (25 MG TOTAL) IN THE EVENING.    traZODone (DESYREL) 100 mg tablet   No No   Sig: TAKE 1 TABLET (100 MG TOTAL) BY MOUTH DAILY AT BEDTIME      Facility-Administered Medications: None       Past Medical History:   Diagnosis Date   • Anxiety    • Asthma    • Closed nondisplaced fracture of proximal phalanx of lesser toe of left foot 1/15/2020   • Diabetes mellitus ([address])    • Hyperlipidemia        Past Surgical History:   Procedure Laterality Date   • ARTHROSCOPY KNEE Left    • CHOLECYSTECTOMY     • ECTOPIC PREGNANCY SURGERY     • SHOULDER SURGERY Right     torn bicep       Family History   Problem Relation Age of Onset   • Obesity Mother    • Obesity Father    • Diabetes type II Father    • Diabetes Father    • Obesity Brother    • No Known Problems Daughter    • No Known Problems Maternal Grandmother    • Diabetes Maternal Grandfather    • No Known Problems Paternal Grandmother    • No Known Problems Maternal Aunt    • No Known Problems Maternal Aunt    • No Known Problems Maternal Aunt    • Heart disease Neg Hx    • Stroke Neg Hx    • Cancer Neg Hx      I have reviewed and agree with the history as documented. E-Cigarette/Vaping   • E-Cigarette Use Never User      E-Cigarette/Vaping Substances   • Nicotine No    • THC No    • CBD No    • Flavoring No    • Other No    • Unknown No      Social History     Tobacco Use   • Smoking status: Never   • Smokeless tobacco: Never   Vaping Use   • Vaping Use: Never used   Substance Use Topics   • Alcohol use: Not Currently     Alcohol/week: 0.0 standard drinks of alcohol     Comment: 0   • Drug use: Not Currently       Review of Systems   Constitutional: Negative for chills and fever. HENT: Negative for ear pain and sore throat. Eyes: Negative for pain and visual disturbance. Respiratory: Negative for cough and shortness of breath. Cardiovascular: Negative for chest pain and palpitations. Gastrointestinal: Negative for abdominal pain and vomiting. Genitourinary: Negative for dysuria and hematuria. Musculoskeletal: Negative for arthralgias and back pain. Skin: Positive for rash and wound. Negative for color change. Neurological: Negative for syncope. All other systems reviewed and are negative. Physical Exam  Physical Exam  Vitals and nursing note reviewed. Constitutional:       General: She is not in acute distress. Appearance: Normal appearance. She is well-developed. She is not ill-appearing or toxic-appearing. HENT:      Head: Normocephalic and atraumatic. Right Ear: External ear normal.      Left Ear: External ear normal.      Nose: Nose normal.      Mouth/Throat:      Mouth: Mucous membranes are moist.   Eyes:      Conjunctiva/sclera: Conjunctivae normal.   Cardiovascular:      Rate and Rhythm: Normal rate and regular rhythm. Pulses: Normal pulses. Heart sounds: Normal heart sounds. No murmur heard. Pulmonary:      Effort: Pulmonary effort is normal. No respiratory distress. Breath sounds: Normal breath sounds.    Abdominal:      General: Bowel sounds are normal.      Palpations: Abdomen is soft. Tenderness: There is no abdominal tenderness. Musculoskeletal:         General: Tenderness present. No swelling or deformity. Cervical back: Neck supple. Skin:     General: Skin is warm and dry. Capillary Refill: Capillary refill takes less than 2 seconds. Findings: Erythema and rash present. Comments: Patient's left abdomen shows an area of cellulitis roughly 8 cm in diameter, with a smaller raised area of less than 1 cm in the center. This may represent the beginning of an early abscess. On the patient's left buttock, shows a similar less than 1 cm area of erythema that is raised however there is no surrounding cellulitis on the buttock. There is no drainage. Neurological:      General: No focal deficit present. Mental Status: She is alert and oriented to person, place, and time. Mental status is at baseline.    Psychiatric:         Mood and Affect: Mood normal.         Vital Signs  ED Triage Vitals [08/02/23 2006]   Temperature Pulse Respirations Blood Pressure SpO2   (!) 97.1 °F (36.2 °C) (!) 112 18 132/67 97 %      Temp Source Heart Rate Source Patient Position - Orthostatic VS BP Location FiO2 (%)   Temporal Monitor Sitting Right arm --      Pain Score       --           Vitals:    08/02/23 2006   BP: 132/67   Pulse: (!) 112   Patient Position - Orthostatic VS: Sitting         Visual Acuity      ED Medications  Medications   cephalexin (KEFLEX) capsule 500 mg (500 mg Oral Given 8/2/23 2238)   sulfamethoxazole-trimethoprim (BACTRIM DS) 800-160 mg per tablet 1 tablet (1 tablet Oral Given 8/2/23 2238)       Diagnostic Studies  Results Reviewed     Procedure Component Value Units Date/Time    Fingerstick Glucose (POCT) [[id number]]  (Abnormal) Collected: 08/02/23 2239    Lab Status: Final result Updated: 08/02/23 2241     POC Glucose 207 mg/dl                  No orders to display              Procedures  Procedures         ED Course  ED Course as of 08/03/23 0149   Wed Aug 02, 2023   2228 Discussed with patient, who notes that he would rather just try medication right now than incision and drainage. I told him that if the abscesses are small as they appear on evaluation, they may be treatable with just antibiotics. Patient would like to go this route. Patient notes to return if symptoms worsen. SBIRT 20yo+    Flowsheet Row Most Recent Value   Initial Alcohol Screen: US AUDIT-C     1. How often do you have a drink containing alcohol? 0 Filed at: 08/02/2023 2006   2. How many drinks containing alcohol do you have on a typical day you are drinking? 0 Filed at: 08/02/2023 2006   3a. Male UNDER 65: How often do you have five or more drinks on one occasion? 0 Filed at: 08/02/2023 2006   3b. FEMALE Any Age, or MALE 65+: How often do you have 4 or more drinks on one occassion? 0 Filed at: 08/02/2023 2006   Audit-C Score 0 Filed at: 08/02/2023 2006   SELMA: How many times in the past year have you. .. Used an illegal drug or used a prescription medication for non-medical reasons? Never Filed at: 08/02/2023 2006                    Medical Decision Making  Patient is a 37-year-old female who presents emergency room complaining of what she feels at the beginning of abscesses on her abdomen and left buttock. Patient notes that she has had infections in these regions before and frequently has small lesions that are infectious that crop up over her body, likely MRSA related. Patient notes that in the past antibiotics have helped her, and is requesting that antibiotics be prescribed for her for these infections. Upon my evaluation, she has 2 very small sites of infection, with what may be the beginning of very small abscess formation. Patient's central areas of erythema are slightly swollen and about 0.5 to 0.75 cm in total diameter.   The anterior abdominal wound does have some surrounding cellulitic appearing skin.  The patient is requesting only oral antibiotics at this time for treatment. Patient was told to place cool compresses to the area 4-6 times a day for 10 to 15 minutes at a time and utilize antibiotics as prescribed till completion. The patient was told if symptoms worsen an I&D may need to be done. Patient will return if symptoms worsen and was discharged    Abscess: acute illness or injury  Cellulitis of abdominal wall: acute illness or injury  Amount and/or Complexity of Data Reviewed  Labs: ordered. Risk  Prescription drug management. Disposition  Final diagnoses:   Abscess   Cellulitis of abdominal wall     Time reflects when diagnosis was documented in both MDM as applicable and the Disposition within this note     Time User Action Codes Description Comment    8/2/2023 10:44 PM [facility] Add [L02.91] Abscess     8/2/2023 10:44 PM [facility] Add [K46.222] Cellulitis of buttock     8/2/2023 10:44 PM [facility] Remove [B27.113] Cellulitis of buttock     8/2/2023 10:44 PM [facility] Add [L03.311] Cellulitis of abdominal wall       ED Disposition     ED Disposition   Discharge    Condition   Stable    Date/Time   Wed Aug 2, 2023 10:48 PM    Comment   Nakul Mendoza discharge to home/self care.                Follow-up Information     Follow up With Specialties Details Why Juliana, [facility], Nurse Practitioner On 8/7/2023  [address] [place] [address]  [phone]            Discharge Medication List as of 8/2/2023 10:49 PM      START taking these medications    Details   !! cephalexin (KEFLEX) 500 mg capsule Take 1 capsule (500 mg total) by mouth every 6 (six) hours for 7 days, Starting Wed 8/2/2023, Until Wed 8/9/2023, Normal      sulfamethoxazole-trimethoprim (BACTRIM DS) 800-160 mg per tablet Take 1 tablet by mouth 2 (two) times a day for 7 days smx-tmp DS (BACTRIM) 800-160 mg tabs (1tab q12 D10), Starting Wed 8/2/2023, Until Wed 8/9/2023, Normal       !! - Potential duplicate medications found. Please discuss with provider. CONTINUE these medications which have NOT CHANGED    Details   acetaminophen (TYLENOL) 650 mg CR tablet Take 2 tablets (1,300 mg total) by mouth every 8 (eight) hours as needed for mild pain, Starting Mon 7/10/2023, Normal      albuterol (ProAir HFA) 90 mcg/act inhaler Inhale 2 puffs every 6 (six) hours as needed for wheezing or shortness of breath, Starting Thu 12/15/2022, Normal      Alcohol Swabs PADS Use once daily when testing finger sticks., Normal      atorvastatin (LIPITOR) 10 mg tablet TAKE 1 TABLET (10 MG TOTAL) BY MOUTH DAILY, Starting Thu 5/25/2023, Normal      B-D UF III MINI PEN NEEDLES 31G X 5 MM MISC USE DAILY AT BEDTIME, Normal      !! cephalexin (KEFLEX) 500 mg capsule Starting Wed 7/5/2023, Historical Med      citalopram (CeleXA) 40 mg tablet TAKE 1 TABLET (40 MG TOTAL) BY MOUTH DAILY, Starting Wed 4/26/2023, Normal      !! glucose blood (True Metrix Blood Glucose Test) test strip Use as instructed, Normal      !! glucose blood test strip Use as instructed, Normal      !! glucose blood test strip Use as instructed. Test up to 3 times a day as needed, Normal      hydrOXYzine HCL (ATARAX) 50 mg tablet TAKE 1 TABLET (50 MG TOTAL) BY MOUTH EVERY 6 (SIX) HOURS AS NEEDED FOR ITCHING, ALLERGIES OR ANXIETY, Starting Tue 1/18/2022, Normal      Lancets MISC Use once for 1 dose Onetouch Verio.  Test once daily or as directed., Starting Fri 6/11/2021, Normal      Levemir FlexPen 100 units/mL injection pen INJECT 20 UNITS UNDER THE SKIN DAILY AT BEDTIME, Normal      metFORMIN (GLUCOPHAGE) 500 mg tablet Multiple Dosages:Starting Sat 1/28/2023TAKE 2 TABLETS (1,000 MG TOTAL) BY MOUTH DAILY WITH BREAKFAST AND 1 TABLET (500 MG TOTAL) DAILY WITH DINNER., Normal      methocarbamol (ROBAXIN) 750 mg tablet Starting Fri 6/23/2023, Historical Med      montelukast (SINGULAIR) 10 mg tablet TAKE 1 TABLET (10 MG TOTAL) BY MOUTH DAILY AT BEDTIME, Starting Wed 6/21/2023, Normal      pramipexole (MIRAPEX) 0.5 mg tablet TAKE 1 TABLET (0.5 MG TOTAL) BY MOUTH DAILY AT BEDTIME, Starting Fri 7/21/2023, Normal      SUMAtriptan (IMITREX) 25 mg tablet TAKE 1 TABLET (25 MG TOTAL) BY MOUTH ONCE AS NEEDED FOR MIGRAINE, Starting Mon 4/4/2022, Normal      topiramate (TOPAMAX) 25 mg sprinkle capsule TAKE 1 CAPSULE (25 MG TOTAL) BY MOUTH IN THE MORNING AND 1 CAPSULE (25 MG TOTAL) IN THE EVENING., Starting Tue 11/29/2022, Normal      traZODone (DESYREL) 100 mg tablet TAKE 1 TABLET (100 MG TOTAL) BY MOUTH DAILY AT BEDTIME, Starting Mon 2/20/2023, Normal       !! - Potential duplicate medications found. Please discuss with provider. No discharge procedures on file.     PDMP Review     None          ED Provider  Electronically Signed by           Marcus Sultana.,   08/03/23 0149

## 2023-08-03 NOTE — DISCHARGE INSTRUCTIONS
Take Keflex 1 pill 4 times a day for 7 days. Finish all antibiotics. Take Bactrim DS, 1 pill twice a day for a week. Consider taking a probiotic or yogurt daily while on these medications. Return to the ER for any new, concerning, or worsening issues. Please cool compresses to the area 4-6 times a day for 10 to 15 minutes at a time.

## 2023-08-16 ENCOUNTER — VBI (OUTPATIENT)
Dept: ADMINISTRATIVE | Facility: OTHER | Age: 48
End: 2023-08-16

## 2023-08-17 DIAGNOSIS — F30.9 MOOD DISORDER OF MANIC TYPE (HCC): ICD-10-CM

## 2023-08-17 RX ORDER — PRAMIPEXOLE DIHYDROCHLORIDE 0.5 MG/1
0.5 TABLET ORAL
Qty: 30 TABLET | Refills: 0 | Status: SHIPPED | OUTPATIENT
Start: 2023-08-17

## 2023-08-22 DIAGNOSIS — J30.1 ALLERGIC RHINITIS DUE TO POLLEN, UNSPECIFIED SEASONALITY: ICD-10-CM

## 2023-08-22 RX ORDER — MONTELUKAST SODIUM 10 MG/1
10 TABLET ORAL
Qty: 30 TABLET | Refills: 5 | Status: SHIPPED | OUTPATIENT
Start: 2023-08-22

## 2023-08-27 DIAGNOSIS — F41.8 DEPRESSION WITH ANXIETY: ICD-10-CM

## 2023-08-27 RX ORDER — CITALOPRAM 40 MG/1
40 TABLET ORAL DAILY
Qty: 30 TABLET | Refills: 3 | Status: SHIPPED | OUTPATIENT
Start: 2023-08-27

## 2023-08-29 ENCOUNTER — OFFICE VISIT (OUTPATIENT)
Dept: FAMILY MEDICINE CLINIC | Facility: CLINIC | Age: 48
End: 2023-08-29
Payer: COMMERCIAL

## 2023-08-29 VITALS
TEMPERATURE: 98.4 F | WEIGHT: 228 LBS | OXYGEN SATURATION: 98 % | SYSTOLIC BLOOD PRESSURE: 122 MMHG | HEART RATE: 102 BPM | BODY MASS INDEX: 38.93 KG/M2 | HEIGHT: 64 IN | DIASTOLIC BLOOD PRESSURE: 70 MMHG | RESPIRATION RATE: 18 BRPM

## 2023-08-29 DIAGNOSIS — F41.8 DEPRESSION WITH ANXIETY: Chronic | ICD-10-CM

## 2023-08-29 DIAGNOSIS — E78.49 OTHER HYPERLIPIDEMIA: Chronic | ICD-10-CM

## 2023-08-29 DIAGNOSIS — Z79.4 TYPE 2 DIABETES MELLITUS WITH HYPERGLYCEMIA, WITH LONG-TERM CURRENT USE OF INSULIN (HCC): Primary | Chronic | ICD-10-CM

## 2023-08-29 DIAGNOSIS — E11.65 TYPE 2 DIABETES MELLITUS WITH HYPERGLYCEMIA, WITH LONG-TERM CURRENT USE OF INSULIN (HCC): Primary | Chronic | ICD-10-CM

## 2023-08-29 DIAGNOSIS — E66.01 SEVERE OBESITY (BMI 35.0-39.9) WITH COMORBIDITY (HCC): Chronic | ICD-10-CM

## 2023-08-29 PROCEDURE — 99214 OFFICE O/P EST MOD 30 MIN: CPT | Performed by: NURSE PRACTITIONER

## 2023-08-29 NOTE — PROGRESS NOTES
[facility]        NAME: Roxane Preciado is a 57 y.o. female  : 1975    MRN: [id number]  DATE: 2023  TIME: 5:25 PM    Assessment and Plan   Type 2 diabetes mellitus with hyperglycemia, with long-term current use of insulin ([facility]) [E11.65, Z79.4]  1. Type 2 diabetes mellitus with hyperglycemia, with long-term current use of insulin (HCC)  sitaGLIPtin (JANUVIA) 25 mg tablet    HEMOGLOBIN A1C W/ EAG ESTIMATION    Comprehensive metabolic panel    Albumin / creatinine urine ratio      2. Other hyperlipidemia  Lipid panel      3. Severe obesity (BMI 35.0-39. 9) with comorbidity ([address] )        4. Depression with anxiety        . 1. Type 2 diabetes mellitus with hyperglycemia, with long-term current use of insulin (HCC)  Stop metformin. Start Januvia. Will increase at next visit as long as tolerating.   - sitaGLIPtin (JANUVIA) 25 mg tablet; Take 1 tablet (25 mg total) by mouth daily  Dispense: 30 tablet; Refill: 5  - HEMOGLOBIN A1C W/ EAG ESTIMATION; Future  - Comprehensive metabolic panel; Future  - Albumin / creatinine urine ratio; Future    2. Other hyperlipidemia    - Lipid panel; Future    3. Severe obesity (BMI 35.0-39. 9) with comorbidity ([facility])      4. Depression with anxiety        Patient Instructions     There are no Patient Instructions on file for this visit. Chief Complaint     Chief Complaint   Patient presents with   • Follow-up         History of Present Illness       Patient here for routine follow up visit. DM2- metformin is causing diarrhea. Has had accidents. Taking insulin every night. Checking fasting sugars. Last HgbA1c was 7.5. Continues to work full time. Got a DUI for taking her prescription medication. Working at [employer].     Trying to get dentures covered. Review of Systems   Review of Systems   Constitutional: Negative. Negative for activity change, appetite change, chills, fatigue and fever.    HENT: Negative for congestion, ear pain, nosebleeds, rhinorrhea and sore throat. Eyes: Negative for photophobia, pain, redness and visual disturbance. Respiratory: Negative. Negative for cough, shortness of breath and wheezing. Cardiovascular: Negative. Negative for chest pain. Gastrointestinal: Positive for diarrhea. Negative for abdominal pain, constipation and vomiting. Endocrine: Negative. Genitourinary: Negative. Negative for difficulty urinating, dysuria and flank pain. Musculoskeletal: Negative. Skin: Negative for color change and rash. Neurological: Negative. Negative for dizziness, weakness, numbness and headaches. Hematological: Negative for adenopathy. Psychiatric/Behavioral: Negative. Negative for agitation and confusion. The patient is not nervous/anxious. PHQ-2/9 Depression Screening    Little interest or pleasure in doing things: 0 - not at all  Feeling down, depressed, or hopeless: 0 - not at all  Trouble falling or staying asleep, or sleeping too much: 0 - not at all  Feeling tired or having little energy: 0 - not at all  Poor appetite or overeatin - not at all  Feeling bad about yourself - or that you are a failure or have let yourself or your family down: 0 - not at all  Trouble concentrating on things, such as reading the newspaper or watching television: 0 - not at all  Moving or speaking so slowly that other people could have noticed.  Or the opposite - being so fidgety or restless that you have been moving around a lot more than usual: 0 - not at all  Thoughts that you would be better off dead, or of hurting yourself in some way: 0 - not at all  PHQ-9 Score: 0   PHQ-9 Interpretation: No or Minimal depression         Current Medications       Current Outpatient Medications:   •  acetaminophen (TYLENOL) 650 mg CR tablet, Take 2 tablets (1,300 mg total) by mouth every 8 (eight) hours as needed for mild pain, Disp: 30 tablet, Rfl: 0  •  albuterol (ProAir HFA) 90 mcg/act inhaler, Inhale 2 puffs every 6 (six) hours as needed for wheezing or shortness of breath, Disp: 8.5 g, Rfl: 0  •  Alcohol Swabs PADS, Use once daily when testing finger sticks. , Disp: 90 each, Rfl: 1  •  atorvastatin (LIPITOR) 10 mg tablet, TAKE 1 TABLET (10 MG TOTAL) BY MOUTH DAILY, Disp: 30 tablet, Rfl: 3  •  B-D UF III MINI PEN NEEDLES 31G X 5 MM MISC, USE DAILY AT BEDTIME, Disp: 90 each, Rfl: 1  •  cephalexin (KEFLEX) 500 mg capsule, , Disp: , Rfl:   •  citalopram (CeleXA) 40 mg tablet, TAKE 1 TABLET (40 MG TOTAL) BY MOUTH DAILY, Disp: 30 tablet, Rfl: 3  •  glucose blood (True Metrix Blood Glucose Test) test strip, Use as instructed, Disp: 100 each, Rfl: 3  •  glucose blood test strip, Use as instructed, Disp: 100 each, Rfl: 2  •  glucose blood test strip, Use as instructed.  Test up to 3 times a day as needed, Disp: 200 each, Rfl: 11  •  hydrOXYzine HCL (ATARAX) 50 mg tablet, TAKE 1 TABLET (50 MG TOTAL) BY MOUTH EVERY 6 (SIX) HOURS AS NEEDED FOR ITCHING, ALLERGIES OR ANXIETY, Disp: 120 tablet, Rfl: 0  •  Levemir FlexPen 100 units/mL injection pen, INJECT 20 UNITS UNDER THE SKIN DAILY AT BEDTIME, Disp: 15 mL, Rfl: 3  •  methocarbamol (ROBAXIN) 750 mg tablet, , Disp: , Rfl:   •  montelukast (SINGULAIR) 10 mg tablet, TAKE 1 TABLET (10 MG TOTAL) BY MOUTH DAILY AT BEDTIME, Disp: 30 tablet, Rfl: 5  •  pramipexole (MIRAPEX) 0.5 mg tablet, TAKE 1 TABLET (0.5 MG TOTAL) BY MOUTH DAILY AT BEDTIME, Disp: 30 tablet, Rfl: 0  •  sitaGLIPtin (JANUVIA) 25 mg tablet, Take 1 tablet (25 mg total) by mouth daily, Disp: 30 tablet, Rfl: 5  •  SUMAtriptan (IMITREX) 25 mg tablet, TAKE 1 TABLET (25 MG TOTAL) BY MOUTH ONCE AS NEEDED FOR MIGRAINE, Disp: 9 tablet, Rfl: 0  •  topiramate (TOPAMAX) 25 mg sprinkle capsule, TAKE 1 CAPSULE (25 MG TOTAL) BY MOUTH IN THE MORNING AND 1 CAPSULE (25 MG TOTAL) IN THE EVENING., Disp: 60 capsule, Rfl: 0  •  traZODone (DESYREL) 100 mg tablet, TAKE 1 TABLET (100 MG TOTAL) BY MOUTH DAILY AT BEDTIME, Disp: 30 tablet, Rfl: 0  • Lancets MISC, Use once for 1 dose Onetouch Verio.  Test once daily or as directed., Disp: 100 each, Rfl: 1    Current Allergies     Allergies as of 08/29/2023 - Reviewed 08/29/2023   Allergen Reaction Noted   • Apple - food allergy Anaphylaxis 03/13/2022   • Apple fruit extract - food allergy Anaphylaxis 03/29/2023   • Grass pollen(k-o-r-t-swt milana) Hives, Itching, Shortness Of Breath, Sneezing, and Throat Swelling 11/17/2021   • Dust mite extract Hives and Itching 11/17/2021   • Misc natural products Diarrhea, Nausea Only, and Vomiting 11/17/2021   • Methylprednisolone Hives and Rash 03/19/2023   • Raw vegetable - food allergy Blisters, Dizziness, Eye Swelling, Fatigue, Headache, Hives, Itching, Rash, Sneezing, Throat Swelling, and Wheezing 03/21/2022   • Red dye - food allergy Hives, Itching, and Rash 11/17/2021            The following portions of the patient's history were reviewed and updated as appropriate: allergies, current medications, past family history, past medical history, past social history, past surgical history and problem list.     Past Medical History:   Diagnosis Date   • Anxiety    • Asthma    • Closed nondisplaced fracture of proximal phalanx of lesser toe of left foot 1/15/2020   • Diabetes mellitus ([address])    • Hyperlipidemia        Past Surgical History:   Procedure Laterality Date   • ARTHROSCOPY KNEE Left    • CHOLECYSTECTOMY     • ECTOPIC PREGNANCY SURGERY     • SHOULDER SURGERY Right     torn bicep       Family History   Problem Relation Age of Onset   • Obesity Mother    • Obesity Father    • Diabetes type II Father    • Diabetes Father    • Obesity Brother    • No Known Problems Daughter    • No Known Problems Maternal Grandmother    • Diabetes Maternal Grandfather    • No Known Problems Paternal Grandmother    • No Known Problems Maternal Aunt    • No Known Problems Maternal Aunt    • No Known Problems Maternal Aunt    • Heart disease Neg Hx    • Stroke Neg Hx    • Cancer Neg Hx Medications have been verified. Objective   /70   Pulse 102   Temp 98.4 °F (36.9 °C)   Resp 18   Ht 5' 4" (1.626 m)   Wt 103 kg (228 lb)   LMP 07/12/2023 (Approximate)   SpO2 98%   BMI 39.14 kg/m²        Physical Exam     Physical Exam  Vitals and nursing note reviewed. Constitutional:       General: She is not in acute distress. Appearance: Normal appearance. She is well-developed. She is not ill-appearing. Eyes:      General: Lids are normal.   Cardiovascular:      Rate and Rhythm: Normal rate and regular rhythm. Pulses: no weak pulses          Dorsalis pedis pulses are 2+ on the right side and 2+ on the left side. Heart sounds: Normal heart sounds, S1 normal and S2 normal. No murmur heard. Pulmonary:      Effort: Pulmonary effort is normal. No respiratory distress. Breath sounds: Normal breath sounds. No decreased breath sounds or wheezing. Musculoskeletal:         General: No tenderness or deformity. Normal range of motion. Feet:      Right foot:      Skin integrity: No ulcer, skin breakdown, erythema, warmth, callus or dry skin. Left foot:      Skin integrity: No ulcer, skin breakdown, erythema, warmth, callus or dry skin. Skin:     General: Skin is warm. Findings: No erythema or rash. Neurological:      Mental Status: She is alert and oriented to person, place, and time. Psychiatric:         Mood and Affect: Mood normal.         Behavior: Behavior normal. Behavior is cooperative. Thought Content: Thought content normal.         Patient's shoes and socks removed. Right Foot/Ankle   Right Foot Inspection  Skin Exam: skin normal and skin intact. No dry skin, no warmth, no callus, no erythema, no maceration, no abnormal color, no pre-ulcer, no ulcer and no callus. Toe Exam: ROM and strength within normal limits. Sensory   Monofilament testing: intact    Vascular  Capillary refills: < 3 seconds  The right DP pulse is 2+. Left Foot/Ankle  Left Foot Inspection  Skin Exam: skin normal and skin intact. No dry skin, no warmth, no erythema, no maceration, normal color, no pre-ulcer, no ulcer and no callus. Sensory   Monofilament testing: intact    Vascular  Capillary refills: < 3 seconds  The left DP pulse is 2+.      Assign Risk Category  No deformity present  No loss of protective sensation  No weak pulses  Risk: 0

## 2023-08-30 ENCOUNTER — VBI (OUTPATIENT)
Dept: ADMINISTRATIVE | Facility: OTHER | Age: 48
End: 2023-08-30

## 2023-09-07 ENCOUNTER — VBI (OUTPATIENT)
Dept: ADMINISTRATIVE | Facility: OTHER | Age: 48
End: 2023-09-07

## 2023-09-10 DIAGNOSIS — E11.9 TYPE 2 DIABETES MELLITUS WITHOUT COMPLICATION, WITHOUT LONG-TERM CURRENT USE OF INSULIN (HCC): ICD-10-CM

## 2023-09-12 RX ORDER — ATORVASTATIN CALCIUM 10 MG/1
10 TABLET, FILM COATED ORAL DAILY
Qty: 30 TABLET | Refills: 3 | Status: SHIPPED | OUTPATIENT
Start: 2023-09-12

## 2023-09-13 DIAGNOSIS — F30.9 MOOD DISORDER OF MANIC TYPE (HCC): ICD-10-CM

## 2023-09-13 RX ORDER — PRAMIPEXOLE DIHYDROCHLORIDE 0.5 MG/1
0.5 TABLET ORAL
Qty: 30 TABLET | Refills: 0 | Status: SHIPPED | OUTPATIENT
Start: 2023-09-13

## 2023-09-19 DIAGNOSIS — G43.109 MIGRAINE WITH AURA AND WITHOUT STATUS MIGRAINOSUS, NOT INTRACTABLE: ICD-10-CM

## 2023-09-19 RX ORDER — TOPIRAMATE 25 MG/1
25 CAPSULE, COATED PELLETS ORAL 2 TIMES DAILY
Qty: 60 CAPSULE | Refills: 0 | Status: SHIPPED | OUTPATIENT
Start: 2023-09-19

## 2023-09-19 NOTE — TELEPHONE ENCOUNTER
Patient requesting refill(s) of: Topamax     Last filled: 11/29/2022  Last appt: 8/29/2023  Next appt: 11/28/2023  Pharmacy:    [address]

## 2023-09-28 ENCOUNTER — HOSPITAL ENCOUNTER (EMERGENCY)
Facility: HOSPITAL | Age: 48
Discharge: HOME | End: 2023-09-28
Attending: EMERGENCY MEDICINE
Payer: COMMERCIAL

## 2023-09-28 ENCOUNTER — APPOINTMENT (EMERGENCY)
Dept: CT IMAGING | Facility: HOSPITAL | Age: 48
End: 2023-09-28
Payer: COMMERCIAL

## 2023-09-28 VITALS
RESPIRATION RATE: 20 BRPM | DIASTOLIC BLOOD PRESSURE: 62 MMHG | SYSTOLIC BLOOD PRESSURE: 133 MMHG | BODY MASS INDEX: 38.93 KG/M2 | TEMPERATURE: 97.6 F | HEIGHT: 64 IN | WEIGHT: 228 LBS | HEART RATE: 119 BPM | OXYGEN SATURATION: 98 %

## 2023-09-28 DIAGNOSIS — F30.9 MOOD DISORDER OF MANIC TYPE (HCC): ICD-10-CM

## 2023-09-28 DIAGNOSIS — F41.9 ANXIETY: ICD-10-CM

## 2023-09-28 DIAGNOSIS — E11.9 DM (DIABETES MELLITUS) (HCC): ICD-10-CM

## 2023-09-28 DIAGNOSIS — F41.8 DEPRESSION WITH ANXIETY: Primary | Chronic | ICD-10-CM

## 2023-09-28 DIAGNOSIS — R55 NEAR SYNCOPE: ICD-10-CM

## 2023-09-28 LAB
ALBUMIN SERPL BCP-MCNC: 4 G/DL (ref 3.5–5)
ALP SERPL-CCNC: 63 U/L (ref 34–104)
ALT SERPL W P-5'-P-CCNC: 21 U/L (ref 7–52)
AMPHETAMINES SERPL QL SCN: POSITIVE
ANION GAP SERPL CALCULATED.3IONS-SCNC: 9 MMOL/L
AST SERPL W P-5'-P-CCNC: 18 U/L (ref 13–39)
BACTERIA UR QL AUTO: NORMAL /HPF
BARBITURATES UR QL: NEGATIVE
BASOPHILS # BLD AUTO: 0.03 THOUSANDS/ÂΜL (ref 0–0.1)
BASOPHILS NFR BLD AUTO: 0 % (ref 0–1)
BENZODIAZ UR QL: NEGATIVE
BETA-HYDROXYBUTYRATE: 0.1 MMOL/L
BILIRUB SERPL-MCNC: 0.46 MG/DL (ref 0.2–1)
BILIRUB UR QL STRIP: NEGATIVE
BUN SERPL-MCNC: 7 MG/DL (ref 5–25)
CALCIUM SERPL-MCNC: 9 MG/DL (ref 8.4–10.2)
CHLORIDE SERPL-SCNC: 105 MMOL/L (ref 96–108)
CLARITY UR: CLEAR
CO2 SERPL-SCNC: 23 MMOL/L (ref 21–32)
COCAINE UR QL: NEGATIVE
COLOR UR: YELLOW
CREAT SERPL-MCNC: 0.52 MG/DL (ref 0.6–1.3)
EOSINOPHIL # BLD AUTO: 0.14 THOUSAND/ÂΜL (ref 0–0.61)
EOSINOPHIL NFR BLD AUTO: 2 % (ref 0–6)
ERYTHROCYTE [DISTWIDTH] IN BLOOD BY AUTOMATED COUNT: 13.3 % (ref 11.6–15.1)
ETHANOL SERPL-MCNC: <10 MG/DL
EXT PREGNANCY TEST URINE: NEGATIVE
EXT. CONTROL: NORMAL
GFR SERPL CREATININE-BSD FRML MDRD: 114 ML/MIN/1.73SQ M
GLUCOSE SERPL-MCNC: 142 MG/DL (ref 65–140)
GLUCOSE SERPL-MCNC: 153 MG/DL (ref 65–140)
GLUCOSE UR STRIP-MCNC: NEGATIVE MG/DL
HCT VFR BLD AUTO: 39.2 % (ref 34.8–46.1)
HGB BLD-MCNC: 12.7 G/DL (ref 11.5–15.4)
HGB UR QL STRIP.AUTO: ABNORMAL
IMM GRANULOCYTES # BLD AUTO: 0.02 THOUSAND/UL (ref 0–0.2)
IMM GRANULOCYTES NFR BLD AUTO: 0 % (ref 0–2)
KETONES UR STRIP-MCNC: NEGATIVE MG/DL
LEUKOCYTE ESTERASE UR QL STRIP: ABNORMAL
LIPASE SERPL-CCNC: 35 U/L (ref 11–82)
LYMPHOCYTES # BLD AUTO: 2.63 THOUSANDS/ÂΜL (ref 0.6–4.47)
LYMPHOCYTES NFR BLD AUTO: 39 % (ref 14–44)
MAGNESIUM SERPL-MCNC: 2.1 MG/DL (ref 1.9–2.7)
MCH RBC QN AUTO: 29.5 PG (ref 26.8–34.3)
MCHC RBC AUTO-ENTMCNC: 32.4 G/DL (ref 31.4–37.4)
MCV RBC AUTO: 91 FL (ref 82–98)
MONOCYTES # BLD AUTO: 1.01 THOUSAND/ÂΜL (ref 0.17–1.22)
MONOCYTES NFR BLD AUTO: 15 % (ref 4–12)
NEUTROPHILS # BLD AUTO: 2.97 THOUSANDS/ÂΜL (ref 1.85–7.62)
NEUTS SEG NFR BLD AUTO: 44 % (ref 43–75)
NITRITE UR QL STRIP: NEGATIVE
NON-SQ EPI CELLS URNS QL MICRO: NORMAL /HPF
NRBC BLD AUTO-RTO: 0 /100 WBCS
OPIATES UR QL SCN: NEGATIVE
OXYCODONE+OXYMORPHONE UR QL SCN: NEGATIVE
PCP UR QL: NEGATIVE
PH UR STRIP.AUTO: 7 [PH]
PLATELET # BLD AUTO: 213 THOUSANDS/UL (ref 149–390)
PMV BLD AUTO: 9.7 FL (ref 8.9–12.7)
POTASSIUM SERPL-SCNC: 3.4 MMOL/L (ref 3.5–5.3)
PROT SERPL-MCNC: 7.2 G/DL (ref 6.4–8.4)
PROT UR STRIP-MCNC: NEGATIVE MG/DL
RBC # BLD AUTO: 4.3 MILLION/UL (ref 3.81–5.12)
RBC #/AREA URNS AUTO: NORMAL /HPF
SODIUM SERPL-SCNC: 137 MMOL/L (ref 135–147)
SP GR UR STRIP.AUTO: 1.01
THC UR QL: NEGATIVE
UROBILINOGEN UR QL STRIP.AUTO: 0.2 E.U./DL
WBC # BLD AUTO: 6.8 THOUSAND/UL (ref 4.31–10.16)
WBC #/AREA URNS AUTO: NORMAL /HPF

## 2023-09-28 PROCEDURE — 36415 COLL VENOUS BLD VENIPUNCTURE: CPT | Performed by: EMERGENCY MEDICINE

## 2023-09-28 PROCEDURE — 70450 CT HEAD/BRAIN W/O DYE: CPT

## 2023-09-28 PROCEDURE — 80053 COMPREHEN METABOLIC PANEL: CPT | Performed by: EMERGENCY MEDICINE

## 2023-09-28 PROCEDURE — 99285 EMERGENCY DEPT VISIT HI MDM: CPT | Performed by: EMERGENCY MEDICINE

## 2023-09-28 PROCEDURE — 99284 EMERGENCY DEPT VISIT MOD MDM: CPT

## 2023-09-28 PROCEDURE — 81025 URINE PREGNANCY TEST: CPT | Performed by: EMERGENCY MEDICINE

## 2023-09-28 PROCEDURE — 82077 ASSAY SPEC XCP UR&BREATH IA: CPT | Performed by: EMERGENCY MEDICINE

## 2023-09-28 PROCEDURE — 83690 ASSAY OF LIPASE: CPT | Performed by: EMERGENCY MEDICINE

## 2023-09-28 PROCEDURE — G1004 CDSM NDSC: HCPCS

## 2023-09-28 PROCEDURE — 83735 ASSAY OF MAGNESIUM: CPT | Performed by: EMERGENCY MEDICINE

## 2023-09-28 PROCEDURE — 80307 DRUG TEST PRSMV CHEM ANLYZR: CPT | Performed by: EMERGENCY MEDICINE

## 2023-09-28 PROCEDURE — 82948 REAGENT STRIP/BLOOD GLUCOSE: CPT

## 2023-09-28 PROCEDURE — 93005 ELECTROCARDIOGRAM TRACING: CPT

## 2023-09-28 PROCEDURE — 81001 URINALYSIS AUTO W/SCOPE: CPT | Performed by: EMERGENCY MEDICINE

## 2023-09-28 PROCEDURE — 82010 KETONE BODYS QUAN: CPT | Performed by: EMERGENCY MEDICINE

## 2023-09-28 PROCEDURE — 85025 COMPLETE CBC W/AUTO DIFF WBC: CPT | Performed by: EMERGENCY MEDICINE

## 2023-09-28 RX ORDER — OLANZAPINE 5 MG/1
5 TABLET ORAL ONCE
Status: COMPLETED | OUTPATIENT
Start: 2023-09-28 | End: 2023-09-28

## 2023-09-28 RX ORDER — OLANZAPINE 5 MG/1
5 TABLET ORAL
Qty: 30 TABLET | Refills: 0 | Status: SHIPPED | OUTPATIENT
Start: 2023-09-28 | End: 2023-10-28

## 2023-09-28 RX ORDER — PROPRANOLOL HYDROCHLORIDE 10 MG/1
10 TABLET ORAL EVERY 6 HOURS PRN
Qty: 20 TABLET | Refills: 0 | Status: SHIPPED | OUTPATIENT
Start: 2023-09-28 | End: 2023-10-28

## 2023-09-28 RX ORDER — LORAZEPAM 0.5 MG/1
0.5 TABLET ORAL ONCE
Status: COMPLETED | OUTPATIENT
Start: 2023-09-28 | End: 2023-09-28

## 2023-09-28 RX ORDER — ESCITALOPRAM OXALATE 10 MG/1
10 TABLET ORAL DAILY
Qty: 30 TABLET | Refills: 0 | Status: SHIPPED | OUTPATIENT
Start: 2023-09-28 | End: 2023-10-28

## 2023-09-28 RX ADMIN — OLANZAPINE 5 MG: 5 TABLET, FILM COATED ORAL at 20:11

## 2023-09-28 RX ADMIN — LORAZEPAM 0.5 MG: 0.5 TABLET ORAL at 18:51

## 2023-09-28 NOTE — ED NOTES
This writer discussed the patients current presentation and recommended discharge plan with [facility], [facility]. They agree with the patient being discharged at this time. The patient was provided with outpatient resources. This writer and the patient completed a safety plan. The patient was provided with a copy of their safety plan with encouragement to utilize the plan following discharge. In addition, the patient was instructed to call local [facility] crisis, other crisis services, [facility] or to go to the nearest ER immediately if their situation changes at any time. This writer discussed discharge plans with the patient, who agrees with and understands the discharge plans. SAFETY PLAN  Warning Signs (thoughts, images, mood, behavior, situations) of a potential crisis: negative thoughts, feeling down, feeling hopelessness, thoughts of hurting self, hitting self      Coping Skills (what can I do to take my mind off the problem, or to keep myself safe): deep breaths, talk to someone, remove self from stressful situation. Outside Support (who can I reach out to for support and help): friends.         National Suicide Prevention Hotline:  [facility] [address] [place] [address] [phone] - [facility] Crisis/Mobile Crisis   [address]: [phone]  [facility]: [address] [place] [address] [address] [phone] - Crisis   [phone] - Peer Support Talk Line (1-9pm daily)  [phone] - Teen Support Talk Line (1-9pm daily)  [address] [place] [address] [address] [phone] - [facility]
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
## 2023-09-28 NOTE — ED NOTES
Unable to insert Peripheral IV, will try once pt is back from [address], RN  09/28/23 7749

## 2023-09-28 NOTE — DISCHARGE INSTRUCTIONS
This writer discussed the patients current presentation and recommended discharge plan with [address] [facility], [facility]. They agree with the patient being discharged at this time. The patient was provided with outpatient resources. This writer and the patient completed a safety plan. The patient was provided with a copy of their safety plan with encouragement to utilize the plan following discharge. In addition, the patient was instructed to call local [facility] crisis, other crisis services, [facility] or to go to the nearest ER immediately if their situation changes at any time. This writer discussed discharge plans with the patient, who agrees with and understands the discharge plans. SAFETY PLAN  Warning Signs (thoughts, images, mood, behavior, situations) of a potential crisis: negative thoughts, feeling down, feeling hopelessness, thoughts of hurting self, hitting self      Coping Skills (what can I do to take my mind off the problem, or to keep myself safe): deep breaths, talk to someone, remove self from stressful situation. Outside Support (who can I reach out to for support and help): friends.         National Suicide Prevention Hotline:  [address] [facility] [phone] - LVF Crisis/Mobile Crisis   [address]: [phone]  [facility]: [address] [place] [address] [address] [phone] - Crisis   [phone] - Peer Support Talk Line (1-9pm daily)  [phone] - Teen Support Talk Line (1-9pm daily)  [address] [place] [address] [address] [phone] - [address] [facility]
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
## 2023-09-28 NOTE — Clinical Note
Madie Holbrook was seen and treated in our emergency department on 9/28/2023. No restrictions            Diagnosis:     Gerri GORMAN  . She may return on this date: 09/30/2023         If you have any questions or concerns, please don't hesitate to call.       [address], DO    ______________________________           _______________          _______________  Hospital Representative                              Date                                Time
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
## 2023-09-28 NOTE — ED NOTES
Patient is unable to give urine sample.  Will try again at a later time      Irwin Lugo RN  09/28/23 1788

## 2023-09-29 LAB
ATRIAL RATE: 109 BPM
P AXIS: 68 DEGREES
PR INTERVAL: 160 MS
QRS AXIS: 65 DEGREES
QRSD INTERVAL: 88 MS
QT INTERVAL: 368 MS
QTC INTERVAL: 495 MS
T WAVE AXIS: 56 DEGREES
VENTRICULAR RATE: 109 BPM

## 2023-09-29 PROCEDURE — 93010 ELECTROCARDIOGRAM REPORT: CPT | Performed by: INTERNAL MEDICINE

## 2023-10-16 ENCOUNTER — OFFICE VISIT (OUTPATIENT)
Dept: URGENT CARE | Facility: CLINIC | Age: 48
End: 2023-10-16
Payer: COMMERCIAL

## 2023-10-16 VITALS
SYSTOLIC BLOOD PRESSURE: 118 MMHG | RESPIRATION RATE: 18 BRPM | TEMPERATURE: 97.6 F | DIASTOLIC BLOOD PRESSURE: 82 MMHG | OXYGEN SATURATION: 94 %

## 2023-10-16 DIAGNOSIS — J01.00 ACUTE NON-RECURRENT MAXILLARY SINUSITIS: ICD-10-CM

## 2023-10-16 DIAGNOSIS — J20.9 ACUTE BRONCHITIS, UNSPECIFIED ORGANISM: Primary | ICD-10-CM

## 2023-10-16 LAB — S PYO AG THROAT QL: NEGATIVE

## 2023-10-16 PROCEDURE — 87880 STREP A ASSAY W/OPTIC: CPT | Performed by: NURSE PRACTITIONER

## 2023-10-16 PROCEDURE — 99213 OFFICE O/P EST LOW 20 MIN: CPT | Performed by: NURSE PRACTITIONER

## 2023-10-16 RX ORDER — AMOXICILLIN AND CLAVULANATE POTASSIUM 875; 125 MG/1; MG/1
1 TABLET, FILM COATED ORAL EVERY 12 HOURS SCHEDULED
Qty: 20 TABLET | Refills: 0 | Status: SHIPPED | OUTPATIENT
Start: 2023-10-16 | End: 2023-10-26

## 2023-10-16 RX ORDER — ALBUTEROL SULFATE 90 UG/1
2 AEROSOL, METERED RESPIRATORY (INHALATION) EVERY 6 HOURS PRN
Qty: 8.5 G | Refills: 1 | Status: SHIPPED | OUTPATIENT
Start: 2023-10-16

## 2023-10-16 NOTE — PATIENT INSTRUCTIONS
Sinusitis   AMBULATORY CARE:   Sinusitis  is inflammation or infection of your sinuses. Sinusitis is most often caused by a virus. Acute sinusitis may last up to 12 weeks. Chronic sinusitis lasts longer than 12 weeks. Recurrent sinusitis means you have 4 or more infections in 1 year. Common signs and symptoms:   Fever    Pain, pressure, redness, or swelling around the forehead, cheeks, or eyes    Thick yellow or green discharge from your nose    Tenderness when you touch your face over your sinuses    Dry cough that happens mostly at night or when you lie down    Headache and face pain that is worse when you lean forward    Tooth pain, or pain when you chew    Seek care immediately if:   You have trouble breathing or wheezing that is getting worse. You have a stiff neck, a fever, or a bad headache. You cannot open your eye. Your eyeball bulges out or you cannot move your eye. You are more sleepy than normal, or you notice changes in your ability to think, move, or talk. You have swelling of your forehead or scalp. Call your doctor if:   You have vision changes, such as double vision. Your eye and eyelid are red, swollen, and painful. Your symptoms do not improve or go away after 10 days. You have nausea and are vomiting. Your nose is bleeding. You have questions or concerns about your condition or care. Medicines: Your symptoms may go away on their own. Your healthcare provider may recommend watchful waiting for up to 10 days before starting antibiotics. You may need any of the following:  Acetaminophen  decreases pain and fever. It is available without a doctor's order. Ask how much to take and how often to take it. Follow directions. Read the labels of all other medicines you are using to see if they also contain acetaminophen, or ask your doctor or pharmacist. Acetaminophen can cause liver damage if not taken correctly.     NSAIDs , such as ibuprofen, help decrease swelling, pain, and fever. This medicine is available with or without a doctor's order. NSAIDs can cause stomach bleeding or kidney problems in certain people. If you take blood thinner medicine, always ask your healthcare provider if NSAIDs are safe for you. Always read the medicine label and follow directions. Nasal steroid sprays  may help decrease inflammation in your nose and sinuses. Decongestants  help reduce swelling and drain mucus in the nose and sinuses. They may help you breathe easier. Antihistamines  help dry mucus in the nose and relieve sneezing. Antibiotics  help treat or prevent a bacterial infection. Self-care:   Rinse your sinuses as directed. Use a sinus rinse device to rinse your nasal passages with a saline (salt water) solution or distilled water. Do not use tap water. This will help thin the mucus in your nose and rinse away pollen and dirt. It will also help reduce swelling so you can breathe normally. Use a humidifier  to increase air moisture in your home. This may make it easier for you to breathe and help decrease your cough. Sleep with your head elevated. Place an extra pillow under your head before you go to sleep to help your sinuses drain. Drink liquids as directed. Ask your healthcare provider how much liquid to drink each day and which liquids are best for you. Liquids will thin the mucus in your nose and help it drain. Avoid drinks that contain alcohol or caffeine. Do not smoke, and avoid secondhand smoke. Nicotine and other chemicals in cigarettes and cigars can make your symptoms worse. Ask your healthcare provider for information if you currently smoke and need help to quit. E-cigarettes or smokeless tobacco still contain nicotine. Talk to your healthcare provider before you use these products. Prevent the spread of germs:   Wash your hands often with soap and water.   Wash your hands after you use the bathroom, change a child's diaper, or sneeze. Wash your hands before you prepare or eat food. Stay away from people who are sick. Some germs spread easily and quickly through contact. Follow up with your doctor as directed: You may be referred to an ear, nose, and throat specialist. Write down your questions so you remember to ask them during your visits. © Copyright Carmina Varun 2023 Information is for End User's use only and may not be sold, redistributed or otherwise used for commercial purposes. The above information is an  only. It is not intended as medical advice for individual conditions or treatments. Talk to your doctor, nurse or pharmacist before following any medical regimen to see if it is safe and effective for you. Acute Bronchitis   AMBULATORY CARE:   Acute bronchitis  is swelling and irritation in your lungs. It is usually caused by a virus and most often happens in the winter. Bronchitis may also be caused by bacteria or by a chemical irritant, such as smoke. Common symptoms:   Cough that lasts up to 3 weeks    Runny or stuffy nose    Hoarseness, sore throat    Fever    Feeling more tired than usual, and body aches    Wheezing or pain when you breathe or cough    Seek care immediately if:   You cough up blood. Your lips or fingernails turn blue. You feel like you are not getting enough air when you breathe. Call your doctor if:   Your symptoms do not go away or get worse, even after treatment. Your cough does not get better within 4 weeks. You have questions or concerns about your condition or care. Medicines: You may need any of the following:  Cough suppressants  decrease your urge to cough. Decongestants  help loosen mucus in your lungs and make it easier to cough up. This can help you breathe easier. Inhalers  may be given. Your healthcare provider may give you one or more inhalers to help you breathe easier and cough less. An inhaler gives you medicine to open your airways. Ask your healthcare provider to show you how to use your inhaler correctly. Antiviral medicine  treats infections caused by a virus. Antibiotics  may be given if your bronchitis is caused by bacteria or if you have lung condition. Acetaminophen  decreases pain and fever. It is available without a doctor's order. Ask how much to take and how often to take it. Follow directions. Read the labels of all other medicines you are using to see if they also contain acetaminophen, or ask your doctor or pharmacist. Acetaminophen can cause liver damage if not taken correctly. NSAIDs  help decrease swelling and pain or fever. This medicine is available with or without a doctor's order. NSAIDs can cause stomach bleeding or kidney problems in certain people. If you take blood thinner medicine, always ask your healthcare provider if NSAIDs are safe for you. Always read the medicine label and follow directions. Self-care:   Drink liquids as directed. You may need to drink more liquids than usual to stay hydrated. Ask how much liquid to drink each day and which liquids are best for you. Use a cool mist humidifier. This increases air moisture in your home. This may make it easier for you to breathe and help decrease your cough. Get more rest.  Rest helps your body to heal. Slowly start to do more each day. Rest when you feel it is needed. Prevent acute bronchitis:       Ask about vaccines you may need. Get a flu vaccine each year as soon as recommended, usually in September or October. Ask your healthcare provider if you should also get a pneumonia or COVID-19 vaccine. Your healthcare provider can tell you if you should also get other vaccines, and when to get them. Prevent the spread of germs. You can decrease your risk for acute bronchitis and other illnesses by doing the following:    Wash your hands often with soap and water. Carry germ-killing hand lotion or gel with you.  You can use the lotion or gel to clean your hands when soap and water are not available. Do not touch your eyes, nose, or mouth unless you have washed your hands first.    Always cover your mouth when you cough to prevent the spread of germs. It is best to cough into a tissue or your shirt sleeve instead of into your hand. Ask those around you to cover their mouths when they cough. Try to avoid people who have a cold or the flu. If you are sick, stay away from others as much as possible. Avoid irritants in the air. Avoid chemicals, fumes, and dust. Wear a face mask if you must work around dust or fumes. Stay inside on days when air pollution levels are high. If you have allergies, stay inside when pollen counts are high. Do not use aerosol products, such as spray-on deodorant, bug spray, and hair spray. Do not smoke or be around others who are smoking. Nicotine and other chemicals in cigarettes and cigars can cause lung damage. Ask your healthcare provider for information if you currently smoke and need help to quit. E-cigarettes or smokeless tobacco still contain nicotine. Talk to your healthcare provider before you use these products. Follow up with your doctor as directed:  Write down questions you have so you will remember to ask them during your follow-up visits. © Copyright Cassidy Lozada 2023 Information is for End User's use only and may not be sold, redistributed or otherwise used for commercial purposes. The above information is an  only. It is not intended as medical advice for individual conditions or treatments. Talk to your doctor, nurse or pharmacist before following any medical regimen to see if it is safe and effective for you.

## 2023-10-16 NOTE — PROGRESS NOTES
[facility]        NAME: Hira Tran is a 57 y.o. female  : 1975    MRN: [id number]  DATE: 2023  TIME: 4:06 PM      Assessment and Plan     Acute bronchitis, unspecified organism [J20.9]  1. Acute bronchitis, unspecified organism  amoxicillin-clavulanate (AUGMENTIN) 875-125 mg per tablet    albuterol (ProAir HFA) 90 mcg/act inhaler      2. Acute non-recurrent maxillary sinusitis  POCT rapid strepA    amoxicillin-clavulanate (AUGMENTIN) 875-125 mg per tablet    albuterol (ProAir HFA) 90 mcg/act inhaler            Patient Instructions     Patient Instructions   Sinusitis   AMBULATORY CARE:   Sinusitis  is inflammation or infection of your sinuses. Sinusitis is most often caused by a virus. Acute sinusitis may last up to 12 weeks. Chronic sinusitis lasts longer than 12 weeks. Recurrent sinusitis means you have 4 or more infections in 1 year. Common signs and symptoms:   Fever    Pain, pressure, redness, or swelling around the forehead, cheeks, or eyes    Thick yellow or green discharge from your nose    Tenderness when you touch your face over your sinuses    Dry cough that happens mostly at night or when you lie down    Headache and face pain that is worse when you lean forward    Tooth pain, or pain when you chew    Seek care immediately if:   You have trouble breathing or wheezing that is getting worse. You have a stiff neck, a fever, or a bad headache. You cannot open your eye. Your eyeball bulges out or you cannot move your eye. You are more sleepy than normal, or you notice changes in your ability to think, move, or talk. You have swelling of your forehead or scalp. Call your doctor if:   You have vision changes, such as double vision. Your eye and eyelid are red, swollen, and painful. Your symptoms do not improve or go away after 10 days. You have nausea and are vomiting. Your nose is bleeding.     You have questions or concerns about your condition or care. Medicines: Your symptoms may go away on their own. Your healthcare provider may recommend watchful waiting for up to 10 days before starting antibiotics. You may need any of the following:  Acetaminophen  decreases pain and fever. It is available without a doctor's order. Ask how much to take and how often to take it. Follow directions. Read the labels of all other medicines you are using to see if they also contain acetaminophen, or ask your doctor or pharmacist. Acetaminophen can cause liver damage if not taken correctly. NSAIDs , such as ibuprofen, help decrease swelling, pain, and fever. This medicine is available with or without a doctor's order. NSAIDs can cause stomach bleeding or kidney problems in certain people. If you take blood thinner medicine, always ask your healthcare provider if NSAIDs are safe for you. Always read the medicine label and follow directions. Nasal steroid sprays  may help decrease inflammation in your nose and sinuses. Decongestants  help reduce swelling and drain mucus in the nose and sinuses. They may help you breathe easier. Antihistamines  help dry mucus in the nose and relieve sneezing. Antibiotics  help treat or prevent a bacterial infection. Self-care:   Rinse your sinuses as directed. Use a sinus rinse device to rinse your nasal passages with a saline (salt water) solution or distilled water. Do not use tap water. This will help thin the mucus in your nose and rinse away pollen and dirt. It will also help reduce swelling so you can breathe normally. Use a humidifier  to increase air moisture in your home. This may make it easier for you to breathe and help decrease your cough. Sleep with your head elevated. Place an extra pillow under your head before you go to sleep to help your sinuses drain. Drink liquids as directed. Ask your healthcare provider how much liquid to drink each day and which liquids are best for you. Liquids will thin the mucus in your nose and help it drain. Avoid drinks that contain alcohol or caffeine. Do not smoke, and avoid secondhand smoke. Nicotine and other chemicals in cigarettes and cigars can make your symptoms worse. Ask your healthcare provider for information if you currently smoke and need help to quit. E-cigarettes or smokeless tobacco still contain nicotine. Talk to your healthcare provider before you use these products. Prevent the spread of germs:   Wash your hands often with soap and water. Wash your hands after you use the bathroom, change a child's diaper, or sneeze. Wash your hands before you prepare or eat food. Stay away from people who are sick. Some germs spread easily and quickly through contact. Follow up with your doctor as directed: You may be referred to an ear, nose, and throat specialist. Write down your questions so you remember to ask them during your visits. © Copyright Elisha Goltz 2023 Information is for End User's use only and may not be sold, redistributed or otherwise used for commercial purposes. The above information is an  only. It is not intended as medical advice for individual conditions or treatments. Talk to your doctor, nurse or pharmacist before following any medical regimen to see if it is safe and effective for you. Acute Bronchitis   AMBULATORY CARE:   Acute bronchitis  is swelling and irritation in your lungs. It is usually caused by a virus and most often happens in the winter. Bronchitis may also be caused by bacteria or by a chemical irritant, such as smoke. Common symptoms:   Cough that lasts up to 3 weeks    Runny or stuffy nose    Hoarseness, sore throat    Fever    Feeling more tired than usual, and body aches    Wheezing or pain when you breathe or cough    Seek care immediately if:   You cough up blood. Your lips or fingernails turn blue.     You feel like you are not getting enough air when you breathe. Call your doctor if:   Your symptoms do not go away or get worse, even after treatment. Your cough does not get better within 4 weeks. You have questions or concerns about your condition or care. Medicines: You may need any of the following:  Cough suppressants  decrease your urge to cough. Decongestants  help loosen mucus in your lungs and make it easier to cough up. This can help you breathe easier. Inhalers  may be given. Your healthcare provider may give you one or more inhalers to help you breathe easier and cough less. An inhaler gives you medicine to open your airways. Ask your healthcare provider to show you how to use your inhaler correctly. Antiviral medicine  treats infections caused by a virus. Antibiotics  may be given if your bronchitis is caused by bacteria or if you have lung condition. Acetaminophen  decreases pain and fever. It is available without a doctor's order. Ask how much to take and how often to take it. Follow directions. Read the labels of all other medicines you are using to see if they also contain acetaminophen, or ask your doctor or pharmacist. Acetaminophen can cause liver damage if not taken correctly. NSAIDs  help decrease swelling and pain or fever. This medicine is available with or without a doctor's order. NSAIDs can cause stomach bleeding or kidney problems in certain people. If you take blood thinner medicine, always ask your healthcare provider if NSAIDs are safe for you. Always read the medicine label and follow directions. Self-care:   Drink liquids as directed. You may need to drink more liquids than usual to stay hydrated. Ask how much liquid to drink each day and which liquids are best for you. Use a cool mist humidifier. This increases air moisture in your home. This may make it easier for you to breathe and help decrease your cough. Get more rest.  Rest helps your body to heal. Slowly start to do more each day. Rest when you feel it is needed. Prevent acute bronchitis:       Ask about vaccines you may need. Get a flu vaccine each year as soon as recommended, usually in September or October. Ask your healthcare provider if you should also get a pneumonia or COVID-19 vaccine. Your healthcare provider can tell you if you should also get other vaccines, and when to get them. Prevent the spread of germs. You can decrease your risk for acute bronchitis and other illnesses by doing the following:    Wash your hands often with soap and water. Carry germ-killing hand lotion or gel with you. You can use the lotion or gel to clean your hands when soap and water are not available. Do not touch your eyes, nose, or mouth unless you have washed your hands first.    Always cover your mouth when you cough to prevent the spread of germs. It is best to cough into a tissue or your shirt sleeve instead of into your hand. Ask those around you to cover their mouths when they cough. Try to avoid people who have a cold or the flu. If you are sick, stay away from others as much as possible. Avoid irritants in the air. Avoid chemicals, fumes, and dust. Wear a face mask if you must work around dust or fumes. Stay inside on days when air pollution levels are high. If you have allergies, stay inside when pollen counts are high. Do not use aerosol products, such as spray-on deodorant, bug spray, and hair spray. Do not smoke or be around others who are smoking. Nicotine and other chemicals in cigarettes and cigars can cause lung damage. Ask your healthcare provider for information if you currently smoke and need help to quit. E-cigarettes or smokeless tobacco still contain nicotine. Talk to your healthcare provider before you use these products. Follow up with your doctor as directed:  Write down questions you have so you will remember to ask them during your follow-up visits.   © Copyright [employer] 2023 Information is for End User's use only and may not be sold, redistributed or otherwise used for commercial purposes. The above information is an  only. It is not intended as medical advice for individual conditions or treatments. Talk to your doctor, nurse or pharmacist before following any medical regimen to see if it is safe and effective for you. Follow up with PCP in 3-5 days. Proceed to  ER if symptoms worsen. Chief Complaint     Chief Complaint   Patient presents with    Sore Throat    Earache     Both ears all since Friday          History of Present Illness     Patient presents reporting nasal congestion along with some CT and SOB since mid last week getting worse not better. Also ear and throat discomfort. Patient has been sleeping in a shed the last week (and high allergies to grass, all other homeless risks aside). She was staying with her parents but was kicked out. She has never been homeless before. She has been unable to find a spot where she, her  and son can all stay. She has been saving for a security deposit and states her card was just compromised and the money taken. 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 List of [employer] resources given to patient. Patient also encouraged to reach out to her PCP as some have care management/case management staff. Review of Systems     Review of Systems   HENT:  Positive for congestion, ear pain, sinus pressure, sinus pain and sore throat. Respiratory:  Positive for cough, chest tightness and shortness of breath. All other systems reviewed and are negative.         Current Medications       Current Outpatient Medications:     albuterol (ProAir HFA) 90 mcg/act inhaler, Inhale 2 puffs every 6 (six) hours as needed for wheezing, Disp: 8.5 g, Rfl: 1    amoxicillin-clavulanate (AUGMENTIN) 875-125 mg per tablet, Take 1 tablet by mouth every 12 (twelve) hours for 10 days, Disp: 20 tablet, Rfl: 0 atorvastatin (LIPITOR) 10 mg tablet, TAKE 1 TABLET (10 MG TOTAL) BY MOUTH DAILY, Disp: 30 tablet, Rfl: 3    citalopram (CeleXA) 40 mg tablet, TAKE 1 TABLET (40 MG TOTAL) BY MOUTH DAILY, Disp: 30 tablet, Rfl: 3    escitalopram (Lexapro) 10 mg tablet, Take 1 tablet (10 mg total) by mouth daily, Disp: 30 tablet, Rfl: 0    hydrOXYzine HCL (ATARAX) 50 mg tablet, TAKE 1 TABLET (50 MG TOTAL) BY MOUTH EVERY 6 (SIX) HOURS AS NEEDED FOR ITCHING, ALLERGIES OR ANXIETY, Disp: 120 tablet, Rfl: 0    Levemir FlexPen 100 units/mL injection pen, INJECT 20 UNITS UNDER THE SKIN DAILY AT BEDTIME, Disp: 15 mL, Rfl: 3    methocarbamol (ROBAXIN) 750 mg tablet, , Disp: , Rfl:     montelukast (SINGULAIR) 10 mg tablet, TAKE 1 TABLET (10 MG TOTAL) BY MOUTH DAILY AT BEDTIME, Disp: 30 tablet, Rfl: 5    OLANZapine (ZyPREXA) 5 mg tablet, Take 1 tablet (5 mg total) by mouth daily at bedtime, Disp: 30 tablet, Rfl: 0    acetaminophen (TYLENOL) 650 mg CR tablet, Take 2 tablets (1,300 mg total) by mouth every 8 (eight) hours as needed for mild pain, Disp: 30 tablet, Rfl: 0    Alcohol Swabs PADS, Use once daily when testing finger sticks. , Disp: 90 each, Rfl: 1    B-D UF III MINI PEN NEEDLES 31G X 5 MM MISC, USE DAILY AT BEDTIME, Disp: 90 each, Rfl: 1    glucose blood (True Metrix Blood Glucose Test) test strip, Use as instructed, Disp: 100 each, Rfl: 3    glucose blood test strip, Use as instructed, Disp: 100 each, Rfl: 2    glucose blood test strip, Use as instructed. Test up to 3 times a day as needed, Disp: 200 each, Rfl: 11    Lancets MISC, Use once for 1 dose Onetouch Verio.  Test once daily or as directed., Disp: 100 each, Rfl: 1    pramipexole (MIRAPEX) 0.5 mg tablet, TAKE 1 TABLET (0.5 MG TOTAL) BY MOUTH DAILY AT BEDTIME, Disp: 30 tablet, Rfl: 0    propranolol (INDERAL) 10 mg tablet, Take 1 tablet (10 mg total) by mouth every 6 (six) hours as needed (ANXIETY), Disp: 20 tablet, Rfl: 0    sitaGLIPtin (JANUVIA) 25 mg tablet, Take 1 tablet (25 mg total) by mouth daily, Disp: 30 tablet, Rfl: 5    SUMAtriptan (IMITREX) 25 mg tablet, TAKE 1 TABLET (25 MG TOTAL) BY MOUTH ONCE AS NEEDED FOR MIGRAINE, Disp: 9 tablet, Rfl: 0    topiramate (TOPAMAX) 25 mg sprinkle capsule, Take 1 capsule (25 mg total) by mouth 2 (two) times a day, Disp: 60 capsule, Rfl: 0    traZODone (DESYREL) 100 mg tablet, TAKE 1 TABLET (100 MG TOTAL) BY MOUTH DAILY AT BEDTIME, Disp: 30 tablet, Rfl: 0    Current Allergies     Allergies as of 10/16/2023 - Reviewed 10/16/2023   Allergen Reaction Noted    Apple - food allergy Anaphylaxis 03/13/2022    Apple fruit extract - food allergy Anaphylaxis 03/29/2023    Grass pollen(k-o-r-t-swt milana) Hives, Itching, Shortness Of Breath, Sneezing, and Throat Swelling 11/17/2021    Dust mite extract Hives and Itching 11/17/2021    Misc natural products Diarrhea, Nausea Only, and Vomiting 11/17/2021    Methylprednisolone Hives and Rash 03/19/2023    Raw vegetable - food allergy Blisters, Dizziness, Eye Swelling, Fatigue, Headache, Hives, Itching, Rash, Sneezing, Throat Swelling, and Wheezing 03/21/2022    Red dye - food allergy Hives, Itching, and Rash 11/17/2021              The following portions of the patient's history were reviewed and updated as appropriate: allergies, current medications, past family history, past medical history, past social history, past surgical history and problem list.     Past Medical History:   Diagnosis Date    Anxiety     Asthma     Closed nondisplaced fracture of proximal phalanx of lesser toe of left foot 1/15/2020    Diabetes mellitus ([address])     Hyperlipidemia        Past Surgical History:   Procedure Laterality Date    ARTHROSCOPY KNEE Left     CHOLECYSTECTOMY      ECTOPIC PREGNANCY SURGERY      SHOULDER SURGERY Right     torn bicep       Family History   Problem Relation Age of Onset    Obesity Mother     Obesity Father     Diabetes type II Father     Diabetes Father     Obesity Brother     No Known Problems Daughter     No Known Problems Maternal Grandmother     Diabetes Maternal Grandfather     No Known Problems Paternal Grandmother     No Known Problems Maternal Aunt     No Known Problems Maternal Aunt     No Known Problems Maternal Aunt     Heart disease Neg Hx     Stroke Neg Hx     Cancer Neg Hx          Medications have been verified. Objective     /82   Temp 97.6 °F (36.4 °C)   Resp 18   SpO2 94%   No LMP recorded. Physical Exam     Physical Exam  Vitals and nursing note reviewed. Constitutional:       General: She is not in acute distress. Appearance: Normal appearance. She is well-developed. She is ill-appearing. She is not toxic-appearing or diaphoretic. HENT:      Head: Normocephalic and atraumatic. Right Ear: Ear canal normal. Tenderness present. A middle ear effusion is present. Tympanic membrane is not erythematous. Left Ear: Ear canal normal. Tenderness present. A middle ear effusion is present. Tympanic membrane is not erythematous. Nose: Congestion present. Mouth/Throat:      Mouth: Mucous membranes are moist.      Pharynx: Posterior oropharyngeal erythema present. No oropharyngeal exudate. Tonsils: No tonsillar exudate or tonsillar abscesses. 1+ on the right. 1+ on the left. Eyes:      Pupils: Pupils are equal, round, and reactive to light. Cardiovascular:      Rate and Rhythm: Normal rate. Pulmonary:      Effort: Pulmonary effort is normal. No respiratory distress. Breath sounds: Wheezing (mild scattered exp wheezes throughout) present. Abdominal:      General: There is no distension. Palpations: Abdomen is soft. Musculoskeletal:         General: Normal range of motion. Cervical back: Normal range of motion and neck supple. Skin:     General: Skin is warm and dry. Capillary Refill: Capillary refill takes less than 2 seconds. Neurological:      General: No focal deficit present.       Mental Status: She is alert and oriented to person, place, and time. Psychiatric:         Mood and Affect: Mood and affect normal.         Behavior: Behavior normal. Behavior is cooperative. Thought Content:  Thought content normal.         Judgment: Judgment normal.

## 2023-10-16 NOTE — LETTER
October 16, 2023     Patient: Henri Stone   YOB: 1975   Date of Visit: 10/16/2023       To Whom It May Concern: It is my medical opinion that Suleman Boggs may return to work on 10/17 or 10/18, depending on symptoms. Please excuse from work 10/13, 10/16 and possibly 10/17 . If you have any questions or concerns, please don't hesitate to call.          Sincerely,        JENNIFER Espinal    CC: No Recipients

## 2023-10-17 DIAGNOSIS — G43.109 MIGRAINE WITH AURA AND WITHOUT STATUS MIGRAINOSUS, NOT INTRACTABLE: ICD-10-CM

## 2023-10-17 RX ORDER — TOPIRAMATE 25 MG/1
25 CAPSULE, COATED PELLETS ORAL 2 TIMES DAILY
Qty: 60 CAPSULE | Refills: 0 | Status: SHIPPED | OUTPATIENT
Start: 2023-10-17

## 2023-11-13 DIAGNOSIS — G43.109 MIGRAINE WITH AURA AND WITHOUT STATUS MIGRAINOSUS, NOT INTRACTABLE: ICD-10-CM

## 2023-11-13 RX ORDER — TOPIRAMATE 25 MG/1
25 CAPSULE, COATED PELLETS ORAL 2 TIMES DAILY
Qty: 60 CAPSULE | Refills: 0 | Status: SHIPPED | OUTPATIENT
Start: 2023-11-13

## 2023-11-28 ENCOUNTER — OFFICE VISIT (OUTPATIENT)
Dept: FAMILY MEDICINE CLINIC | Facility: CLINIC | Age: 48
End: 2023-11-28
Payer: COMMERCIAL

## 2023-11-28 DIAGNOSIS — J30.1 ALLERGIC RHINITIS DUE TO POLLEN, UNSPECIFIED SEASONALITY: ICD-10-CM

## 2023-11-28 DIAGNOSIS — Z59.00 HOMELESS: Primary | ICD-10-CM

## 2023-11-28 DIAGNOSIS — E78.49 OTHER HYPERLIPIDEMIA: Chronic | ICD-10-CM

## 2023-11-28 DIAGNOSIS — Z79.4 TYPE 2 DIABETES MELLITUS WITH HYPERGLYCEMIA, WITH LONG-TERM CURRENT USE OF INSULIN (HCC): Chronic | ICD-10-CM

## 2023-11-28 DIAGNOSIS — F41.8 DEPRESSION WITH ANXIETY: ICD-10-CM

## 2023-11-28 DIAGNOSIS — E11.9 TYPE 2 DIABETES MELLITUS WITHOUT COMPLICATION, WITHOUT LONG-TERM CURRENT USE OF INSULIN (HCC): Chronic | ICD-10-CM

## 2023-11-28 DIAGNOSIS — E11.65 TYPE 2 DIABETES MELLITUS WITH HYPERGLYCEMIA, WITH LONG-TERM CURRENT USE OF INSULIN (HCC): Chronic | ICD-10-CM

## 2023-11-28 DIAGNOSIS — L08.9 SKIN INFECTION: ICD-10-CM

## 2023-11-28 DIAGNOSIS — E66.01 SEVERE OBESITY (BMI 35.0-39.9) WITH COMORBIDITY (HCC): Chronic | ICD-10-CM

## 2023-11-28 DIAGNOSIS — Z86.39 HISTORY OF HYPERLIPIDEMIA: ICD-10-CM

## 2023-11-28 PROCEDURE — 99214 OFFICE O/P EST MOD 30 MIN: CPT | Performed by: NURSE PRACTITIONER

## 2023-11-28 RX ORDER — CALCIUM CITRATE/VITAMIN D3 200MG-6.25
TABLET ORAL
Qty: 100 EACH | Refills: 3 | Status: SHIPPED | OUTPATIENT
Start: 2023-11-28

## 2023-11-28 RX ORDER — HYDROXYZINE 50 MG/1
50 TABLET, FILM COATED ORAL EVERY 6 HOURS PRN
Qty: 120 TABLET | Refills: 1 | Status: SHIPPED | OUTPATIENT
Start: 2023-11-28

## 2023-11-28 RX ORDER — BLOOD PRESSURE TEST KIT
KIT MISCELLANEOUS
Qty: 90 EACH | Refills: 1 | Status: SHIPPED | OUTPATIENT
Start: 2023-11-28

## 2023-11-28 RX ORDER — DOXYCYCLINE HYCLATE 100 MG/1
100 CAPSULE ORAL EVERY 12 HOURS SCHEDULED
Qty: 20 CAPSULE | Refills: 0 | Status: ON HOLD | OUTPATIENT
Start: 2023-11-28 | End: 2023-12-03 | Stop reason: SDUPTHER

## 2023-11-28 RX ORDER — ATORVASTATIN CALCIUM 10 MG/1
10 TABLET, FILM COATED ORAL DAILY
Qty: 30 TABLET | Refills: 5 | Status: SHIPPED | OUTPATIENT
Start: 2023-11-28

## 2023-11-28 RX ORDER — FLURBIPROFEN SODIUM 0.3 MG/ML
SOLUTION/ DROPS OPHTHALMIC
Qty: 90 EACH | Refills: 1 | Status: SHIPPED | OUTPATIENT
Start: 2023-11-28

## 2023-11-28 SDOH — ECONOMIC STABILITY - HOUSING INSECURITY: HOMELESSNESS UNSPECIFIED: Z59.00
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
## 2023-12-01 ENCOUNTER — HOSPITAL ENCOUNTER (INPATIENT)
Facility: HOSPITAL | Age: 48
LOS: 2 days | Discharge: HOME | DRG: 364 | End: 2023-12-03
Attending: EMERGENCY MEDICINE | Admitting: INTERNAL MEDICINE
Payer: COMMERCIAL

## 2023-12-01 ENCOUNTER — PATIENT OUTREACH (OUTPATIENT)
Dept: FAMILY MEDICINE CLINIC | Facility: CLINIC | Age: 48
End: 2023-12-01

## 2023-12-01 ENCOUNTER — APPOINTMENT (EMERGENCY)
Dept: CT IMAGING | Facility: HOSPITAL | Age: 48
DRG: 364 | End: 2023-12-01
Payer: COMMERCIAL

## 2023-12-01 DIAGNOSIS — L02.211 ABDOMINAL WALL ABSCESS: ICD-10-CM

## 2023-12-01 DIAGNOSIS — N73.0 PID (ACUTE PELVIC INFLAMMATORY DISEASE): ICD-10-CM

## 2023-12-01 DIAGNOSIS — L08.9 SKIN INFECTION: ICD-10-CM

## 2023-12-01 DIAGNOSIS — L03.311 ABDOMINAL WALL CELLULITIS: Primary | ICD-10-CM

## 2023-12-01 LAB
ALBUMIN SERPL BCP-MCNC: 3.7 G/DL (ref 3.5–5)
ALP SERPL-CCNC: 80 U/L (ref 34–104)
ALT SERPL W P-5'-P-CCNC: 37 U/L (ref 7–52)
ANION GAP SERPL CALCULATED.3IONS-SCNC: 7 MMOL/L
APTT PPP: 26 SECONDS (ref 23–37)
AST SERPL W P-5'-P-CCNC: 43 U/L (ref 13–39)
BACTERIA UR QL AUTO: ABNORMAL /HPF
BASOPHILS # BLD AUTO: 0.03 THOUSANDS/ÂΜL (ref 0–0.1)
BASOPHILS NFR BLD AUTO: 0 % (ref 0–1)
BILIRUB SERPL-MCNC: 0.3 MG/DL (ref 0.2–1)
BILIRUB UR QL STRIP: NEGATIVE
BUN SERPL-MCNC: 7 MG/DL (ref 5–25)
CALCIUM SERPL-MCNC: 9.2 MG/DL (ref 8.4–10.2)
CHLORIDE SERPL-SCNC: 103 MMOL/L (ref 96–108)
CLARITY UR: ABNORMAL
CO2 SERPL-SCNC: 26 MMOL/L (ref 21–32)
COLOR UR: YELLOW
CREAT SERPL-MCNC: 0.55 MG/DL (ref 0.6–1.3)
EOSINOPHIL # BLD AUTO: 0.3 THOUSAND/ÂΜL (ref 0–0.61)
EOSINOPHIL NFR BLD AUTO: 4 % (ref 0–6)
ERYTHROCYTE [DISTWIDTH] IN BLOOD BY AUTOMATED COUNT: 12.8 % (ref 11.6–15.1)
GFR SERPL CREATININE-BSD FRML MDRD: 112 ML/MIN/1.73SQ M
GLUCOSE SERPL-MCNC: 181 MG/DL (ref 65–140)
GLUCOSE SERPL-MCNC: 239 MG/DL (ref 65–140)
GLUCOSE UR STRIP-MCNC: ABNORMAL MG/DL
HCG SERPL QL: NEGATIVE
HCT VFR BLD AUTO: 39.8 % (ref 34.8–46.1)
HGB BLD-MCNC: 12.8 G/DL (ref 11.5–15.4)
HGB UR QL STRIP.AUTO: ABNORMAL
IMM GRANULOCYTES # BLD AUTO: 0.03 THOUSAND/UL (ref 0–0.2)
IMM GRANULOCYTES NFR BLD AUTO: 0 % (ref 0–2)
INR PPP: 0.91 (ref 0.84–1.19)
KETONES UR STRIP-MCNC: NEGATIVE MG/DL
LACTATE SERPL-SCNC: 1.8 MMOL/L (ref 0.5–2)
LEUKOCYTE ESTERASE UR QL STRIP: ABNORMAL
LYMPHOCYTES # BLD AUTO: 1.43 THOUSANDS/ÂΜL (ref 0.6–4.47)
LYMPHOCYTES NFR BLD AUTO: 17 % (ref 14–44)
MCH RBC QN AUTO: 29.7 PG (ref 26.8–34.3)
MCHC RBC AUTO-ENTMCNC: 32.2 G/DL (ref 31.4–37.4)
MCV RBC AUTO: 92 FL (ref 82–98)
MONOCYTES # BLD AUTO: 1.1 THOUSAND/ÂΜL (ref 0.17–1.22)
MONOCYTES NFR BLD AUTO: 13 % (ref 4–12)
NEUTROPHILS # BLD AUTO: 5.35 THOUSANDS/ÂΜL (ref 1.85–7.62)
NEUTS SEG NFR BLD AUTO: 66 % (ref 43–75)
NITRITE UR QL STRIP: NEGATIVE
NON-SQ EPI CELLS URNS QL MICRO: ABNORMAL /HPF
NRBC BLD AUTO-RTO: 0 /100 WBCS
PH UR STRIP.AUTO: 7.5 [PH]
PLATELET # BLD AUTO: 209 THOUSANDS/UL (ref 149–390)
PMV BLD AUTO: 10.2 FL (ref 8.9–12.7)
POTASSIUM SERPL-SCNC: 3.9 MMOL/L (ref 3.5–5.3)
PROCALCITONIN SERPL-MCNC: 0.07 NG/ML
PROT SERPL-MCNC: 6.8 G/DL (ref 6.4–8.4)
PROT UR STRIP-MCNC: NEGATIVE MG/DL
PROTHROMBIN TIME: 12.3 SECONDS (ref 11.6–14.5)
RBC # BLD AUTO: 4.31 MILLION/UL (ref 3.81–5.12)
RBC #/AREA URNS AUTO: ABNORMAL /HPF
SODIUM SERPL-SCNC: 136 MMOL/L (ref 135–147)
SP GR UR STRIP.AUTO: 1.01
UROBILINOGEN UR QL STRIP.AUTO: 0.2 E.U./DL
WBC # BLD AUTO: 8.24 THOUSAND/UL (ref 4.31–10.16)
WBC #/AREA URNS AUTO: ABNORMAL /HPF

## 2023-12-01 PROCEDURE — 10060 I&D ABSCESS SIMPLE/SINGLE: CPT | Performed by: EMERGENCY MEDICINE

## 2023-12-01 PROCEDURE — 85025 COMPLETE CBC W/AUTO DIFF WBC: CPT | Performed by: EMERGENCY MEDICINE

## 2023-12-01 PROCEDURE — 84145 PROCALCITONIN (PCT): CPT | Performed by: EMERGENCY MEDICINE

## 2023-12-01 PROCEDURE — 96365 THER/PROPH/DIAG IV INF INIT: CPT

## 2023-12-01 PROCEDURE — 87040 BLOOD CULTURE FOR BACTERIA: CPT | Performed by: EMERGENCY MEDICINE

## 2023-12-01 PROCEDURE — G1004 CDSM NDSC: HCPCS

## 2023-12-01 PROCEDURE — 96367 TX/PROPH/DG ADDL SEQ IV INF: CPT

## 2023-12-01 PROCEDURE — 0J980ZZ DRAINAGE OF ABDOMEN SUBCUTANEOUS TISSUE AND FASCIA, OPEN APPROACH: ICD-10-PCS | Performed by: EMERGENCY MEDICINE

## 2023-12-01 PROCEDURE — 84703 CHORIONIC GONADOTROPIN ASSAY: CPT | Performed by: EMERGENCY MEDICINE

## 2023-12-01 PROCEDURE — 99284 EMERGENCY DEPT VISIT MOD MDM: CPT

## 2023-12-01 PROCEDURE — 83605 ASSAY OF LACTIC ACID: CPT | Performed by: EMERGENCY MEDICINE

## 2023-12-01 PROCEDURE — 80053 COMPREHEN METABOLIC PANEL: CPT | Performed by: EMERGENCY MEDICINE

## 2023-12-01 PROCEDURE — 99285 EMERGENCY DEPT VISIT HI MDM: CPT | Performed by: EMERGENCY MEDICINE

## 2023-12-01 PROCEDURE — 85730 THROMBOPLASTIN TIME PARTIAL: CPT | Performed by: EMERGENCY MEDICINE

## 2023-12-01 PROCEDURE — 85610 PROTHROMBIN TIME: CPT | Performed by: EMERGENCY MEDICINE

## 2023-12-01 PROCEDURE — 81001 URINALYSIS AUTO W/SCOPE: CPT | Performed by: EMERGENCY MEDICINE

## 2023-12-01 PROCEDURE — 93005 ELECTROCARDIOGRAM TRACING: CPT

## 2023-12-01 PROCEDURE — 96361 HYDRATE IV INFUSION ADD-ON: CPT

## 2023-12-01 PROCEDURE — 82948 REAGENT STRIP/BLOOD GLUCOSE: CPT

## 2023-12-01 PROCEDURE — 74177 CT ABD & PELVIS W/CONTRAST: CPT

## 2023-12-01 PROCEDURE — 36415 COLL VENOUS BLD VENIPUNCTURE: CPT | Performed by: EMERGENCY MEDICINE

## 2023-12-01 PROCEDURE — 99223 1ST HOSP IP/OBS HIGH 75: CPT | Performed by: INTERNAL MEDICINE

## 2023-12-01 RX ORDER — FLUCONAZOLE 100 MG/1
200 TABLET ORAL DAILY
Status: DISCONTINUED | OUTPATIENT
Start: 2023-12-02 | End: 2023-12-03 | Stop reason: HOSPADM

## 2023-12-01 RX ORDER — ONDANSETRON 2 MG/ML
4 INJECTION INTRAMUSCULAR; INTRAVENOUS EVERY 6 HOURS PRN
Status: DISCONTINUED | OUTPATIENT
Start: 2023-12-01 | End: 2023-12-03 | Stop reason: HOSPADM

## 2023-12-01 RX ORDER — ALBUTEROL SULFATE 90 UG/1
2 AEROSOL, METERED RESPIRATORY (INHALATION) EVERY 6 HOURS PRN
Status: DISCONTINUED | OUTPATIENT
Start: 2023-12-01 | End: 2023-12-03 | Stop reason: HOSPADM

## 2023-12-01 RX ORDER — ENOXAPARIN SODIUM 100 MG/ML
40 INJECTION SUBCUTANEOUS
Status: DISCONTINUED | OUTPATIENT
Start: 2023-12-02 | End: 2023-12-03 | Stop reason: HOSPADM

## 2023-12-01 RX ORDER — TRAZODONE HYDROCHLORIDE 50 MG/1
100 TABLET ORAL
Status: DISCONTINUED | OUTPATIENT
Start: 2023-12-01 | End: 2023-12-03 | Stop reason: HOSPADM

## 2023-12-01 RX ORDER — MONTELUKAST SODIUM 10 MG/1
10 TABLET ORAL
Status: DISCONTINUED | OUTPATIENT
Start: 2023-12-01 | End: 2023-12-03 | Stop reason: HOSPADM

## 2023-12-01 RX ORDER — CEFAZOLIN SODIUM 2 G/50ML
2000 SOLUTION INTRAVENOUS EVERY 8 HOURS
Status: DISCONTINUED | OUTPATIENT
Start: 2023-12-01 | End: 2023-12-03 | Stop reason: HOSPADM

## 2023-12-01 RX ORDER — ACETAMINOPHEN 325 MG/1
650 TABLET ORAL EVERY 4 HOURS PRN
Status: DISCONTINUED | OUTPATIENT
Start: 2023-12-01 | End: 2023-12-03 | Stop reason: HOSPADM

## 2023-12-01 RX ORDER — PROPRANOLOL HYDROCHLORIDE 20 MG/1
10 TABLET ORAL EVERY 6 HOURS PRN
Status: DISCONTINUED | OUTPATIENT
Start: 2023-12-01 | End: 2023-12-03 | Stop reason: HOSPADM

## 2023-12-01 RX ORDER — INSULIN LISPRO 100 [IU]/ML
1-6 INJECTION, SOLUTION INTRAVENOUS; SUBCUTANEOUS
Status: DISCONTINUED | OUTPATIENT
Start: 2023-12-02 | End: 2023-12-03 | Stop reason: HOSPADM

## 2023-12-01 RX ORDER — OLANZAPINE 5 MG/1
5 TABLET ORAL
Status: DISCONTINUED | OUTPATIENT
Start: 2023-12-01 | End: 2023-12-03 | Stop reason: HOSPADM

## 2023-12-01 RX ORDER — PRAMIPEXOLE DIHYDROCHLORIDE 0.5 MG/1
0.5 TABLET ORAL
Status: DISCONTINUED | OUTPATIENT
Start: 2023-12-01 | End: 2023-12-03 | Stop reason: HOSPADM

## 2023-12-01 RX ORDER — TOPIRAMATE 25 MG/1
25 CAPSULE, COATED PELLETS ORAL 2 TIMES DAILY
Status: DISCONTINUED | OUTPATIENT
Start: 2023-12-01 | End: 2023-12-03 | Stop reason: HOSPADM

## 2023-12-01 RX ORDER — ATORVASTATIN CALCIUM 10 MG/1
10 TABLET, FILM COATED ORAL DAILY
Status: DISCONTINUED | OUTPATIENT
Start: 2023-12-02 | End: 2023-12-03 | Stop reason: HOSPADM

## 2023-12-01 RX ORDER — INSULIN LISPRO 100 [IU]/ML
1-6 INJECTION, SOLUTION INTRAVENOUS; SUBCUTANEOUS
Status: DISCONTINUED | OUTPATIENT
Start: 2023-12-01 | End: 2023-12-03 | Stop reason: HOSPADM

## 2023-12-01 RX ORDER — LIDOCAINE HYDROCHLORIDE AND EPINEPHRINE 10; 10 MG/ML; UG/ML
5 INJECTION, SOLUTION INFILTRATION; PERINEURAL ONCE
Status: COMPLETED | OUTPATIENT
Start: 2023-12-01 | End: 2023-12-01

## 2023-12-01 RX ORDER — CEFEPIME HYDROCHLORIDE 2 G/50ML
2000 INJECTION, SOLUTION INTRAVENOUS ONCE
Status: COMPLETED | OUTPATIENT
Start: 2023-12-01 | End: 2023-12-01

## 2023-12-01 RX ORDER — CITALOPRAM 20 MG/1
40 TABLET ORAL DAILY
Status: DISCONTINUED | OUTPATIENT
Start: 2023-12-02 | End: 2023-12-03 | Stop reason: HOSPADM

## 2023-12-01 RX ADMIN — PRAMIPEXOLE DIHYDROCHLORIDE 0.5 MG: 0.5 TABLET ORAL at 23:00

## 2023-12-01 RX ADMIN — SODIUM CHLORIDE 1000 ML: 0.9 INJECTION, SOLUTION INTRAVENOUS at 15:10

## 2023-12-01 RX ADMIN — INSULIN LISPRO 3 UNITS: 100 INJECTION, SOLUTION INTRAVENOUS; SUBCUTANEOUS at 23:00

## 2023-12-01 RX ADMIN — TOPIRAMATE 25 MG: 25 CAPSULE, COATED PELLETS ORAL at 19:54

## 2023-12-01 RX ADMIN — IOHEXOL 100 ML: 350 INJECTION, SOLUTION INTRAVENOUS at 16:37

## 2023-12-01 RX ADMIN — VANCOMYCIN HYDROCHLORIDE 1500 MG: 1 INJECTION, POWDER, LYOPHILIZED, FOR SOLUTION INTRAVENOUS at 17:54

## 2023-12-01 RX ADMIN — OLANZAPINE 5 MG: 5 TABLET, FILM COATED ORAL at 23:00

## 2023-12-01 RX ADMIN — TRAZODONE HYDROCHLORIDE 100 MG: 50 TABLET ORAL at 23:01

## 2023-12-01 RX ADMIN — INSULIN DETEMIR 20 UNITS: 100 INJECTION, SOLUTION SUBCUTANEOUS at 23:00

## 2023-12-01 RX ADMIN — CEFAZOLIN SODIUM 2000 MG: 2 SOLUTION INTRAVENOUS at 23:34

## 2023-12-01 RX ADMIN — CEFEPIME HYDROCHLORIDE 2000 MG: 2 INJECTION, SOLUTION INTRAVENOUS at 16:54

## 2023-12-01 RX ADMIN — LIDOCAINE HYDROCHLORIDE,EPINEPHRINE BITARTRATE 5 ML: 10; .01 INJECTION, SOLUTION INFILTRATION; PERINEURAL at 17:53

## 2023-12-01 NOTE — ASSESSMENT & PLAN NOTE
Lab Results   Component Value Date    HGBA1C 7.5 (H) 07/07/2023       No results for input(s): "POCGLU" in the last 72 hours.     Blood Sugar Average: Last 72 hrs:    Well-controlled on current insulin regimen  Continue home Levemir 20 units daily at bedtime  Sliding scale insulin while inpatient with Accu-Cheks  Diabetic diet

## 2023-12-01 NOTE — ASSESSMENT & PLAN NOTE
Patient presents with erythema and purulent abscess which was drained in the ED on her abdomen  Abdominal wall cellulitis  Patient is nonseptic appearing however will benefit from IV antibiotics  Initiate vancomycin and cefazolin ; de-escalate antibiotics as appropriate  Pharmacy consult for vancomycin trough management  Trend fever curve and WBC count

## 2023-12-01 NOTE — H&P
[address]  H&P  Name: Gloria Marte 57 y.o. female I MRN: [id number]  Unit/Bed#: RENATE I Date of Admission: 12/1/2023   Date of Service: 12/1/2023 I Hospital Day: 0      Assessment/Plan   * Cellulitis  Assessment & Plan  Patient presents with erythema and purulent abscess which was drained in the ED on her abdomen  Abdominal wall cellulitis  Patient is nonseptic appearing however will benefit from IV antibiotics  Initiate vancomycin and cefazolin ; de-escalate antibiotics as appropriate  Pharmacy consult for vancomycin trough management  Trend fever curve and WBC count    Type 2 diabetes mellitus with hyperglycemia, with long-term current use of insulin ([facility])  Assessment & Plan  Lab Results   Component Value Date    HGBA1C 7.5 (H) 07/07/2023       No results for input(s): "POCGLU" in the last 72 hours. Blood Sugar Average: Last 72 hrs:    Well-controlled on current insulin regimen  Continue home Levemir 20 units daily at bedtime  Sliding scale insulin while inpatient with Accu-Cheks  Diabetic diet      Depression with anxiety  Assessment & Plan  Continue home Celexa, Zyprexa  Trazodone at bedtime    Restless leg syndrome  Assessment & Plan  Continue home pramipexole    Other hyperlipidemia  Assessment & Plan  Continue home statin therapy    Severe obesity (BMI 35.0-39. 9) with comorbidity ([address])  Assessment & Plan  Present on admission as evidenced by BMI of 39  Encourage lifestyle modifications and weight loss         VTE Prophylaxis: Lovenox  Code Status: Level 1 full code    Anticipated Length of Stay:  Patient will be admitted on an Inpatient basis with an anticipated length of stay of greater than 2 midnights.    Justification for Hospital Stay: Abdominal wall cellulitis    Total Time for Visit, including Counseling / Coordination of Care: I have spent a total time of 45 minutes on 12/01/23 in caring for this patient including Diagnostic results, Prognosis, Risks and benefits of tx options, Instructions for management, Patient and family education, Importance of tx compliance, Risk factor reductions, Impressions, Counseling / Coordination of care, Documenting in the medical record, Reviewing / ordering tests, medicine, procedures  , Obtaining or reviewing history  , and Communicating with other healthcare professionals . Chief Complaint:   Abdominal pain    History of Present Illness:    Jennifer Duque is a 57 y.o. female with a past medical history significant for insulin-dependent type 2 diabetes mellitus, restless leg syndrome, anxiety with depression who presents with abdominal wall erythema. In the ED, ED provider drained an abscess on her abdominal wall which has surrounding erythema. The patient was on a course of oral antibiotics in the outpatient setting however the erythema seem to be worsening. All vital signs stable. Afebrile and without leukocytosis. Nonseptic appearing. The patient was admitted to the general medicine service for further evaluation and treatment of abdominal wall cellulitis for IV antibiotics. Review of Systems:    Review of Systems   Constitutional:  Negative for chills and fever. HENT:  Negative for ear pain and sore throat. Eyes:  Negative for pain and visual disturbance. Respiratory:  Negative for cough and shortness of breath. Cardiovascular:  Negative for chest pain and palpitations. Gastrointestinal:  Positive for abdominal pain. Negative for vomiting. Genitourinary:  Negative for dysuria and hematuria. Musculoskeletal:  Negative for arthralgias and back pain. Skin:  Negative for color change and rash. Neurological:  Negative for seizures and syncope. All other systems reviewed and are negative.       Past Medical and Surgical History:     Past Medical History:   Diagnosis Date    Anxiety     Asthma     Closed nondisplaced fracture of proximal phalanx of lesser toe of left foot 1/15/2020    Diabetes mellitus ([address]) Hyperlipidemia        Past Surgical History:   Procedure Laterality Date    ARTHROSCOPY KNEE Left     CHOLECYSTECTOMY      ECTOPIC PREGNANCY SURGERY      SHOULDER SURGERY Right     torn bicep       Meds/Allergies:    Prior to Admission medications    Medication Sig Start Date End Date Taking? Authorizing Provider   acetaminophen (TYLENOL) 650 mg CR tablet Take 2 tablets (1,300 mg total) by mouth every 8 (eight) hours as needed for mild pain 7/10/23   Frey Barefoot, DO   albuterol (ProAir HFA) 90 mcg/act inhaler Inhale 2 puffs every 6 (six) hours as needed for wheezing 10/16/23   [place], CRNP   Alcohol Swabs PADS Use once daily when testing finger sticks. 11/28/23   Earnest Shillings, CRNP   atorvastatin (LIPITOR) 10 mg tablet Take 1 tablet (10 mg total) by mouth daily 11/28/23   Earnest Shillings, CRNP   citalopram (CeleXA) 40 mg tablet TAKE 1 TABLET (40 MG TOTAL) BY MOUTH DAILY 8/27/23   Earnest Shillings, CRNP   doxycycline hyclate (VIBRAMYCIN) 100 mg capsule Take 1 capsule (100 mg total) by mouth every 12 (twelve) hours for 10 days 11/28/23 12/8/23  Earnest Shillings, CRNP   escitalopram (Lexapro) 10 mg tablet Take 1 tablet (10 mg total) by mouth daily 9/28/23 10/28/23  Anitha Locks III, DO   glucose blood (True Metrix Blood Glucose Test) test strip Use as instructed 11/28/23   Earnest ShillJENNIFER garcia   glucose blood test strip Use as instructed 3/8/21   Earmait ShiJENNIFER gonzalez   glucose blood test strip Use as instructed. Test up to 3 times a day as needed 3/10/21   Earnest Shillings, CRNP   hydrOXYzine HCL (ATARAX) 50 mg tablet Take 1 tablet (50 mg total) by mouth every 6 (six) hours as needed for itching, allergies or anxiety 11/28/23   Earmait Shillings CRNP   Insulin Pen Needle (B-D UF III MINI PEN NEEDLES) 31G X 5 MM MISC Inject under the skin daily at bedtime 11/28/23   Earmait ShillANDIE garciaNP   Lancets MISC Use once for 1 dose Onetouch Verio. Test once daily or as directed.  6/11/21 3/14/23  Fe Augustine JENNIFER Perez   Levemir FlexPen 100 units/mL injection pen INJECT 20 UNITS UNDER THE SKIN DAILY AT BEDTIME 8/2/23   Cayden Copping, JENNIFER   methocarbamol (ROBAXIN) 750 mg tablet  6/23/23   Historical Provider, MD   montelukast (SINGULAIR) 10 mg tablet TAKE 1 TABLET (10 MG TOTAL) BY MOUTH DAILY AT BEDTIME 8/22/23   Candler Copping, CRNP   OLANZapine (ZyPREXA) 5 mg tablet Take 1 tablet (5 mg total) by mouth daily at bedtime 9/28/23 10/28/23  Jarad Garcia III, DO   pramipexole (MIRAPEX) 0.5 mg tablet TAKE 1 TABLET (0.5 MG TOTAL) BY MOUTH DAILY AT BEDTIME 9/13/23   Candler Copping, JENNIFER   propranolol (INDERAL) 10 mg tablet Take 1 tablet (10 mg total) by mouth every 6 (six) hours as needed (ANXIETY) 9/28/23 10/28/23  Jarad Garcia III, DO   sitaGLIPtin (JANUVIA) 25 mg tablet Take 1 tablet (25 mg total) by mouth daily 8/29/23   Candler Copping, JENNIFER   SUMAtriptan (IMITREX) 25 mg tablet TAKE 1 TABLET (25 MG TOTAL) BY MOUTH ONCE AS NEEDED FOR MIGRAINE 4/4/22   Cayden Copping, JENNIFER   topiramate (TOPAMAX) 25 mg sprinkle capsule TAKE 1 CAPSULE (25 MG TOTAL) BY MOUTH 2 (TWO) TIMES A DAY 11/13/23   Candler Copping, JENNIFER   traZODone (DESYREL) 100 mg tablet TAKE 1 TABLET (100 MG TOTAL) BY MOUTH DAILY AT BEDTIME 2/20/23   Evy Martinez,    ondansetron (Zofran ODT) 4 mg disintegrating tablet Take 1 tablet (4 mg total) by mouth every 6 (six) hours as needed for nausea or vomiting 5/20/22 11/12/22  JENNIFER Guadalupe Mai       Allergies:    Allergies   Allergen Reactions    Apple - Food Allergy Anaphylaxis    Apple Fruit Extract - Food Allergy Anaphylaxis     Throat closes off    Grass Pollen(K-O-R-T-Swt Justus) Hives, Itching, Shortness Of Breath, Sneezing and Throat Swelling    Dust Mite Extract Hives and Itching    Misc Natural Products Diarrhea, Nausea Only and Vomiting    Methylprednisolone Hives and Rash    Raw Vegetable - Food Allergy Blisters, Dizziness, Eye Swelling, Fatigue, Headache, Hives, Itching, Rash, Sneezing, Throat Swelling and Wheezing    Red Dye - Food Allergy Hives, Itching and Rash       Social History:     Marital Status: Unknown   Substance Use History:   Social History     Substance and Sexual Activity   Alcohol Use Not Currently    Alcohol/week: 0.0 standard drinks of alcohol    Comment: 0     Social History     Tobacco Use   Smoking Status Never   Smokeless Tobacco Never     Social History     Substance and Sexual Activity   Drug Use Not Currently       Family History:    Pertinent family history reviewed    Physical Exam:     Vitals:   Blood Pressure: 134/63 (12/01/23 1502)  Pulse: (!) 109 (12/01/23 1502)  Temperature: 98.8 °F (37.1 °C) (12/01/23 1502)  Temp Source: Tympanic (12/01/23 1502)  Respirations: 16 (12/01/23 1502)  SpO2: 98 % (12/01/23 1502)    Physical Exam  Vitals and nursing note reviewed. Constitutional:       General: She is not in acute distress. Appearance: She is well-developed. HENT:      Head: Normocephalic and atraumatic. Eyes:      Conjunctiva/sclera: Conjunctivae normal.   Cardiovascular:      Rate and Rhythm: Normal rate and regular rhythm. Heart sounds: No murmur heard. Pulmonary:      Effort: Pulmonary effort is normal. No respiratory distress. Breath sounds: Normal breath sounds. Abdominal:      Palpations: Abdomen is soft. Tenderness: There is no abdominal tenderness. Comments: Erythema of abdominal wall   Musculoskeletal:         General: No swelling. Cervical back: Neck supple. Skin:     General: Skin is warm and dry. Capillary Refill: Capillary refill takes less than 2 seconds. Neurological:      Mental Status: She is alert.    Psychiatric:         Mood and Affect: Mood normal.          Additional Data:     Lab Results: I have reviewed pertinent results     Results from last 7 days   Lab Units 12/01/23  1511   WBC Thousand/uL 8.24   HEMOGLOBIN g/dL 12.8   HEMATOCRIT % 39.8   PLATELETS Thousands/uL 209   NEUTROS PCT % 66   LYMPHS PCT % 17   MONOS PCT % 13*   EOS PCT % 4     Results from last 7 days   Lab Units 12/01/23  1511   SODIUM mmol/L 136   POTASSIUM mmol/L 3.9   CHLORIDE mmol/L 103   CO2 mmol/L 26   BUN mg/dL 7   CREATININE mg/dL 0.55*   ANION GAP mmol/L 7   CALCIUM mg/dL 9.2   ALBUMIN g/dL 3.7   TOTAL BILIRUBIN mg/dL 0.30   ALK PHOS U/L 80   ALT U/L 37   AST U/L 43*   GLUCOSE RANDOM mg/dL 181*     Results from last 7 days   Lab Units 12/01/23  1511   INR  0.91             Results from last 7 days   Lab Units 12/01/23  1511   LACTIC ACID mmol/L 1.8   PROCALCITONIN ng/ml 0.07       Imaging: I have reviewed pertinent imaging     CT abdomen pelvis with contrast   Final Result by Katia Kathleen MD (12/01 1701)      1) Skin thickening and mild subcutaneous tissue stranding in the infraumbilical ventral abdominal wall, with more pronounced subcutaneous tissue stranding along the left side of the pannus with a 1.4 x 1.2 cm fluid collection within the skin, which may    represent a cutaneous blister, or possibly phlegmon/early abscess. Recommend correlation with physical exam.      2) No fluid collections or air in the subcutaneous tissues. No intra-abdominal or pelvic fluid collections. 3) 5.4 x 1.9 cm cystic tubular structure in the right adnexa, most likely representing a hydrosalpinx. Although the images are not available for direct comparison at time of dictation, a right-sided hydrosalpinx is described on reports from a CT of the    abdomen and pelvis from 3/6/2023 and pelvic ultrasound from 3/29/2023 performed at [address] [place] [address]. 4) Multiple mildly enlarged and prominent mesenteric lymph nodes with surrounding stranding, slightly increased from 2014. 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 5) Prominent bilateral inguinal lymph nodes, right greater than left, however below size threshold for lymphadenopathy in this lymph node chain. These are likely reactive.       6) Additional findings as above. Workstation performed: [id number]             EKG, Pathology, and Other Studies Reviewed on Admission:   EKG: NSR    Allscripts / Epic Records Reviewed    ** Please Note: This note has been constructed using a voice recognition system.  **

## 2023-12-01 NOTE — PROGRESS NOTES
Referral received from PCP for Outpatient Social Work Care Manager (OP [facility]) to outreach patient and assist with housing resources, as patient is homeless. Spoke with patient who stated she was on the way to the hospital because she was bit by something and is having an allergic reaction to the antibiotics. Patient confirmed she is in need of housing resources; states she is trying to find a house but has not been successful due to her current credit score. Patient states she spoke with [facility] during previous hospitalization but did not find housing resources to be helpful. Will follow-up with patient upon discharge to further discuss housing resources that may be available.

## 2023-12-02 LAB
ANION GAP SERPL CALCULATED.3IONS-SCNC: 7 MMOL/L
ATRIAL RATE: 104 BPM
BASOPHILS # BLD AUTO: 0.02 THOUSANDS/ÂΜL (ref 0–0.1)
BASOPHILS NFR BLD AUTO: 0 % (ref 0–1)
BUN SERPL-MCNC: 7 MG/DL (ref 5–25)
CALCIUM SERPL-MCNC: 8.7 MG/DL (ref 8.4–10.2)
CHLORIDE SERPL-SCNC: 108 MMOL/L (ref 96–108)
CO2 SERPL-SCNC: 22 MMOL/L (ref 21–32)
CREAT SERPL-MCNC: 0.52 MG/DL (ref 0.6–1.3)
EOSINOPHIL # BLD AUTO: 0.26 THOUSAND/ÂΜL (ref 0–0.61)
EOSINOPHIL NFR BLD AUTO: 6 % (ref 0–6)
ERYTHROCYTE [DISTWIDTH] IN BLOOD BY AUTOMATED COUNT: 12.9 % (ref 11.6–15.1)
GFR SERPL CREATININE-BSD FRML MDRD: 114 ML/MIN/1.73SQ M
GLUCOSE SERPL-MCNC: 136 MG/DL (ref 65–140)
GLUCOSE SERPL-MCNC: 160 MG/DL (ref 65–140)
GLUCOSE SERPL-MCNC: 246 MG/DL (ref 65–140)
GLUCOSE SERPL-MCNC: 287 MG/DL (ref 65–140)
GLUCOSE SERPL-MCNC: 288 MG/DL (ref 65–140)
HCT VFR BLD AUTO: 38.1 % (ref 34.8–46.1)
HGB BLD-MCNC: 12.3 G/DL (ref 11.5–15.4)
IMM GRANULOCYTES # BLD AUTO: 0.03 THOUSAND/UL (ref 0–0.2)
IMM GRANULOCYTES NFR BLD AUTO: 1 % (ref 0–2)
LYMPHOCYTES # BLD AUTO: 1.56 THOUSANDS/ÂΜL (ref 0.6–4.47)
LYMPHOCYTES NFR BLD AUTO: 33 % (ref 14–44)
MAGNESIUM SERPL-MCNC: 2 MG/DL (ref 1.9–2.7)
MCH RBC QN AUTO: 29.9 PG (ref 26.8–34.3)
MCHC RBC AUTO-ENTMCNC: 32.3 G/DL (ref 31.4–37.4)
MCV RBC AUTO: 93 FL (ref 82–98)
MONOCYTES # BLD AUTO: 0.89 THOUSAND/ÂΜL (ref 0.17–1.22)
MONOCYTES NFR BLD AUTO: 19 % (ref 4–12)
NEUTROPHILS # BLD AUTO: 1.97 THOUSANDS/ÂΜL (ref 1.85–7.62)
NEUTS SEG NFR BLD AUTO: 41 % (ref 43–75)
NRBC BLD AUTO-RTO: 0 /100 WBCS
P AXIS: 67 DEGREES
PHOSPHATE SERPL-MCNC: 2.9 MG/DL (ref 2.7–4.5)
PLATELET # BLD AUTO: 205 THOUSANDS/UL (ref 149–390)
PMV BLD AUTO: 10.2 FL (ref 8.9–12.7)
POTASSIUM SERPL-SCNC: 3.5 MMOL/L (ref 3.5–5.3)
PR INTERVAL: 142 MS
QRS AXIS: 68 DEGREES
QRSD INTERVAL: 90 MS
QT INTERVAL: 344 MS
QTC INTERVAL: 452 MS
RBC # BLD AUTO: 4.12 MILLION/UL (ref 3.81–5.12)
SODIUM SERPL-SCNC: 137 MMOL/L (ref 135–147)
T WAVE AXIS: 55 DEGREES
VENTRICULAR RATE: 104 BPM
WBC # BLD AUTO: 4.73 THOUSAND/UL (ref 4.31–10.16)

## 2023-12-02 PROCEDURE — 85025 COMPLETE CBC W/AUTO DIFF WBC: CPT | Performed by: INTERNAL MEDICINE

## 2023-12-02 PROCEDURE — 80048 BASIC METABOLIC PNL TOTAL CA: CPT | Performed by: INTERNAL MEDICINE

## 2023-12-02 PROCEDURE — 99221 1ST HOSP IP/OBS SF/LOW 40: CPT | Performed by: PHYSICIAN ASSISTANT

## 2023-12-02 PROCEDURE — 87147 CULTURE TYPE IMMUNOLOGIC: CPT | Performed by: INTERNAL MEDICINE

## 2023-12-02 PROCEDURE — 0J980ZZ DRAINAGE OF ABDOMEN SUBCUTANEOUS TISSUE AND FASCIA, OPEN APPROACH: ICD-10-PCS | Performed by: SPECIALIST

## 2023-12-02 PROCEDURE — 84100 ASSAY OF PHOSPHORUS: CPT | Performed by: INTERNAL MEDICINE

## 2023-12-02 PROCEDURE — 87205 SMEAR GRAM STAIN: CPT | Performed by: PHYSICIAN ASSISTANT

## 2023-12-02 PROCEDURE — 10060 I&D ABSCESS SIMPLE/SINGLE: CPT | Performed by: PHYSICIAN ASSISTANT

## 2023-12-02 PROCEDURE — 82948 REAGENT STRIP/BLOOD GLUCOSE: CPT

## 2023-12-02 PROCEDURE — 87070 CULTURE OTHR SPECIMN AEROBIC: CPT | Performed by: PHYSICIAN ASSISTANT

## 2023-12-02 PROCEDURE — 87081 CULTURE SCREEN ONLY: CPT | Performed by: INTERNAL MEDICINE

## 2023-12-02 PROCEDURE — 87186 SC STD MICRODIL/AGAR DIL: CPT | Performed by: PHYSICIAN ASSISTANT

## 2023-12-02 PROCEDURE — 83735 ASSAY OF MAGNESIUM: CPT | Performed by: INTERNAL MEDICINE

## 2023-12-02 PROCEDURE — 99232 SBSQ HOSP IP/OBS MODERATE 35: CPT | Performed by: INTERNAL MEDICINE

## 2023-12-02 PROCEDURE — NC001 PR NO CHARGE: Performed by: PHYSICIAN ASSISTANT

## 2023-12-02 PROCEDURE — 87147 CULTURE TYPE IMMUNOLOGIC: CPT | Performed by: PHYSICIAN ASSISTANT

## 2023-12-02 RX ORDER — POTASSIUM CHLORIDE 20 MEQ/1
40 TABLET, EXTENDED RELEASE ORAL ONCE
Status: COMPLETED | OUTPATIENT
Start: 2023-12-02 | End: 2023-12-02

## 2023-12-02 RX ORDER — HYDROXYZINE HYDROCHLORIDE 25 MG/1
25 TABLET, FILM COATED ORAL EVERY 6 HOURS PRN
Status: DISCONTINUED | OUTPATIENT
Start: 2023-12-02 | End: 2023-12-03 | Stop reason: HOSPADM

## 2023-12-02 RX ORDER — LIDOCAINE HYDROCHLORIDE AND EPINEPHRINE 10; 10 MG/ML; UG/ML
40 INJECTION, SOLUTION INFILTRATION; PERINEURAL ONCE
Status: DISCONTINUED | OUTPATIENT
Start: 2023-12-02 | End: 2023-12-03 | Stop reason: HOSPADM

## 2023-12-02 RX ADMIN — TRAZODONE HYDROCHLORIDE 100 MG: 50 TABLET ORAL at 22:15

## 2023-12-02 RX ADMIN — ATORVASTATIN CALCIUM 10 MG: 10 TABLET, FILM COATED ORAL at 09:45

## 2023-12-02 RX ADMIN — TOPIRAMATE 25 MG: 25 CAPSULE, COATED PELLETS ORAL at 17:10

## 2023-12-02 RX ADMIN — POTASSIUM CHLORIDE 40 MEQ: 1500 TABLET, EXTENDED RELEASE ORAL at 09:45

## 2023-12-02 RX ADMIN — INSULIN LISPRO 3 UNITS: 100 INJECTION, SOLUTION INTRAVENOUS; SUBCUTANEOUS at 17:10

## 2023-12-02 RX ADMIN — VANCOMYCIN HYDROCHLORIDE 1250 MG: 1 INJECTION, POWDER, LYOPHILIZED, FOR SOLUTION INTRAVENOUS at 18:14

## 2023-12-02 RX ADMIN — INSULIN LISPRO 4 UNITS: 100 INJECTION, SOLUTION INTRAVENOUS; SUBCUTANEOUS at 12:00

## 2023-12-02 RX ADMIN — CITALOPRAM HYDROBROMIDE 40 MG: 20 TABLET ORAL at 09:45

## 2023-12-02 RX ADMIN — CEFAZOLIN SODIUM 2000 MG: 2 SOLUTION INTRAVENOUS at 14:23

## 2023-12-02 RX ADMIN — INSULIN DETEMIR 20 UNITS: 100 INJECTION, SOLUTION SUBCUTANEOUS at 22:16

## 2023-12-02 RX ADMIN — INSULIN LISPRO 4 UNITS: 100 INJECTION, SOLUTION INTRAVENOUS; SUBCUTANEOUS at 22:16

## 2023-12-02 RX ADMIN — ACETAMINOPHEN 650 MG: 325 TABLET ORAL at 14:23

## 2023-12-02 RX ADMIN — CEFAZOLIN SODIUM 2000 MG: 2 SOLUTION INTRAVENOUS at 22:21

## 2023-12-02 RX ADMIN — CEFAZOLIN SODIUM 2000 MG: 2 SOLUTION INTRAVENOUS at 09:48

## 2023-12-02 RX ADMIN — ENOXAPARIN SODIUM 40 MG: 40 INJECTION SUBCUTANEOUS at 09:45

## 2023-12-02 RX ADMIN — OLANZAPINE 5 MG: 5 TABLET, FILM COATED ORAL at 22:15

## 2023-12-02 RX ADMIN — VANCOMYCIN HYDROCHLORIDE 1250 MG: 1 INJECTION, POWDER, LYOPHILIZED, FOR SOLUTION INTRAVENOUS at 06:47

## 2023-12-02 RX ADMIN — MONTELUKAST 10 MG: 10 TABLET, FILM COATED ORAL at 22:15

## 2023-12-02 RX ADMIN — TOPIRAMATE 25 MG: 25 CAPSULE, COATED PELLETS ORAL at 09:45

## 2023-12-02 RX ADMIN — FLUCONAZOLE 200 MG: 100 TABLET ORAL at 09:44

## 2023-12-02 RX ADMIN — PRAMIPEXOLE DIHYDROCHLORIDE 0.5 MG: 0.5 TABLET ORAL at 22:15

## 2023-12-02 NOTE — PLAN OF CARE
Problem: PAIN - ADULT  Goal: Verbalizes/displays adequate comfort level or baseline comfort level  Description: Interventions:  - Encourage patient to monitor pain and request assistance  - Assess pain using appropriate pain scale  - Administer analgesics based on type and severity of pain and evaluate response  - Implement non-pharmacological measures as appropriate and evaluate response  - Consider cultural and social influences on pain and pain management  - Notify physician/advanced practitioner if interventions unsuccessful or patient reports new pain  Outcome: Progressing     Problem: SAFETY ADULT  Goal: Patient will remain free of falls  Description: INTERVENTIONS:  - Educate patient/family on patient safety including physical limitations  - Instruct patient to call for assistance with activity   - Consult OT/PT to assist with strengthening/mobility   - Keep Call bell within reach  - Keep bed low and locked with side rails adjusted as appropriate  - Keep care items and personal belongings within reach  - Initiate and maintain comfort rounds  - Apply yellow socks and bracelet for high fall risk patients  - Consider moving patient to room near nurses station  Outcome: Progressing     Problem: DISCHARGE PLANNING  Goal: Discharge to home or other facility with appropriate resources  Description: INTERVENTIONS:  - Identify barriers to discharge w/patient and caregiver  - Arrange for needed discharge resources and transportation as appropriate  - Identify discharge learning needs (meds, wound care, etc.)  - Arrange for interpretive services to assist at discharge as needed  - Refer to Case Management Department for coordinating discharge planning if the patient needs post-hospital services based on physician/advanced practitioner order or complex needs related to functional status, cognitive ability, or social support system  Outcome: Progressing     Problem: Knowledge Deficit  Goal: Patient/family/caregiver demonstrates understanding of disease process, treatment plan, medications, and discharge instructions  Description: Complete learning assessment and assess knowledge base.   Interventions:  - Provide teaching at level of understanding  - Provide teaching via preferred learning methods  Outcome: Progressing     Problem: SKIN/TISSUE INTEGRITY - ADULT  Goal: Skin Integrity remains intact(Skin Breakdown Prevention)  Description: Assess:  -Perform Lucas assessment every shift  -Clean and moisturize skin every day  -Inspect skin when repositioning, toileting, and assisting with ADLS  -Assess under medical devices such as thaddeus every shift  -Assess extremities for adequate circulation and sensation     Bed Management:  -Have minimal linens on bed & keep smooth, unwrinkled  -Change linens as needed when moist or perspiring    Skin Care:  -Avoid use of baby powder, tape, friction and shearing, hot water or constrictive clothing  -Relieve pressure over bony prominences using hydraguard  -Do not massage red bony areas    Next Steps:  -Teach patient strategies to minimize risks such as bathing   -Consider consults to  interdisciplinary teams such as wound care  Outcome: Progressing  Goal: Incision(s), wounds(s) or drain site(s) healing without S/S of infection  Description: INTERVENTIONS  - Assess and document dressing, incision, wound bed, drain sites and surrounding tissue  - Provide patient and family education  - Perform skin care/dressing changes every shift  Outcome: Progressing     Problem: INFECTION - ADULT  Goal: Absence or prevention of progression during hospitalization  Description: INTERVENTIONS:  - Assess and monitor for signs and symptoms of infection  - Monitor lab/diagnostic results  - Monitor all insertion sites, i.e. indwelling lines, tubes, and drains  - Monitor endotracheal if appropriate and nasal secretions for changes in amount and color  - [place] appropriate cooling/warming therapies per order  - Administer medications as ordered  - Instruct and encourage patient and family to use good hand hygiene technique  - Identify and instruct in appropriate isolation precautions for identified infection/condition  Outcome: Progressing

## 2023-12-02 NOTE — UTILIZATION REVIEW
Initial Clinical Review    Admission: Date/Time/Statement:   Admission Orders (From admission, onward)       Ordered        12/01/23 1811  INPATIENT ADMISSION  Once                          Orders Placed This Encounter   Procedures    INPATIENT ADMISSION     Standing Status:   Standing     Number of Occurrences:   1     Order Specific Question:   Level of Care     Answer:   Med Surg [16]     Order Specific Question:   Estimated length of stay     Answer:   More than 2 Midnights     Order Specific Question:   Certification     Answer:   I certify that inpatient services are medically necessary for this patient for a duration of greater than two midnights. See H&P and MD Progress Notes for additional information about the patient's course of treatment. ED Arrival Information       Expected   -    Arrival   12/1/2023 14:10    Acuity   Urgent              Means of arrival   Walk-In    Escorted by   Self    Service   Hospitalist    Admission type   Emergency              Arrival complaint   rash             Chief Complaint   Patient presents with    Rash     Painful rash on right leg, abdomen, and vaginal area  On doxycycline patient reports worsening of rash         Initial Presentation: 57 y.o. female to the ED from home with complaints of rash on right leg, abdomen, vaginal area. Admitted to inpatient for cellulitis. H/O  insulin-dependent type 2 diabetes mellitus, restless leg syndrome, anxiety with depression . The ED doc drained an abscess on her abdominal wall. Arrives tachycardic with erythema to abdominal wall. Started on IV abx. Trend fever curve and WBcs. Date: 12/2   Day 2:    Continue with IV abx. Acute care surg consult for abscesses on buttock    Gen/surg consult:  Apply warm compress to areas of induration. Continue with IV abx.    PROCEDURE NOTE  12/2  I/D ABSCESS on abdomen/trunk  ED Triage Vitals [12/01/23 1502]   Temperature Pulse Respirations Blood Pressure SpO2   98.8 °F (37.1 °C) (!) 109 16 134/63 98 %      Temp Source Heart Rate Source Patient Position - Orthostatic VS BP Location FiO2 (%)   Tympanic Monitor Sitting Left arm --      Pain Score       No Pain          Wt Readings from Last 1 Encounters:   12/01/23 105 kg (231 lb 11.3 oz)     Additional Vital Signs: Vital Signs (last 2 days)    Date/Time Temp Pulse Resp BP MAP (mmHg) SpO2 O2 Device Patient Position - Orthostatic VS   12/02/23 0700 -- -- -- -- -- 94 % None (Room air) --   12/02/23 06:54:11 97.8 °F (36.6 °C) 90 -- 98/60 73 94 % -- --   12/02/23 06:53:38 97.8 °F (36.6 °C) 83 20 98/60 73 92 % -- --   12/01/23 23:41:49 97.8 °F (36.6 °C) 87 18 116/61 79 95 % -- --   12/01/23 1947 -- -- -- -- -- -- None (Room air) --   12/01/23 19:18:55 98 °F (36.7 °C) 98 18 121/71 88 96 % -- --   12/01/23 1502 98.8 °F (37.1 °C) 109 Abnormal  16 134/63 -- 98 % None (Room air) Sitting     Pertinent Labs/Diagnostic Test Results:   12/1 EKG: Narrative & Impression    Sinus tachycardia  Otherwise normal ECG  When compared with ECG of 28-SEP-2023 15:28,  No significant change was found     CT abdomen pelvis with contrast   Final Result by Michael Moreno MD (12/01 9254)      1) Skin thickening and mild subcutaneous tissue stranding in the infraumbilical ventral abdominal wall, with more pronounced subcutaneous tissue stranding along the left side of the pannus with a 1.4 x 1.2 cm fluid collection within the skin, which may    represent a cutaneous blister, or possibly phlegmon/early abscess. Recommend correlation with physical exam.      2) No fluid collections or air in the subcutaneous tissues. No intra-abdominal or pelvic fluid collections. 3) 5.4 x 1.9 cm cystic tubular structure in the right adnexa, most likely representing a hydrosalpinx.  Although the images are not available for direct comparison at time of dictation, a right-sided hydrosalpinx is described on reports from a CT of the    abdomen and pelvis from 3/6/2023 and pelvic ultrasound from 3/29/2023 performed at [facility]. 4) Multiple mildly enlarged and prominent mesenteric lymph nodes with surrounding stranding, slightly increased from 2014. 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 5) Prominent bilateral inguinal lymph nodes, right greater than left, however below size threshold for lymphadenopathy in this lymph node chain. These are likely reactive. 6) Additional findings as above.                   Workstation performed: [id number]               Results from last 7 days   Lab Units 12/02/23  0517 12/01/23  1511   WBC Thousand/uL 4.73 8.24   HEMOGLOBIN g/dL 12.3 12.8   HEMATOCRIT % 38.1 39.8   PLATELETS Thousands/uL 205 209   NEUTROS ABS Thousands/µL 1.97 5.35         Results from last 7 days   Lab Units 12/02/23  0517 12/01/23  1511   SODIUM mmol/L 137 136   POTASSIUM mmol/L 3.5 3.9   CHLORIDE mmol/L 108 103   CO2 mmol/L 22 26   ANION GAP mmol/L 7 7   BUN mg/dL 7 7   CREATININE mg/dL 0.52* 0.55*   EGFR ml/min/1.73sq m 114 112   CALCIUM mg/dL 8.7 9.2   MAGNESIUM mg/dL 2.0  --    PHOSPHORUS mg/dL 2.9  --      Results from last 7 days   Lab Units 12/01/23  1511   AST U/L 43*   ALT U/L 37   ALK PHOS U/L 80   TOTAL PROTEIN g/dL 6.8   ALBUMIN g/dL 3.7   TOTAL BILIRUBIN mg/dL 0.30     Results from last 7 days   Lab Units 12/02/23  1055 12/02/23  0656 12/01/23  2118   POC GLUCOSE mg/dl 287* 136 239*     Results from last 7 days   Lab Units 12/02/23  0517 12/01/23  1511   GLUCOSE RANDOM mg/dL 160* 181*     BETA-HYDROXYBUTYRATE   Date Value Ref Range Status   09/28/2023 0.1 <0.6 mmol/L Final        Results from last 7 days   Lab Units 12/01/23  1511   PROTIME seconds 12.3   INR  0.91   PTT seconds 26         Results from last 7 days   Lab Units 12/01/23  1511   PROCALCITONIN ng/ml 0.07     Results from last 7 days   Lab Units 12/01/23  1511   LACTIC ACID mmol/L 1.8     Results from last 7 days   Lab Units 12/01/23  1651   CLARITY UA  Slightly Cloudy* COLOR UA  Yellow   SPEC GRAV UA  1.015   PH UA  7.5   GLUCOSE UA mg/dl 3+*   KETONES UA mg/dl Negative   BLOOD UA  Trace-lysed*   PROTEIN UA mg/dl Negative   NITRITE UA  Negative   BILIRUBIN UA  Negative   UROBILINOGEN UA E.U./dl 0.2   LEUKOCYTES UA  Trace*   WBC UA /hpf 4-10*   RBC UA /hpf 0-1   BACTERIA UA /hpf None Seen   EPITHELIAL CELLS WET PREP /hpf Occasional     Results from last 7 days   Lab Units 12/01/23  1526 12/01/23  1511   BLOOD CULTURE  Received in Microbiology Lab. Culture in Progress. Received in Microbiology Lab. Culture in Progress.        ED Treatment:   Medication Administration from 12/01/2023 1410 to 12/01/2023 1840         Date/Time Order Dose Route Action Comments     12/01/2023 1654 EST cefepime (MAXIPIME) IVPB (premix in dextrose) 2,000 mg 50 mL 2,000 mg Intravenous New Bag --     12/01/2023 1754 EST vancomycin (VANCOCIN) 1500 mg in sodium chloride 0.9% 250 mL IVPB 1,500 mg Intravenous New Bag --     12/01/2023 1510 EST sodium chloride 0.9 % bolus 1,000 mL 1,000 mL Intravenous New Bag --     12/01/2023 1753 EST lidocaine-epinephrine (XYLOCAINE/EPINEPHRINE) 1 %-1:100,000 injection 5 mL 5 mL Infiltration Given --          Past Medical History:   Diagnosis Date    Anxiety     Asthma     Closed nondisplaced fracture of proximal phalanx of lesser toe of left foot 1/15/2020    Diabetes mellitus (HCC)     Hyperlipidemia          Admitting Diagnosis: Rash [R21]  Abdominal wall abscess [L02.211]  Abdominal wall cellulitis [L03.311]  Age/Sex: 57 y.o. female  Admission Orders:  UP and OOB    Scheduled Medications:  atorvastatin, 10 mg, Oral, Daily  cefazolin, 2,000 mg, Intravenous, Q8H  citalopram, 40 mg, Oral, Daily  enoxaparin, 40 mg, Subcutaneous, Q24H SAHIL  fluconazole, 200 mg, Oral, Daily  insulin detemir, 20 Units, Subcutaneous, HS  insulin lispro, 1-6 Units, Subcutaneous, TID AC  insulin lispro, 1-6 Units, Subcutaneous, HS  lidocaine-epinephrine, 40 mL, Infiltration, Once  montelukast, 10 mg, Oral, HS  OLANZapine, 5 mg, Oral, HS  pramipexole, 0.5 mg, Oral, HS  topiramate, 25 mg, Oral, BID  traZODone, 100 mg, Oral, HS  vancomycin, 1,250 mg, Intravenous, Q12H      Continuous IV Infusions:     PRN Meds:  acetaminophen, 650 mg, Oral, Q4H PRN  albuterol, 2 puff, Inhalation, Q6H PRN  hydrOXYzine HCL, 25 mg, Oral, Q6H PRN  ondansetron, 4 mg, Intravenous, Q6H PRN  propranolol, 10 mg, Oral, Q6H PRN        IP CONSULT TO CASE MANAGEMENT  IP CONSULT TO PHARMACY  IP CONSULT TO ACUTE CARE SURGERY    Network Utilization Review Department  ATTENTION: Please call with any questions or concerns to [phone] and carefully listen to the prompts so that you are directed to the right person. All voicemails are confidential.   For Discharge needs, contact Care Management DC Support Team at [phone] opt. [phone]  Send all requests for admission clinical reviews, approved or denied determinations and any other requests to dedicated fax number below belonging to the campus where the patient is receiving treatment.  List of dedicated fax numbers for the Facilities:  [place] DENIALS (Administrative/Medical Necessity) [phone]   DISCHARGE SUPPORT TEAM (NETWORK) [id number] [address] (Maternity/NICU/Pediatrics) [phone]   [address] [facility] [phone]   [address] [facility] [address] [facility] [address] [address] [facility] [address] [phone]

## 2023-12-02 NOTE — PROCEDURES
Procedure  Incision and drain    Date/Time: 12/2/2023 12:35 PM    Performed by: Abilio Garcia PA-C  Authorized by: Abilio Garcia PA-C  Universal Protocol:  Consent: Verbal consent obtained. Risks and benefits: risks, benefits and alternatives were discussed  Consent given by: patient  Time out: Immediately prior to procedure a "time out" was called to verify the correct patient, procedure, equipment, support staff and site/side marked as required. Timeout called at: 12/2/2023 12:34 PM.  Patient identity confirmed: verbally with patient and arm band    Patient location:  Bedside  Location:     Type:  Abscess    Location:  Trunk    Trunk location:  Abdomen  Pre-procedure details:     Skin preparation:  Betadine  Anesthesia (see MAR for exact dosages): Anesthesia method:  Local infiltration    Local anesthetic:  Lidocaine 1% WITH epi  Procedure details:     Complexity:  Simple    Needle aspiration: no      Incision types:  Elliptical    Scalpel blade:  11    Incision depth:  Subcutaneous    Wound management:  Probed and deloculated, irrigated with saline and extensive cleaning    Drainage:  Bloody and purulent    Drainage amount: Moderate    Wound treatment:  Packing placed    Packing materials:  1/2 in iodoform gauze  Post-procedure details:     Patient tolerance of procedure:   Tolerated well, no immediate complications        Abilio Garcia [facility]  12/02/23

## 2023-12-02 NOTE — CONSULTS
Consultation - General Surgery   Gerri Joselyn Score 57 y.o. female MRN: [id number]  Unit/Bed#: -01 Encounter: [id number]    Assessment/Plan     Assessment/Plan   68-year-old female with PMH of T2DM, depression/anxiety, obesity presenting with:  Cellulitis and abscess of abdominal wall  Patient has history of multiple soft tissue abscesses over the past year which she believes are due to spider bites. Abscess initially drained in ED however it has since closed over causing increasing pain, erythema, and fluctuance on examination. Will plan for repeat I&D today at bedside. Please see procedure note for further details. Erythema outlined with skin marker   -IV antibiotics  -Warm compresses  -Trend a.m. labs, monitor vitals  -Follow-up blood cultures and wound cultures obtained today  - CTAP "Skin thickening and mild subcutaneous tissue stranding in the infraumbilical ventral abdominal wall, with more pronounced subcutaneous tissue stranding along the left side of the pannus with a 1.4 x 1.2 cm fluid collection within the skin, which may represent a cutaneous blister, or possibly phlegmon/early abscess. Recommend correlation with physical exam."    Cellulitis of right posterior thigh  Two small areas of induration to right posterior thigh, no palpable fluctuance on exam today  -Warm compresses  -IV antibiotic  -Will reevaluate tomorrow for need for additional incision and drainage    History of Present Illness     HPI:  Daniel Leigh is a 57 y.o. female with PMH of T2DM, depression/anxiety, obesity who presents with cellulitis and abscess of multiple sites. Patient states she initially noticed abscess forming over left lower abdomen with surrounding redness. Believes this has been present for approximately 1 week. She was taking oral antibiotics in the outpatient setting however the surrounding redness seem to be worsening.     CTAP obtained in ED significant for 1.4 x 1.2 cm fluid collection within the skin/subcu. AVSS, no leukocytosis. Abscess initially drained in ED but unfortunately has closed off overnight causing increased pain, induration and fluctuance on examination. Today, patient reported 2 new areas of firm tissue with surrounding redness on her right posterior thigh. WBC remains WNL. General surgery consulted for further evaluation/need for further I&D. Inpatient consult to Acute Care Surgery  Consult performed by: Braxton Pendleton PA-C  Consult ordered by: [employer], DO          Review of Systems   Constitutional:  Positive for chills and fatigue. Negative for appetite change and fever. HENT:  Negative for congestion. Respiratory:  Positive for cough. Negative for shortness of breath. Cardiovascular:  Negative for chest pain. Gastrointestinal:  Negative for abdominal pain. Genitourinary:  Negative for difficulty urinating. Skin:  Positive for color change and wound. Neurological:  Negative for weakness and light-headedness. Psychiatric/Behavioral:  Negative for confusion.     All other ROS negative listed above    Historical Information   Past Medical History:   Diagnosis Date    Anxiety     Asthma     Closed nondisplaced fracture of proximal phalanx of lesser toe of left foot 1/15/2020    Diabetes mellitus ([address])     Hyperlipidemia      Past Surgical History:   Procedure Laterality Date    ARTHROSCOPY KNEE Left     CHOLECYSTECTOMY      ECTOPIC PREGNANCY SURGERY      SHOULDER SURGERY Right     torn bicep     Social History   Social History     Substance and Sexual Activity   Alcohol Use Not Currently    Alcohol/week: 0.0 standard drinks of alcohol    Comment: 0     Social History     Substance and Sexual Activity   Drug Use Not Currently     E-Cigarette/Vaping    E-Cigarette Use Never User      E-Cigarette/Vaping Substances    Nicotine No     THC No     CBD No     Flavoring No     Other No     Unknown No      Social History     Tobacco Use   Smoking Status Never Smokeless Tobacco Never     Family History: non-contributory    Meds/Allergies   all current active meds have been reviewed  Allergies   Allergen Reactions    Apple - Food Allergy Anaphylaxis    Apple Fruit Extract - Food Allergy Anaphylaxis     Throat closes off    Grass Pollen(K-O-R-T-Swt Justus) Hives, Itching, Shortness Of Breath, Sneezing and Throat Swelling    Dust Mite Extract Hives and Itching    Misc Natural Products Diarrhea, Nausea Only and Vomiting    Methylprednisolone Hives and Rash    Raw Vegetable - Food Allergy Blisters, Dizziness, Eye Swelling, Fatigue, Headache, Hives, Itching, Rash, Sneezing, Throat Swelling and Wheezing    Red Dye - Food Allergy Hives, Itching and Rash       Objective   First Vitals:   Blood Pressure: 134/63 (12/01/23 1502)  Pulse: (!) 109 (12/01/23 1502)  Temperature: 98.8 °F (37.1 °C) (12/01/23 1502)  Temp Source: Tympanic (12/01/23 1502)  Respirations: 16 (12/01/23 1502)  Height: 5' 4" (162.6 cm) (12/01/23 1918)  Weight - Scale: 105 kg (231 lb 11.3 oz) (12/01/23 1918)  SpO2: 98 % (12/01/23 1502)    Current Vitals:   Blood Pressure: 98/60 (12/02/23 0654)  Pulse: 90 (12/02/23 0654)  Temperature: 97.8 °F (36.6 °C) (12/02/23 0654)  Temp Source: Tympanic (12/01/23 1502)  Respirations: 20 (12/02/23 0653)  Height: 5' 4" (162.6 cm) (12/01/23 1918)  Weight - Scale: 105 kg (231 lb 11.3 oz) (12/01/23 1918)  SpO2: 94 % (12/02/23 0700)      Intake/Output Summary (Last 24 hours) at 12/2/2023 1432  Last data filed at 12/2/2023 0700  Gross per 24 hour   Intake 1530 ml   Output --   Net 1530 ml       Invasive Devices       Peripheral Intravenous Line  Duration             Peripheral IV 12/01/23 Dorsal (posterior); Proximal;Right Forearm <1 day                    Physical Exam  Vitals reviewed. Constitutional:       General: She is not in acute distress. Appearance: She is obese. She is not ill-appearing or toxic-appearing. HENT:      Head: Normocephalic and atraumatic.    Eyes: General: No scleral icterus. Cardiovascular:      Rate and Rhythm: Normal rate and regular rhythm. Pulmonary:      Effort: No respiratory distress. Breath sounds: Normal breath sounds. Abdominal:      General: Bowel sounds are normal. There is no distension. Tenderness: There is abdominal tenderness. Skin:     General: Skin is warm and dry. Findings: Erythema present. Comments: Large area of erythema overlying lower abdominal pannus. Outlined with skin marker. Small, closed over stab incision noted in left lower quadrant. Skin surrounding incision is indurated, fluctuant, tender to palpation. Mild amount of purulent material weeping from former incision site. Area is warm to touch    2 areas of induration noted over right posterior thigh. Mild surrounding erythema. No palpable fluctuance on examination this morning. Neurological:      Mental Status: She is alert. Lab Results: I have personally reviewed pertinent lab results. , CBC:   Lab Results   Component Value Date    WBC 4.73 12/02/2023    HGB 12.3 12/02/2023    HCT 38.1 12/02/2023    MCV 93 12/02/2023     12/02/2023    RBC 4.12 12/02/2023    MCH 29.9 12/02/2023    MCHC 32.3 12/02/2023    RDW 12.9 12/02/2023    MPV 10.2 12/02/2023    NRBC 0 12/02/2023   , CMP:   Lab Results   Component Value Date    SODIUM 137 12/02/2023    K 3.5 12/02/2023     12/02/2023    CO2 22 12/02/2023    BUN 7 12/02/2023    CREATININE 0.52 (L) 12/02/2023    CALCIUM 8.7 12/02/2023    AST 43 (H) 12/01/2023    ALT 37 12/01/2023    ALKPHOS 80 12/01/2023    EGFR 114 12/02/2023     Imaging: I have personally reviewed pertinent reports. EKG, Pathology, and Other Studies: I have personally reviewed pertinent reports. Counseling / Coordination of Care  Total floor / unit time spent today 60 minutes. Greater than 50% of total time was spent with the patient and / or family counseling and / or coordination of care.   A description of the counseling / coordination of care: Review of imaging, discussion with primary team, bedside procedure    Skylar Sampson, [facility]  12/02/23  **Please Note: Portions of the record may have been created using voice recognition software. Occasional wrong word or "sound a like" substitutions may have occurred due to the inherent limitations of voice recognition software. Read the chart carefully and recognize, using context, where substitutions have occurred. **

## 2023-12-02 NOTE — PROGRESS NOTES
Aisha Amaral is a 57 y.o. female who is currently ordered Vancomycin IV with management by the Pharmacy Consult service. Relevant clinical data and objective / subjective history reviewed. Vancomycin Assessment:  Indication and Goal AUC/Trough: Soft tissue (goal -600, trough >10), -600, trough >10  Clinical Status: stable  Micro:     Renal Function:  SCr: 0.52 mg/dL  CrCl: 159.2 mL/min  Renal replacement: Not on dialysis  Days of Therapy: 2  Current Dose: 1250mg iv q12h  Vancomycin Plan:  New Dosinmg iv q12h  Estimated AUC: 461 mcg*hr/mL  Estimated Trough: 14.3 mcg/mL  Next Level: 12/3/23 0600  Renal Function Monitoring: Daily BMP and UOP  Pharmacy will continue to follow closely for s/sx of nephrotoxicity, infusion reactions and appropriateness of therapy. BMP and CBC will be ordered per protocol. We will continue to follow the patient’s culture results and clinical progress daily.     Abdelrahman Lee, Pharmacist

## 2023-12-02 NOTE — ASSESSMENT & PLAN NOTE
Lab Results   Component Value Date    HGBA1C 7.5 (H) 07/07/2023       Recent Labs     12/01/23 2118 12/02/23  0656   POCGLU 239* 136       Blood Sugar Average: Last 72 hrs:  (P) 187.5  Well-controlled on current insulin regimen  Continue home Levemir 20 units daily at bedtime  Sliding scale insulin while inpatient with Accu-Cheks  Diabetic diet

## 2023-12-02 NOTE — ASSESSMENT & PLAN NOTE
Patient presents with erythema and purulent abscess which was drained in the ED on her abdomen  Abdominal wall cellulitis  Patient is nonseptic appearing however will benefit from IV antibiotics  Initiate vancomycin and cefazolin ; de-escalate antibiotics as appropriate  Pharmacy consult for vancomycin trough management  Trend fever curve and WBC count  Acute Care Surgery consultation for I & D of buttock abscesses

## 2023-12-02 NOTE — PROGRESS NOTES
[address] [facility]  Progress Note  Name: Emeterio Cooley  MRN: [id number]  Unit/Bed#: -01 I Date of Admission: 12/1/2023   Date of Service: 12/2/2023 I Hospital Day: 1    Assessment/Plan   * Cellulitis  Assessment & Plan  Patient presents with erythema and purulent abscess which was drained in the ED on her abdomen  Abdominal wall cellulitis  Patient is nonseptic appearing however will benefit from IV antibiotics  Initiate vancomycin and cefazolin ; de-escalate antibiotics as appropriate  Pharmacy consult for vancomycin trough management  Trend fever curve and WBC count  Acute Care Surgery consultation for I & D of buttock abscesses    Type 2 diabetes mellitus with hyperglycemia, with long-term current use of insulin [facility])  Assessment & Plan  Lab Results   Component Value Date    HGBA1C 7.5 (H) 07/07/2023       Recent Labs     12/01/23 2118 12/02/23  0656   POCGLU 239* 136       Blood Sugar Average: Last 72 hrs:  (P) 187.5  Well-controlled on current insulin regimen  Continue home Levemir 20 units daily at bedtime  Sliding scale insulin while inpatient with Accu-Cheks  Diabetic diet      Depression with anxiety  Assessment & Plan  Continue home Celexa, Zyprexa  Trazodone at bedtime    Restless leg syndrome  Assessment & Plan  Continue home pramipexole    Other hyperlipidemia  Assessment & Plan  Continue home statin therapy    Severe obesity (BMI 35.0-39. 9) with comorbidity ([address])  Assessment & Plan  Present on admission as evidenced by BMI of 39  Encourage lifestyle modifications and weight loss               VTE Pharmacologic Prophylaxis: VTE Score: 2 Low Risk (Score 0-2) - Encourage Ambulation. Mobility:   Basic Mobility Inpatient Raw Score: 24  JH-HLM Goal: 8: Walk 250 feet or more  JH-HLM Achieved: 7: Walk 25 feet or more  HLM Goal NOT achieved. Continue with multidisciplinary rounding and encourage appropriate mobility to improve upon [facility] goals. Patient Centered Rounds:  I performed bedside rounds with nursing staff today. Discussions with Specialists or Other Care Team Provider: Nursing    Education and Discussions with Family / Patient: Patient declined call to . Total Time Spent on Date of Encounter in care of patient: 35 mins. This time was spent on one or more of the following: performing physical exam; counseling and coordination of care; obtaining or reviewing history; documenting in the medical record; reviewing/ordering tests, medications or procedures; communicating with other healthcare professionals and discussing with patient's family/caregivers. Current Length of Stay: 1 day(s)  Current Patient Status: Inpatient   Certification Statement: The patient will continue to require additional inpatient hospital stay due to cellulitis  Discharge Plan: Anticipate discharge tomorrow to home. Code Status: Level 1 - Full Code    Subjective:   Patient seen and examined at bedside. No acute events overnight. Denies chest pain, SOB, diaphoresis, nausea/vomiting/diarrhea, fevers/chills. Objective:     Vitals:   Temp (24hrs), Av °F (36.7 °C), Min:97.8 °F (36.6 °C), Max:98.8 °F (37.1 °C)    Temp:  [97.8 °F (36.6 °C)-98.8 °F (37.1 °C)] 97.8 °F (36.6 °C)  HR:  [] 90  Resp:  [16-20] 20  BP: ()/(60-71) 98/60  SpO2:  [92 %-98 %] 94 %  Body mass index is 39.77 kg/m². Input and Output Summary (last 24 hours): Intake/Output Summary (Last 24 hours) at 2023 5974  Last data filed at 2023 0700  Gross per 24 hour   Intake 1530 ml   Output --   Net 1530 ml       Physical Exam:   Physical Exam  Vitals and nursing note reviewed. Constitutional:       General: She is not in acute distress. Appearance: She is well-developed. HENT:      Head: Normocephalic and atraumatic. Eyes:      Conjunctiva/sclera: Conjunctivae normal.   Cardiovascular:      Rate and Rhythm: Normal rate and regular rhythm.       Heart sounds: No murmur heard.  Pulmonary:      Effort: Pulmonary effort is normal. No respiratory distress. Breath sounds: Normal breath sounds. Abdominal:      Palpations: Abdomen is soft. Tenderness: There is no abdominal tenderness. Comments: erythema   Musculoskeletal:         General: No swelling. Cervical back: Neck supple. Skin:     General: Skin is warm and dry. Capillary Refill: Capillary refill takes less than 2 seconds. Neurological:      Mental Status: She is alert. Psychiatric:         Mood and Affect: Mood normal.          Additional Data:     Labs:  Results from last 7 days   Lab Units 12/02/23  0517   WBC Thousand/uL 4.73   HEMOGLOBIN g/dL 12.3   HEMATOCRIT % 38.1   PLATELETS Thousands/uL 205   NEUTROS PCT % 41*   LYMPHS PCT % 33   MONOS PCT % 19*   EOS PCT % 6     Results from last 7 days   Lab Units 12/02/23  0517 12/01/23  1511   SODIUM mmol/L 137 136   POTASSIUM mmol/L 3.5 3.9   CHLORIDE mmol/L 108 103   CO2 mmol/L 22 26   BUN mg/dL 7 7   CREATININE mg/dL 0.52* 0.55*   ANION GAP mmol/L 7 7   CALCIUM mg/dL 8.7 9.2   ALBUMIN g/dL  --  3.7   TOTAL BILIRUBIN mg/dL  --  0.30   ALK PHOS U/L  --  80   ALT U/L  --  37   AST U/L  --  43*   GLUCOSE RANDOM mg/dL 160* 181*     Results from last 7 days   Lab Units 12/01/23  1511   INR  0.91     Results from last 7 days   Lab Units 12/02/23  0656 12/01/23  2118   POC GLUCOSE mg/dl 136 239*         Results from last 7 days   Lab Units 12/01/23  1511   LACTIC ACID mmol/L 1.8   PROCALCITONIN ng/ml 0.07       Lines/Drains:  Invasive Devices       Peripheral Intravenous Line  Duration             Peripheral IV 12/01/23 Dorsal (posterior); Proximal;Right Forearm <1 day                          Imaging: Reviewed radiology reports from this admission including: abdominal/pelvic CT    Recent Cultures (last 7 days):   Results from last 7 days   Lab Units 12/01/23  1526 12/01/23  1511   BLOOD CULTURE  Received in Microbiology Lab. Culture in Progress.  Received in Microbiology Lab. Culture in Progress. Last 24 Hours Medication List:   Current Facility-Administered Medications   Medication Dose Route Frequency Provider Last Rate    acetaminophen  650 mg Oral Q4H PRN Equilla Furlong, DO      albuterol  2 puff Inhalation Q6H PRN Equilla Furlong, DO      atorvastatin  10 mg Oral Daily Equilla Furlong, DO      cefazolin  2,000 mg Intravenous Q8H Equilla Furlong, DO 2,000 mg (12/01/23 5854)    citalopram  40 mg Oral Daily Equilla Furlong, DO      enoxaparin  40 mg Subcutaneous Q24H [address] Keyshawn C Yorty, DO      fluconazole  200 mg Oral Daily Viviana Bread, PA-C      insulin detemir  20 Units Subcutaneous HS Equilla Furlong, DO      insulin lispro  1-6 Units Subcutaneous TID AC Keyshawn C Yorty, DO      insulin lispro  1-6 Units Subcutaneous HS Equilla Furlong, DO      montelukast  10 mg Oral HS [facility] Yorty, DO      OLANZapine  5 mg Oral HS [facility] Yorty, DO      ondansetron  4 mg Intravenous Q6H PRN Equilla Furlong, DO      pramipexole  0.5 mg Oral HS Equilla Furlong, DO      propranolol  10 mg Oral Q6H PRN Equilla Furlong, DO      topiramate  25 mg Oral BID Equilla Furlong, DO      traZODone  100 mg Oral HS Equilla Furlong, DO      vancomycin  1,250 mg Intravenous Q12H Equilla Furarlinng, DO 1,250 mg (12/02/23 1647)        Today, Patient Was Seen By: Brie Barahona DO    **Please Note: This note may have been constructed using a voice recognition system. **

## 2023-12-02 NOTE — PROGRESS NOTES
Evelyne Lynne is a 57 y.o. female who is currently ordered Vancomycin IV with management by the Pharmacy Consult service. Relevant clinical data and objective / subjective history reviewed. Vancomycin Assessment:  Indication and Goal AUC/Trough: Soft tissue (goal -600, trough >10)  Clinical Status: stable  Micro:   pending  Renal Function:  SCr: 0.55 mg/dL  CrCl: 150.9 mL/min  Renal replacement: Not on dialysis  Days of Therapy: 1  Current Dose: 1500 mg iv q 12 hrs  Vancomycin Plan:  New Dosin mg iv q 12 hrs  Estimated AUC: 459 mcg*hr/mL  Estimated Trough: 14.2 mcg/mL  Next Level: 0600 23  Renal Function Monitoring: Daily BMP and UOP  Pharmacy will continue to follow closely for s/sx of nephrotoxicity, infusion reactions and appropriateness of therapy. BMP and CBC will be ordered per protocol. We will continue to follow the patient’s culture results and clinical progress daily.     Emy Dennis, Pharmacist

## 2023-12-03 VITALS
WEIGHT: 231.7 LBS | HEART RATE: 64 BPM | BODY MASS INDEX: 39.56 KG/M2 | RESPIRATION RATE: 20 BRPM | OXYGEN SATURATION: 95 % | SYSTOLIC BLOOD PRESSURE: 104 MMHG | TEMPERATURE: 98.1 F | HEIGHT: 64 IN | DIASTOLIC BLOOD PRESSURE: 53 MMHG

## 2023-12-03 LAB
ANION GAP SERPL CALCULATED.3IONS-SCNC: 8 MMOL/L
BASOPHILS # BLD AUTO: 0.03 THOUSANDS/ÂΜL (ref 0–0.1)
BASOPHILS NFR BLD AUTO: 1 % (ref 0–1)
BUN SERPL-MCNC: 8 MG/DL (ref 5–25)
CALCIUM SERPL-MCNC: 9.2 MG/DL (ref 8.4–10.2)
CHLORIDE SERPL-SCNC: 107 MMOL/L (ref 96–108)
CO2 SERPL-SCNC: 21 MMOL/L (ref 21–32)
CREAT SERPL-MCNC: 0.58 MG/DL (ref 0.6–1.3)
EOSINOPHIL # BLD AUTO: 0.32 THOUSAND/ÂΜL (ref 0–0.61)
EOSINOPHIL NFR BLD AUTO: 5 % (ref 0–6)
ERYTHROCYTE [DISTWIDTH] IN BLOOD BY AUTOMATED COUNT: 12.8 % (ref 11.6–15.1)
GFR SERPL CREATININE-BSD FRML MDRD: 110 ML/MIN/1.73SQ M
GLUCOSE SERPL-MCNC: 130 MG/DL (ref 65–140)
GLUCOSE SERPL-MCNC: 149 MG/DL (ref 65–140)
GLUCOSE SERPL-MCNC: 215 MG/DL (ref 65–140)
HCT VFR BLD AUTO: 41.7 % (ref 34.8–46.1)
HGB BLD-MCNC: 13 G/DL (ref 11.5–15.4)
IMM GRANULOCYTES # BLD AUTO: 0.02 THOUSAND/UL (ref 0–0.2)
IMM GRANULOCYTES NFR BLD AUTO: 0 % (ref 0–2)
LYMPHOCYTES # BLD AUTO: 2.1 THOUSANDS/ÂΜL (ref 0.6–4.47)
LYMPHOCYTES NFR BLD AUTO: 33 % (ref 14–44)
MAGNESIUM SERPL-MCNC: 2.2 MG/DL (ref 1.9–2.7)
MCH RBC QN AUTO: 28.9 PG (ref 26.8–34.3)
MCHC RBC AUTO-ENTMCNC: 31.2 G/DL (ref 31.4–37.4)
MCV RBC AUTO: 93 FL (ref 82–98)
MONOCYTES # BLD AUTO: 0.8 THOUSAND/ÂΜL (ref 0.17–1.22)
MONOCYTES NFR BLD AUTO: 13 % (ref 4–12)
MRSA NOSE QL CULT: ABNORMAL
MRSA NOSE QL CULT: ABNORMAL
NEUTROPHILS # BLD AUTO: 3.04 THOUSANDS/ÂΜL (ref 1.85–7.62)
NEUTS SEG NFR BLD AUTO: 48 % (ref 43–75)
NRBC BLD AUTO-RTO: 0 /100 WBCS
PHOSPHATE SERPL-MCNC: 2.8 MG/DL (ref 2.7–4.5)
PLATELET # BLD AUTO: 245 THOUSANDS/UL (ref 149–390)
PMV BLD AUTO: 9.7 FL (ref 8.9–12.7)
POTASSIUM SERPL-SCNC: 4 MMOL/L (ref 3.5–5.3)
RBC # BLD AUTO: 4.5 MILLION/UL (ref 3.81–5.12)
SODIUM SERPL-SCNC: 136 MMOL/L (ref 135–147)
VANCOMYCIN TROUGH SERPL-MCNC: 6.6 UG/ML (ref 10–20)
WBC # BLD AUTO: 6.31 THOUSAND/UL (ref 4.31–10.16)

## 2023-12-03 PROCEDURE — 83735 ASSAY OF MAGNESIUM: CPT | Performed by: INTERNAL MEDICINE

## 2023-12-03 PROCEDURE — 84100 ASSAY OF PHOSPHORUS: CPT | Performed by: INTERNAL MEDICINE

## 2023-12-03 PROCEDURE — 80048 BASIC METABOLIC PNL TOTAL CA: CPT | Performed by: INTERNAL MEDICINE

## 2023-12-03 PROCEDURE — 82948 REAGENT STRIP/BLOOD GLUCOSE: CPT

## 2023-12-03 PROCEDURE — NC001 PR NO CHARGE: Performed by: PHYSICIAN ASSISTANT

## 2023-12-03 PROCEDURE — 99239 HOSP IP/OBS DSCHRG MGMT >30: CPT | Performed by: INTERNAL MEDICINE

## 2023-12-03 PROCEDURE — 80202 ASSAY OF VANCOMYCIN: CPT | Performed by: INTERNAL MEDICINE

## 2023-12-03 PROCEDURE — 85025 COMPLETE CBC W/AUTO DIFF WBC: CPT | Performed by: INTERNAL MEDICINE

## 2023-12-03 RX ORDER — VANCOMYCIN HYDROCHLORIDE 1 G/200ML
1000 INJECTION, SOLUTION INTRAVENOUS EVERY 8 HOURS
Status: DISCONTINUED | OUTPATIENT
Start: 2023-12-03 | End: 2023-12-03 | Stop reason: HOSPADM

## 2023-12-03 RX ORDER — FLUCONAZOLE 200 MG/1
200 TABLET ORAL DAILY
Qty: 7 TABLET | Refills: 0 | Status: SHIPPED | OUTPATIENT
Start: 2023-12-04 | End: 2023-12-11

## 2023-12-03 RX ORDER — DOXYCYCLINE HYCLATE 100 MG/1
100 CAPSULE ORAL EVERY 12 HOURS SCHEDULED
Qty: 20 CAPSULE | Refills: 0 | Status: SHIPPED | OUTPATIENT
Start: 2023-12-03 | End: 2023-12-13

## 2023-12-03 RX ORDER — CEPHALEXIN 500 MG/1
500 CAPSULE ORAL EVERY 6 HOURS SCHEDULED
Qty: 28 CAPSULE | Refills: 0 | Status: SHIPPED | OUTPATIENT
Start: 2023-12-03 | End: 2023-12-10

## 2023-12-03 RX ADMIN — ACETAMINOPHEN 650 MG: 325 TABLET ORAL at 00:15

## 2023-12-03 RX ADMIN — FLUCONAZOLE 200 MG: 100 TABLET ORAL at 08:11

## 2023-12-03 RX ADMIN — ONDANSETRON 4 MG: 2 INJECTION INTRAMUSCULAR; INTRAVENOUS at 08:23

## 2023-12-03 RX ADMIN — INSULIN LISPRO 2 UNITS: 100 INJECTION, SOLUTION INTRAVENOUS; SUBCUTANEOUS at 12:23

## 2023-12-03 RX ADMIN — ATORVASTATIN CALCIUM 10 MG: 10 TABLET, FILM COATED ORAL at 08:11

## 2023-12-03 RX ADMIN — TOPIRAMATE 25 MG: 25 CAPSULE, COATED PELLETS ORAL at 08:11

## 2023-12-03 RX ADMIN — CITALOPRAM HYDROBROMIDE 40 MG: 20 TABLET ORAL at 08:11

## 2023-12-03 RX ADMIN — CEFAZOLIN SODIUM 2000 MG: 2 SOLUTION INTRAVENOUS at 07:10

## 2023-12-03 RX ADMIN — VANCOMYCIN HYDROCHLORIDE 1000 MG: 1 INJECTION, SOLUTION INTRAVENOUS at 08:58

## 2023-12-03 RX ADMIN — ENOXAPARIN SODIUM 40 MG: 40 INJECTION SUBCUTANEOUS at 08:12

## 2023-12-03 NOTE — ASSESSMENT & PLAN NOTE
Lab Results   Component Value Date    HGBA1C 7.5 (H) 07/07/2023       Recent Labs     12/02/23  1055 12/02/23  1603 12/02/23 2052 12/03/23  0725   POCGLU 287* 246* 288* 130       Blood Sugar Average: Last 72 hrs:  (P) 221  Well-controlled on current insulin regimen  Continue home Levemir 20 units daily at bedtime  Sliding scale insulin while inpatient with Accu-Cheks  Diabetic diet

## 2023-12-03 NOTE — PROGRESS NOTES
Chente Velásquez is a 57 y.o. female who is currently ordered Vancomycin IV with management by the Pharmacy Consult service. Relevant clinical data and objective / subjective history reviewed. Vancomycin Assessment:  Indication and Goal AUC/Trough: Soft tissue (goal -600, trough >10), -600, trough >10  Clinical Status: stable  Micro:     Renal Function:  SCr: 0.58 mg/dL  CrCl: 142.8 mL/min  Renal replacement: Not on dialysis  Days of Therapy: 3  Current Dose: 1250 mg iv q 12 hrs  Vancomycin Plan:  New Dosin mg iv q 8 hrs  Estimated AUC: 433 mcg*hr/mL  Estimated Trough: 13.1 mcg/mL  Next Level: 12/10/23 @ 0600  Renal Function Monitoring: Daily BMP and [place] will continue to follow closely for s/sx of nephrotoxicity, infusion reactions and appropriateness of therapy. BMP and CBC will be ordered per protocol. We will continue to follow the patient’s culture results and clinical progress daily.     Tish Lei, Pharmacist

## 2023-12-03 NOTE — PLAN OF CARE
Problem: INFECTION - ADULT  Goal: Absence or prevention of progression during hospitalization  Description: INTERVENTIONS:  - Assess and monitor for signs and symptoms of infection  - Monitor lab/diagnostic results  - Monitor all insertion sites, i.e. indwelling lines, tubes, and drains  - Monitor endotracheal if appropriate and nasal secretions for changes in amount and color  - [place] appropriate cooling/warming therapies per order  - Administer medications as ordered  - Instruct and encourage patient and family to use good hand hygiene technique  - Identify and instruct in appropriate isolation precautions for identified infection/condition  Administer antibiotics as ordered  Outcome: Progressing

## 2023-12-03 NOTE — DISCHARGE SUMMARY
[address]  Discharge- [place] 1975, 57 y.o. female MRN: [id number]  Unit/Bed#: -01 Encounter: [id number]  Primary Care Provider: JENNIFER Acevedo   Date and time admitted to hospital: 12/1/2023  2:45 PM    * Cellulitis  Assessment & Plan  Patient presents with erythema and purulent abscess which was drained in the ED on her abdomen  Abdominal wall cellulitis  Patient is nonseptic appearing however will benefit from IV antibiotics  Initiate vancomycin and cefazolin ; de-escalate antibiotics as appropriate  Pharmacy consult for vancomycin trough management  Trend fever curve and WBC count  Acute Care Surgery consultation for I & D of buttock abscesses    Type 2 diabetes mellitus with hyperglycemia, with long-term current use of insulin [facility])  Assessment & Plan  Lab Results   Component Value Date    HGBA1C 7.5 (H) 07/07/2023       Recent Labs     12/02/23  1055 12/02/23  1603 12/02/23  2052 12/03/23  0725   POCGLU 287* 246* 288* 130       Blood Sugar Average: Last 72 hrs:  (P) 221  Well-controlled on current insulin regimen  Continue home Levemir 20 units daily at bedtime  Sliding scale insulin while inpatient with Accu-Cheks  Diabetic diet      Depression with anxiety  Assessment & Plan  Continue home Celexa, Zyprexa  Trazodone at bedtime    Restless leg syndrome  Assessment & Plan  Continue home pramipexole    Other hyperlipidemia  Assessment & Plan  Continue home statin therapy    Severe obesity (BMI 35.0-39. 9) with comorbidity [facility])  Assessment & Plan  Present on admission as evidenced by BMI of 39  Encourage lifestyle modifications and weight loss              Medical Problems       Resolved Problems  Date Reviewed: 12/3/2023   None         Admission Date:   Admission Orders (From admission, onward)       Ordered        12/01/23 1811  INPATIENT ADMISSION  Once                            Admitting Diagnosis: Rash [R21]  Abdominal wall abscess [L02.211]  Abdominal wall cellulitis [C05.938]    HPI: Aubrey Sullivan is a 57 y.o. female with a past medical history significant for insulin-dependent type 2 diabetes mellitus, restless leg syndrome, anxiety with depression who presents with abdominal wall erythema. In the ED, ED provider drained an abscess on her abdominal wall which has surrounding erythema. The patient was on a course of oral antibiotics in the outpatient setting however the erythema seem to be worsening. All vital signs stable. Afebrile and without leukocytosis. Nonseptic appearing. The patient was admitted to the general medicine service for further evaluation and treatment of abdominal wall cellulitis for IV antibiotics. Procedures Performed:   Orders Placed This Encounter   Procedures    Incision and Drainage       Summary of Hospital Course: The patient has remained hemodynamically stable and saturating well on room air throughout her hospital course. General surgery drained abdominal wall abscess and will evaluate buttock abscesses prior to discharge for need for incision and drainage. The patient was prescribed doxycycline and Keflex for 7 days in the setting of abdominal wall cellulitis. She was strongly encouraged to follow-up with her PCP within 7 days 14 days. She was also encouraged to resume all preadmission medications at all preadmission dosages. Significant Findings, Care, Treatment and Services Provided: I&D    Complications: Not applicable    Condition at Discharge: stable         Discharge instructions/Information to patient and family:   See after visit summary for information provided to patient and family. Provisions for Follow-Up Care:  See after visit summary for information related to follow-up care and any pertinent home health orders. PCP: JENNIFER Jaramillo    Disposition: Home    Planned Readmission: No    Discharge Statement   I spent 45 minutes discharging the patient.  This time was spent on the day of discharge. I had direct contact with the patient on the day of discharge. Additional documentation is required if more than 30 minutes were spent on discharge. Discharge Medications:  See after visit summary for reconciled discharge medications provided to patient and family.

## 2023-12-03 NOTE — PLAN OF CARE
Problem: PAIN - ADULT  Goal: Verbalizes/displays adequate comfort level or baseline comfort level  Description: Interventions:  - Encourage patient to monitor pain and request assistance  - Assess pain using appropriate pain scale  - Administer analgesics based on type and severity of pain and evaluate response  - Implement non-pharmacological measures as appropriate and evaluate response  - Consider cultural and social influences on pain and pain management  - Notify physician/advanced practitioner if interventions unsuccessful or patient reports new pain  12/3/2023 1031 by Amy Diaz RN  Outcome: Adequate for Discharge  12/3/2023 0836 by Amy Diaz RN  Outcome: Progressing     Problem: SAFETY ADULT  Goal: Patient will remain free of falls  Description: INTERVENTIONS:  - Educate patient/family on patient safety including physical limitations  - Instruct patient to call for assistance with activity   - Consult OT/PT to assist with strengthening/mobility   - Keep Call bell within reach  - Keep bed low and locked with side rails adjusted as appropriate  - Keep care items and personal belongings within reach  - Initiate and maintain comfort rounds  - Make Fall Risk Sign visible to staff  - Apply yellow socks and bracelet for high fall risk patients  - Consider moving patient to room near nurses station  Outcome: Adequate for Discharge     Problem: DISCHARGE PLANNING  Goal: Discharge to home or other facility with appropriate resources  Description: INTERVENTIONS:  - Identify barriers to discharge w/patient and caregiver  - Arrange for needed discharge resources and transportation as appropriate  - Identify discharge learning needs (meds, wound care, etc.)  - Arrange for interpretive services to assist at discharge as needed  - Refer to Case Management Department for coordinating discharge planning if the patient needs post-hospital services based on physician/advanced practitioner order or complex needs related to functional status, cognitive ability, or social support system  Outcome: Adequate for Discharge     Problem: Knowledge Deficit  Goal: Patient/family/caregiver demonstrates understanding of disease process, treatment plan, medications, and discharge instructions  Description: Complete learning assessment and assess knowledge base.   Interventions:  - Provide teaching at level of understanding  - Provide teaching via preferred learning methods  Outcome: Adequate for Discharge     Problem: SKIN/TISSUE INTEGRITY - ADULT  Goal: Skin Integrity remains intact(Skin Breakdown Prevention)  Description: Assess:  -Assess extremities for adequate circulation and sensation     Bed Management:  -Have minimal linens on bed & keep smooth, unwrinkled  -Change linens as needed when moist or perspiring    Toileting:  -Offer bedside commode    Activity:    -Encourage activity and walks on unit  -Encourage or provide ROM exercises       Skin Care:  -Avoid use of baby powder, tape, friction and shearing, hot water or constrictive clothing  Outcome: Adequate for Discharge  Goal: Incision(s), wounds(s) or drain site(s) healing without S/S of infection  Description: INTERVENTIONS  - Assess and document dressing, incision, wound bed, drain sites and surrounding tissue  - Provide patient and family education    Outcome: Adequate for Discharge     Problem: INFECTION - ADULT  Goal: Absence or prevention of progression during hospitalization  Description: INTERVENTIONS:  - Assess and monitor for signs and symptoms of infection  - Monitor lab/diagnostic results  - Monitor all insertion sites, i.e. indwelling lines, tubes, and drains  - Monitor endotracheal if appropriate and nasal secretions for changes in amount and color  - [place] appropriate cooling/warming therapies per order  - Administer medications as ordered  - Instruct and encourage patient and family to use good hand hygiene technique  - Identify and instruct in appropriate isolation precautions for identified infection/condition  Outcome: Adequate for Discharge

## 2023-12-03 NOTE — DISCHARGE INSTR - AVS FIRST PAGE
L abdominal wound  - cleanse wound daily with soap and water, pat dry  - pack with 1/4 in plain packing strip  - cover with clean 4x4 and secure with tape     Monitor other areas for signs of evolving infection including increasing pain, redness, warmth, active drainage, or systemic signs of infection including fever/chills, decreased appetite, fatigue, persistent nausea or vomiting.      Take antibiotic as prescribed and complete entire dose of medication    Follow up in wound care center for further management after hospital discharge

## 2023-12-03 NOTE — PROGRESS NOTES
Progress Note - General Surgery   Maxine Orellana 57 y.o. female MRN: [id number]  Unit/Bed#: -01 Encounter: [id number]    Assessment:  53yo F with PMH of T2DM, depression/anxiety, obesity presenting with:  Cellulitis and abscess of abdominal wall - drained and improved   Cellulitis of right posterior thigh - improving  - AVSS on RA  -No leukocytosis, WBC 6.3  -Hemoglobin stable  -Exam: Left-sided abdominal I&D site with improvement of erythema since yesterday's examination, mildly tender, mild induration, no new fluctuant areas, packing removed and replaced, wound bed appears healthy, no warmth  Right posterior thigh cellulitis improved, 2 areas with mild induration, no palpable fluctuance, no warmth    Plan:  - Daily dressing/packing changes to L sided abdominal I&D site. Monitor other sites closely for signs of evolving infection including increasing pain, redness, drainage, or systemic symptoms such as fever/chills, fatigue, decreased appetite. - follow up in wound care center after hospital discharge   - cont antibiotics  - diet as tolerated  - Medical management per primary team     Subjective/Objective   Subjective: No acute events overnight. Reports feeling tired today. Tolerating diet. Pain at I&D site improved. Objective:   Blood pressure 104/53, pulse 64, temperature 98.1 °F (36.7 °C), temperature source Temporal, resp. rate 20, height 5' 4" (1.626 m), weight 105 kg (231 lb 11.3 oz), SpO2 95 %, not currently breastfeeding. ,Body mass index is 39.77 kg/m². Intake/Output Summary (Last 24 hours) at 12/3/2023 0947  Last data filed at 12/3/2023 5215  Gross per 24 hour   Intake 120 ml   Output --   Net 120 ml       Invasive Devices       Peripheral Intravenous Line  Duration             Peripheral IV 12/01/23 Dorsal (posterior); Proximal;Right Forearm 1 day                    Physical Exam  Vitals reviewed. Constitutional:       General: She is not in acute distress.      Appearance: She is obese. She is not toxic-appearing. HENT:      Head: Normocephalic and atraumatic. Cardiovascular:      Rate and Rhythm: Normal rate. Pulmonary:      Effort: Pulmonary effort is normal. No respiratory distress. Abdominal:      General: There is no distension. Palpations: Abdomen is soft. Tenderness: There is no abdominal tenderness. There is no guarding or rebound. Skin:     General: Skin is warm and dry. Comments: Abdominal erythema improved from previous, I&D site with minimal induration, no new fluctuance palpated, wound irrigated and probed, no additional fluid collections identified. Repacked with 1/2 in packing strip. Neurological:      Mental Status: She is alert. Lab, Imaging and other studies:I have personally reviewed pertinent lab results. , CBC:   Lab Results   Component Value Date    WBC 6.31 12/03/2023    HGB 13.0 12/03/2023    HCT 41.7 12/03/2023    MCV 93 12/03/2023     12/03/2023    RBC 4.50 12/03/2023    MCH 28.9 12/03/2023    MCHC 31.2 (L) 12/03/2023    RDW 12.8 12/03/2023    MPV 9.7 12/03/2023    NRBC 0 12/03/2023   , CMP:   Lab Results   Component Value Date    SODIUM 136 12/03/2023    K 4.0 12/03/2023     12/03/2023    CO2 21 12/03/2023    BUN 8 12/03/2023    CREATININE 0.58 (L) 12/03/2023    CALCIUM 9.2 12/03/2023    EGFR 110 12/03/2023       CT abdomen pelvis with contrast    Result Date: 12/1/2023  Impression: 1) Skin thickening and mild subcutaneous tissue stranding in the infraumbilical ventral abdominal wall, with more pronounced subcutaneous tissue stranding along the left side of the pannus with a 1.4 x 1.2 cm fluid collection within the skin, which may represent a cutaneous blister, or possibly phlegmon/early abscess. Recommend correlation with physical exam. 2) No fluid collections or air in the subcutaneous tissues. No intra-abdominal or pelvic fluid collections.  3) 5.4 x 1.9 cm cystic tubular structure in the right adnexa, most likely representing a hydrosalpinx. Although the images are not available for direct comparison at time of dictation, a right-sided hydrosalpinx is described on reports from a CT of the abdomen and pelvis from 3/6/2023 and pelvic ultrasound from 3/29/2023 performed at [facility]. 4) Multiple mildly enlarged and prominent mesenteric lymph nodes with surrounding stranding, slightly increased from 2014. 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 5) Prominent bilateral inguinal lymph nodes, right greater than left, however below size threshold for lymphadenopathy in this lymph node chain. These are likely reactive. 6) Additional findings as above. Workstation performed: [id number]       VTE PPX:  VTE Pharmacologic Prophylaxis: Enoxaparin (Lovenox)  VTE Mechanical Prophylaxis: SCDs, ambulation as able     Chai Tobias PA-C  12/3/2023     9:47 AM   **Please Note: Portions of the record may have been created using voice recognition software. Occasional wrong word or "sound a like" substitutions may have occurred due to the inherent limitations of voice recognition software. Read the chart carefully and recognize, using context, where substitutions have occurred. **

## 2023-12-03 NOTE — NURSING NOTE
Patient received ancef and vanco before discharge. Patient's iv removed with catheter tip intact. Dsd and pressure applied. AVS reviewed with patient. Provided materials and teachings on wound care instructions. Questions and concerns addressed at this time.

## 2023-12-04 ENCOUNTER — TRANSITIONAL CARE MANAGEMENT (OUTPATIENT)
Dept: FAMILY MEDICINE CLINIC | Facility: CLINIC | Age: 48
End: 2023-12-04

## 2023-12-04 NOTE — UTILIZATION REVIEW
Initial Clinical Review    Admission: Date/Time/Statement:   Admission Orders (From admission, onward)       Ordered        12/01/23 1811  INPATIENT ADMISSION  Once                          Orders Placed This Encounter   Procedures    INPATIENT ADMISSION     Standing Status:   Standing     Number of Occurrences:   1     Order Specific Question:   Level of Care     Answer:   Med Surg [16]     Order Specific Question:   Estimated length of stay     Answer:   More than 2 Midnights     Order Specific Question:   Certification     Answer:   I certify that inpatient services are medically necessary for this patient for a duration of greater than two midnights. See H&P and MD Progress Notes for additional information about the patient's course of treatment. ED Arrival Information       Expected   -    Arrival   12/1/2023 14:10    Acuity   Urgent              Means of arrival   Walk-In    Escorted by   Self    Service   Hospitalist    Admission type   Emergency              Arrival complaint   rash             Chief Complaint   Patient presents with    Rash     Painful rash on right leg, abdomen, and vaginal area  On doxycycline patient reports worsening of rash         Initial Presentation: 57 y.o. female to the ED from home with complaints of rash on right leg, abdomen, vaginal area. Admitted to inpatient for cellulitis. H/O  insulin-dependent type 2 diabetes mellitus, restless leg syndrome, anxiety with depression . The ED doc drained an abscess on her abdominal wall. Arrives tachycardic with erythema to abdominal wall. Started on IV abx. Trend fever curve and WBcs. Date: 12/2   Day 2:    Continue with IV abx. Acute care surg consult for abscesses on buttock    Gen/surg consult:  Apply warm compress to areas of induration. Continue with IV abx.    PROCEDURE NOTE  12/2  I/D ABSCESS on abdomen/trunk  ED Triage Vitals [12/01/23 1502]   Temperature Pulse Respirations Blood Pressure SpO2   98.8 °F (37.1 °C) (!) 109 16 134/63 98 %      Temp Source Heart Rate Source Patient Position - Orthostatic VS BP Location FiO2 (%)   Tympanic Monitor Sitting Left arm --      Pain Score       No Pain          Wt Readings from Last 1 Encounters:   12/01/23 105 kg (231 lb 11.3 oz)     Additional Vital Signs: Vital Signs (last 2 days)    Date/Time Temp Pulse Resp BP MAP (mmHg) SpO2 O2 Device Patient Position - Orthostatic VS   12/02/23 0700 -- -- -- -- -- 94 % None (Room air) --   12/02/23 06:54:11 97.8 °F (36.6 °C) 90 -- 98/60 73 94 % -- --   12/02/23 06:53:38 97.8 °F (36.6 °C) 83 20 98/60 73 92 % -- --   12/01/23 23:41:49 97.8 °F (36.6 °C) 87 18 116/61 79 95 % -- --   12/01/23 1947 -- -- -- -- -- -- None (Room air) --   12/01/23 19:18:55 98 °F (36.7 °C) 98 18 121/71 88 96 % -- --   12/01/23 1502 98.8 °F (37.1 °C) 109 Abnormal  16 134/63 -- 98 % None (Room air) Sitting     Pertinent Labs/Diagnostic Test Results:   12/1 EKG: Narrative & Impression    Sinus tachycardia  Otherwise normal ECG  When compared with ECG of 28-SEP-2023 15:28,  No significant change was found     CT abdomen pelvis with contrast   Final Result by Wilbert Hernandez MD (12/01 5321)      1) Skin thickening and mild subcutaneous tissue stranding in the infraumbilical ventral abdominal wall, with more pronounced subcutaneous tissue stranding along the left side of the pannus with a 1.4 x 1.2 cm fluid collection within the skin, which may    represent a cutaneous blister, or possibly phlegmon/early abscess. Recommend correlation with physical exam.      2) No fluid collections or air in the subcutaneous tissues. No intra-abdominal or pelvic fluid collections. 3) 5.4 x 1.9 cm cystic tubular structure in the right adnexa, most likely representing a hydrosalpinx.  Although the images are not available for direct comparison at time of dictation, a right-sided hydrosalpinx is described on reports from a CT of the    abdomen and pelvis from 3/6/2023 and pelvic ultrasound from 3/29/2023 performed at [facility]. 4) Multiple mildly enlarged and prominent mesenteric lymph nodes with surrounding stranding, slightly increased from 2014. 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 5) Prominent bilateral inguinal lymph nodes, right greater than left, however below size threshold for lymphadenopathy in this lymph node chain. These are likely reactive. 6) Additional findings as above.                   Workstation performed: [id number]               Results from last 7 days   Lab Units 12/03/23  0551 12/02/23  0517 12/01/23  1511   WBC Thousand/uL 6.31 4.73 8.24   HEMOGLOBIN g/dL 13.0 12.3 12.8   HEMATOCRIT % 41.7 38.1 39.8   PLATELETS Thousands/uL 245 205 209   NEUTROS ABS Thousands/µL 3.04 1.97 5.35           Results from last 7 days   Lab Units 12/03/23  0551 12/02/23  0517 12/01/23  1511   SODIUM mmol/L 136 137 136   POTASSIUM mmol/L 4.0 3.5 3.9   CHLORIDE mmol/L 107 108 103   CO2 mmol/L 21 22 26   ANION GAP mmol/L 8 7 7   BUN mg/dL 8 7 7   CREATININE mg/dL 0.58* 0.52* 0.55*   EGFR ml/min/1.73sq m 110 114 112   CALCIUM mg/dL 9.2 8.7 9.2   MAGNESIUM mg/dL 2.2 2.0  --    PHOSPHORUS mg/dL 2.8 2.9  --        Results from last 7 days   Lab Units 12/01/23  1511   AST U/L 43*   ALT U/L 37   ALK PHOS U/L 80   TOTAL PROTEIN g/dL 6.8   ALBUMIN g/dL 3.7   TOTAL BILIRUBIN mg/dL 0.30       Results from last 7 days   Lab Units 12/03/23  1101 12/03/23  0725 12/02/23  2052 12/02/23  1603 12/02/23  1055 12/02/23  0656 12/01/23  2118   POC GLUCOSE mg/dl 215* 130 288* 246* 287* 136 239*       Results from last 7 days   Lab Units 12/03/23  0551 12/02/23  0517 12/01/23  1511   GLUCOSE RANDOM mg/dL 149* 160* 181*       BETA-HYDROXYBUTYRATE   Date Value Ref Range Status   09/28/2023 0.1 <0.6 mmol/L Final        Results from last 7 days   Lab Units 12/01/23  1511   PROTIME seconds 12.3   INR  0.91   PTT seconds 26           Results from last 7 days   Lab Units 12/01/23  1511   PROCALCITONIN ng/ml 0.07       Results from last 7 days   Lab Units 12/01/23  1511   LACTIC ACID mmol/L 1.8       Results from last 7 days   Lab Units 12/01/23  1657   CLARITY UA  Slightly Cloudy*   COLOR UA  Yellow   SPEC GRAV UA  1.015   PH UA  7.5   GLUCOSE UA mg/dl 3+*   KETONES UA mg/dl Negative   BLOOD UA  Trace-lysed*   PROTEIN UA mg/dl Negative   NITRITE UA  Negative   BILIRUBIN UA  Negative   UROBILINOGEN UA E.U./dl 0.2   LEUKOCYTES UA  Trace*   WBC UA /hpf 4-10*   RBC UA /hpf 0-1   BACTERIA UA /hpf None Seen   EPITHELIAL CELLS WET PREP /hpf Occasional       Results from last 7 days   Lab Units 12/02/23  1533 12/02/23  1443 12/01/23  1526 12/01/23  1511   BLOOD CULTURE   --   --  No Growth at 48 hrs. No Growth at 48 hrs.    GRAM STAIN RESULT  1+ Polys*  1+ Gram positive cocci in pairs* 2+ Gram positive cocci in pairs*  No polys seen*  --   --    WOUND CULTURE  2+ Growth of Staphylococcus aureus* 3+ Growth of Staphylococcus aureus*  --   --          ED Treatment:   Medication Administration from 12/01/2023 1410 to 12/01/2023 1840         Date/Time Order Dose Route Action Comments     12/01/2023 1654 EST cefepime (MAXIPIME) IVPB (premix in dextrose) 2,000 mg 50 mL 2,000 mg Intravenous New Bag --     12/01/2023 1754 EST vancomycin (VANCOCIN) 1500 mg in sodium chloride 0.9% 250 mL IVPB 1,500 mg Intravenous New Bag --     12/01/2023 1510 EST sodium chloride 0.9 % bolus 1,000 mL 1,000 mL Intravenous New Bag --     12/01/2023 1753 EST lidocaine-epinephrine (XYLOCAINE/EPINEPHRINE) 1 %-1:100,000 injection 5 mL 5 mL Infiltration Given --          Past Medical History:   Diagnosis Date    Anxiety     Asthma     Closed nondisplaced fracture of proximal phalanx of lesser toe of left foot 1/15/2020    Diabetes mellitus ([address])     Hyperlipidemia          Admitting Diagnosis: Rash [R21]  Abdominal wall abscess [L02.211]  Abdominal wall cellulitis [L03.311]  Age/Sex: 57 y.o. female  Admission Orders:  UP and OOB    Scheduled Medications:  atorvastatin, 10 mg, Oral, Daily  cefazolin, 2,000 mg, Intravenous, Q8H  citalopram, 40 mg, Oral, Daily  enoxaparin, 40 mg, Subcutaneous, Q24H SAHIL  fluconazole, 200 mg, Oral, Daily  insulin detemir, 20 Units, Subcutaneous, HS  insulin lispro, 1-6 Units, Subcutaneous, TID AC  insulin lispro, 1-6 Units, Subcutaneous, HS  lidocaine-epinephrine, 40 mL, Infiltration, Once  montelukast, 10 mg, Oral, HS  OLANZapine, 5 mg, Oral, HS  pramipexole, 0.5 mg, Oral, HS  topiramate, 25 mg, Oral, BID  traZODone, 100 mg, Oral, HS  vancomycin, 1,250 mg, Intravenous, Q12H      Continuous IV Infusions:  No current facility-administered medications for this encounter. PRN Meds:  No current facility-administered medications for this encounter. IP CONSULT TO ACUTE CARE SURGERY    Network Utilization Review Department  ATTENTION: Please call with any questions or concerns to [phone] and carefully listen to the prompts so that you are directed to the right person. All voicemails are confidential.   For Discharge needs, contact Care Management DC Support Team at [phone] opt. 2  Send all requests for admission clinical reviews, approved or denied determinations and any other requests to dedicated fax number below belonging to the campus where the patient is receiving treatment.  List of dedicated fax numbers for the Facilities:  [place] DENIALS (Administrative/Medical Necessity) [phone]   DISCHARGE SUPPORT TEAM (NETWORK) [id number] [address] Alfonso (Maternity/NICU/Pediatrics) [phone]   [address] [place] [address] Drive [address] [facility] [address] [phone] [place] [id number] [address] [phone]   [address] [place] [address]  [facility] [phone]

## 2023-12-04 NOTE — ED PROVIDER NOTES
History  Chief Complaint   Patient presents with    Rash     Painful rash on right leg, abdomen, and vaginal area  On doxycycline patient reports worsening of rash       Patient is a 82-year-old female with history of diabetes mellitus and homelessness that presents for evaluation of rash. Patient was prescribed doxycycline 4 days ago for abdominal wall cellulitis. She says that symptoms have been significantly worsening despite taking the antibiotics as prescribed. She describes worsening cellulitis moving up her abdomen. She also says the rash spreads down to her vaginal area. Patient with some subjective chills but no documented fevers. No nausea or vomiting. She been able to eat and drink despite her symptoms. She denies urinary or bowel complaints at this time. Prior to Admission Medications   Prescriptions Last Dose Informant Patient Reported? Taking? Alcohol Swabs PADS   No No   Sig: Use once daily when testing finger sticks. Insulin Pen Needle (B-D UF III MINI PEN NEEDLES) 31G X 5 MM MISC   No No   Sig: Inject under the skin daily at bedtime   Lancets MISC  Self No No   Sig: Use once for 1 dose Onetouch Verio. Test once daily or as directed.    Levemir FlexPen 100 units/mL injection pen 11/30/2023 at 2100  No Yes   Sig: INJECT 20 UNITS UNDER THE SKIN DAILY AT BEDTIME   SUMAtriptan (IMITREX) 25 mg tablet Past Month  No Yes   Sig: TAKE 1 TABLET (25 MG TOTAL) BY MOUTH ONCE AS NEEDED FOR MIGRAINE   acetaminophen (TYLENOL) 650 mg CR tablet Past Week  No Yes   Sig: Take 2 tablets (1,300 mg total) by mouth every 8 (eight) hours as needed for mild pain   albuterol (ProAir HFA) 90 mcg/act inhaler Past Month  No Yes   Sig: Inhale 2 puffs every 6 (six) hours as needed for wheezing   atorvastatin (LIPITOR) 10 mg tablet 12/1/2023 at 0800  No Yes   Sig: Take 1 tablet (10 mg total) by mouth daily   doxycycline hyclate (VIBRAMYCIN) 100 mg capsule 12/1/2023 at 0800  No Yes   Sig: Take 1 capsule (100 mg total) by mouth every 12 (twelve) hours for 10 days   escitalopram (Lexapro) 10 mg tablet Past Week  No Yes   Sig: Take 1 tablet (10 mg total) by mouth daily   glucose blood (True Metrix Blood Glucose Test) test strip   No No   Sig: Use as instructed   glucose blood test strip  Self No No   Sig: Use as instructed   glucose blood test strip  Self No No   Sig: Use as instructed.  Test up to 3 times a day as needed   hydrOXYzine HCL (ATARAX) 50 mg tablet 11/30/2023  No Yes   Sig: Take 1 tablet (50 mg total) by mouth every 6 (six) hours as needed for itching, allergies or anxiety   montelukast (SINGULAIR) 10 mg tablet Not Taking  No No   Sig: TAKE 1 TABLET (10 MG TOTAL) BY MOUTH DAILY AT BEDTIME   Patient not taking: Reported on 12/1/2023   pramipexole (MIRAPEX) 0.5 mg tablet   No No   Sig: TAKE 1 TABLET (0.5 MG TOTAL) BY MOUTH DAILY AT BEDTIME   sitaGLIPtin (JANUVIA) 25 mg tablet Past Week  No Yes   Sig: Take 1 tablet (25 mg total) by mouth daily   topiramate (TOPAMAX) 25 mg sprinkle capsule 11/30/2023  No Yes   Sig: TAKE 1 CAPSULE (25 MG TOTAL) BY MOUTH 2 (TWO) TIMES A DAY   traZODone (DESYREL) 100 mg tablet Past Month  No Yes   Sig: TAKE 1 TABLET (100 MG TOTAL) BY MOUTH DAILY AT BEDTIME      Facility-Administered Medications: None       Past Medical History:   Diagnosis Date    Anxiety     Asthma     Closed nondisplaced fracture of proximal phalanx of lesser toe of left foot 1/15/2020    Diabetes mellitus ([address])     Hyperlipidemia        Past Surgical History:   Procedure Laterality Date    ARTHROSCOPY KNEE Left     CHOLECYSTECTOMY      ECTOPIC PREGNANCY SURGERY      SHOULDER SURGERY Right     torn bicep       Family History   Problem Relation Age of Onset    Obesity Mother     Obesity Father     Diabetes type II Father     Diabetes Father     Obesity Brother     No Known Problems Daughter     No Known Problems Maternal Grandmother     Diabetes Maternal Grandfather     No Known Problems Paternal Grandmother     No Known Problems Maternal Aunt     No Known Problems Maternal Aunt     No Known Problems Maternal Aunt     Heart disease Neg Hx     Stroke Neg Hx     Cancer Neg Hx      I have reviewed and agree with the history as documented. E-Cigarette/Vaping    E-Cigarette Use Never User      E-Cigarette/Vaping Substances    Nicotine No     THC No     CBD No     Flavoring No     Other No     Unknown No      Social History     Tobacco Use    Smoking status: Never    Smokeless tobacco: Never   Vaping Use    Vaping Use: Never used   Substance Use Topics    Alcohol use: Not Currently     Alcohol/week: 0.0 standard drinks of alcohol     Comment: 0    Drug use: Not Currently       Review of Systems   Constitutional:  Negative for fever. HENT:  Negative for sore throat. Respiratory:  Negative for shortness of breath. Cardiovascular:  Negative for chest pain. Gastrointestinal:  Negative for abdominal pain. Genitourinary:  Negative for dysuria. Musculoskeletal:  Negative for back pain. Skin:  Positive for rash. Neurological:  Negative for light-headedness. Psychiatric/Behavioral:  Negative for agitation. All other systems reviewed and are negative. Physical Exam  Physical Exam  Vitals reviewed. Constitutional:       General: She is not in acute distress. Appearance: She is well-developed. HENT:      Head: Normocephalic. Eyes:      Pupils: Pupils are equal, round, and reactive to light. Cardiovascular:      Rate and Rhythm: Normal rate and regular rhythm. Heart sounds: Normal heart sounds. Pulmonary:      Effort: Pulmonary effort is normal.      Breath sounds: Normal breath sounds. Abdominal:      General: Bowel sounds are normal. There is no distension. Palpations: Abdomen is soft. Tenderness: There is no abdominal tenderness. There is no guarding. Musculoskeletal:         General: No tenderness or deformity. Normal range of motion.       Cervical back: Normal range of motion and neck supple. Skin:     General: Skin is warm and dry. Capillary Refill: Capillary refill takes less than 2 seconds. Comments: Erythematous rash with induration in the lower abdomen spreading to the suprapubic area. Does not reach into the perineum. Some tinea crura seen as well. Left side of the abdomen a small abscess is palpated superficially. Neurological:      Mental Status: She is alert and oriented to person, place, and time. Cranial Nerves: No cranial nerve deficit. Sensory: No sensory deficit. Psychiatric:         Behavior: Behavior normal.         Thought Content:  Thought content normal.         Judgment: Judgment normal.         Vital Signs  ED Triage Vitals [12/01/23 1502]   Temperature Pulse Respirations Blood Pressure SpO2   98.8 °F (37.1 °C) (!) 109 16 134/63 98 %      Temp Source Heart Rate Source Patient Position - Orthostatic VS BP Location FiO2 (%)   Tympanic Monitor Sitting Left arm --      Pain Score       No Pain           Vitals:    12/02/23 0653 12/02/23 0654 12/02/23 1433 12/03/23 0727   BP: 98/60 98/60 108/72 104/53   Pulse: 83 90 80 64   Patient Position - Orthostatic VS:   Lying          Visual Acuity      ED Medications  Medications   cefepime (MAXIPIME) IVPB (premix in dextrose) 2,000 mg 50 mL (0 mg Intravenous Stopped 12/1/23 1724)   vancomycin (VANCOCIN) 1500 mg in sodium chloride 0.9% 250 mL IVPB (1,500 mg Intravenous New Bag 12/1/23 1754)   sodium chloride 0.9 % bolus 1,000 mL (0 mL Intravenous Stopped 12/1/23 1710)   iohexol (OMNIPAQUE) 350 MG/ML injection (MULTI-DOSE) 100 mL (100 mL Intravenous Given 12/1/23 1637)   lidocaine-epinephrine (XYLOCAINE/EPINEPHRINE) 1 %-1:100,000 injection 5 mL (5 mL Infiltration Given 12/1/23 1753)   potassium chloride (K-DUR,KLOR-CON) CR tablet 40 mEq (40 mEq Oral Given 12/2/23 0945)       Diagnostic Studies  Results Reviewed       Procedure Component Value Units Date/Time    Blood culture #1 [[id number]] Collected: 12/01/23 1526    Lab Status: Preliminary result Specimen: Blood from Arm, Right Updated: 12/04/23 0001     Blood Culture No Growth at 48 hrs. Blood culture #2 [[id number]] Collected: 12/01/23 1511    Lab Status: Preliminary result Specimen: Blood from Arm, Right Updated: 12/04/23 0001     Blood Culture No Growth at 48 hrs.     Basic metabolic panel [[id number]]  (Abnormal) Collected: 12/02/23 0517    Lab Status: Final result Specimen: Blood from Arm, Left Updated: 12/02/23 0601     Sodium 137 mmol/L      Potassium 3.5 mmol/L      Chloride 108 mmol/L      CO2 22 mmol/L      ANION GAP 7 mmol/L      BUN 7 mg/dL      Creatinine 0.52 mg/dL      Glucose 160 mg/dL      Calcium 8.7 mg/dL      eGFR 114 ml/min/1.73sq m     Narrative:      [facility] guidelines for [facility] Disease (CKD):     Stage 1 with normal or high GFR (GFR > 90 mL/min/1.73 square meters)    Stage 2 Mild CKD (GFR = 60-89 mL/min/1.73 square meters)    Stage 3A Moderate CKD (GFR = 45-59 mL/min/1.73 square meters)    Stage 3B Moderate CKD (GFR = 30-44 mL/min/1.73 square meters)    Stage 4 Severe CKD (GFR = 15-29 mL/min/1.73 square meters)    Stage 5 End Stage CKD (GFR <15 mL/min/1.73 square meters)  Note: GFR calculation is accurate only with a steady state creatinine    CBC and differential [[id number]]  (Abnormal) Collected: 12/02/23 0517    Lab Status: Final result Specimen: Blood from Arm, Left Updated: 12/02/23 0542     WBC 4.73 Thousand/uL      RBC 4.12 Million/uL      Hemoglobin 12.3 g/dL      Hematocrit 38.1 %      MCV 93 fL      MCH 29.9 pg      MCHC 32.3 g/dL      RDW 12.9 %      MPV 10.2 fL      Platelets 440 Thousands/uL      nRBC 0 /100 WBCs      Neutrophils Relative 41 %      Immat GRANS % 1 %      Lymphocytes Relative 33 %      Monocytes Relative 19 %      Eosinophils Relative 6 %      Basophils Relative 0 %      Neutrophils Absolute 1.97 Thousands/µL      Immature Grans Absolute 0.03 Thousand/uL      Lymphocytes Absolute 1.56 Thousands/µL      Monocytes Absolute 0.89 Thousand/µL      Eosinophils Absolute 0.26 Thousand/µL      Basophils Absolute 0.02 Thousands/µL     Urine Microscopic [[id number]]  (Abnormal) Collected: 12/01/23 1657    Lab Status: Final result Specimen: Urine, Clean Catch Updated: 12/01/23 1750     RBC, UA 0-1 /hpf      WBC, UA 4-10 /hpf      Epithelial Cells Occasional /hpf      Bacteria, UA None Seen /hpf     UA w Reflex to Microscopic w Reflex to Culture [[id number]]  (Abnormal) Collected: 12/01/23 1657    Lab Status: Final result Specimen: Urine, Clean Catch Updated: 12/01/23 1702     Color, UA Yellow     Clarity, UA Slightly Cloudy     Specific Gravity, UA 1.015     pH, UA 7.5     Leukocytes, UA Trace     Nitrite, UA Negative     Protein, UA Negative mg/dl      Glucose, UA 3+ mg/dl      Ketones, UA Negative mg/dl      Urobilinogen, UA 0.2 E.U./dl      Bilirubin, UA Negative     Occult Blood, UA Trace-lysed    Procalcitonin [[id number]]  (Normal) Collected: 12/01/23 1511    Lab Status: Final result Specimen: Blood from Arm, Right Updated: 12/01/23 1602     Procalcitonin 0.07 ng/ml     hCG, qualitative pregnancy [[id number]]  (Normal) Collected: 12/01/23 1511    Lab Status: Final result Specimen: Blood from Arm, Right Updated: 12/01/23 1600     Preg, Serum Negative    Comprehensive metabolic panel [[id number]]  (Abnormal) Collected: 12/01/23 1511    Lab Status: Final result Specimen: Blood from Arm, Right Updated: 12/01/23 1553     Sodium 136 mmol/L      Potassium 3.9 mmol/L      Chloride 103 mmol/L      CO2 26 mmol/L      ANION GAP 7 mmol/L      BUN 7 mg/dL      Creatinine 0.55 mg/dL      Glucose 181 mg/dL      Calcium 9.2 mg/dL      AST 43 U/L      ALT 37 U/L      Alkaline Phosphatase 80 U/L      Total Protein 6.8 g/dL      Albumin 3.7 g/dL      Total Bilirubin 0.30 mg/dL      eGFR 112 ml/min/1.73sq m     Narrative:      [facility] ([facility]D): Stage 1 with normal or high GFR (GFR > 90 mL/min/1.73 square meters)    Stage 2 Mild CKD (GFR = 60-89 mL/min/1.73 square meters)    Stage 3A Moderate CKD (GFR = 45-59 mL/min/1.73 square meters)    Stage 3B Moderate CKD (GFR = 30-44 mL/min/1.73 square meters)    Stage 4 Severe CKD (GFR = 15-29 mL/min/1.73 square meters)    Stage 5 End Stage CKD (GFR <15 mL/min/1.73 square meters)  Note: GFR calculation is accurate only with a steady state creatinine    Lactic acid [[id number]]  (Normal) Collected: 12/01/23 1511    Lab Status: Final result Specimen: Blood from Arm, Right Updated: 12/01/23 1552     LACTIC ACID 1.8 mmol/L     Narrative:      Result may be elevated if tourniquet was used during collection.     Protime-INR [[id number]]  (Normal) Collected: 12/01/23 1511    Lab Status: Final result Specimen: Blood from Arm, Right Updated: 12/01/23 1546     Protime 12.3 seconds      INR 0.91    APTT [[id number]]  (Normal) Collected: 12/01/23 1511    Lab Status: Final result Specimen: Blood from Arm, Right Updated: 12/01/23 1546     PTT 26 seconds     CBC and differential [[id number]]  (Abnormal) Collected: 12/01/23 1511    Lab Status: Final result Specimen: Blood from Arm, Right Updated: 12/01/23 1535     WBC 8.24 Thousand/uL      RBC 4.31 Million/uL      Hemoglobin 12.8 g/dL      Hematocrit 39.8 %      MCV 92 fL      MCH 29.7 pg      MCHC 32.2 g/dL      RDW 12.8 %      MPV 10.2 fL      Platelets 683 Thousands/uL      nRBC 0 /100 WBCs      Neutrophils Relative 66 %      Immat GRANS % 0 %      Lymphocytes Relative 17 %      Monocytes Relative 13 %      Eosinophils Relative 4 %      Basophils Relative 0 %      Neutrophils Absolute 5.35 Thousands/µL      Immature Grans Absolute 0.03 Thousand/uL      Lymphocytes Absolute 1.43 Thousands/µL      Monocytes Absolute 1.10 Thousand/µL      Eosinophils Absolute 0.30 Thousand/µL      Basophils Absolute 0.03 Thousands/µL                    CT abdomen pelvis with contrast   Final Result by Michael Moreno MD (12/01 1681)      1) Skin thickening and mild subcutaneous tissue stranding in the infraumbilical ventral abdominal wall, with more pronounced subcutaneous tissue stranding along the left side of the pannus with a 1.4 x 1.2 cm fluid collection within the skin, which may    represent a cutaneous blister, or possibly phlegmon/early abscess. Recommend correlation with physical exam.      2) No fluid collections or air in the subcutaneous tissues. No intra-abdominal or pelvic fluid collections. 3) 5.4 x 1.9 cm cystic tubular structure in the right adnexa, most likely representing a hydrosalpinx. Although the images are not available for direct comparison at time of dictation, a right-sided hydrosalpinx is described on reports from a CT of the    abdomen and pelvis from 3/6/2023 and pelvic ultrasound from 3/29/2023 performed at [address] [place] [address]. 4) Multiple mildly enlarged and prominent mesenteric lymph nodes with surrounding stranding, slightly increased from 2014. 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 5) Prominent bilateral inguinal lymph nodes, right greater than left, however below size threshold for lymphadenopathy in this lymph node chain. These are likely reactive. 6) Additional findings as above. Workstation performed: [id number]                    Procedures  Incision and drain    Date/Time: 12/1/2023 6:00 PM    Performed by: Leigha Borden MD  Authorized by: Leigha Borden MD  Universal Protocol:  Consent: Verbal consent obtained.   Risks and benefits: risks, benefits and alternatives were discussed  Consent given by: patient  Required items: required blood products, implants, devices, and special equipment available  Patient identity confirmed: verbally with patient and arm band    Patient location:  ED  Location:     Type:  Abscess    Size:  2 cm    Location:  Trunk    Trunk location:  Abdomen  Pre-procedure details:     Skin preparation:  Antiseptic wash  Anesthesia (see MAR for exact dosages): Anesthesia method:  Local infiltration    Local anesthetic:  Lidocaine 1% WITH epi  Procedure details:     Complexity:  Simple    Needle aspiration: no      Incision types:  Stab incision    Scalpel blade:  11    Approach:  Puncture    Incision depth:  Subcutaneous    Drainage:  Purulent    Drainage amount:  Copious    Wound treatment:  Wound left open    Packing materials:  None  Post-procedure details:     Patient tolerance of procedure: Tolerated well, no immediate complications           ED Course                                             Medical Decision Making  Patient is a 70-year-old female who presents for evaluation of worsening cellulitis on outpatient antibiotics. She has been on the antibiotics for 4 days, and I consider this a failure of outpatient management. Patient started with IV antibiotics here. CT imaging ruled out deeper abscess and reviewed. Blood work reviewed and shows some hyperglycemia. Patient had bedside incision and drainage of small superficial abscess with significant drainage of purulent material.  Plan to admit to the hospital for further workup and management. Amount and/or Complexity of Data Reviewed  Labs: ordered. Radiology: ordered. Risk  Prescription drug management. Decision regarding hospitalization.              Disposition  Final diagnoses:   Abdominal wall cellulitis   Abdominal wall abscess     Time reflects when diagnosis was documented in both MDM as applicable and the Disposition within this note       Time User Action Codes Description Comment    12/1/2023  6:11 PM Cherylene Lesser Add [J31.071] Abdominal wall cellulitis     12/1/2023  6:11 PM Cherylene Lesser Add [M02.405] Abdominal wall abscess     12/3/2023  8:22 AM Virgil Pereira Add [L08.9] Skin infection     12/3/2023  8:24 AM Virgil Pereira Add [N73.0] PID (acute pelvic inflammatory disease)           ED Disposition       ED Disposition   Admit    Condition   Stable    Date/Time   Fri Dec 1, 2023  6:11 PM    Comment   Case was discussed with BRAD and the patient's admission status was agreed to be Admission Status: inpatient status to the service of Dr. Pietro Das .                Follow-up Information       Follow up With Specialties Details Why Contact Info Additional Information    [address]. [address] [place] [address] Wound Care Schedule an appointment as soon as possible for a visit follow up within 1 week Megha [phone] [address] [facility], [address] [place] [address]   [phone]            Discharge Medication List as of 12/3/2023 11:15 AM        START taking these medications    Details   cephalexin (KEFLEX) 500 mg capsule Take 1 capsule (500 mg total) by mouth every 6 (six) hours for 7 days, Starting Sun 12/3/2023, Until Sun 12/10/2023, Normal      fluconazole (DIFLUCAN) 200 mg tablet Take 1 tablet (200 mg total) by mouth daily for 7 days Do not start before December 4, 2023., Starting Mon 12/4/2023, Until Mon 12/11/2023, Normal           CONTINUE these medications which have CHANGED    Details   doxycycline hyclate (VIBRAMYCIN) 100 mg capsule Take 1 capsule (100 mg total) by mouth every 12 (twelve) hours for 10 days, Starting Sun 12/3/2023, Until Wed 12/13/2023, Normal           CONTINUE these medications which have NOT CHANGED    Details   acetaminophen (TYLENOL) 650 mg CR tablet Take 2 tablets (1,300 mg total) by mouth every 8 (eight) hours as needed for mild pain, Starting Mon 7/10/2023, Normal      albuterol (ProAir HFA) 90 mcg/act inhaler Inhale 2 puffs every 6 (six) hours as needed for wheezing, Starting Mon 10/16/2023, Normal      atorvastatin (LIPITOR) 10 mg tablet Take 1 tablet (10 mg total) by mouth daily, Starting Tue 11/28/2023, Normal      hydrOXYzine HCL (ATARAX) 50 mg tablet Take 1 tablet (50 mg total) by mouth every 6 (six) hours as needed for itching, allergies or anxiety, Starting Tue 11/28/2023, Normal      Levemir FlexPen 100 units/mL injection pen INJECT 20 UNITS UNDER THE SKIN DAILY AT BEDTIME, Normal      sitaGLIPtin (JANUVIA) 25 mg tablet Take 1 tablet (25 mg total) by mouth daily, Starting Tue 8/29/2023, Normal      SUMAtriptan (IMITREX) 25 mg tablet TAKE 1 TABLET (25 MG TOTAL) BY MOUTH ONCE AS NEEDED FOR MIGRAINE, Starting Mon 4/4/2022, Normal      topiramate (TOPAMAX) 25 mg sprinkle capsule TAKE 1 CAPSULE (25 MG TOTAL) BY MOUTH 2 (TWO) TIMES A DAY, Starting Mon 11/13/2023, Normal      traZODone (DESYREL) 100 mg tablet TAKE 1 TABLET (100 MG TOTAL) BY MOUTH DAILY AT BEDTIME, Starting Mon 2/20/2023, Normal      Alcohol Swabs PADS Use once daily when testing finger sticks., Normal      !! glucose blood (True Metrix Blood Glucose Test) test strip Use as instructed, Normal      !! glucose blood test strip Use as instructed, Normal      !! glucose blood test strip Use as instructed. Test up to 3 times a day as needed, Normal      Insulin Pen Needle (B-D UF III MINI PEN NEEDLES) 31G X 5 MM MISC Inject under the skin daily at bedtime, Starting Tue 11/28/2023, Normal      Lancets MISC Use once for 1 dose Onetouch Verio. Test once daily or as directed., Starting Fri 6/11/2021, Normal      pramipexole (MIRAPEX) 0.5 mg tablet TAKE 1 TABLET (0.5 MG TOTAL) BY MOUTH DAILY AT BEDTIME, Starting Wed 9/13/2023, Normal       !! - Potential duplicate medications found. Please discuss with provider.         STOP taking these medications       escitalopram (Lexapro) 10 mg tablet Comments:   Reason for Stopping:         citalopram (CeleXA) 40 mg tablet Comments:   Reason for Stopping:         montelukast (SINGULAIR) 10 mg tablet Comments:   Reason for Stopping:         OLANZapine (ZyPREXA) 5 mg tablet Comments:   Reason for Stopping:         propranolol (INDERAL) 10 mg tablet Comments:   Reason for Stopping:               No discharge procedures on file.     PDMP Review       None            ED Provider  Electronically Signed by             Bri Bauer MD  12/04/23 3511

## 2023-12-04 NOTE — UTILIZATION REVIEW
NOTIFICATION OF INPATIENT ADMISSION   AUTHORIZATION REQUEST   SERVICING FACILITY:   [address] AFFILIATED WITH [address]  Tax ID: [id number]  NPI: [id number]   ATTENDING PROVIDER:  Attending Name and NPI#: Alona Villa [[id number]]  Address: [address] [place] [address] AFFILIATED WITH [address]  Phone: [phone]     ADMISSION INFORMATION:  Place of Service: [address] [place] [address]  Place of Service Code: 21  Inpatient Admission Date/Time: 12/1/23  6:12 PM  Discharge Date/Time: 12/3/2023  1:27 PM  Admitting Diagnosis Code/Description:  Rash [R21]  Abdominal wall abscess [L02.211]  Abdominal wall cellulitis [E99.440]     UTILIZATION REVIEW CONTACT:  Ariadne Corey Utilization   Network Utilization Review Department  Phone: [phone]  Fax [phone]  Email: [email or web address]  Contact for approvals/pending authorizations, clinical reviews, and discharge. PHYSICIAN ADVISORY SERVICES:  Medical Necessity Denial & [facility] Review  Phone: [phone]  Fax: [phone]  Email: [email or web address]     DISCHARGE SUPPORT TEAM:  For Patients Discharge Needs & Updates  Phone: [phone] opt. 2 Fax: [phone]  Email: [email or web address]

## 2023-12-05 LAB
BACTERIA WND AEROBE CULT: ABNORMAL
BACTERIA WND AEROBE CULT: ABNORMAL
GRAM STN SPEC: ABNORMAL

## 2023-12-06 DIAGNOSIS — G43.109 MIGRAINE WITH AURA AND WITHOUT STATUS MIGRAINOSUS, NOT INTRACTABLE: ICD-10-CM

## 2023-12-06 DIAGNOSIS — F41.8 DEPRESSION WITH ANXIETY: ICD-10-CM

## 2023-12-06 DIAGNOSIS — J30.1 ALLERGIC RHINITIS DUE TO POLLEN, UNSPECIFIED SEASONALITY: ICD-10-CM

## 2023-12-06 RX ORDER — TOPIRAMATE 25 MG/1
25 CAPSULE, COATED PELLETS ORAL 2 TIMES DAILY
Qty: 60 CAPSULE | Refills: 0 | Status: SHIPPED | OUTPATIENT
Start: 2023-12-06

## 2023-12-07 LAB
BACTERIA BLD CULT: NORMAL
BACTERIA BLD CULT: NORMAL

## 2023-12-08 RX ORDER — CITALOPRAM 40 MG/1
40 TABLET ORAL DAILY
Qty: 30 TABLET | Refills: 5 | Status: SHIPPED | OUTPATIENT
Start: 2023-12-08

## 2023-12-08 RX ORDER — MONTELUKAST SODIUM 10 MG/1
10 TABLET ORAL
Qty: 90 TABLET | Refills: 3 | Status: SHIPPED | OUTPATIENT
Start: 2023-12-08

## 2023-12-12 VITALS
DIASTOLIC BLOOD PRESSURE: 84 MMHG | RESPIRATION RATE: 18 BRPM | TEMPERATURE: 98.4 F | HEIGHT: 64 IN | BODY MASS INDEX: 39.44 KG/M2 | HEART RATE: 108 BPM | SYSTOLIC BLOOD PRESSURE: 124 MMHG | WEIGHT: 231 LBS | OXYGEN SATURATION: 98 %

## 2023-12-13 ENCOUNTER — OFFICE VISIT (OUTPATIENT)
Dept: WOUND CARE | Facility: CLINIC | Age: 48
End: 2023-12-13
Payer: COMMERCIAL

## 2023-12-13 VITALS
DIASTOLIC BLOOD PRESSURE: 69 MMHG | TEMPERATURE: 97.3 F | HEIGHT: 64 IN | RESPIRATION RATE: 20 BRPM | SYSTOLIC BLOOD PRESSURE: 110 MMHG | HEART RATE: 81 BPM | WEIGHT: 230 LBS | BODY MASS INDEX: 39.27 KG/M2

## 2023-12-13 DIAGNOSIS — Z98.890 STATUS POST INCISION AND DRAINAGE: ICD-10-CM

## 2023-12-13 DIAGNOSIS — Z79.4 TYPE 2 DIABETES MELLITUS WITHOUT COMPLICATION, WITH LONG-TERM CURRENT USE OF INSULIN (HCC): ICD-10-CM

## 2023-12-13 DIAGNOSIS — E11.9 TYPE 2 DIABETES MELLITUS WITHOUT COMPLICATION, WITH LONG-TERM CURRENT USE OF INSULIN (HCC): ICD-10-CM

## 2023-12-13 DIAGNOSIS — S31.109A OPEN WOUND OF ABDOMEN, INITIAL ENCOUNTER: Primary | ICD-10-CM

## 2023-12-13 PROCEDURE — 11042 DBRDMT SUBQ TIS 1ST 20SQCM/<: CPT | Performed by: STUDENT IN AN ORGANIZED HEALTH CARE EDUCATION/TRAINING PROGRAM

## 2023-12-13 PROCEDURE — 99214 OFFICE O/P EST MOD 30 MIN: CPT | Performed by: STUDENT IN AN ORGANIZED HEALTH CARE EDUCATION/TRAINING PROGRAM

## 2023-12-13 PROCEDURE — 99215 OFFICE O/P EST HI 40 MIN: CPT | Performed by: STUDENT IN AN ORGANIZED HEALTH CARE EDUCATION/TRAINING PROGRAM

## 2023-12-13 PROCEDURE — 11045 DBRDMT SUBQ TISS EACH ADDL: CPT | Performed by: STUDENT IN AN ORGANIZED HEALTH CARE EDUCATION/TRAINING PROGRAM

## 2023-12-13 RX ORDER — LIDOCAINE 40 MG/G
CREAM TOPICAL ONCE
Status: COMPLETED | OUTPATIENT
Start: 2023-12-13 | End: 2023-12-13

## 2023-12-13 RX ADMIN — LIDOCAINE: 40 CREAM TOPICAL at 10:20

## 2023-12-13 NOTE — PATIENT INSTRUCTIONS
Orders Placed This Encounter   Procedures    Wound cleansing and dressings     Wash your hands with soap and water. Remove old dressing, discard into plastic bag and place in trash. Cleanse the wound with soap and water (Dove for sensitive skin) prior to applying a clean dressing. Do not use tissue or cotton balls. Do not scrub the wound. Pat dry using gauze. Shower yes    Apply Dermagran gauze to the abdominal wound. Cover with bordered gauze. Change dressing daily and as needed for excessive drainage. This was done today. Please call the [address] Monday through Friday between the hours of 8:00 AM and 4:30 PM at [phone] if you have any questions, if you experience any major changes in your wound(s) or for any signs or symptoms of infection such as fever; changes in the redness, swelling, drainage, or odor of your wound. After hours, weekends or holidays please contact your primary care physician or go to the hospital emergency room. Standing Status:   Future     Standing Expiration Date:   12/13/2024    Meal Planning with Diabetes Exchanges   AMBULATORY CARE:   Diabetes exchanges  are servings of food that contain similar amounts of carbohydrate, fat, protein, and calories within a food group. The exchanges can be used to develop a healthy meal plan that helps to keep your blood sugar within the recommended levels. A meal plan with the right amount of carbohydrates is especially important. Your blood sugar naturally rises after you eat carbohydrates. Too many carbohydrates in 1 meal or snack can raise your blood sugar level. Carbohydrates are found in starches, fruit, milk, yogurt, and sweets. Call your doctor or diabetes care provider if:   You have high blood sugar levels during a certain time of day, or almost all of the time. You often have low blood sugar levels. You have questions or concerns about your condition or care.     Create a meal plan with exchanges:  A dietitian will work with you to develop a healthy meal plan that is right for you. This meal plan will include the amount of exchanges you can have from each food group throughout the day. Follow your meal plan by keeping track of the amount of exchanges you eat for each meal and snack. Your meal plan will be based on your age, weight, blood sugar levels, medicine, and activity level. Starch food group exchanges:  Each exchange below contains about 15 grams of carbohydrate , 3 grams of protein, 1 gram of fat, and 80 calories. 1 ounce of white, whole wheat or rye bread (1 slice)    1 ounce of bagel (about ¼ of a bagel)    1 6-inch flour or corn tortilla or 1 4-inch pancake (about ¼ inch thick)    ? cup of cooked pasta or rice    ¾ cup of dry, ready-to-eat cereal with no sugar added    ½ cup of cooked cereal, such as oatmeal    3 tacho cracker squares or 8 animal crackers    6 saltine-type crackers    3 cups of popcorn or ¾ ounce of pretzels    Starchy vegetables and cooked legumes:      ½ cup of corn, green peas, sweet potatoes, or mashed potatoes    ¼ of a large baked potato    1 cup of acorn, butternut squash, or pumpkin    ½ cup of beans, lentils, or peas (such as jenkins, kidney, or black-eyed)    ? cup of lima beans    Fruit group exchanges:  Each exchange contains about 15 grams of carbohydrate  and 60 calories. 1 small (4 ounce) apple, banana orange, or nectarine    ½ cup of canned or fresh fruit    ½ cup (4 ounces) of unsweetened fruit juice    2 tablespoons of dried fruit    Milk group exchanges:  Each exchange contains about 12 grams of carbohydrate  and 8 grams of protein. The amount of fat and calories in each serving depends on the type of milk (such as whole, low-fat, or fat-free).   1 cup fat-free or low-fat milk    ¾ cup of plain, nonfat yogurt    1 cup fat-free, flavored yogurt with artificial (no calorie) sweetener    Non-starchy vegetable group exchanges:  Each exchange contains about 5 grams of carbohydrate , 2 grams of protein, and 25 calories. Examples include beets, broccoli, cabbage, carrots, cauliflower, cucumber, mushrooms, tomatoes, and zucchini. ½ cup of cooked vegetables or 1 cup of raw vegetables    ½ cup of vegetable juice    Meat and meat substitute group exchanges:  Each exchange of a lean meat  listed below contains about 7 grams of protein, 0 to 3 grams of fat, and 45 calories. The meat and meat substitutes food group does not contain any carbohydrates. Medium and high-fat meats have more calories. 1 ounce of chicken or turkey without skin, or 1 ounce of fish (not breaded or fried)    1 ounce of lean beef, pork, or lamb    1-inch cube or 1 ounce of low-fat cheese    2 egg whites or ¼ cup of egg substitute    ½ cup of tofu    Sweets, desserts, and other carbohydrate group exchanges:   Sweets and other desserts:  Each exchange has about 15 grams of carbohydrate . 1 ounce of mahogany food cake or 2-inch square cake (unfrosted)    2 small cookies    ½ cup of sugar-free, fat-free ice cream    1 tablespoon of syrup, jam, jelly, table sugar, or honey    Combination foods:     1 cup of an entrée, such as lasagna, spaghetti with meatballs, macaroni and cheese, and chili with beans (each serving counts as 2 carbohydrate exchanges )    1 cup of tomato or vegetable beef soup (each serving counts as 1 carbohydrate exchange )    Fat group exchanges:  Each exchange contains 5 grams of fat and 45 calories. 1 teaspoon of oil (such as canola, olive, or corn oil)    6 almonds or cashews, 10 peanuts, or 4 pecan halves    2 tablespoons of avocado    ½ tablespoon of peanut butter    1 teaspoon of regular margarine or 2 teaspoons of low-fat margarine    1 teaspoon of regular butter or 1 tablespoon of low-fat butter    1 teaspoon of regular mayonnaise or 1 tablespoon of low-fat mayonnaise    1 tablespoon of regular salad dressing or 2 tablespoons of low-fat salad dressing    Free foods:   The foods on this list are called free foods because they have very few calories. Free foods usually do not increase your blood sugar if you limit them. 1 tablespoon of catsup or taco sauce    ¼ cup of salsa    2 tablespoons of sugar-free syrup or 2 teaspoons of light jam or jelly    1 tablespoon of fat-free salad dressing    4 tablespoons of fat-free margarine or fat-free mayonnaise    Sugar-free drinks: diet soda, sugar-free drink mixes, or mineral water    Low-sodium bouillon or fat-free broth    Mustard    Seasonings such as spices, herbs, and garlic    Sugar-free gelatin without added fruit    Other healthy nutrition guidelines:   Limit drinks with sugar substitutes. Your dietitian or healthcare provider will encourage you to drink water. Water helps your kidneys to function properly. Ask how much water you should drink every day. Eat more fiber. Choose foods that are good sources of fiber, such as fruits, vegetables, and whole grains. Cereals that contain 5 or more grams of fiber per serving are good sources of fiber. Legumes such as garbanzo, jenkins beans, kidney beans, and lentils are also good sources. Limit fat. Ask your dietitian or healthcare provider how much fat you should eat each day. Choose foods low in fat, saturated fat, trans fat, and cholesterol. Examples include turkey or chicken without the skin, fish, lean cuts of meat, and beans. Low-fat dairy foods, such as low-fat or fat-free milk and low-fat yogurt are also good choices. Omega-3 fatty acids are healthy fats that are found in canola oil, soybean oil and fatty fish. [place], albacore tuna, and sardines are good sources of omega 3 fatty acids. Eat 2 servings of these types of fish each week. Do not eat fried fish. Limit sugar. Sugar and sweets must be counted toward the carbohydrate exchanges that you can have within your meal plan. Limit sugar and sweets because they are usually also high in calories and fat.  Eat smaller portions of sweets by sharing a dessert or asking for a child-size portion at a restaurant. Limit sodium  (salt) to about 2,300 mg per day. You may need to eat even less sodium if you have certain medical conditions. Foods high in sodium include soy sauce, potato chips, and soup. Limit alcohol. Ask your healthcare provider if it is safe for you to drink alcohol. If alcohol is safe for you to have, eat a meal when you drink alcohol. If you drink alcohol on an empty stomach, your blood sugar may drop to a low level. Women 21 years or older and men 72 years or older should limit alcohol to 1 drink a day. Men aged 24 to 59 years should limit alcohol to 2 drinks a day. A drink of alcohol is 5 ounces of wine, 12 ounces of beer, or 1½ ounces of liquor. Other ways to manage diabetes:   Control your blood sugar level. Test your blood sugar level regularly and keep a record of the results. Ask your healthcare provider when and how often to test your blood sugar. You may need to check your blood sugar level at least 3 times each day. Talk to your healthcare provider about your weight. Ask if you need to lose weight, and how much you need to lose. If you are overweight, you may need to make other changes to lose weight. Ask your healthcare provider to help you create a weight loss program.    Get regular physical activity. Physical activity can help decrease your blood sugar level. It can also help to decrease your risk for heart disease and help you lose weight. Adults should have moderate intensity physical activity for at least 150 minutes every week. Spread the amount of activity over at least 3 days a week. Do not skip more than 2 days in a row. Children should get at least 60 minutes of moderate physical activity on most days of the week. Examples of moderate physical activity include brisk walking, running, and swimming. Do not sit for longer than 30 minutes.  Work with your healthcare provider to create a plan for physical activity. © Copyright Mike Ports 2023 Information is for End User's use only and may not be sold, redistributed or otherwise used for commercial purposes. The above information is an  only. It is not intended as medical advice for individual conditions or treatments. Talk to your doctor, nurse or pharmacist before following any medical regimen to see if it is safe and effective for you.

## 2023-12-13 NOTE — PROGRESS NOTES
Patient ID: Carrie Marquez is a 57 y.o. female Date of Birth 1975       Chief Complaint   Patient presents with    New Patient Visit     Patient here today for evaluation and management of wounds to the back of her thighs and abdomen. Patient believes it is from bug bites. Patient is homeless and living in a shed. Allergies:  Apple - food allergy, Apple fruit extract - food allergy, Grass pollen(k-o-r-t-swt milana), Dust mite extract, Misc natural products, Methylprednisolone, Raw vegetable - food allergy, and Red dye - food allergy    Diagnosis:   Diagnosis ICD-10-CM Associated Orders   1. Open wound of abdomen, initial encounter  S31.109A lidocaine (LMX) 4 % cream     Wound cleansing and dressings      2. Type 2 diabetes mellitus without complication, with long-term current use of insulin ([facility])  E11.9     Z79.4       3. Status post incision and drainage  Z98.890            Assessment  & Plan:    Initial evaluation of open wound of abdomen. Wound bed with slough. Post debridement wound bed with healthy appearing granulation tissue. Drainage is minimal. Periwound with induration but without erythema, purulent drainage or malodor to suggest active soft tissue infection. Surgical debridement, as below. Apply Dermagran to wound. Change daily. Keep area covered. A1C results reviewed with the patient today. Elevated at 7.5. Continue with insulin as prescribed. Try to avoid sugary soda that can spike sugars. May shower and cleanse area with gentle soap and water. Avoid harsh cleansers. Obtain 3-4 servings of protein daily for wound healing. Instructed to monitor for any changes including redness or swelling surrounding the wound, increased drainage or pain as well as fevers or chills. F/u in one week. Instructed to call if any questions or concerns arise in meantime.             Subjective:   12/13/23: 56 y/o F with PMHx of migraine, restless leg syndrome, mood disorder, PID, hx of drug use, obesity, DM with insulin use presents for evaluation of wound on her stomach. For the past few months pt has been getting recurrent abscesses on her body including legs and stomach. In July and August pt sought medical care regarding abscess formation on her abdomen, buttock and L leg and was treated with multiple courses of antibiotics. More recently she was evaluated in the emergency department on 12/01/23 given worsening redness of abdomen while on outpatient abx. Abdominal wall abscess was drained in the ED and she was treated with IV abx and de-escalated to PO. Was discharged on course of Doxycycline and Keflex for seven days. Unfortunately she is currently not in a home and living in a shed with a generator and feels as though these areas may be bites of some sort given new living situation. Notes she cleaned the place very well prior to arrival. Has access to a shower at a family members house and therefore is still able to bathe/shower consistently. Denies illicit drug use. The following portions of the patient's history were reviewed and updated as appropriate:   Patient Active Problem List   Diagnosis    Allergic rhinitis    Depression with anxiety    Disc degeneration, lumbosacral    Mood disorder of manic type ([address])    Severe obesity (BMI 35.0-39. 9) with comorbidity ([address])    Migraine without aura, not intractable    Other hyperlipidemia    Cellulitis of left hip    Cellulitis    Type 2 diabetes mellitus with hyperglycemia, with long-term current use of insulin ([facility])    Restless leg syndrome    Drug use    History of hyperlipidemia    Hydrosalpinx    PID (acute pelvic inflammatory disease)     Past Medical History:   Diagnosis Date    Anxiety     Asthma     Closed nondisplaced fracture of proximal phalanx of lesser toe of left foot 1/15/2020    Diabetes mellitus ([address])     Hyperlipidemia      Past Surgical History:   Procedure Laterality Date    ARTHROSCOPY KNEE Left     CHOLECYSTECTOMY ECTOPIC PREGNANCY SURGERY      SHOULDER SURGERY Right     torn bicep     Family History   Problem Relation Age of Onset    Obesity Mother     Obesity Father     Diabetes type II Father     Diabetes Father     Obesity Brother     No Known Problems Daughter     No Known Problems Maternal Grandmother     Diabetes Maternal Grandfather     No Known Problems Paternal Grandmother     No Known Problems Maternal Aunt     No Known Problems Maternal Aunt     No Known Problems Maternal Aunt     Heart disease Neg Hx     Stroke Neg Hx     Cancer Neg Hx      Social History     Socioeconomic History    Marital status: Unknown     Spouse name: Not on file    Number of children: Not on file    Years of education: Not on file    Highest education level: Not on file   Occupational History    Not on file   Tobacco Use    Smoking status: Never    Smokeless tobacco: Never   Vaping Use    Vaping status: Never Used   Substance and Sexual Activity    Alcohol use: Not Currently     Alcohol/week: 0.0 standard drinks of alcohol     Comment: 0    Drug use: Not Currently    Sexual activity: Not on file   Other Topics Concern    Not on file   Social History Narrative    Not on file     Social Determinants of Health     Financial Resource Strain: High Risk (3/29/2023)    Received from [address] [place] [address] Road [facility] ([facility])     Difficulty of Paying Living Expenses: Very hard   Food Insecurity: No Food Insecurity (7/8/2023)    Hunger Vital Sign     Worried About Running Out of Food in the Last Year: Never true     Ran Out of Food in the Last Year: Never true   Transportation Needs: No Transportation Needs (7/8/2023)    PRAPARE - Transportation     Lack of Transportation (Medical): No     Lack of Transportation (Non-Medical):  No   Physical Activity: Not on file   Stress: Not on file   Social Connections: Not on file   Intimate Partner Violence: Unknown (3/29/2023)    Received from [facility] Humiliation, Afraid, Rape, and Kick questionnaire     Fear of Current or Ex-Partner: Patient refused     Emotionally Abused: Patient refused     Physically Abused: Patient refused     Sexually Abused: No   Housing Stability: High Risk (7/8/2023)    Housing Stability Vital Sign     Unable to Pay for Housing in the Last Year: Yes     Number of [address] in the Last Year: 1     Unstable Housing in the Last Year: Yes       Current Outpatient Medications:     acetaminophen (TYLENOL) 650 mg CR tablet, Take 2 tablets (1,300 mg total) by mouth every 8 (eight) hours as needed for mild pain, Disp: 30 tablet, Rfl: 0    albuterol (ProAir HFA) 90 mcg/act inhaler, Inhale 2 puffs every 6 (six) hours as needed for wheezing, Disp: 8.5 g, Rfl: 1    Alcohol Swabs PADS, Use once daily when testing finger sticks. , Disp: 90 each, Rfl: 1    atorvastatin (LIPITOR) 10 mg tablet, Take 1 tablet (10 mg total) by mouth daily, Disp: 30 tablet, Rfl: 5    citalopram (CeleXA) 40 mg tablet, TAKE 1 TABLET (40 MG TOTAL) BY MOUTH DAILY, Disp: 30 tablet, Rfl: 5    glucose blood (True Metrix Blood Glucose Test) test strip, Use as instructed, Disp: 100 each, Rfl: 3    glucose blood test strip, Use as instructed, Disp: 100 each, Rfl: 2    glucose blood test strip, Use as instructed. Test up to 3 times a day as needed, Disp: 200 each, Rfl: 11    hydrOXYzine HCL (ATARAX) 50 mg tablet, Take 1 tablet (50 mg total) by mouth every 6 (six) hours as needed for itching, allergies or anxiety, Disp: 120 tablet, Rfl: 1    Insulin Pen Needle (B-D UF III MINI PEN NEEDLES) 31G X 5 MM MISC, Inject under the skin daily at bedtime, Disp: 90 each, Rfl: 1    Lancets MISC, Use once for 1 dose Onetouch Verio.  Test once daily or as directed., Disp: 100 each, Rfl: 1    Levemir FlexPen 100 units/mL injection pen, INJECT 20 UNITS UNDER THE SKIN DAILY AT BEDTIME, Disp: 15 mL, Rfl: 3    montelukast (SINGULAIR) 10 mg tablet, TAKE 1 TABLET (10 MG TOTAL) BY MOUTH DAILY AT BEDTIME, Disp: 90 tablet, Rfl: 3    pramipexole (MIRAPEX) 0.5 mg tablet, TAKE 1 TABLET (0.5 MG TOTAL) BY MOUTH DAILY AT BEDTIME, Disp: 30 tablet, Rfl: 0    sitaGLIPtin (JANUVIA) 25 mg tablet, Take 1 tablet (25 mg total) by mouth daily, Disp: 30 tablet, Rfl: 5    SUMAtriptan (IMITREX) 25 mg tablet, TAKE 1 TABLET (25 MG TOTAL) BY MOUTH ONCE AS NEEDED FOR MIGRAINE, Disp: 9 tablet, Rfl: 0    topiramate (TOPAMAX) 25 mg sprinkle capsule, TAKE 1 CAPSULE (25 MG TOTAL) BY MOUTH 2 (TWO) TIMES A DAY, Disp: 60 capsule, Rfl: 0    traZODone (DESYREL) 100 mg tablet, TAKE 1 TABLET (100 MG TOTAL) BY MOUTH DAILY AT BEDTIME, Disp: 30 tablet, Rfl: 0    Review of Systems   Constitutional:  Negative for chills and fever. HENT:  Positive for dental problem. Skin:  Positive for wound (abdomen). Psychiatric/Behavioral:  Negative for agitation. Objective:  /69   Pulse 81   Temp (!) 97.3 °F (36.3 °C)   Resp 20   Ht 5' 4" (1.626 m)   Wt 104 kg (230 lb)   BMI 39.48 kg/m²   Pain Score: 0-No pain     Physical Exam  Vitals reviewed. Cardiovascular:      Rate and Rhythm: Normal rate. Abdominal:          Comments: open wound of abdomen. Wound bed with slough. Post debridement wound bed with healthy appearing granulation tissue. Drainage is minimal. Periwound with induration but without erythema, purulent drainage or malodor to suggest active soft tissue infection. Skin:     Findings: Rash (intertrigo of inguinal region) present. Neurological:      Mental Status: She is alert. Wound 12/13/23 Abscess Abdomen Anterior; Lower (Active)   Wound Image   12/13/23 1033   Wound Description Dry;Brown;Yellow 12/13/23 1003   [place]-wound Assessment Clean;Dry; Induration;[place] 12/13/23 1003   Wound Length (cm) 0.5 cm 12/13/23 1003   Wound Width (cm) 1.5 cm 12/13/23 1003   Wound Depth (cm) 0.1 cm 12/13/23 1003   Wound Surface Area (cm^2) 0.75 cm^2 12/13/23 1003   Wound Volume (cm^3) 0.075 cm^3 12/13/23 1003 Calculated Wound Volume (cm^3) 0.08 cm^3 12/13/23 1003   Drainage Amount None 12/13/23 1003   Non-staged Wound Description Full thickness 12/13/23 1003   Treatments Cleansed 12/13/23 1003   Patient Tolerance Tolerated well 12/13/23 1003         Debridement   Wound 12/13/23 Abscess Abdomen Anterior; Lower    Universal Protocol:  Consent: Verbal consent obtained. Consent given by: patient  Time out: Immediately prior to procedure a "time out" was called to verify the correct patient, procedure, equipment, support staff and site/side marked as required. Patient understanding: patient states understanding of the procedure being performed  Patient identity confirmed: verbally with patient    Debridement Details  Performed by: PA  Debridement type: surgical  Level of debridement: subcutaneous tissue  Pain control: lidocaine 4%      Post-debridement measurements  Length (cm): 0.5  Width (cm): 1.5  Depth (cm): 0.3  Percent debrided: 100%  Surface Area (cm^2): 0.75  Area Debrided (cm^2): 0.75  Volume (cm^3): 0.22    Tissue and other material debrided: dermis and subcutaneous tissue  Devitalized tissue debrided: slough  Instrument(s) utilized: curette  Bleeding: small  Hemostasis obtained with: pressure  Procedural pain (0-10): 0  Post-procedural pain: 0   Response to treatment: procedure was tolerated well         Results from last 6 Months   Lab Units 12/02/23  1533   WOUND CULTURE  2+ Growth of Methicillin Resistant Staphylococcus aureus*           Wound Instructions:  Orders Placed This Encounter   Procedures    Wound cleansing and dressings     Wash your hands with soap and water. Remove old dressing, discard into plastic bag and place in trash. Cleanse the wound with soap and water (Dove for sensitive skin) prior to applying a clean dressing. Do not use tissue or cotton balls. Do not scrub the wound. Pat dry using gauze. Shower yes    Apply Dermagran gauze to the abdominal wound. Cover with bordered gauze. Change dressing daily and as needed for excessive drainage. This was done today. Please call the [id number] Monday through Friday between the hours of 8:00 AM and 4:30 PM at [phone] if you have any questions, if you experience any major changes in your wound(s) or for any signs or symptoms of infection such as fever; changes in the redness, swelling, drainage, or odor of your wound. After hours, weekends or holidays please contact your primary care physician or go to the hospital emergency room. Standing Status:   Future     Standing Expiration Date:   12/13/2024       Ally Seth PA-C      Portions of the record may have been created with voice recognition software. Occasional wrong word or "sound alike" substitutions may have occurred due to the inherent limitations of voice recognition software. Read the chart carefully and recognize, using context, where substitutions have occurred.

## 2023-12-14 ENCOUNTER — PATIENT OUTREACH (OUTPATIENT)
Dept: FAMILY MEDICINE CLINIC | Facility: CLINIC | Age: 48
End: 2023-12-14

## 2023-12-20 ENCOUNTER — TELEPHONE (OUTPATIENT)
Dept: WOUND CARE | Facility: CLINIC | Age: 48
End: 2023-12-20

## 2023-12-22 ENCOUNTER — PATIENT OUTREACH (OUTPATIENT)
Dept: FAMILY MEDICINE CLINIC | Facility: CLINIC | Age: 48
End: 2023-12-22

## 2023-12-22 NOTE — PROGRESS NOTES
Additional outreach made to patient to assess need for housing resources, as patient is homeless. No answer, voicemail left, and awaiting return call.    Will send Unable to Reach letter via "[employer]"t and close case at this time.   [phone]

## 2023-12-22 NOTE — LETTER
[address] [place] [address] [address]  [place] [address]  [phone]    Re: Unable to Reach   12/22/2023       Dear Gerri,    I am a [facility]  and wanted to be certain you had information to contact me should you desire assistance with or have questions about non-medical aspects of your care such as [but not limited to] medical insurance, housing, transportation, material needs, or emergency needs, as I was unable to reach you.  If I do not have an answer I will assist you in finding the appropriate agency or individual who can help.      Please feel free to contact me at [phone]. Thank You.    Sincerely,         Myla Shelton

## 2023-12-27 ENCOUNTER — OFFICE VISIT (OUTPATIENT)
Dept: URGENT CARE | Facility: CLINIC | Age: 48
End: 2023-12-27
Payer: COMMERCIAL

## 2023-12-27 VITALS
HEART RATE: 97 BPM | RESPIRATION RATE: 18 BRPM | DIASTOLIC BLOOD PRESSURE: 69 MMHG | TEMPERATURE: 97.8 F | OXYGEN SATURATION: 98 % | SYSTOLIC BLOOD PRESSURE: 118 MMHG

## 2023-12-27 DIAGNOSIS — B34.9 VIRAL INFECTION: ICD-10-CM

## 2023-12-27 DIAGNOSIS — L02.91 ABSCESS: Primary | ICD-10-CM

## 2023-12-27 LAB
SARS-COV-2 AG UPPER RESP QL IA: NEGATIVE
VALID CONTROL: NORMAL

## 2023-12-27 PROCEDURE — 87811 SARS-COV-2 COVID19 W/OPTIC: CPT | Performed by: NURSE PRACTITIONER

## 2023-12-27 PROCEDURE — 87636 SARSCOV2 & INF A&B AMP PRB: CPT | Performed by: NURSE PRACTITIONER

## 2023-12-27 PROCEDURE — 99213 OFFICE O/P EST LOW 20 MIN: CPT | Performed by: NURSE PRACTITIONER

## 2023-12-27 RX ORDER — SULFAMETHOXAZOLE AND TRIMETHOPRIM 800; 160 MG/1; MG/1
1 TABLET ORAL EVERY 12 HOURS SCHEDULED
Qty: 20 TABLET | Refills: 0 | Status: SHIPPED | OUTPATIENT
Start: 2023-12-27 | End: 2024-01-06

## 2023-12-27 NOTE — LETTER
December 27, 2023     Patient: Gerri Parkinson   YOB: 1975   Date of Visit: 12/27/2023       To Whom It May Concern:    It is my medical opinion that Gerri Parkinson  may return to work once fever free for 24 hours and symptoms improving 12/29 or after.  Please excuse for time missed due to acute illness .    If you have any questions or concerns, please don't hesitate to call.         Sincerely,        JENNIFER Glasgow    CC: No Recipients

## 2023-12-28 LAB
FLUAV RNA RESP QL NAA+PROBE: NEGATIVE
FLUBV RNA RESP QL NAA+PROBE: NEGATIVE
SARS-COV-2 RNA RESP QL NAA+PROBE: NEGATIVE

## 2023-12-28 NOTE — PROGRESS NOTES
[facility]        NAME: Gerri Parkinson is a 48 y.o. female  : 1975    MRN: [id number]  DATE: 2023  TIME: 7:30 PM      Assessment and Plan     Abscess [L02.91]  1. Abscess  sulfamethoxazole-trimethoprim (BACTRIM DS) 800-160 mg per tablet      2. Viral infection  Poct Covid 19 Rapid Antigen Test    Covid/Flu- Office Collect Normal            Patient Instructions     Patient Instructions   Abscess   AMBULATORY CARE:   An abscess  is an area under the skin where pus (infected fluid) collects. An abscess is often caused by bacteria, fungi or other germs that get into an open wound. You can get an abscess anywhere on your body.       Common signs and symptoms of an abscess:  You may have a swollen mass that is red and painful. Pus may leak out of the mass. The pus will be white or yellow and may smell bad. You may have redness and pain days before the mass appears. You may have a fever and chills if the infection spreads.  Seek immediate care if:   The area around your abscess becomes very painful, warm, or has red streaks.    You have a fever and chills.    Your heart is beating faster than usual.    You feel faint or confused.    Call your doctor if:   Your abscess gets bigger or does not get better.    Your abscess returns.    You have questions or concerns about your condition or care.    Treatment for an abscess:  Your healthcare provider may need to make a cut in the abscess to allow the pus to drain. You may need surgery to remove your abscess. You may  need any of the following:  Antibiotics  help treat a bacterial infection.    Acetaminophen  decreases pain and fever. It is available without a doctor's order. Ask how much to take and how often to take it. Follow directions. Read the labels of all other medicines you are using to see if they also contain acetaminophen, or ask your doctor or pharmacist. Acetaminophen can cause liver damage if not taken correctly.    NSAIDs , such as  ibuprofen, help decrease swelling, pain, and fever. This medicine is available with or without a doctor's order. NSAIDs can cause stomach bleeding or kidney problems in certain people. If you take blood thinner medicine, always ask your healthcare provider if NSAIDs are safe for you. Always read the medicine label and follow directions.    Take your medicine as directed.  Contact your healthcare provider if you think your medicine is not helping or if you have side effects. Tell your provider if you are allergic to any medicine. Keep a list of the medicines, vitamins, and herbs you take. Include the amounts, and when and why you take them. Bring the list or the pill bottles to follow-up visits. Carry your medicine list with you in case of an emergency.    Self-care:   Apply a warm compress to your abscess.  This will help it open and drain. Wet a washcloth in warm, but not hot, water. Apply the compress for 10 minutes. Repeat this 4 times each day. Do not  press on an abscess or try to open it with a needle. You may push the bacteria deeper or into your blood.    Do not share your clothes, towels, or sheets with anyone.  This can spread the infection to others.    Wash your hands often.  This can help prevent the spread of germs. Use soap and water or an alcohol-based hand rub.       Care for your wound after it is drained:   Care for your wound as directed.  If your healthcare provider says it is okay, carefully remove the bandage and gauze packing. You may need to soak the gauze to get it out of your wound. Clean your wound and the area around it as directed. Dry the area and put on new, clean bandages. Change your bandages when they get wet or dirty.    Ask your healthcare provider how to change the gauze in your wound.  Keep track of how many pieces of gauze are placed inside the wound. Do not put too much packing in the wound. Do not pack the gauze too tightly in your wound.    Follow up with your healthcare  provider in 1 to 3 days:  You may need to have your packing removed or your bandage changed. Write down your questions so you remember to ask them during your visits.  © Copyright [employer] 2023 Information is for End User's use only and may not be sold, redistributed or otherwise used for commercial purposes.  The above information is an  only. It is not intended as medical advice for individual conditions or treatments. Talk to your doctor, nurse or pharmacist before following any medical regimen to see if it is safe and effective for you.    COVID-19 (Coronavirus Disease 2019)   AMBULATORY CARE:   What you need to know about COVID-19:  COVID-19 is the disease caused by a coronavirus first discovered in December 2019. The virus can be spread starting 2 to 3 days before symptoms begin. The virus has changed into several new forms (called variants) since it was discovered. A variant may be more easily spread or cause more severe illness than the original form.  Signs and symptoms of COVID-19  Signs and symptoms usually start about 5 days after infection but can take 2 to 14 days. You may feel like you have the flu or a bad cold. Some signs and symptoms go away in a few days. Others can last weeks, months, or possibly years. You may have any of the following:  A cough or sore throat    Shortness of breath or trouble breathing that may become severe    A fever, possibly with chills and shaking    Muscle pain, body aches, or a headache    Sudden changes or loss of your taste or smell    Feeling mentally and physically tired (fatigue)    Congestion (stuffy head and nose), or a runny nose    Diarrhea, nausea, or vomiting    Call your local emergency number (911 in the [place]) if:   You have trouble breathing or shortness of breath at rest.    You have chest pain or pressure that lasts longer than 5 minutes.    You become confused or hard to wake.    Seek care immediately if:   The skin on your face, fingers, or  toes look blue or darker than usual.      Call your doctor if:   You have a fever.    You have questions or concerns about your condition or care.    How COVID-19 is diagnosed:  Your healthcare provider will examine you and ask about your symptoms. Tell your provider if you have any health conditions or are taking any medicines. Your provider can help you create a COVID-19 plan if you are at high risk for severe illness. Any of the following tests may be used:  A viral test  shows if you have a current infection. Some tests are available for you to do at home. Most use a nasal swab and give the result within 15 minutes. Your provider may want you to keep a supply of tests at home as part of your COVID-19 plan. Testing sites may also be available. A sample is taken from your nose or throat with a swab. You may need to quarantine until you get the results from a testing site.    An antigen test  shows if you have a protein from the COVID-19 virus. This test is often called a rapid test because the results are available in 30 minutes or less.    An antibody test  shows if you had a recent or past infection. Blood samples are used for this test. Antibodies are made by your immune system to fight the virus that causes COVID-19. Antibodies form 1 to 3 weeks after you are infected. This test is not used to show if you are immune to the virus.    A CT, MRI, ultrasound, or x-ray  may show complications of COVID-19, such as pneumonia or blood clots.    Treatment  may include any of the following:  Mild symptoms  may get better on their own. Some treatments have emergency use authorization (EUA). Examples include monoclonal antibodies and convalescent plasma. These may be given to help prevent worsening of your symptoms. You may also need any of the following:    Decongestants  help reduce nasal congestion and help you breathe more easily. If you take decongestant pills, they may make you feel restless or cause problems with  your sleep. Do not use decongestant sprays for more than a few days.    Cough suppressants  help reduce coughing. Ask your healthcare provider which type of cough medicine is best for you.    To soothe a sore throat,  gargle with warm salt water, or use throat lozenges or a throat spray. Drink more liquids to thin and loosen mucus and to prevent dehydration.    NSAIDs or acetaminophen  can help lower a fever and relieve body aches or a headache. Follow directions. If not taken correctly, NSAIDs can cause kidney damage and acetaminophen can cause liver damage.    Severe or life-threatening symptoms  are treated in the hospital. You may need any of the following:     Medicines  may be given to fight the virus or treat inflammation.    Blood thinners  help prevent or treat blood clots. If you have a deep vein thrombosis (DVT) or pulmonary embolism (PE), you may need to use blood thinners for at least 3 months.    Extra oxygen  may be given if you have respiratory failure. This means your lungs cannot get enough oxygen into your blood and out to your organs.    A ventilator  may be used to help you breathe.    What you need to know about long COVID:  Long COVID is a term to describe health problems the virus may cause.  Certain conditions increase your risk for long COVID.  Your risk is higher if you are 65 or older or a current or former smoker. A weak immune system, obesity, diabetes, or kidney, heart, or lung disease can also increase your risk.    Long COVID may cause severe or chronic health problems.  The most common problem is trouble thinking, remembering, or concentrating. You may also develop shortness of breath or a serious respiratory condition such as pneumonia. Blood clots may form and lead to organ damage. You may have nerve pain, trouble sleeping, or mood changes.    COVID-19 can lead to severe inflammation in your organs.  This is also called multisymptom inflammatory syndrome in adults (MIS-A) or MIS-C  in children. Inflammation may affect the heart, digestive system, skin, or brain.    What you need to know about COVID-19 vaccines:  Healthcare providers recommend vaccination, even if you already had COVID-19.  Get a COVID-19 vaccine as directed.  Vaccination is recommended for everyone 6 months or older. COVID-19 vaccines are given as a shot in 1 to 3 doses as a primary series. This depends on the vaccine brand and the age of the person who receives it. A booster dose is recommended for everyone 5 years or older after the primary series is complete. A second booster  is recommended for all adults 50 or older and for immunocompromised adolescents. The second booster is also recommended for anyone who got the 1-dose brand of vaccine for the first dose and a booster. Your provider can give you more information on boosters and help you schedule all needed doses.         Continue to protect yourself and others.  You can become infected even after you get the vaccine. You may also be able to pass the virus to others without knowing you are infected.    After you get the vaccine, check local, national, and international travel rules.  You may need to be tested before you travel. Some countries require proof of a negative test before you travel. You may also need to quarantine after you return.    Medicine may be given to prevent infection.  The medicine can be given if you are at high risk for infection and cannot get the vaccine. It can also be given if your immune system does not respond well to the vaccine.    How the 2019 coronavirus spreads:  Close personal contact with an infected person increases your risk for infection. This means being within 6 feet (2 meters) of the person for at least 15 minutes over 24 hours.  The virus travels in droplets that form when a person talks, sings, coughs, or sneezes.  The droplets can also float in the air for minutes or hours. Infection happens when you breathe in the droplets  or get them in your eyes or nose.    Person-to-person contact can spread the virus.  For example, a person with the virus on his or her hands can spread it by shaking hands with someone.    The virus can stay on objects and surfaces for hours to days.  You may become infected by touching the object or surface and then touching your eyes or mouth.    Help lower your risk for COVID-19 during an active outbreak:   Stay home if you are sick or think you may have COVID-19.  It is important to stay home if you are waiting for a testing appointment or for test results.    Wash your hands often throughout the day.  Use soap and water. Rub your soapy hands together, lacing your fingers, for at least 20 seconds. Rinse with warm, running water. Dry your hands with a clean towel or paper towel. Use hand  that contains alcohol if soap and water are not available. Teach children how to wash their hands and use hand .         Cover sneezes and coughs.  Turn your face away and cover your mouth and nose with a tissue. Throw the tissue away. Use the bend of your arm if a tissue is not available. Then wash your hands well with soap and water or use hand . Teach children how to cover a cough or sneeze.    Wear a face covering (mask) when needed.  Use a cloth covering with at least 2 layers. You can also create layers by putting a cloth covering over a disposable non-medical mask. Cover your mouth and your nose.         Try to keep space between you and others when you are out of the house.  Avoid crowds as much as possible. Wear a face covering when you must be around a large group and cannot keep space between you and others.    Clean and disinfect high-touch surfaces and objects often.  Use disinfecting wipes, or make a solution of 4 teaspoons of bleach in 1 quart (4 cups) of water.    Ask about other vaccines you may need.  Get the influenza (flu) vaccine as soon as recommended each year, usually starting  in September or October. Get the pneumonia vaccine if recommended. Your healthcare provider can tell you if you should also get other vaccines, and when to get them.       Follow up with your doctor as directed:  Write down your questions so you remember to ask them during your visits.  For more information:   [facility] [employer] and Prevention  [address] [place] [address]  Phone: 9- 457 [phone] [phone]  Web Address: http://www.[facility].gov    © Copyright [employer] 2023 Information is for End User's use only and may not be sold, redistributed or otherwise used for commercial purposes.  The above information is an  only. It is not intended as medical advice for individual conditions or treatments. Talk to your doctor, nurse or pharmacist before following any medical regimen to see if it is safe and effective for you.     Follow up with PCP in 3-5 days.  Proceed to  ER if symptoms worsen.    Chief Complaint     Chief Complaint   Patient presents with    Cold Like Symptoms     Sore throat, c/o feeling cold and hot since yesterday, exposed to covid     Mass     Back of thighs         History of Present Illness     Patient reports painful lump on the back of her right thigh a couple of days getting worse not better.    She also reports onset of upper resp symptoms--congestion, throat irritation, hot/cold, etc yesterday.  She notes her coworkers are currently ill with covid.        Review of Systems     Review of Systems   Constitutional:  Positive for chills and fever (subjective).   HENT:  Positive for congestion and sore throat.    Respiratory:  Positive for cough. Negative for chest tightness, shortness of breath and wheezing.    Skin:         Infection on back of right thigh     All other systems reviewed and are negative.        Current Medications       Current Outpatient Medications:     sulfamethoxazole-trimethoprim (BACTRIM DS) 800-160 mg per tablet, Take 1 tablet by mouth every 12 (twelve)  hours for 10 days, Disp: 20 tablet, Rfl: 0    acetaminophen (TYLENOL) 650 mg CR tablet, Take 2 tablets (1,300 mg total) by mouth every 8 (eight) hours as needed for mild pain, Disp: 30 tablet, Rfl: 0    albuterol (ProAir HFA) 90 mcg/act inhaler, Inhale 2 puffs every 6 (six) hours as needed for wheezing, Disp: 8.5 g, Rfl: 1    Alcohol Swabs PADS, Use once daily when testing finger sticks., Disp: 90 each, Rfl: 1    atorvastatin (LIPITOR) 10 mg tablet, Take 1 tablet (10 mg total) by mouth daily, Disp: 30 tablet, Rfl: 5    citalopram (CeleXA) 40 mg tablet, TAKE 1 TABLET (40 MG TOTAL) BY MOUTH DAILY, Disp: 30 tablet, Rfl: 5    glucose blood (True Metrix Blood Glucose Test) test strip, Use as instructed, Disp: 100 each, Rfl: 3    glucose blood test strip, Use as instructed, Disp: 100 each, Rfl: 2    glucose blood test strip, Use as instructed. Test up to 3 times a day as needed, Disp: 200 each, Rfl: 11    hydrOXYzine HCL (ATARAX) 50 mg tablet, Take 1 tablet (50 mg total) by mouth every 6 (six) hours as needed for itching, allergies or anxiety, Disp: 120 tablet, Rfl: 1    Insulin Pen Needle (B-D UF III MINI PEN NEEDLES) 31G X 5 MM MISC, Inject under the skin daily at bedtime, Disp: 90 each, Rfl: 1    Lancets MISC, Use once for 1 dose Onetouch Verio. Test once daily or as directed., Disp: 100 each, Rfl: 1    Levemir FlexPen 100 units/mL injection pen, INJECT 20 UNITS UNDER THE SKIN DAILY AT BEDTIME, Disp: 15 mL, Rfl: 3    montelukast (SINGULAIR) 10 mg tablet, TAKE 1 TABLET (10 MG TOTAL) BY MOUTH DAILY AT BEDTIME, Disp: 90 tablet, Rfl: 3    pramipexole (MIRAPEX) 0.5 mg tablet, TAKE 1 TABLET (0.5 MG TOTAL) BY MOUTH DAILY AT BEDTIME, Disp: 30 tablet, Rfl: 0    sitaGLIPtin (JANUVIA) 25 mg tablet, Take 1 tablet (25 mg total) by mouth daily, Disp: 30 tablet, Rfl: 5    SUMAtriptan (IMITREX) 25 mg tablet, TAKE 1 TABLET (25 MG TOTAL) BY MOUTH ONCE AS NEEDED FOR MIGRAINE, Disp: 9 tablet, Rfl: 0    topiramate (TOPAMAX) 25 mg sprinkle  capsule, TAKE 1 CAPSULE (25 MG TOTAL) BY MOUTH 2 (TWO) TIMES A DAY, Disp: 60 capsule, Rfl: 0    traZODone (DESYREL) 100 mg tablet, TAKE 1 TABLET (100 MG TOTAL) BY MOUTH DAILY AT BEDTIME, Disp: 30 tablet, Rfl: 0    Current Allergies     Allergies as of 12/27/2023 - Reviewed 12/27/2023   Allergen Reaction Noted    Apple - food allergy Anaphylaxis 03/13/2022    Apple fruit extract - food allergy Anaphylaxis 03/29/2023    Grass pollen(k-o-r-t-swt milana) Hives, Itching, Shortness Of Breath, Sneezing, and Throat Swelling 11/17/2021    Dust mite extract Hives and Itching 11/17/2021    Misc natural products Diarrhea, Nausea Only, and Vomiting 11/17/2021    Methylprednisolone Hives and Rash 03/19/2023    Raw vegetable - food allergy Blisters, Dizziness, Eye Swelling, Fatigue, Headache, Hives, Itching, Rash, Sneezing, Throat Swelling, and Wheezing 03/21/2022    Red dye - food allergy Hives, Itching, and Rash 11/17/2021              The following portions of the patient's history were reviewed and updated as appropriate: allergies, current medications, past family history, past medical history, past social history, past surgical history and problem list.     Past Medical History:   Diagnosis Date    Anxiety     Asthma     Closed nondisplaced fracture of proximal phalanx of lesser toe of left foot 1/15/2020    Diabetes mellitus (HCC)     Hyperlipidemia        Past Surgical History:   Procedure Laterality Date    ARTHROSCOPY KNEE Left     CHOLECYSTECTOMY      ECTOPIC PREGNANCY SURGERY      SHOULDER SURGERY Right     torn bicep       Family History   Problem Relation Age of Onset    Obesity Mother     Obesity Father     Diabetes type II Father     Diabetes Father     Obesity Brother     No Known Problems Daughter     No Known Problems Maternal Grandmother     Diabetes Maternal Grandfather     No Known Problems Paternal Grandmother     No Known Problems Maternal Aunt     No Known Problems Maternal Aunt     No Known Problems  Maternal Aunt     Heart disease Neg Hx     Stroke Neg Hx     Cancer Neg Hx          Medications have been verified.        Objective     /69   Pulse 97   Temp 97.8 °F (36.6 °C)   Resp 18   SpO2 98%   No LMP recorded.         Physical Exam     Physical Exam  Vitals and nursing note reviewed.   Constitutional:       General: She is not in acute distress.     Appearance: Normal appearance. She is well-developed. She is ill-appearing. She is not toxic-appearing or diaphoretic.   HENT:      Head: Normocephalic and atraumatic.      Nose: Congestion present.      Mouth/Throat:      Mouth: Mucous membranes are moist.      Pharynx: No oropharyngeal exudate or posterior oropharyngeal erythema.   Eyes:      Pupils: Pupils are equal, round, and reactive to light.   Cardiovascular:      Rate and Rhythm: Normal rate and regular rhythm.      Heart sounds: Normal heart sounds. No murmur heard.     No friction rub. No gallop.   Pulmonary:      Effort: Pulmonary effort is normal. No respiratory distress.      Breath sounds: Normal breath sounds. No stridor. No wheezing, rhonchi or rales.   Abdominal:      General: There is no distension.      Palpations: Abdomen is soft.   Musculoskeletal:         General: Normal range of motion.      Cervical back: Normal range of motion and neck supple.   Skin:     General: Skin is warm and dry.      Capillary Refill: Capillary refill takes less than 2 seconds.      Findings: Abscess and erythema present.          Neurological:      General: No focal deficit present.      Mental Status: She is alert and oriented to person, place, and time.   Psychiatric:         Mood and Affect: Mood and affect normal.         Behavior: Behavior normal. Behavior is cooperative.         Thought Content: Thought content normal.         Judgment: Judgment normal.

## 2024-01-06 NOTE — ASSESSMENT & PLAN NOTE
· Metformin held  · Target blood sugar in the hospital should be between 140 and 180  · Levemir increased to 25 units  · Continue Accu-Cheks AC and HS otherwise with sliding scale coverage negative -  no rash

## 2024-02-26 ENCOUNTER — APPOINTMENT (OUTPATIENT)
Dept: RADIOLOGY | Facility: CLINIC | Age: 49
End: 2024-02-26
Payer: OTHER MISCELLANEOUS

## 2024-02-26 ENCOUNTER — OCCMED (OUTPATIENT)
Dept: URGENT CARE | Facility: CLINIC | Age: 49
End: 2024-02-26
Payer: OTHER MISCELLANEOUS

## 2024-02-26 DIAGNOSIS — S99.911A INJURY OF RIGHT ANKLE, INITIAL ENCOUNTER: Primary | ICD-10-CM

## 2024-02-26 DIAGNOSIS — S99.911A INJURY OF RIGHT ANKLE, INITIAL ENCOUNTER: ICD-10-CM

## 2024-02-26 PROCEDURE — 73610 X-RAY EXAM OF ANKLE: CPT

## 2024-02-26 PROCEDURE — G0383 LEV 4 HOSP TYPE B ED VISIT: HCPCS | Performed by: STUDENT IN AN ORGANIZED HEALTH CARE EDUCATION/TRAINING PROGRAM

## 2024-02-26 PROCEDURE — 99284 EMERGENCY DEPT VISIT MOD MDM: CPT | Performed by: STUDENT IN AN ORGANIZED HEALTH CARE EDUCATION/TRAINING PROGRAM

## 2024-03-08 ENCOUNTER — OFFICE VISIT (OUTPATIENT)
Dept: OBGYN CLINIC | Facility: CLINIC | Age: 49
End: 2024-03-08
Payer: OTHER MISCELLANEOUS

## 2024-03-08 VITALS
SYSTOLIC BLOOD PRESSURE: 124 MMHG | HEART RATE: 99 BPM | WEIGHT: 235.4 LBS | DIASTOLIC BLOOD PRESSURE: 78 MMHG | TEMPERATURE: 98.4 F | BODY MASS INDEX: 40.19 KG/M2 | HEIGHT: 64 IN

## 2024-03-08 DIAGNOSIS — S99.911A ANKLE INJURY, RIGHT, INITIAL ENCOUNTER: Primary | ICD-10-CM

## 2024-03-08 PROCEDURE — 99204 OFFICE O/P NEW MOD 45 MIN: CPT | Performed by: FAMILY MEDICINE

## 2024-03-08 NOTE — PATIENT INSTRUCTIONS
F/u after MRI  MRI R ankle-  R ankle pain/injury/XR neg/pain weight bearing. r/o occult fx   Continue wearing boot  Icing/heat/OTC pain meds as needed.

## 2024-03-08 NOTE — PROGRESS NOTES
[facility] [address] [id number] [phone]      Assessment:  1. Ankle injury, right, initial encounter  -     MRI ankle/heel right  wo contrast; Future; Expected date: 03/08/2024        Plan:  Patient Instructions   F/u after MRI  MRI R ankle-  R ankle pain/injury/XR neg/pain weight bearing. r/o occult fx   Continue wearing boot  Icing/heat/OTC pain meds as needed.     Return for f/u after MRi R ankle, Recheck.    Chief Complaint:  Chief Complaint   Patient presents with    Right Ankle - Pain       Subjective:   HPI    Patient ID: Gerri Parkinson is a 48 y.o. female     Here for WC injury R ankle pain  She works at [employer]. She is a direct support person. She was working on 2/26/24 and the consumer in a wheelchair- brakes didn't work and put her foot in the spokes to stop from wheeling backward and twisted her R ankle back  Seen in [facility].  XR done. Given boot.  Less pain walking in boot.  Swells at end of day.  Tylenol/motrin PRN  Stabbing pain. Better at rest.    XR ANKLE 3+ VW RIGHT     INDICATION: S99.911A: Unspecified injury of right ankle, initial encounter. Right ankle pain after fall.     COMPARISON: Foot radiograph 1/10/2023     FINDINGS:     No acute fracture or dislocation.     Plantar and retrocalcaneal spurs.     No lytic or blastic osseous lesion.     Unremarkable soft tissues.     IMPRESSION:     No acute osseous abnormality.       Review of Systems   Constitutional:  Negative for fatigue and fever.   Respiratory:  Negative for shortness of breath.    Cardiovascular:  Negative for chest pain.   Gastrointestinal:  Negative for abdominal pain and nausea.   Genitourinary:  Negative for dysuria.   Musculoskeletal:  Positive for arthralgias.   Skin:  Negative for rash and wound.   Neurological:  Negative for weakness and headaches.       Objective:  Vitals:  /78 (BP Location: Left arm, Patient Position:  "Sitting, Cuff Size: Standard)   Pulse 99   Temp 98.4 °F (36.9 °C) (Tympanic)   Ht 5' 4\" (1.626 m)   Wt 107 kg (235 lb 6.4 oz)   BMI 40.41 kg/m²     The following portions of the patient's history were reviewed and updated as appropriate: allergies, current medications, past family history, past medical history, past social history, past surgical history, and problem list.    Physical exam:  Physical Exam  Constitutional:       Appearance: Normal appearance. She is normal weight.   HENT:      Head: Normocephalic.   Eyes:      Extraocular Movements: Extraocular movements intact.   Pulmonary:      Effort: Pulmonary effort is normal.   Musculoskeletal:      Cervical back: Normal range of motion.   Skin:     General: Skin is warm and dry.   Neurological:      General: No focal deficit present.      Mental Status: She is alert and oriented to person, place, and time. Mental status is at baseline.   Psychiatric:         Mood and Affect: Mood normal.         Behavior: Behavior normal.         Thought Content: Thought content normal.         Judgment: Judgment normal.       Right Ankle Exam     Tenderness   The patient is experiencing tenderness in the deltoid, CF, ATF, medial malleolus and lateral malleolus (talar dome TTP).  Swelling: none    Range of Motion   The patient has normal right ankle ROM.    Muscle Strength   The patient has normal right ankle strength.     Comments:  Pain with dorsiflexion/int/ext/inversion/eversion  Neg felton/squeeze test.          Strength/Myotome Testing     Right Ankle/Foot   Normal strength      I have personally reviewed pertinent films in PACS and my interpretation is XR-  R ankle- no fx.      Randy Hanson MD   "

## 2024-03-08 NOTE — LETTER
March 8, 2024     Patient: Gerri Parkinson  YOB: 1975  Date of Visit: 3/8/2024      To Whom it May Concern:    Gerri Parkinson is under my professional care. Gerri was seen in my office on 3/8/2024. Gerri may return to work with limitations -  sedentary duty only.  Limited walking. She must wear boot at all times.  No pushing/pulling/lifting >10 lbs .    If you have any questions or concerns, please don't hesitate to call.         Sincerely,          Randy Hanson MD        CC: No Recipients

## 2024-03-26 ENCOUNTER — HOSPITAL ENCOUNTER (OUTPATIENT)
Dept: MRI IMAGING | Facility: HOSPITAL | Age: 49
Discharge: HOME | End: 2024-03-26
Attending: FAMILY MEDICINE
Payer: OTHER MISCELLANEOUS

## 2024-03-26 DIAGNOSIS — S99.911A ANKLE INJURY, RIGHT, INITIAL ENCOUNTER: ICD-10-CM

## 2024-03-26 PROCEDURE — 73721 MRI JNT OF LWR EXTRE W/O DYE: CPT

## 2024-03-29 ENCOUNTER — APPOINTMENT (EMERGENCY)
Dept: NON INVASIVE DIAGNOSTICS | Facility: HOSPITAL | Age: 49
End: 2024-03-29
Payer: OTHER MISCELLANEOUS

## 2024-03-29 ENCOUNTER — APPOINTMENT (EMERGENCY)
Dept: CT IMAGING | Facility: HOSPITAL | Age: 49
End: 2024-03-29
Payer: OTHER MISCELLANEOUS

## 2024-03-29 ENCOUNTER — HOSPITAL ENCOUNTER (EMERGENCY)
Facility: HOSPITAL | Age: 49
Discharge: HOME | End: 2024-03-29
Attending: EMERGENCY MEDICINE
Payer: OTHER MISCELLANEOUS

## 2024-03-29 ENCOUNTER — APPOINTMENT (EMERGENCY)
Dept: RADIOLOGY | Facility: HOSPITAL | Age: 49
End: 2024-03-29
Payer: OTHER MISCELLANEOUS

## 2024-03-29 ENCOUNTER — OFFICE VISIT (OUTPATIENT)
Dept: OBGYN CLINIC | Facility: CLINIC | Age: 49
End: 2024-03-29
Payer: OTHER MISCELLANEOUS

## 2024-03-29 VITALS
SYSTOLIC BLOOD PRESSURE: 106 MMHG | WEIGHT: 233.2 LBS | HEART RATE: 107 BPM | TEMPERATURE: 97.2 F | DIASTOLIC BLOOD PRESSURE: 72 MMHG | BODY MASS INDEX: 39.81 KG/M2 | HEIGHT: 64 IN

## 2024-03-29 VITALS
RESPIRATION RATE: 18 BRPM | WEIGHT: 233 LBS | HEART RATE: 91 BPM | TEMPERATURE: 98.4 F | BODY MASS INDEX: 39.78 KG/M2 | DIASTOLIC BLOOD PRESSURE: 62 MMHG | SYSTOLIC BLOOD PRESSURE: 119 MMHG | OXYGEN SATURATION: 94 % | HEIGHT: 64 IN

## 2024-03-29 DIAGNOSIS — M79.669 CALF PAIN: Primary | ICD-10-CM

## 2024-03-29 DIAGNOSIS — R06.02 SHORTNESS OF BREATH: ICD-10-CM

## 2024-03-29 DIAGNOSIS — S99.911D ANKLE INJURY, RIGHT, SUBSEQUENT ENCOUNTER: Primary | ICD-10-CM

## 2024-03-29 DIAGNOSIS — M79.661 RIGHT CALF PAIN: ICD-10-CM

## 2024-03-29 DIAGNOSIS — R07.9 CHEST PAIN: ICD-10-CM

## 2024-03-29 DIAGNOSIS — R00.0 TACHYCARDIA: ICD-10-CM

## 2024-03-29 DIAGNOSIS — J45.901 ASTHMA EXACERBATION: ICD-10-CM

## 2024-03-29 LAB
2HR DELTA HS TROPONIN: <0 NG/L
ALBUMIN SERPL BCP-MCNC: 4.1 G/DL (ref 3.5–5)
ALP SERPL-CCNC: 75 U/L (ref 34–104)
ALT SERPL W P-5'-P-CCNC: 23 U/L (ref 7–52)
ANION GAP SERPL CALCULATED.3IONS-SCNC: 9 MMOL/L (ref 4–13)
AST SERPL W P-5'-P-CCNC: 18 U/L (ref 13–39)
ATRIAL RATE: 90 BPM
BASOPHILS # BLD AUTO: 0.06 THOUSANDS/ÂΜL (ref 0–0.1)
BASOPHILS NFR BLD AUTO: 1 % (ref 0–1)
BILIRUB SERPL-MCNC: 0.32 MG/DL (ref 0.2–1)
BNP SERPL-MCNC: 14 PG/ML (ref 0–100)
BUN SERPL-MCNC: 15 MG/DL (ref 5–25)
CALCIUM SERPL-MCNC: 9.3 MG/DL (ref 8.4–10.2)
CARDIAC TROPONIN I PNL SERPL HS: 2 NG/L
CARDIAC TROPONIN I PNL SERPL HS: <2 NG/L
CHLORIDE SERPL-SCNC: 103 MMOL/L (ref 96–108)
CO2 SERPL-SCNC: 22 MMOL/L (ref 21–32)
CREAT SERPL-MCNC: 0.68 MG/DL (ref 0.6–1.3)
EOSINOPHIL # BLD AUTO: 0.14 THOUSAND/ÂΜL (ref 0–0.61)
EOSINOPHIL NFR BLD AUTO: 1 % (ref 0–6)
ERYTHROCYTE [DISTWIDTH] IN BLOOD BY AUTOMATED COUNT: 13.2 % (ref 11.6–15.1)
FLUAV RNA RESP QL NAA+PROBE: NEGATIVE
FLUBV RNA RESP QL NAA+PROBE: NEGATIVE
GFR SERPL CREATININE-BSD FRML MDRD: 105 ML/MIN/1.73SQ M
GLUCOSE SERPL-MCNC: 160 MG/DL (ref 65–140)
HCT VFR BLD AUTO: 39.9 % (ref 34.8–46.1)
HGB BLD-MCNC: 13.1 G/DL (ref 11.5–15.4)
IMM GRANULOCYTES # BLD AUTO: 0.02 THOUSAND/UL (ref 0–0.2)
IMM GRANULOCYTES NFR BLD AUTO: 0 % (ref 0–2)
LYMPHOCYTES # BLD AUTO: 3.24 THOUSANDS/ÂΜL (ref 0.6–4.47)
LYMPHOCYTES NFR BLD AUTO: 32 % (ref 14–44)
MAGNESIUM SERPL-MCNC: 2 MG/DL (ref 1.9–2.7)
MCH RBC QN AUTO: 30 PG (ref 26.8–34.3)
MCHC RBC AUTO-ENTMCNC: 32.8 G/DL (ref 31.4–37.4)
MCV RBC AUTO: 92 FL (ref 82–98)
MONOCYTES # BLD AUTO: 0.89 THOUSAND/ÂΜL (ref 0.17–1.22)
MONOCYTES NFR BLD AUTO: 9 % (ref 4–12)
NEUTROPHILS # BLD AUTO: 5.67 THOUSANDS/ÂΜL (ref 1.85–7.62)
NEUTS SEG NFR BLD AUTO: 57 % (ref 43–75)
NRBC BLD AUTO-RTO: 0 /100 WBCS
P AXIS: 67 DEGREES
PLATELET # BLD AUTO: 237 THOUSANDS/UL (ref 149–390)
PMV BLD AUTO: 9.7 FL (ref 8.9–12.7)
POTASSIUM SERPL-SCNC: 3.7 MMOL/L (ref 3.5–5.3)
PR INTERVAL: 146 MS
PROT SERPL-MCNC: 7.4 G/DL (ref 6.4–8.4)
QRS AXIS: 51 DEGREES
QRSD INTERVAL: 82 MS
QT INTERVAL: 380 MS
QTC INTERVAL: 464 MS
RBC # BLD AUTO: 4.36 MILLION/UL (ref 3.81–5.12)
RSV RNA RESP QL NAA+PROBE: NEGATIVE
SARS-COV-2 RNA RESP QL NAA+PROBE: NEGATIVE
SODIUM SERPL-SCNC: 134 MMOL/L (ref 135–147)
T WAVE AXIS: 46 DEGREES
TSH SERPL DL<=0.05 MIU/L-ACNC: 1.02 UIU/ML (ref 0.45–4.5)
VENTRICULAR RATE: 90 BPM
WBC # BLD AUTO: 10.02 THOUSAND/UL (ref 4.31–10.16)

## 2024-03-29 PROCEDURE — 71045 X-RAY EXAM CHEST 1 VIEW: CPT

## 2024-03-29 PROCEDURE — 36415 COLL VENOUS BLD VENIPUNCTURE: CPT

## 2024-03-29 PROCEDURE — 80053 COMPREHEN METABOLIC PANEL: CPT

## 2024-03-29 PROCEDURE — 99215 OFFICE O/P EST HI 40 MIN: CPT | Performed by: FAMILY MEDICINE

## 2024-03-29 PROCEDURE — 83735 ASSAY OF MAGNESIUM: CPT

## 2024-03-29 PROCEDURE — 85025 COMPLETE CBC W/AUTO DIFF WBC: CPT

## 2024-03-29 PROCEDURE — 84484 ASSAY OF TROPONIN QUANT: CPT

## 2024-03-29 PROCEDURE — 93005 ELECTROCARDIOGRAM TRACING: CPT

## 2024-03-29 PROCEDURE — 99285 EMERGENCY DEPT VISIT HI MDM: CPT

## 2024-03-29 PROCEDURE — 99284 EMERGENCY DEPT VISIT MOD MDM: CPT

## 2024-03-29 PROCEDURE — 84443 ASSAY THYROID STIM HORMONE: CPT

## 2024-03-29 PROCEDURE — 83880 ASSAY OF NATRIURETIC PEPTIDE: CPT

## 2024-03-29 PROCEDURE — 93971 EXTREMITY STUDY: CPT | Performed by: SURGERY

## 2024-03-29 PROCEDURE — 93971 EXTREMITY STUDY: CPT

## 2024-03-29 PROCEDURE — 93010 ELECTROCARDIOGRAM REPORT: CPT | Performed by: INTERNAL MEDICINE

## 2024-03-29 PROCEDURE — 0241U HB NFCT DS VIR RESP RNA 4 TRGT: CPT

## 2024-03-29 PROCEDURE — 71275 CT ANGIOGRAPHY CHEST: CPT

## 2024-03-29 PROCEDURE — 94640 AIRWAY INHALATION TREATMENT: CPT

## 2024-03-29 RX ORDER — PREDNISONE 20 MG/1
40 TABLET ORAL ONCE
Status: COMPLETED | OUTPATIENT
Start: 2024-03-29 | End: 2024-03-29

## 2024-03-29 RX ORDER — SODIUM CHLORIDE 9 MG/ML
3 INJECTION INTRAVENOUS
Status: DISCONTINUED | OUTPATIENT
Start: 2024-03-29 | End: 2024-03-29 | Stop reason: HOSPADM

## 2024-03-29 RX ORDER — AZITHROMYCIN 250 MG/1
250 TABLET, FILM COATED ORAL EVERY 24 HOURS
Qty: 4 TABLET | Refills: 0 | Status: SHIPPED | OUTPATIENT
Start: 2024-03-29 | End: 2024-04-02

## 2024-03-29 RX ORDER — AZITHROMYCIN 250 MG/1
500 TABLET, FILM COATED ORAL ONCE
Status: COMPLETED | OUTPATIENT
Start: 2024-03-29 | End: 2024-03-29

## 2024-03-29 RX ORDER — ALBUTEROL SULFATE 2.5 MG/3ML
2.5 SOLUTION RESPIRATORY (INHALATION) EVERY 6 HOURS PRN
Qty: 75 ML | Refills: 0 | Status: SHIPPED | OUTPATIENT
Start: 2024-03-29

## 2024-03-29 RX ORDER — ALBUTEROL SULFATE 2.5 MG/3ML
5 SOLUTION RESPIRATORY (INHALATION) ONCE
Status: COMPLETED | OUTPATIENT
Start: 2024-03-29 | End: 2024-03-29

## 2024-03-29 RX ORDER — PREDNISONE 20 MG/1
40 TABLET ORAL DAILY
Qty: 8 TABLET | Refills: 0 | Status: SHIPPED | OUTPATIENT
Start: 2024-03-29 | End: 2024-04-02

## 2024-03-29 RX ADMIN — AZITHROMYCIN DIHYDRATE 500 MG: 250 TABLET ORAL at 19:12

## 2024-03-29 RX ADMIN — IPRATROPIUM BROMIDE 0.5 MG: 0.5 SOLUTION RESPIRATORY (INHALATION) at 18:21

## 2024-03-29 RX ADMIN — PREDNISONE 40 MG: 20 TABLET ORAL at 18:21

## 2024-03-29 RX ADMIN — IOHEXOL 85 ML: 350 INJECTION, SOLUTION INTRAVENOUS at 17:40

## 2024-03-29 RX ADMIN — ALBUTEROL SULFATE 5 MG: 2.5 SOLUTION RESPIRATORY (INHALATION) at 18:21

## 2024-03-29 NOTE — DISCHARGE INSTRUCTIONS
Return for worsening of symptoms.   You are being treated for an asthma exacerbation.   Continue following with Sports Medicine.   Monitor your blood glucose while taking prednisone as this can cause hyperglycemia.

## 2024-03-29 NOTE — PATIENT INSTRUCTIONS
Urgent transfer to ER-  R calf pain/tachycardia- r/o PE  F/u 2 wks  Physical therapy  Home exercises.  Ankle brace  Boot as needed.

## 2024-03-29 NOTE — LETTER
March 29, 2024     Patient: Gerri Parkinson  YOB: 1975  Date of Visit: 3/29/2024      To Whom it May Concern:    Gerri Parkinson is under my professional care. Gerri was seen in my office on 3/29/2024. Gerri may return to work with limitations - she must wear ankle brace at all times.  No lifting >20 lbs .    If you have any questions or concerns, please don't hesitate to call.         Sincerely,          Randy Hanson MD        CC: No Recipients

## 2024-03-29 NOTE — PROGRESS NOTES
[facility] [address]  [facility] [id number] [phone]      Assessment:  1. Ankle injury, right, subsequent encounter  -     Brace  -     Ambulatory Referral to Physical Therapy; Future  -     Transfer to other facility    2. Tachycardia  -     Transfer to other facility    3. Right calf pain  -     Transfer to other facility        Plan:  Patient Instructions   Urgent transfer to ER-  R calf pain/tachycardia- r/o PE  F/u 2 wks  Physical therapy  Home exercises.  Ankle brace  Boot as needed.   Return in about 2 weeks (around 4/12/2024) for Recheck.    Chief Complaint:  Chief Complaint   Patient presents with    Right Ankle - Follow-up     Follow up to MRI       Subjective:   HPI    Patient ID: Gerri Parkinson is a 48 y.o. female        Here for WC injury R ankle pain  Wearing boot  Pain is about the same  Pain walking in boot up steps  No swelling  Tylenol  PRN, nsaids bother stomach  Stabbing pain at night  Better at rest.  No hx of DVT.     MRI ANKLE/HEEL RIGHT WO CONTRAST     INDICATION:   S99.911A: Unspecified injury of right ankle, initial encounter.     COMPARISON: Plain film 2/26/2024     TECHNIQUE:   Multiplanar/multisequence MR of the right ankle was performed.        FINDINGS:     Subcutaneous Tissues: Normal.     Joint Effusion: None.     TENDONS:  Achilles tendon: Mild Achilles insertional tendinosis with retrocalcaneal spur. No evidence of tear.  Peroneus brevis and longus: Normal.  Posterior tibialis, flexor digitorum longus, flexor hallucis longus: Normal.  Anterior tibialis, extensor digitorum longus, extensor hallucis longus:  Normal.     LIGAMENTS:  Lateral collateral ligament complex:  Normal.  Distal tibiofibular syndesmosis:  Normal.  Medial collateral ligament complex:  Normal.     Plantar Fascia: Mild thickening of the medial cord consistent with chronic planter fasciitis with associated spur but no evidence of tear.    "  Articular Surfaces:  Normal.     Sinus Tarsi:  Normal.     Tarsal Tunnel: Unremarkable.     Bones:  Normal signal intensity.     Musculature:  Normal.     IMPRESSION:     1. Mild Achilles insertional tendinosis with associated retrocalcaneal spur but no evidence of tear.  2. Chronic planter fasciitis with plantar calcaneal spur but no evidence of tear.  3. No evidence of calcaneal or other stress fracture as questioned clinically.        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   Respiratory:  Negative for shortness of breath.    Cardiovascular:  Negative for chest pain.   Gastrointestinal:  Negative for abdominal pain and nausea.   Genitourinary:  Negative for dysuria.   Musculoskeletal:  Positive for arthralgias.   Skin:  Negative for rash and wound.   Neurological:  Negative for weakness and headaches.       Objective:  Vitals:  /72 (BP Location: Left arm, Patient Position: Sitting, Cuff Size: Large)   Pulse (!) 107   Temp (!) 97.2 °F (36.2 °C) (Tympanic)   Ht 5' 4\" (1.626 m)   Wt 106 kg (233 lb 3.2 oz)   BMI 40.03 kg/m²     The following portions of the patient's history were reviewed and updated as appropriate: allergies, current medications, past family history, past medical history, past social history, past surgical history, and problem list.    Physical exam:  Physical Exam  Constitutional:       Appearance: Normal appearance. She is normal weight.   HENT:      Head: Normocephalic.   Eyes:      Extraocular Movements: Extraocular movements intact.   Pulmonary:      Effort: Pulmonary effort is normal.   Musculoskeletal:      Cervical back: Normal range of motion.   Skin:     General: Skin is warm and dry. "   Neurological:      General: No focal deficit present.      Mental Status: She is alert and oriented to person, place, and time. Mental status is at baseline.   Psychiatric:         Mood and Affect: Mood normal.         Behavior: Behavior normal.         Thought Content: Thought content normal.         Judgment: Judgment normal.       Right Ankle Exam     Tenderness   The patient is experiencing tenderness in the lateral malleolus, ATF, deltoid and CF.    Range of Motion   The patient has normal right ankle ROM.    Muscle Strength   The patient has normal right ankle strength.    Tests   Anterior drawer: negative  Varus tilt: positive     Comments:  Pain with inversion/ext rotation  Neg felton/squeeze test  Calf TTP          Strength/Myotome Testing     Right Ankle/Foot   Normal strength      I have personally reviewed pertinent films in PACS and my interpretation is MRI  mild achilles tendinosis/no fx..      Randy Hanson MD

## 2024-03-29 NOTE — ED PROVIDER NOTES
"History  Chief Complaint   Patient presents with    Leg Pain     R leg pain post injury x feb; was seen at ortho doctor & provider concerned for DVT & PE due to HR of 107     Patient is a 48-year-old female past medical history of anxiety, diabetes, hyperlipidemia arriving for evaluation of right calf pain, and chest pain.  Patient states that throughout the past week she has noticed consistent Pain in her right lower leg.  Patient was in a immobilizing boot secondary to a right ankle injury.  This was exchanged today by her sports medicine physician for a brace.  Patient states that she feels as though is her right leg is swollen.  Patient reports that she has been increasingly immobile secondary to pain with mobilization.  Patient reports that over the past week she has had intermittent chest pain brought on while working, with associated \"sweating.\"  Patient states that this is not experienced in the past.  Patient reports intermittent shortness of breath different from her asthma.  Patient reports that her grandfather had a history of blood clots, although she herself does not.  Patient denies any abdominal pain, nausea, vomiting.  Patient was noted to have a heart rate of 107 while at her sports medicine evaluation. Last chest pain occurred at 3:30 PM.  She has no increased work of breathing, limits no labored respirations, no difficulty breathing, no current oxygen requirement.  Endorses that she has been coughing for the past 10 days, cough is nonproductive.  Patient recently quit vaping 3 weeks ago.  Patient has a history of asthma, no COPD. Patient endorses also having nasal congestion.         Prior to Admission Medications   Prescriptions Last Dose Informant Patient Reported? Taking?   Alcohol Swabs PADS  Self No No   Sig: Use once daily when testing finger sticks.   Insulin Pen Needle (B-D UF III MINI PEN NEEDLES) 31G X 5 MM MISC  Self No No   Sig: Inject under the skin daily at bedtime   Lancets MISC  " Self No No   Sig: Use once for 1 dose Onetouch Verio. Test once daily or as directed.   Levemir FlexPen 100 units/mL injection pen  Self No No   Sig: INJECT 20 UNITS UNDER THE SKIN DAILY AT BEDTIME   SUMAtriptan (IMITREX) 25 mg tablet  Self No No   Sig: TAKE 1 TABLET (25 MG TOTAL) BY MOUTH ONCE AS NEEDED FOR MIGRAINE   acetaminophen (TYLENOL) 650 mg CR tablet  Self No No   Sig: Take 2 tablets (1,300 mg total) by mouth every 8 (eight) hours as needed for mild pain   albuterol (ProAir HFA) 90 mcg/act inhaler  Self No No   Sig: Inhale 2 puffs every 6 (six) hours as needed for wheezing   atorvastatin (LIPITOR) 10 mg tablet  Self No No   Sig: Take 1 tablet (10 mg total) by mouth daily   citalopram (CeleXA) 40 mg tablet  Self No No   Sig: TAKE 1 TABLET (40 MG TOTAL) BY MOUTH DAILY   glucose blood (True Metrix Blood Glucose Test) test strip  Self No No   Sig: Use as instructed   glucose blood test strip  Self No No   Sig: Use as instructed   glucose blood test strip  Self No No   Sig: Use as instructed. Test up to 3 times a day as needed   Patient not taking: Reported on 3/8/2024   hydrOXYzine HCL (ATARAX) 50 mg tablet  Self No No   Sig: Take 1 tablet (50 mg total) by mouth every 6 (six) hours as needed for itching, allergies or anxiety   montelukast (SINGULAIR) 10 mg tablet  Self No No   Sig: TAKE 1 TABLET (10 MG TOTAL) BY MOUTH DAILY AT BEDTIME   Patient not taking: Reported on 3/8/2024   pramipexole (MIRAPEX) 0.5 mg tablet  Self No No   Sig: TAKE 1 TABLET (0.5 MG TOTAL) BY MOUTH DAILY AT BEDTIME   sitaGLIPtin (JANUVIA) 25 mg tablet  Self No No   Sig: Take 1 tablet (25 mg total) by mouth daily   topiramate (TOPAMAX) 25 mg sprinkle capsule  Self No No   Sig: TAKE 1 CAPSULE (25 MG TOTAL) BY MOUTH 2 (TWO) TIMES A DAY   traZODone (DESYREL) 100 mg tablet  Self No No   Sig: TAKE 1 TABLET (100 MG TOTAL) BY MOUTH DAILY AT BEDTIME      Facility-Administered Medications: None       Past Medical History:   Diagnosis Date    Anxiety      Asthma     Closed nondisplaced fracture of proximal phalanx of lesser toe of left foot 1/15/2020    Diabetes mellitus (HCC)     Hyperlipidemia        Past Surgical History:   Procedure Laterality Date    ARTHROSCOPY KNEE Left     CHOLECYSTECTOMY      ECTOPIC PREGNANCY SURGERY      SHOULDER SURGERY Right     torn bicep       Family History   Problem Relation Age of Onset    Obesity Mother     Obesity Father     Diabetes type II Father     Diabetes Father     Obesity Brother     No Known Problems Daughter     No Known Problems Maternal Grandmother     Diabetes Maternal Grandfather     No Known Problems Paternal Grandmother     No Known Problems Maternal Aunt     No Known Problems Maternal Aunt     No Known Problems Maternal Aunt     Heart disease Neg Hx     Stroke Neg Hx     Cancer Neg Hx      I have reviewed and agree with the history as documented.    E-Cigarette/Vaping    E-Cigarette Use Never User      E-Cigarette/Vaping Substances    Nicotine No     THC No     CBD No     Flavoring No     Other No     Unknown No      Social History     Tobacco Use    Smoking status: Never    Smokeless tobacco: Never   Vaping Use    Vaping status: Never Used   Substance Use Topics    Alcohol use: Not Currently     Alcohol/week: 0.0 standard drinks of alcohol     Comment: 0    Drug use: Not Currently       Review of Systems   Constitutional: Negative.    HENT: Negative.     Eyes: Negative.    Respiratory:  Positive for shortness of breath. Negative for cough.    Cardiovascular:  Positive for chest pain and leg swelling.   Gastrointestinal: Negative.    Endocrine: Negative.    Genitourinary: Negative.    Musculoskeletal: Negative.    Skin: Negative.    Allergic/Immunologic: Negative.    Neurological: Negative.    Hematological: Negative.    Psychiatric/Behavioral: Negative.     All other systems reviewed and are negative.      Physical Exam  Physical Exam  Vitals and nursing note reviewed.   Constitutional:       Appearance:  Normal appearance. She is normal weight.   HENT:      Head: Normocephalic.      Right Ear: External ear normal.      Left Ear: External ear normal.      Nose: Congestion and rhinorrhea present.      Mouth/Throat:      Mouth: Mucous membranes are moist.      Pharynx: Oropharynx is clear.   Eyes:      General:         Right eye: No discharge.         Left eye: No discharge.      Extraocular Movements: Extraocular movements intact.      Conjunctiva/sclera: Conjunctivae normal.      Pupils: Pupils are equal, round, and reactive to light.   Cardiovascular:      Rate and Rhythm: Normal rate and regular rhythm.      Pulses: Normal pulses.      Heart sounds: Normal heart sounds.   Pulmonary:      Effort: Pulmonary effort is normal. No respiratory distress.      Breath sounds: Normal breath sounds. No stridor. No wheezing, rhonchi or rales.   Chest:      Chest wall: No tenderness.   Abdominal:      General: Abdomen is flat. Bowel sounds are normal. There is no distension.      Palpations: Abdomen is soft. There is no mass.      Tenderness: There is no abdominal tenderness. There is no right CVA tenderness, left CVA tenderness, guarding or rebound.      Hernia: No hernia is present.   Musculoskeletal:      Cervical back: Normal range of motion and neck supple.        Legs:    Skin:     General: Skin is warm.      Capillary Refill: Capillary refill takes less than 2 seconds.   Neurological:      General: No focal deficit present.      Mental Status: She is alert and oriented to person, place, and time. Mental status is at baseline.      GCS: GCS eye subscore is 4. GCS verbal subscore is 5. GCS motor subscore is 6.   Psychiatric:         Mood and Affect: Mood normal.         Behavior: Behavior normal.         Thought Content: Thought content normal.         Judgment: Judgment normal.         Vital Signs  ED Triage Vitals [03/29/24 1625]   Temperature Pulse Respirations Blood Pressure SpO2   98.4 °F (36.9 °C) 86 18 124/75 98 %       Temp src Heart Rate Source Patient Position - Orthostatic VS BP Location FiO2 (%)   -- Monitor Sitting Right arm --      Pain Score       7           Vitals:    03/29/24 1700 03/29/24 1800 03/29/24 1815 03/29/24 1830   BP: 128/68 134/75 125/78 119/62   Pulse: 88 87 90 91   Patient Position - Orthostatic VS: Sitting Sitting Sitting Sitting         Visual Acuity      ED Medications  Medications   sodium chloride (PF) 0.9 % injection 3 mL (has no administration in time range)   iohexol (OMNIPAQUE) 350 MG/ML injection (MULTI-DOSE) 85 mL (85 mL Intravenous Given 3/29/24 1740)   albuterol inhalation solution 5 mg (5 mg Nebulization Given 3/29/24 1821)   ipratropium (ATROVENT) 0.02 % inhalation solution 0.5 mg (0.5 mg Nebulization Given 3/29/24 1821)   predniSONE tablet 40 mg (40 mg Oral Given 3/29/24 1821)   azithromycin (ZITHROMAX) tablet 500 mg (500 mg Oral Given 3/29/24 1912)       Diagnostic Studies  Results Reviewed       Procedure Component Value Units Date/Time    HS Troponin I 2hr [[id number]]  (Normal) Collected: 03/29/24 1821    Lab Status: Final result Specimen: Blood from Arm, Right Updated: 03/29/24 1912     hs TnI 2hr <2 ng/L      Delta 2hr hsTnI <0 ng/L     HS Troponin I 4hr [[id number]]     Lab Status: No result Specimen: Blood     FLU/RSV/COVID - if FLU/RSV clinically relevant [[id number]]  (Normal) Collected: 03/29/24 1706    Lab Status: Final result Specimen: Nares from Nose Updated: 03/29/24 1748     SARS-CoV-2 Negative     INFLUENZA A PCR Negative     INFLUENZA B PCR Negative     RSV PCR Negative    Narrative:      FOR PEDIATRIC PATIENTS - copy/paste COVID Guidelines URL to browser: [email or web address]    SARS-CoV-2 assay is a Nucleic Acid Amplification assay intended for the  qualitative detection of nucleic acid from SARS-CoV-2 in nasopharyngeal  swabs. Results are for the presumptive identification of SARS-CoV-2 RNA.    Positive results are  indicative of infection with SARS-CoV-2, the virus  causing COVID-19, but do not rule out bacterial infection or co-infection  with other viruses. Laboratories within the [place] and its  territories are required to report all positive results to the appropriate  public health authorities. Negative results do not preclude SARS-CoV-2  infection and should not be used as the sole basis for treatment or other  patient management decisions. Negative results must be combined with  clinical observations, patient history, and epidemiological information.  This test has not been FDA cleared or approved.    This test has been authorized by FDA under an Emergency Use Authorization  (EUA). This test is only authorized for the duration of time the  declaration that circumstances exist justifying the authorization of the  emergency use of an in vitro diagnostic tests for detection of SARS-CoV-2  virus and/or diagnosis of COVID-19 infection under section 564(b)(1) of  the Act, 21 U.S.C. 360bbb-3(b)(1), unless the authorization is terminated  or revoked sooner. The test has been validated but independent review by FDA  and CLIA is pending.    Test performed using [employer] Gene[employer]pert: This RT-PCR assay targets N2,  a region unique to SARS-CoV-2. A conserved region in the E-gene was chosen  for pan-Sarbecovirus detection which includes SARS-CoV-2.    According to CMS-2020-01-R, this platform meets the definition of high-throughput technology.    B-Type Natriuretic Peptide(BNP) [[id number]]  (Normal) Collected: 03/29/24 1645    Lab Status: Final result Specimen: Blood from Arm, Right Updated: 03/29/24 1743     BNP 14 pg/mL     Comprehensive metabolic panel [[id number]]  (Abnormal) Collected: 03/29/24 1645    Lab Status: Final result Specimen: Blood from Arm, Right Updated: 03/29/24 1721     Sodium 134 mmol/L      Potassium 3.7 mmol/L      Chloride 103 mmol/L      CO2 22 mmol/L      ANION GAP 9 mmol/L      BUN 15 mg/dL       Creatinine 0.68 mg/dL      Glucose 160 mg/dL      Calcium 9.3 mg/dL      AST 18 U/L      ALT 23 U/L      Alkaline Phosphatase 75 U/L      Total Protein 7.4 g/dL      Albumin 4.1 g/dL      Total Bilirubin 0.32 mg/dL      eGFR 105 ml/min/1.73sq m     Narrative:      National Kidney Disease Foundation guidelines for Chronic Kidney Disease (CKD):     Stage 1 with normal or high GFR (GFR > 90 mL/min/1.73 square meters)    Stage 2 Mild CKD (GFR = 60-89 mL/min/1.73 square meters)    Stage 3A Moderate CKD (GFR = 45-59 mL/min/1.73 square meters)    Stage 3B Moderate CKD (GFR = 30-44 mL/min/1.73 square meters)    Stage 4 Severe CKD (GFR = 15-29 mL/min/1.73 square meters)    Stage 5 End Stage CKD (GFR <15 mL/min/1.73 square meters)  Note: GFR calculation is accurate only with a steady state creatinine    Magnesium [[id number]]  (Normal) Collected: 03/29/24 1645    Lab Status: Final result Specimen: Blood from Arm, Right Updated: 03/29/24 1721     Magnesium 2.0 mg/dL     TSH, 3rd generation with Free T4 reflex [[id number]]  (Normal) Collected: 03/29/24 1645    Lab Status: Final result Specimen: Blood from Arm, Right Updated: 03/29/24 1721     TSH 3RD GENERATON 1.020 uIU/mL     HS Troponin 0hr (reflex protocol) [[id number]]  (Normal) Collected: 03/29/24 1645    Lab Status: Final result Specimen: Blood from Arm, Right Updated: 03/29/24 1712     hs TnI 0hr 2 ng/L     CBC and differential [[id number]] Collected: 03/29/24 1645    Lab Status: Final result Specimen: Blood from Arm, Right Updated: 03/29/24 1649     WBC 10.02 Thousand/uL      RBC 4.36 Million/uL      Hemoglobin 13.1 g/dL      Hematocrit 39.9 %      MCV 92 fL      MCH 30.0 pg      MCHC 32.8 g/dL      RDW 13.2 %      MPV 9.7 fL      Platelets 237 Thousands/uL      nRBC 0 /100 WBCs      Neutrophils Relative 57 %      Immature Grans % 0 %      Lymphocytes Relative 32 %      Monocytes Relative 9 %      Eosinophils Relative 1 %      Basophils Relative 1 %      Neutrophils  Absolute 5.67 Thousands/µL      Absolute Immature Grans 0.02 Thousand/uL      Absolute Lymphocytes 3.24 Thousands/µL      Absolute Monocytes 0.89 Thousand/µL      Eosinophils Absolute 0.14 Thousand/µL      Basophils Absolute 0.06 Thousands/µL                    CTA ED chest PE study   Final Result by Anthony Rasmussen MD (03/29 1808)      No pulmonary embolism. Clear lungs.      Fatty liver.                  Workstation performed: [id number]         X-ray chest 1 view portable   ED Interpretation by JENNIFER Goldberg (03/29 1813)   No acute cardiopulmonary disease.       VAS VENOUS DUPLEX -LOWER LIMB UNILATERAL    (Results Pending)              Procedures  ECG 12 Lead Documentation Only    Date/Time: 3/29/2024 4:27 PM    Performed by: JENNIFER Goldberg  Authorized by: JENNIFER Goldberg    Indications / Diagnosis:  Chest pain  ECG reviewed by me, the ED Provider: yes    Patient location:  ED  Interpretation:     Interpretation: normal    Rate:     ECG rate:  90    ECG rate assessment: normal    Rhythm:     Rhythm: sinus rhythm    Ectopy:     Ectopy: none    QRS:     QRS axis:  Normal  Conduction:     Conduction: normal    ST segments:     ST segments:  Normal  T waves:     T waves: normal    ECG 12 Lead Documentation Only    Date/Time: 3/29/2024 6:29 PM    Performed by: JENNIFER Goldberg  Authorized by: JENNIFER Goldberg    Indications / Diagnosis:  Chest pain  ECG reviewed by me, the ED Provider: yes    Patient location:  ED  Interpretation:     Interpretation: normal    Rate:     ECG rate:  94    ECG rate assessment: normal    Rhythm:     Rhythm: sinus rhythm    Ectopy:     Ectopy: none    QRS:     QRS axis:  Normal  Conduction:     Conduction: normal    ST segments:     ST segments:  Normal  T waves:     T waves: normal             ED Course  ED Course as of 03/29/24 1921   Fri Mar 29, 2024   1715 hs TnI 0hr: 2   1724 Comprehensive metabolic panel(!)   1800 Vascular study negative per  Kelsea Mccollum.    1811 CTA ED chest PE study  No pulmonary embolism. Clear lungs.   1914 hs TnI 2hr: <2             HEART Risk Score      Flowsheet Row Most Recent Value   Heart Score Risk Calculator    History 0 Filed at: 03/29/2024 1737   ECG 0 Filed at: 03/29/2024 1737   Age 1 Filed at: 03/29/2024 1737   Risk Factors 2 Filed at: 03/29/2024 1737   Troponin 0 Filed at: 03/29/2024 1737   HEART Score 3 Filed at: 03/29/2024 1737                          SBIRT 22yo+      Flowsheet Row Most Recent Value   Initial Alcohol Screen: US AUDIT-C     1. How often do you have a drink containing alcohol? 0 Filed at: 03/29/2024 1624   2. How many drinks containing alcohol do you have on a typical day you are drinking?  0 Filed at: 03/29/2024 1624   3a. Male UNDER 65: How often do you have five or more drinks on one occasion? 0 Filed at: 03/29/2024 1624   3b. FEMALE Any Age, or MALE 65+: How often do you have 4 or more drinks on one occassion? 0 Filed at: 03/29/2024 1624   Audit-C Score 0 Filed at: 03/29/2024 1624   SELMA: How many times in the past year have you...    Used an illegal drug or used a prescription medication for non-medical reasons? Never Filed at: 03/29/2024 1624            Wells' Criteria for PE      Flowsheet Row Most Recent Value   Wells' Criteria for PE    Clinical signs and symptoms of DVT 3 Filed at: 03/29/2024 1627   PE is primary diagnosis or equally likely 3 Filed at: 03/29/2024 1627   HR >100 1.5 Filed at: 03/29/2024 1627   Immobilization at least 3 days or Surgery in the previous 4 weeks 1.5 Filed at: 03/29/2024 1627   Previous, objectively diagnosed PE or DVT 0 Filed at: 03/29/2024 1627   Hemoptysis 0 Filed at: 03/29/2024 1627   Malignancy with treatment within 6 months or palliative 0 Filed at: 03/29/2024 1627   Wells' Criteria Total 9 Filed at: 03/29/2024 1627          Pedro' Criteria for DVT      Flowsheet Row Most Recent Value   Pedro' Criteria for DVT    Active cancer Treatment or palliation within 6  months 0 Filed at: 03/29/2024 1636   Bedridden recently >3 days or major surgery within 12 weeks 0 Filed at: 03/29/2024 1636   Calf swelling >3 cm compared to the other leg 1 Filed at: 03/29/2024 1636   Entire leg swollen 1 Filed at: 03/29/2024 1636   Collateral (nonvaricose) superficial veins present 0 Filed at: 03/29/2024 1636   Localized tenderness along the deep venous system 1 Filed at: 03/29/2024 1636   Pitting edema, confined to symptomatic leg 0 Filed at: 03/29/2024 1636   Paralysis, paresis, or recent plaster immobilization of the lower extremity 1 Filed at: 03/29/2024 1636   Previously documented DVT 0 Filed at: 03/29/2024 1636   Alternative diagnosis to DVT as likely or more likely 0 Filed at: 03/29/2024 1636   Wells DVT Critera Score 4 Filed at: 03/29/2024 1636                Medical Decision Making  DDx: PE, DVT, ACS   Patient's HPI is concerning for PE, DVT, ACS.  Patient's Wells score is high for DVT and PE.  Will obtain vascular duplex of right lower leg, as well as PE study.  Will rule out ACS with chest pain workup.   Patient has no leukocytosis, no anemia.  Patient's TSH within normal limits.  Magnesium within normal limits.  Patient has no SAYRA, no gross electrolyte abnormalities.  Patient has known diabetes.  BNP WNL.   Has a heart score 3.  Initial troponin is 2.  As patient's last chest pain started at 330, will trend with a second troponin at this time.  EKGs are nonischemic. Patient has had two negative troponins and does not require additional per [facility] ACS algorithm. Chest pain in presence of 10 days of coughing and asthma/smoker history.   Patient reporting relief from chest pain with breathing treatment and steroids. Patient endorses she quit vaping 3 weeks ago and has had a cough for the past 10 days. As chest xray and CT PE study are negative will treat as acute bronchitis and asthma exacerbation. Patient does not have a nebulizer at home. Patient has had no episodes of hypoxia  while in department. Patient appropriate for outpatient follow up. Patient states will start seeing PCP Tamica again once she obtains insurance. Discussed return precautions. Patient eager for discharge.   Reviewed reasons to return to ed.  Patient verbalized understanding of diagnosis and agreement with discharge plan of care as well as understanding of reasons to return to ed.               Disposition  Final diagnoses:   Calf pain   Chest pain   Shortness of breath   Asthma exacerbation     Time reflects when diagnosis was documented in both MDM as applicable and the Disposition within this note       Time User Action Codes Description Comment    3/29/2024  5:36 PM Cecile Bardales [M79.669] Calf pain     3/29/2024  5:36 PM Cecile Bardales [R07.9] Chest pain     3/29/2024  5:36 PM Cecile Bardales [R06.02] Shortness of breath     3/29/2024  7:03 PM Cecile Bardales [J45.901] Asthma exacerbation           ED Disposition       ED Disposition   Discharge    Condition   Stable    Date/Time   Fri Mar 29, 2024 1903    Comment   Gerri Parkinson discharge to home/self care.                   Follow-up Information       Follow up With Specialties Details Why Contact Info Additional Information    [facility], Nurse Practitioner   [address] [place] [id number] [phone]       [facility] Emergency Department Emergency Medicine Go to  If symptoms worsen [address] [facility] [place] [address] [phone] [facility] Emergency Department, [address] [place], [place] [address]            Patient's Medications   Discharge Prescriptions    ALBUTEROL (2.5 MG/3 ML) 0.083 % NEBULIZER SOLUTION    Take 3 mL (2.5 mg total) by nebulization every 6 (six) hours as needed for wheezing or shortness of breath       Start Date: 3/29/2024 End Date: --       Order Dose: 2.5 mg       Quantity: 75 mL    Refills:  0    AZITHROMYCIN (ZITHROMAX) 250 MG TABLET    Take 1 tablet (250 mg total) by mouth every 24 hours for 4 days       Start Date: 3/29/2024 End Date: 4/2/2024       Order Dose: 250 mg       Quantity: 4 tablet    Refills: 0    PREDNISONE 20 MG TABLET    Take 2 tablets (40 mg total) by mouth daily for 4 days       Start Date: 3/29/2024 End Date: 4/2/2024       Order Dose: 40 mg       Quantity: 8 tablet    Refills: 0       Outpatient Discharge Orders   Nebulizer       PDMP Review       None            ED Provider  Electronically Signed by             JENNIFER Goldberg  03/29/24 [id number]

## 2024-03-30 LAB
ATRIAL RATE: 94 BPM
P AXIS: 63 DEGREES
PR INTERVAL: 152 MS
QRS AXIS: 63 DEGREES
QRSD INTERVAL: 86 MS
QT INTERVAL: 380 MS
QTC INTERVAL: 475 MS
T WAVE AXIS: 50 DEGREES
VENTRICULAR RATE: 94 BPM

## 2024-03-30 PROCEDURE — 93010 ELECTROCARDIOGRAM REPORT: CPT | Performed by: INTERNAL MEDICINE

## 2024-04-20 ENCOUNTER — OFFICE VISIT (OUTPATIENT)
Dept: URGENT CARE | Facility: CLINIC | Age: 49
End: 2024-04-20
Payer: COMMERCIAL

## 2024-04-20 VITALS
OXYGEN SATURATION: 96 % | SYSTOLIC BLOOD PRESSURE: 122 MMHG | DIASTOLIC BLOOD PRESSURE: 71 MMHG | HEART RATE: 108 BPM | RESPIRATION RATE: 18 BRPM | TEMPERATURE: 97.9 F

## 2024-04-20 DIAGNOSIS — J06.9 ACUTE URI: Primary | ICD-10-CM

## 2024-04-20 PROCEDURE — 99213 OFFICE O/P EST LOW 20 MIN: CPT | Performed by: PHYSICIAN ASSISTANT

## 2024-04-20 RX ORDER — FLUTICASONE PROPIONATE 50 MCG
2 SPRAY, SUSPENSION (ML) NASAL DAILY
Qty: 1 G | Refills: 0 | Status: SHIPPED | OUTPATIENT
Start: 2024-04-20

## 2024-04-20 RX ORDER — AMOXICILLIN AND CLAVULANATE POTASSIUM 875; 125 MG/1; MG/1
1 TABLET, FILM COATED ORAL EVERY 12 HOURS SCHEDULED
Qty: 20 TABLET | Refills: 0 | Status: SHIPPED | OUTPATIENT
Start: 2024-04-20 | End: 2024-04-30

## 2024-04-20 RX ORDER — METHYLPREDNISOLONE 4 MG/1
TABLET ORAL
Qty: 1 EACH | Refills: 0 | Status: SHIPPED | OUTPATIENT
Start: 2024-04-20

## 2024-04-20 NOTE — PROGRESS NOTES
[facility]        NAME: Gerri Parkinson is a 48 y.o. female  : 1975    MRN: [id number]  DATE: 2024  TIME: 2:45 PM    Assessment and Plan   Acute URI [J06.9]  1. Acute URI  amoxicillin-clavulanate (AUGMENTIN) 875-125 mg per tablet    fluticasone (FLONASE) 50 mcg/act nasal spray    methylPREDNISolone 4 MG tablet therapy pack    loratadine-pseudoephedrine (CLARITIN-D 12-HOUR) 5-120 mg per tablet            Patient Instructions       Follow up with PCP in 3-5 days.  Proceed to  ER if symptoms worsen.    If tests have been performed at [facility], our office will contact you with results if changes need to be made to the care plan discussed with you at the visit.  You can review your full results on [facility].    Chief Complaint     Chief Complaint   Patient presents with    Cough     X 2 weeks, was at ER  easter weekend, dx bronchitis          History of Present Illness       Patient is a 47 y/o/f presenting to [facility] with cough, congestion, bilateral ear pain and sore throat.  Patient reports symptoms began 2 weeks ago.  Patient was evaluated in the ED on  and diagnosed w/ bronchitis.  Patient completed a course of Azithromycin.    Cough  This is a new problem. The current episode started 1 to 4 weeks ago. The problem has been gradually worsening. The problem occurs every few minutes. Associated symptoms include nasal congestion. Pertinent negatives include no chest pain, chills, ear pain, fever, rash, sore throat or shortness of breath.       Review of Systems   Review of Systems   Constitutional:  Negative for chills and fever.   HENT:  Positive for congestion. Negative for ear pain and sore throat.    Eyes:  Negative for pain and visual disturbance.   Respiratory:  Positive for cough. Negative for shortness of breath.    Cardiovascular:  Negative for chest pain and palpitations.   Gastrointestinal:  Negative for abdominal pain and vomiting.   Genitourinary:  Negative for  dysuria and hematuria.   Musculoskeletal:  Negative for arthralgias and back pain.   Skin:  Negative for color change and rash.   Neurological:  Negative for seizures and syncope.   All other systems reviewed and are negative.        Current Medications       Current Outpatient Medications:     albuterol (2.5 mg/3 mL) 0.083 % nebulizer solution, Take 3 mL (2.5 mg total) by nebulization every 6 (six) hours as needed for wheezing or shortness of breath, Disp: 75 mL, Rfl: 0    albuterol (ProAir HFA) 90 mcg/act inhaler, Inhale 2 puffs every 6 (six) hours as needed for wheezing, Disp: 8.5 g, Rfl: 1    amoxicillin-clavulanate (AUGMENTIN) 875-125 mg per tablet, Take 1 tablet by mouth every 12 (twelve) hours for 10 days, Disp: 20 tablet, Rfl: 0    atorvastatin (LIPITOR) 10 mg tablet, Take 1 tablet (10 mg total) by mouth daily, Disp: 30 tablet, Rfl: 5    citalopram (CeleXA) 40 mg tablet, TAKE 1 TABLET (40 MG TOTAL) BY MOUTH DAILY, Disp: 30 tablet, Rfl: 5    fluticasone (FLONASE) 50 mcg/act nasal spray, 2 sprays into each nostril daily, Disp: 1 g, Rfl: 0    hydrOXYzine HCL (ATARAX) 50 mg tablet, Take 1 tablet (50 mg total) by mouth every 6 (six) hours as needed for itching, allergies or anxiety, Disp: 120 tablet, Rfl: 1    Levemir FlexPen 100 units/mL injection pen, INJECT 20 UNITS UNDER THE SKIN DAILY AT BEDTIME, Disp: 15 mL, Rfl: 3    loratadine-pseudoephedrine (CLARITIN-D 12-HOUR) 5-120 mg per tablet, Take 1 tablet by mouth 2 (two) times a day, Disp: 30 tablet, Rfl: 0    methylPREDNISolone 4 MG tablet therapy pack, Use as directed on package, Disp: 1 each, Rfl: 0    pramipexole (MIRAPEX) 0.5 mg tablet, TAKE 1 TABLET (0.5 MG TOTAL) BY MOUTH DAILY AT BEDTIME, Disp: 30 tablet, Rfl: 0    SUMAtriptan (IMITREX) 25 mg tablet, TAKE 1 TABLET (25 MG TOTAL) BY MOUTH ONCE AS NEEDED FOR MIGRAINE, Disp: 9 tablet, Rfl: 0    topiramate (TOPAMAX) 25 mg sprinkle capsule, TAKE 1 CAPSULE (25 MG TOTAL) BY MOUTH 2 (TWO) TIMES A DAY, Disp: 60  capsule, Rfl: 0    traZODone (DESYREL) 100 mg tablet, TAKE 1 TABLET (100 MG TOTAL) BY MOUTH DAILY AT BEDTIME, Disp: 30 tablet, Rfl: 0    acetaminophen (TYLENOL) 650 mg CR tablet, Take 2 tablets (1,300 mg total) by mouth every 8 (eight) hours as needed for mild pain, Disp: 30 tablet, Rfl: 0    Alcohol Swabs PADS, Use once daily when testing finger sticks., Disp: 90 each, Rfl: 1    glucose blood (True Metrix Blood Glucose Test) test strip, Use as instructed, Disp: 100 each, Rfl: 3    glucose blood test strip, Use as instructed, Disp: 100 each, Rfl: 2    glucose blood test strip, Use as instructed. Test up to 3 times a day as needed (Patient not taking: Reported on 3/8/2024), Disp: 200 each, Rfl: 11    Insulin Pen Needle (B-D UF III MINI PEN NEEDLES) 31G X 5 MM MISC, Inject under the skin daily at bedtime, Disp: 90 each, Rfl: 1    Lancets MISC, Use once for 1 dose Onetouch Verio. Test once daily or as directed., Disp: 100 each, Rfl: 1    montelukast (SINGULAIR) 10 mg tablet, TAKE 1 TABLET (10 MG TOTAL) BY MOUTH DAILY AT BEDTIME (Patient not taking: Reported on 3/8/2024), Disp: 90 tablet, Rfl: 3    sitaGLIPtin (JANUVIA) 25 mg tablet, Take 1 tablet (25 mg total) by mouth daily, Disp: 30 tablet, Rfl: 5    Current Allergies     Allergies as of 04/20/2024 - Reviewed 04/20/2024   Allergen Reaction Noted    Apple - food allergy Anaphylaxis 03/13/2022    Apple fruit extract - food allergy Anaphylaxis 03/29/2023    Grass pollen(k-o-r-t-swt milana) Hives, Itching, Shortness Of Breath, Sneezing, and Throat Swelling 11/17/2021    Dust mite extract Hives and Itching 11/17/2021    Misc natural products Diarrhea, Nausea Only, and Vomiting 11/17/2021    Methylprednisolone Hives and Rash 03/19/2023    Raw vegetable - food allergy Blisters, Dizziness, Eye Swelling, Fatigue, Headache, Hives, Itching, Rash, Sneezing, Throat Swelling, and Wheezing 03/21/2022    Red dye - food allergy Hives, Itching, and Rash 11/17/2021            The  following portions of the patient's history were reviewed and updated as appropriate: allergies, current medications, past family history, past medical history, past social history, past surgical history and problem list.     Past Medical History:   Diagnosis Date    Anxiety     Asthma     Closed nondisplaced fracture of proximal phalanx of lesser toe of left foot 1/15/2020    Diabetes mellitus (HCC)     Hyperlipidemia        Past Surgical History:   Procedure Laterality Date    ARTHROSCOPY KNEE Left     CHOLECYSTECTOMY      ECTOPIC PREGNANCY SURGERY      SHOULDER SURGERY Right     torn bicep       Family History   Problem Relation Age of Onset    Obesity Mother     Obesity Father     Diabetes type II Father     Diabetes Father     Obesity Brother     No Known Problems Daughter     No Known Problems Maternal Grandmother     Diabetes Maternal Grandfather     No Known Problems Paternal Grandmother     No Known Problems Maternal Aunt     No Known Problems Maternal Aunt     No Known Problems Maternal Aunt     Heart disease Neg Hx     Stroke Neg Hx     Cancer Neg Hx          Medications have been verified.        Objective   /71   Pulse (!) 108   Temp 97.9 °F (36.6 °C)   Resp 18   SpO2 96%   No LMP recorded. Patient is perimenopausal.       Physical Exam     Physical Exam  Constitutional:       Appearance: Normal appearance.   HENT:      Head: Normocephalic and atraumatic.      Right Ear: Tympanic membrane is bulging. Tympanic membrane is not erythematous.      Left Ear: Tympanic membrane is bulging. Tympanic membrane is not erythematous.      Nose: Congestion present.      Mouth/Throat:      Mouth: Mucous membranes are moist.   Eyes:      Extraocular Movements: Extraocular movements intact.      Conjunctiva/sclera: Conjunctivae normal.      Pupils: Pupils are equal, round, and reactive to light.   Cardiovascular:      Rate and Rhythm: Normal rate and regular rhythm.      Heart sounds: No murmur heard.     No  friction rub. No gallop.   Pulmonary:      Effort: Pulmonary effort is normal.      Breath sounds: No wheezing, rhonchi or rales.   Musculoskeletal:         General: Normal range of motion.      Cervical back: Normal range of motion and neck supple.   Skin:     General: Skin is warm and dry.      Capillary Refill: Capillary refill takes less than 2 seconds.   Neurological:      General: No focal deficit present.      Mental Status: She is alert and oriented to person, place, and time.   Psychiatric:         Mood and Affect: Mood normal.         Behavior: Behavior normal.

## 2024-04-20 NOTE — LETTER
April 20, 2024     Patient: Gerri Parkinson   YOB: 1975   Date of Visit: 4/20/2024       To Whom It May Concern:    It is my medical opinion that Gerri Parkinson may return to work on 04/23/2024.    If you have any questions or concerns, please don't hesitate to call.         Sincerely,        Connie Valdes PA-C    CC: No Recipients

## 2024-04-23 ENCOUNTER — HOSPITAL ENCOUNTER (EMERGENCY)
Facility: HOSPITAL | Age: 49
Discharge: HOME | End: 2024-04-23
Attending: EMERGENCY MEDICINE
Payer: COMMERCIAL

## 2024-04-23 ENCOUNTER — APPOINTMENT (EMERGENCY)
Dept: RADIOLOGY | Facility: HOSPITAL | Age: 49
End: 2024-04-23
Payer: COMMERCIAL

## 2024-04-23 VITALS
HEART RATE: 87 BPM | WEIGHT: 233 LBS | BODY MASS INDEX: 39.78 KG/M2 | SYSTOLIC BLOOD PRESSURE: 120 MMHG | RESPIRATION RATE: 18 BRPM | HEIGHT: 64 IN | TEMPERATURE: 98 F | DIASTOLIC BLOOD PRESSURE: 62 MMHG | OXYGEN SATURATION: 96 %

## 2024-04-23 DIAGNOSIS — J31.0 RHINITIS: ICD-10-CM

## 2024-04-23 DIAGNOSIS — J18.9 PNEUMONIA: Primary | ICD-10-CM

## 2024-04-23 PROCEDURE — 71046 X-RAY EXAM CHEST 2 VIEWS: CPT

## 2024-04-23 PROCEDURE — 99284 EMERGENCY DEPT VISIT MOD MDM: CPT | Performed by: EMERGENCY MEDICINE

## 2024-04-23 PROCEDURE — 99284 EMERGENCY DEPT VISIT MOD MDM: CPT

## 2024-04-23 RX ORDER — FLUTICASONE PROPIONATE 50 MCG
1 SPRAY, SUSPENSION (ML) NASAL DAILY
Qty: 16 G | Refills: 0 | Status: SHIPPED | OUTPATIENT
Start: 2024-04-23

## 2024-04-23 RX ORDER — AZITHROMYCIN 250 MG/1
500 TABLET, FILM COATED ORAL ONCE
Status: COMPLETED | OUTPATIENT
Start: 2024-04-23 | End: 2024-04-23

## 2024-04-23 RX ORDER — IBUPROFEN 400 MG/1
400 TABLET ORAL EVERY 6 HOURS PRN
COMMUNITY

## 2024-04-23 RX ORDER — AZITHROMYCIN 250 MG/1
250 TABLET, FILM COATED ORAL EVERY 24 HOURS
Qty: 4 TABLET | Refills: 0 | Status: SHIPPED | OUTPATIENT
Start: 2024-04-23 | End: 2024-04-27

## 2024-04-23 RX ADMIN — AZITHROMYCIN DIHYDRATE 500 MG: 250 TABLET ORAL at 21:00

## 2024-04-23 NOTE — Clinical Note
Gerri Parkinson was seen and treated in our emergency department on 4/23/2024.                Diagnosis:     Gerri  .    She may return on this date:          If you have any questions or concerns, please don't hesitate to call.      Jd Dubon, DO    ______________________________           _______________          _______________  Hospital Representative                              Date                                Time

## 2024-04-24 NOTE — ED PROVIDER NOTES
History  Chief Complaint   Patient presents with    Earache     Pt was seen for an earache on 4/20 and given amoxicillin. Pt reports its ineffective and still has an earache.      48-year-old female presents with cough, ear discomfort on the right over the last 2 weeks.  Has nonproductive cough.  Denies chest pain or shortness of breath.  Seen at urgent care and started on Augmentin for her viral syndrome and comes in today because her symptoms are better.      Earache      Prior to Admission Medications   Prescriptions Last Dose Informant Patient Reported? Taking?   Alcohol Swabs PADS  Self No No   Sig: Use once daily when testing finger sticks.   Insulin Pen Needle (B-D UF III MINI PEN NEEDLES) 31G X 5 MM MISC  Self No No   Sig: Inject under the skin daily at bedtime   Lancets MISC  Self No No   Sig: Use once for 1 dose Onetouch Verio. Test once daily or as directed.   Levemir FlexPen 100 units/mL injection pen  Self No No   Sig: INJECT 20 UNITS UNDER THE SKIN DAILY AT BEDTIME   SUMAtriptan (IMITREX) 25 mg tablet  Self No No   Sig: TAKE 1 TABLET (25 MG TOTAL) BY MOUTH ONCE AS NEEDED FOR MIGRAINE   acetaminophen (TYLENOL) 650 mg CR tablet  Self No No   Sig: Take 2 tablets (1,300 mg total) by mouth every 8 (eight) hours as needed for mild pain   albuterol (2.5 mg/3 mL) 0.083 % nebulizer solution   No No   Sig: Take 3 mL (2.5 mg total) by nebulization every 6 (six) hours as needed for wheezing or shortness of breath   albuterol (ProAir HFA) 90 mcg/act inhaler  Self No No   Sig: Inhale 2 puffs every 6 (six) hours as needed for wheezing   amoxicillin-clavulanate (AUGMENTIN) 875-125 mg per tablet 4/23/2024  No Yes   Sig: Take 1 tablet by mouth every 12 (twelve) hours for 10 days   atorvastatin (LIPITOR) 10 mg tablet  Self No No   Sig: Take 1 tablet (10 mg total) by mouth daily   citalopram (CeleXA) 40 mg tablet  Self No No   Sig: TAKE 1 TABLET (40 MG TOTAL) BY MOUTH DAILY   fluticasone (FLONASE) 50 mcg/act nasal spray    No No   Si sprays into each nostril daily   glucose blood (True Metrix Blood Glucose Test) test strip  Self No No   Sig: Use as instructed   glucose blood test strip  Self No No   Sig: Use as instructed   glucose blood test strip  Self No No   Sig: Use as instructed. Test up to 3 times a day as needed   Patient not taking: Reported on 3/8/2024   hydrOXYzine HCL (ATARAX) 50 mg tablet  Self No No   Sig: Take 1 tablet (50 mg total) by mouth every 6 (six) hours as needed for itching, allergies or anxiety   ibuprofen (MOTRIN) 400 mg tablet 2024  Yes Yes   Sig: Take 400 mg by mouth every 6 (six) hours as needed for mild pain   loratadine-pseudoephedrine (CLARITIN-D 12-HOUR) 5-120 mg per tablet   No No   Sig: Take 1 tablet by mouth 2 (two) times a day   methylPREDNISolone 4 MG tablet therapy pack   No No   Sig: Use as directed on package   montelukast (SINGULAIR) 10 mg tablet  Self No No   Sig: TAKE 1 TABLET (10 MG TOTAL) BY MOUTH DAILY AT BEDTIME   Patient not taking: Reported on 3/8/2024   pramipexole (MIRAPEX) 0.5 mg tablet  Self No No   Sig: TAKE 1 TABLET (0.5 MG TOTAL) BY MOUTH DAILY AT BEDTIME   sitaGLIPtin (JANUVIA) 25 mg tablet  Self No No   Sig: Take 1 tablet (25 mg total) by mouth daily   topiramate (TOPAMAX) 25 mg sprinkle capsule  Self No No   Sig: TAKE 1 CAPSULE (25 MG TOTAL) BY MOUTH 2 (TWO) TIMES A DAY   traZODone (DESYREL) 100 mg tablet  Self No No   Sig: TAKE 1 TABLET (100 MG TOTAL) BY MOUTH DAILY AT BEDTIME      Facility-Administered Medications: None       Past Medical History:   Diagnosis Date    Anxiety     Asthma     Closed nondisplaced fracture of proximal phalanx of lesser toe of left foot 1/15/2020    Diabetes mellitus (HCC)     Hyperlipidemia        Past Surgical History:   Procedure Laterality Date    ARTHROSCOPY KNEE Left     CHOLECYSTECTOMY      ECTOPIC PREGNANCY SURGERY      SHOULDER SURGERY Right     torn bicep       Family History   Problem Relation Age of Onset    Obesity Mother      Obesity Father     Diabetes type II Father     Diabetes Father     Obesity Brother     No Known Problems Daughter     No Known Problems Maternal Grandmother     Diabetes Maternal Grandfather     No Known Problems Paternal Grandmother     No Known Problems Maternal Aunt     No Known Problems Maternal Aunt     No Known Problems Maternal Aunt     Heart disease Neg Hx     Stroke Neg Hx     Cancer Neg Hx      I have reviewed and agree with the history as documented.    E-Cigarette/Vaping    E-Cigarette Use Never User      E-Cigarette/Vaping Substances    Nicotine No     THC No     CBD No     Flavoring No     Other No     Unknown No      Social History     Tobacco Use    Smoking status: Never    Smokeless tobacco: Never   Vaping Use    Vaping status: Never Used   Substance Use Topics    Alcohol use: Not Currently     Alcohol/week: 0.0 standard drinks of alcohol     Comment: 0    Drug use: Not Currently       Review of Systems   HENT:  Positive for ear pain.        Physical Exam  Physical Exam  Vitals and nursing note reviewed.   Constitutional:       Appearance: Normal appearance. She is well-developed.   HENT:      Head: Normocephalic and atraumatic.   Eyes:      Conjunctiva/sclera: Conjunctivae normal.      Pupils: Pupils are equal, round, and reactive to light.   Neck:      Trachea: No tracheal deviation.   Cardiovascular:      Rate and Rhythm: Normal rate and regular rhythm.      Heart sounds: Normal heart sounds. No murmur heard.  Pulmonary:      Effort: Pulmonary effort is normal. No respiratory distress.      Breath sounds: Rhonchi present. No wheezing (left upper) or rales.   Abdominal:      General: Bowel sounds are normal. There is no distension.      Palpations: Abdomen is soft.      Tenderness: There is no abdominal tenderness.   Musculoskeletal:         General: No deformity.      Cervical back: Normal range of motion and neck supple.   Skin:     General: Skin is warm and dry.      Capillary Refill:  Capillary refill takes less than 2 seconds.   Neurological:      General: No focal deficit present.      Mental Status: She is alert and oriented to person, place, and time.      Sensory: No sensory deficit.   Psychiatric:         Mood and Affect: Mood normal.         Judgment: Judgment normal.         Vital Signs  ED Triage Vitals [04/23/24 1944]   Temperature Pulse Respirations Blood Pressure SpO2   98 °F (36.7 °C) 87 18 120/62 95 %      Temp Source Heart Rate Source Patient Position - Orthostatic VS BP Location FiO2 (%)   Oral Monitor Lying Right arm --      Pain Score       7           Vitals:    04/23/24 1944   BP: 120/62   Pulse: 87   Patient Position - Orthostatic VS: Lying         Visual Acuity      ED Medications  Medications   azithromycin (ZITHROMAX) tablet 500 mg (500 mg Oral Given 4/23/24 2100)       Diagnostic Studies  Results Reviewed       None                   XR chest 2 views   Final Result by Jd Chacon MD (04/24 0742)      No acute cardiopulmonary disease.      Findings are stable      Workstation performed: [id number]                    Procedures  Procedures         ED Course                               SBIRT 20yo+      Flowsheet Row Most Recent Value   Initial Alcohol Screen: US AUDIT-C     1. How often do you have a drink containing alcohol? 0 Filed at: 04/23/2024 1946   2. How many drinks containing alcohol do you have on a typical day you are drinking?  0 Filed at: 04/23/2024 1946   3b. FEMALE Any Age, or MALE 65+: How often do you have 4 or more drinks on one occassion? 0 Filed at: 04/23/2024 1946   Audit-C Score 0 Filed at: 04/23/2024 1946   SELMA: How many times in the past year have you...    Used an illegal drug or used a prescription medication for non-medical reasons? Never Filed at: 04/23/2024 1946                      Medical Decision Making  48-year-old female presents with symptoms consistent with a viral syndrome.    Will get chest x-ray to rule out pneumonia on the  clinically doubtful as she has nasal congestion, rhinorrhea and ear discomfort.  Possibly viral syndrome predisposing to pneumonia.  She does have some mild rhonchi in the left upper lung suggestive of pneumonia so will add azithromycin to her Augmentin.  Alternatively this may just be transmitted upper respiratory noises his chest x-ray was negative    She otherwise is well-appearing saturating well on room air.    Problems Addressed:  Pneumonia: acute illness or injury  Rhinitis: acute illness or injury    Amount and/or Complexity of Data Reviewed  Radiology: ordered and independent interpretation performed. Decision-making details documented in ED Course.    Risk  Prescription drug management.             Disposition  Final diagnoses:   Pneumonia   Rhinitis     Time reflects when diagnosis was documented in both MDM as applicable and the Disposition within this note       Time User Action Codes Description Comment    4/23/2024  8:52 PM Jd Dubon [J18.9] Pneumonia     4/23/2024  8:52 PM Jd Dubon [J31.0] Rhinitis           ED Disposition       ED Disposition   Discharge    Condition   Stable    Date/Time   Tue Apr 23, 2024 2052    Comment   Gerri Parkinson discharge to home/self care.                   Follow-up Information       Follow up With Specialties Details Why Contact Info    [facility], Nurse Practitioner Schedule an appointment as soon as possible for a visit   [address] [place] [address]  [phone]              Discharge Medication List as of 4/23/2024  8:52 PM        START taking these medications    Details   azithromycin (ZITHROMAX) 250 mg tablet Take 1 tablet (250 mg total) by mouth every 24 hours for 4 days, Starting Tue 4/23/2024, Until Sat 4/27/2024, Normal      !! fluticasone (FLONASE) 50 mcg/act nasal spray 1 spray into each nostril daily, Starting Tue 4/23/2024, Normal       !! - Potential duplicate medications  found. Please discuss with provider.        CONTINUE these medications which have NOT CHANGED    Details   amoxicillin-clavulanate (AUGMENTIN) 875-125 mg per tablet Take 1 tablet by mouth every 12 (twelve) hours for 10 days, Starting Sat 4/20/2024, Until Tue 4/30/2024, Normal      ibuprofen (MOTRIN) 400 mg tablet Take 400 mg by mouth every 6 (six) hours as needed for mild pain, Historical Med      acetaminophen (TYLENOL) 650 mg CR tablet Take 2 tablets (1,300 mg total) by mouth every 8 (eight) hours as needed for mild pain, Starting Mon 7/10/2023, Normal      albuterol (2.5 mg/3 mL) 0.083 % nebulizer solution Take 3 mL (2.5 mg total) by nebulization every 6 (six) hours as needed for wheezing or shortness of breath, Starting Fri 3/29/2024, Normal      albuterol (ProAir HFA) 90 mcg/act inhaler Inhale 2 puffs every 6 (six) hours as needed for wheezing, Starting Mon 10/16/2023, Normal      Alcohol Swabs PADS Use once daily when testing finger sticks., Normal      atorvastatin (LIPITOR) 10 mg tablet Take 1 tablet (10 mg total) by mouth daily, Starting Tue 11/28/2023, Normal      citalopram (CeleXA) 40 mg tablet TAKE 1 TABLET (40 MG TOTAL) BY MOUTH DAILY, Starting Fri 12/8/2023, Normal      !! fluticasone (FLONASE) 50 mcg/act nasal spray 2 sprays into each nostril daily, Starting Sat 4/20/2024, Normal      !! glucose blood (True Metrix Blood Glucose Test) test strip Use as instructed, Normal      !! glucose blood test strip Use as instructed, Normal      !! glucose blood test strip Use as instructed. Test up to 3 times a day as needed, Normal      hydrOXYzine HCL (ATARAX) 50 mg tablet Take 1 tablet (50 mg total) by mouth every 6 (six) hours as needed for itching, allergies or anxiety, Starting Tue 11/28/2023, Normal      Insulin Pen Needle (B-D UF III MINI PEN NEEDLES) 31G X 5 MM MISC Inject under the skin daily at bedtime, Starting Tue 11/28/2023, Normal      Lancets MISC Use once for 1 dose Onetouch Verio. Test once  daily or as directed., Starting Fri 6/11/2021, Normal      Levemir FlexPen 100 units/mL injection pen INJECT 20 UNITS UNDER THE SKIN DAILY AT BEDTIME, Normal      loratadine-pseudoephedrine (CLARITIN-D 12-HOUR) 5-120 mg per tablet Take 1 tablet by mouth 2 (two) times a day, Starting Sat 4/20/2024, Normal      methylPREDNISolone 4 MG tablet therapy pack Use as directed on package, Normal      montelukast (SINGULAIR) 10 mg tablet TAKE 1 TABLET (10 MG TOTAL) BY MOUTH DAILY AT BEDTIME, Starting Fri 12/8/2023, Normal      pramipexole (MIRAPEX) 0.5 mg tablet TAKE 1 TABLET (0.5 MG TOTAL) BY MOUTH DAILY AT BEDTIME, Starting Wed 9/13/2023, Normal      sitaGLIPtin (JANUVIA) 25 mg tablet Take 1 tablet (25 mg total) by mouth daily, Starting Tue 8/29/2023, Normal      SUMAtriptan (IMITREX) 25 mg tablet TAKE 1 TABLET (25 MG TOTAL) BY MOUTH ONCE AS NEEDED FOR MIGRAINE, Starting Mon 4/4/2022, Normal      topiramate (TOPAMAX) 25 mg sprinkle capsule TAKE 1 CAPSULE (25 MG TOTAL) BY MOUTH 2 (TWO) TIMES A DAY, Starting Wed 12/6/2023, Normal      traZODone (DESYREL) 100 mg tablet TAKE 1 TABLET (100 MG TOTAL) BY MOUTH DAILY AT BEDTIME, Starting Mon 2/20/2023, Normal       !! - Potential duplicate medications found. Please discuss with provider.          No discharge procedures on file.    PDMP Review       None            ED Provider  Electronically Signed by             Jd Dubon DO  04/25/24 8680

## 2024-05-13 ENCOUNTER — EVALUATION (OUTPATIENT)
Dept: PHYSICAL THERAPY | Facility: CLINIC | Age: 49
End: 2024-05-13
Payer: OTHER MISCELLANEOUS

## 2024-05-13 DIAGNOSIS — S99.911D ANKLE INJURY, RIGHT, SUBSEQUENT ENCOUNTER: ICD-10-CM

## 2024-05-13 PROCEDURE — 97110 THERAPEUTIC EXERCISES: CPT | Performed by: PHYSICAL THERAPIST

## 2024-05-13 PROCEDURE — 97162 PT EVAL MOD COMPLEX 30 MIN: CPT | Performed by: PHYSICAL THERAPIST

## 2024-05-13 NOTE — PROGRESS NOTES
PT Evaluation     Today's date: 2024  Patient name: Gerri Parkinson  : 1975  MRN: [id number]  Referring provider: Randy Hanson MD  Dx:   Encounter Diagnosis     ICD-10-CM    1. Ankle injury, right, subsequent encounter  S99.911D Ambulatory Referral to Physical Therapy                     Assessment  Assessment details: Gerri Parkinson is a pleasant 48 y.o. female who presents with ankle pain from an injury from Feb of this year. Primary movement impairment diagnosis of impairment ankle AROM. Her impairments have lead to participation restrictions in standing, walking, stairs, and work duties. She would benefit from working with a skilled PT in order to increase participation in aforementioned participation restrictions.    No further referral appears necessary at this time based upon examination results.    Pt. will benefit from skilled PT services that includes manual therapy techniques to enhance tissue extensibility, neuromuscular re-education to facilitate motor control, therapeutic exercise to increase functional mobility, and modalities prn to reduce pain and inflammation.            Impairments: abnormal gait, abnormal or restricted ROM, abnormal movement, activity intolerance, impaired physical strength, lacks appropriate home exercise program, pain with function, weight-bearing intolerance and poor posture     Symptom irritability: highUnderstanding of Dx/Px/POC: good   Prognosis: good  Prognosis details: Positive prognostic indicators include absence of observed red flags.  Negative prognostic indicators include chronicity of symptoms and high symptom irritability.      Goals  ST. Independent with HEP in 2 weeks  2. Pt will have verbal report of improvement in symptoms by >/=25% in 2 weeks     To be achieved by D/C   LT. Pt will improve FOTO score by >/= 9 points in 6 weeks  2. Pt will improve FOTO score to >/= 54 by visit # 14  3. Pt will be able to ascend/descend stairs  reciprocally   4. Pt will be able to return to full work duties without restrictions   5. Pt will be able to return to recreational walking   6. Pt will be able to sleep throughout the night without disturbances of ankle sx's       Plan  Patient would benefit from: skilled physical therapy  Planned therapy interventions: activity modification, manual therapy, motor coordination training, neuromuscular re-education, patient education, self care, therapeutic activities, therapeutic exercise, graded activity, home exercise program, graded exercise, functional ROM exercises and strengthening  Frequency: 2x week  Duration in weeks: 8  Plan of Care beginning date: 2024  Plan of Care expiration date: 2024  Treatment plan discussed with: patient      Subjective Evaluation    History of Present Illness  Mechanism of injury: Pt reports that back in Feb she rolled her ankle while working. She was in a CAM boot and now has transitioned to a sneaker for the last couple of weeks. She most difficulty with getting in and out of the car and difficulty walking long distances.     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 She felt her ankle when she was transferring patients.   Patient Goals  Patient goals for therapy: decreased pain  Patient goal: be able to sleep through the night, be able to get out of a car, be able to return to full work duty, be able to go for recreational walks,  Pain  At best pain ratin  At worst pain ratin  Relieving factors: medications  Aggravating factors: standing, walking and stair climbing (getting out of the car)  Progression: no change    Social Support  Stairs in house: yes   10  Lives in: multiple-level home          Objective     Active Range of Motion   Left Ankle/Foot   Dorsiflexion (ke): -2 degrees   Plantar flexion: 35 degrees   Inversion: 8 degrees   Eversion: 5 degrees   Great toe extension: 70 degrees     Right Ankle/Foot    Dorsiflexion (ke): -10 degrees with pain  Plantar flexion: 25 degrees with pain  Inversion: 15 degrees with pain  Eversion: 12 degrees with pain    Additional Active Range of Motion Details  Able to get to neutral when walking     Passive Range of Motion   Left Ankle/Foot    Dorsiflexion (ke): 0 degrees   Plantar flexion: 45 degrees   Inversion: 25 degrees   Eversion: 15 degrees   Great toe extension: 72 degrees     Right Ankle/Foot    Dorsiflexion (ke): 0 degrees with pain  Plantar flexion: 40 degrees with pain  Inversion: 16 degrees with pain  Eversion: 15 degrees     Strength/Myotome Testing     Left Ankle/Foot   Dorsiflexion: 5  Plantar flexion: 4  Inversion: 4+  Eversion: 4+  Great toe flexion: 4+  Great toe extension: 4+    Right Ankle/Foot   Dorsiflexion: 4-  Plantar flexion: 3-  Inversion: 4-  Eversion: 4-  Great toe flexion: 4-  Great toe extension: 4-    Ambulation   Weight-Bearing Status   Weight-Bearing Status (Left): full weight bearing   Weight-Bearing Status (Right): weight-bearing as tolerated    Ambulation assistive devices: don roberto brace.    Observational Gait   Gait: antalgic   Increased left stance time, right swing time and right step length. Decreased right stance time, left swing time and left step length.   Left foot contact pattern: foot flat  Right foot contact pattern: foot flat    Functional Assessment      Squat    Pain, sitting toward left side, left tibial anterior translation beyond toes and right tibial anterior translation beyond toes.     Single Leg Stance - Eyes Open   Left  Trial 1: 8.61 seconds    Right  Trial 1: 1.79 seconds      Flowsheet Rows      Flowsheet Row Most Recent Value   PT/OT G-Codes    Current Score 49   Projected Score 59          POC expires Auth Status Unit limit Start date  Expiration date PT/OT + Visit Limit?   7/8/24 5/13/24   BOMN                                                             Visit/Unit Tracking  AUTH Status:  Date 5/13              N/A  Used 1               Remaining                         Precautions: DM,   Access Code: [email or web address]     Manuals 5/13                                                                Neuro Re-Ed             Towel curls toe flexion              Towels sweeps inv/ev             LAQs c holds              SAQs c holds                                                    Ther Ex             Pt education on HEP, POC  8 min            Bike for ROM 8 min            Ankle pumps x20            Ankle ABCs 1 cap, 1 lower case            Long sitting calf stretch  3x30s            Seated HR/TR x20            Ankle TB 4 way (TB if able might need AROM first)                          Ther Activity                                       Gait Training                                       Modalities

## 2024-05-15 ENCOUNTER — APPOINTMENT (OUTPATIENT)
Dept: PHYSICAL THERAPY | Facility: CLINIC | Age: 49
End: 2024-05-15
Payer: OTHER MISCELLANEOUS

## 2024-05-16 ENCOUNTER — OFFICE VISIT (OUTPATIENT)
Dept: OBGYN CLINIC | Facility: CLINIC | Age: 49
End: 2024-05-16

## 2024-05-16 VITALS
TEMPERATURE: 98.4 F | HEART RATE: 100 BPM | SYSTOLIC BLOOD PRESSURE: 104 MMHG | BODY MASS INDEX: 40.9 KG/M2 | WEIGHT: 239.6 LBS | DIASTOLIC BLOOD PRESSURE: 71 MMHG | HEIGHT: 64 IN

## 2024-05-16 DIAGNOSIS — S93.421D SPRAIN OF DELTOID LIGAMENT OF RIGHT ANKLE, SUBSEQUENT ENCOUNTER: ICD-10-CM

## 2024-05-16 DIAGNOSIS — S99.911D ANKLE INJURY, RIGHT, SUBSEQUENT ENCOUNTER: Primary | ICD-10-CM

## 2024-05-16 DIAGNOSIS — S93.491D SPRAIN OF ANTERIOR TALOFIBULAR LIGAMENT OF RIGHT ANKLE, SUBSEQUENT ENCOUNTER: ICD-10-CM

## 2024-05-16 PROCEDURE — 99214 OFFICE O/P EST MOD 30 MIN: CPT | Performed by: FAMILY MEDICINE

## 2024-05-16 NOTE — PATIENT INSTRUCTIONS
F/u 2 wks  Continue therapy  Continue wearing ankle brace  Home exercises.  Icing/heat/OTC pain meds as needed.  Consider MRI if no improvement.

## 2024-05-16 NOTE — PROGRESS NOTES
"[facility] [address] [address] [phone]      Assessment:  1. Ankle injury, right, subsequent encounter  2. Sprain of anterior talofibular ligament of right ankle, subsequent encounter  3. Sprain of deltoid ligament of right ankle, subsequent encounter      Plan:  Patient Instructions   F/u 2 wks  Continue therapy  Continue wearing ankle brace  Home exercises.  Icing/heat/OTC pain meds as needed.  Consider MRI if no improvement.     Return in about 2 weeks (around 5/30/2024) for Recheck.    Chief Complaint:  Chief Complaint   Patient presents with    Right Ankle - Follow-up       Subjective:   HPI    Patient ID: Gerri Parkinson is a 48 y.o. female     Here for WC injury R ankle pain  Still having pain- worse with getting in/out of car and steps  Stopped Wearing boot  Pain is about the same  Wearing brace  Started PT Monday- due to pneumonia  Tylenol  PRN, nsaids bother stomach  Stabbing pain at night  Better at rest.  Using biofreeze  Working- pain at the end of the day.  Stabbing/thobbing pain.      Review of Systems   Constitutional:  Negative for fatigue and fever.   Respiratory:  Negative for shortness of breath.    Cardiovascular:  Negative for chest pain.   Gastrointestinal:  Negative for abdominal pain and nausea.   Genitourinary:  Negative for dysuria.   Musculoskeletal:  Positive for arthralgias.   Skin:  Negative for rash and wound.   Neurological:  Negative for weakness and headaches.       Objective:  Vitals:  /71 (BP Location: Left arm, Patient Position: Sitting, Cuff Size: Large)   Pulse 100   Temp 98.4 °F (36.9 °C) (Tympanic)   Ht 5' 4\" (1.626 m)   Wt 109 kg (239 lb 9.6 oz)   LMP  (LMP Unknown)   BMI 41.13 kg/m²     The following portions of the patient's history were reviewed and updated as appropriate: allergies, current medications, past family history, past medical history, past social history, " past surgical history, and problem list.    Physical exam:  Physical Exam  Constitutional:       Appearance: Normal appearance. She is normal weight.   HENT:      Head: Normocephalic.   Eyes:      Extraocular Movements: Extraocular movements intact.   Pulmonary:      Effort: Pulmonary effort is normal.   Musculoskeletal:      Cervical back: Normal range of motion.   Skin:     General: Skin is warm and dry.   Neurological:      General: No focal deficit present.      Mental Status: She is alert and oriented to person, place, and time. Mental status is at baseline.   Psychiatric:         Mood and Affect: Mood normal.         Behavior: Behavior normal.         Thought Content: Thought content normal.         Judgment: Judgment normal.       Right Ankle Exam     Tenderness   The patient is experiencing tenderness in the lateral malleolus, deltoid, CF, ATF and medial malleolus (talar dome TTP).  Swelling: mild    Range of Motion   The patient has normal right ankle ROM.    Muscle Strength   The patient has normal right ankle strength.    Tests   Varus tilt: positive     Comments:  Pain with ROM          Strength/Myotome Testing     Right Ankle/Foot   Normal strength            Randy Hanson MD

## 2024-05-17 ENCOUNTER — OFFICE VISIT (OUTPATIENT)
Dept: PHYSICAL THERAPY | Facility: CLINIC | Age: 49
End: 2024-05-17
Payer: OTHER MISCELLANEOUS

## 2024-05-17 DIAGNOSIS — S99.911D ANKLE INJURY, RIGHT, SUBSEQUENT ENCOUNTER: Primary | ICD-10-CM

## 2024-05-17 PROCEDURE — 97110 THERAPEUTIC EXERCISES: CPT

## 2024-05-17 NOTE — PROGRESS NOTES
"Daily Note     Today's date: 2024  Patient name: Gerri Parkinson  : 1975  MRN: [id number]  Referring provider: Randy Hanson MD  Dx:   Encounter Diagnosis     ICD-10-CM    1. Ankle injury, right, subsequent encounter  S99.911D                      Subjective: Pt states she went for a walk last night and fell because her ankle gave out. Notes she was wearing her brace. No injury.      Objective: See treatment diary below      Assessment: Tolerated treatment well. Patient exhibited good technique with therapeutic exercises and would benefit from continued PT  Pt arrived to PT amb with mild limp. Noted improved mobility following tx.       Plan: Continue per plan of care.      Precautions: DM,   Access Code: [email or web address]     Re-eval Date: 24    Date       Visit Count 1 2      FOTO Comp        Pain In  8/10      Pain Out            Manuals                                       Neuro Re-Ed        Towel curls toe flexion   5x      Towels sweeps inv/ev  5x ea      LAQs c holds   20 x 3\"      SAQs c holds  30 x 3\"                              Ther Ex        Pt education on HEP, POC  8 min       Bike for ROM 8 min L2  10'      Ankle pumps x20 20x      Ankle ABCs 1 cap, 1 lower case 1 cap, 1 lower case      Long sitting calf stretch  3x30s 3 x 30\"      Seated HR/TR x20 30x      Ankle TB 4 way (TB if able might need AROM first)  Yellow 20x ea              Ther Activity                        Gait Training                        Modalities                             "

## 2024-05-29 ENCOUNTER — APPOINTMENT (OUTPATIENT)
Dept: PHYSICAL THERAPY | Facility: CLINIC | Age: 49
End: 2024-05-29
Payer: OTHER MISCELLANEOUS

## 2024-05-30 ENCOUNTER — APPOINTMENT (OUTPATIENT)
Dept: PHYSICAL THERAPY | Facility: CLINIC | Age: 49
End: 2024-05-30
Payer: OTHER MISCELLANEOUS

## 2024-06-03 ENCOUNTER — OFFICE VISIT (OUTPATIENT)
Dept: OBGYN CLINIC | Facility: CLINIC | Age: 49
End: 2024-06-03
Payer: OTHER MISCELLANEOUS

## 2024-06-03 VITALS
TEMPERATURE: 98.8 F | BODY MASS INDEX: 41.18 KG/M2 | DIASTOLIC BLOOD PRESSURE: 75 MMHG | WEIGHT: 241.2 LBS | HEART RATE: 108 BPM | SYSTOLIC BLOOD PRESSURE: 109 MMHG | HEIGHT: 64 IN

## 2024-06-03 DIAGNOSIS — S93.421D SPRAIN OF DELTOID LIGAMENT OF RIGHT ANKLE, SUBSEQUENT ENCOUNTER: ICD-10-CM

## 2024-06-03 DIAGNOSIS — S93.491D SPRAIN OF ANTERIOR TALOFIBULAR LIGAMENT OF RIGHT ANKLE, SUBSEQUENT ENCOUNTER: ICD-10-CM

## 2024-06-03 DIAGNOSIS — S99.911D ANKLE INJURY, RIGHT, SUBSEQUENT ENCOUNTER: Primary | ICD-10-CM

## 2024-06-03 PROCEDURE — 99214 OFFICE O/P EST MOD 30 MIN: CPT | Performed by: FAMILY MEDICINE

## 2024-06-03 NOTE — PATIENT INSTRUCTIONS
F/u 2 wks  Continue therapy at least 2-3  per week-  would improve quicker with more frequent/aggressive physical therapy.  Continue home exercises  Icing/heat/OTC pain meds as needed.  Continue wearing ankle brace

## 2024-06-03 NOTE — LETTER
Hannah 3, 2024     Patient: Gerri Parkinson  YOB: 1975  Date of Visit: 6/3/2024      To Whom it May Concern:    Gerri Parkinson is under my professional care. Gerri was seen in my office on 6/3/2024. Gerri may return to work with limitations - she must wear ankle brace at all times.  No lifting >20 lbs .    If you have any questions or concerns, please don't hesitate to call.         Sincerely,          Randy Hanson MD        CC: No Recipients

## 2024-06-03 NOTE — PROGRESS NOTES
"[facility] [address] [address] [phone]      Assessment:  1. Ankle injury, right, subsequent encounter  2. Sprain of anterior talofibular ligament of right ankle, subsequent encounter  3. Sprain of deltoid ligament of right ankle, subsequent encounter      Plan:  Patient Instructions   F/u 2 wks  Continue therapy at least 2-3  per week-  would improve quicker with more frequent/aggressive physical therapy.  Continue home exercises  Icing/heat/OTC pain meds as needed.  Continue wearing ankle brace     Return in about 2 weeks (around 6/17/2024) for Recheck.    Chief Complaint:  Chief Complaint   Patient presents with    Right Ankle - Follow-up       Subjective:   HPI    Patient ID: Gerri Parkinson is a 48 y.o. female        Here for WC injury R ankle pain  Still having pain- worse with getting in/out of car and steps  Stopped Wearing boot  Wearing ankle brace- helps a little.  Worse at night-  Stabbing pain at night  Worse with prolonged walking.  Going to PT- 1x per week- \"my job is giving me a hard time about going to PT.\"  Tylenol  PRN, nsaids bother stomach  Better at rest.  Using biofreeze  Stabbing/thobbing pain.    Review of Systems   Constitutional:  Negative for fatigue and fever.   Respiratory:  Negative for shortness of breath.    Cardiovascular:  Negative for chest pain.   Gastrointestinal:  Negative for abdominal pain and nausea.   Genitourinary:  Negative for dysuria.   Musculoskeletal:  Positive for arthralgias.   Skin:  Negative for rash and wound.   Neurological:  Negative for weakness and headaches.       Objective:  Vitals:  /75 (BP Location: Left arm, Patient Position: Sitting, Cuff Size: Large)   Pulse (!) 108   Temp 98.8 °F (37.1 °C) (Tympanic)   Ht 5' 4\" (1.626 m)   Wt 109 kg (241 lb 3.2 oz)   BMI 41.40 kg/m²     The following portions of the patient's history were reviewed and updated as " appropriate: allergies, current medications, past family history, past medical history, past social history, past surgical history, and problem list.    Physical exam:  Physical Exam  Constitutional:       Appearance: Normal appearance. She is normal weight.   HENT:      Head: Normocephalic.   Eyes:      Extraocular Movements: Extraocular movements intact.   Pulmonary:      Effort: Pulmonary effort is normal.   Musculoskeletal:         General: Tenderness present.      Cervical back: Normal range of motion.   Skin:     General: Skin is warm and dry.   Neurological:      General: No focal deficit present.      Mental Status: She is alert and oriented to person, place, and time. Mental status is at baseline.   Psychiatric:         Mood and Affect: Mood normal.         Behavior: Behavior normal.         Thought Content: Thought content normal.         Judgment: Judgment normal.       Right Ankle Exam     Tenderness   The patient is experiencing tenderness in the ATF, CF and deltoid (talar dome TTP).  Swelling: mild    Range of Motion   The patient has normal right ankle ROM.    Muscle Strength   The patient has normal right ankle strength.    Tests   Anterior drawer: negative  Varus tilt: positive     Comments:  Pain with ROM  Neg felton/squeeze test.          Strength/Myotome Testing     Right Ankle/Foot   Normal strength            Randy Hanson MD

## 2024-06-24 ENCOUNTER — TELEPHONE (OUTPATIENT)
Dept: GASTROENTEROLOGY | Facility: CLINIC | Age: 49
End: 2024-06-24

## 2024-06-24 NOTE — TELEPHONE ENCOUNTER
Patient dropped off forms that need to be filled out by PCP ( due to losing her Insurance) patient has not been seen since 11/28/2024, scanned into patients chart and put in PCP folder, any questions please call [phone]

## 2024-06-25 NOTE — TELEPHONE ENCOUNTER
Attempted to contact patient, at patient's request. I left a message for patient to return my call.     Tamica is addiment about the necessity of patient scheduling an appointment prior to her filling out paperwork. We are able to help patient with a discount for the appointment.    If patient calls back, risa try to reach me to transfer call.

## 2024-06-25 NOTE — TELEPHONE ENCOUNTER
Spoke to PCP. Patient no showed annual appt that had been scheduled for January. Last HA1C was completed July 2023. Tamica would like patient to come in for an appt to discuss paperwork and get routine labs. Contacted patient to schedule an appt. Patient was very upset because she does not have insurance and cannot afford to come in. Said to patient that we have self pay options. She said she does not have money to pay for an appt. Explained that [facility] has payment options. Patient was still very upset, refusing to schedule an appt without speaking to management first.     Paper placed in PCP's folder at [facility].

## 2024-06-26 NOTE — TELEPHONE ENCOUNTER
Also, when patient calls back, we can offer for her to speak with social work to see if she would qualify for state insurance or assist with this as well as talking to the manager.

## 2024-07-01 NOTE — TELEPHONE ENCOUNTER
Patient returned call.  She is in need of medication and is amenable to coming in for a visit, with a reduced rate.  I will speak with Tamica tomorrow regarding a suitable appointment date and will call patient with date/time.     Patient is also interested in  reaching out to possibly help with benefits.

## 2024-07-02 NOTE — TELEPHONE ENCOUNTER
Left message for patient to return call for scheduling - offered 7/9/24 at 9:40 am.     When patient returns call, please schedule appointment with Tamica Perez. 20 minutes to follow up for paperwork.

## 2024-07-07 ENCOUNTER — OFFICE VISIT (OUTPATIENT)
Dept: URGENT CARE | Facility: CLINIC | Age: 49
End: 2024-07-07

## 2024-07-07 VITALS
SYSTOLIC BLOOD PRESSURE: 144 MMHG | HEART RATE: 93 BPM | DIASTOLIC BLOOD PRESSURE: 78 MMHG | OXYGEN SATURATION: 97 % | BODY MASS INDEX: 41.66 KG/M2 | HEIGHT: 64 IN | WEIGHT: 244 LBS | RESPIRATION RATE: 20 BRPM | TEMPERATURE: 98.6 F

## 2024-07-07 DIAGNOSIS — H60.331 ACUTE SWIMMER'S EAR OF RIGHT SIDE: Primary | ICD-10-CM

## 2024-07-07 PROCEDURE — 99213 OFFICE O/P EST LOW 20 MIN: CPT | Performed by: PHYSICIAN ASSISTANT

## 2024-07-07 RX ORDER — OFLOXACIN 3 MG/ML
5 SOLUTION AURICULAR (OTIC) DAILY
Qty: 5 ML | Refills: 0 | Status: SHIPPED | OUTPATIENT
Start: 2024-07-07 | End: 2024-07-10

## 2024-07-07 NOTE — PATIENT INSTRUCTIONS
Use antibiotic eardrops as instructed over the next 5 days.  Can take over-the-counter ibuprofen or Tylenol for pain and discomfort.  All patient's questions answered and is agreeable with this plan.

## 2024-07-07 NOTE — PROGRESS NOTES
[facility]        NAME: Gerri Parkinson is a 48 y.o. female  : 1975    MRN: [id number]  DATE: 2024  TIME: 7:37 PM    Assessment and Plan   Acute swimmer's ear of right side [H60.331]  1. Acute swimmer's ear of right side  ofloxacin (FLOXIN) 0.3 % otic solution            Patient Instructions     Patient Instructions   Use antibiotic eardrops as instructed over the next 5 days.  Can take over-the-counter ibuprofen or Tylenol for pain and discomfort.  All patient's questions answered and is agreeable with this plan.      Follow up with PCP in 3-5 days.  Proceed to  ER if symptoms worsen.    Chief Complaint     Chief Complaint   Patient presents with    Diarrhea     For one week+    Earache     Right ear pain for 3 days          History of Present Illness       Patient is a 48-year-old female presenting today with right ear pain x 3 days.  Patient notes over the last few days she has had persistent pain or discomfort of her right ear, notes it is tender to touch, has not taken any medication limiting factors for symptoms.  Notes she is needing a note for work.  Denies fever, ear discharge or drainage, hearing loss, tinnitus.        Review of Systems   Review of Systems   Constitutional:  Negative for chills and fever.   HENT:  Positive for ear pain. Negative for sore throat.    Eyes:  Negative for pain and visual disturbance.   Respiratory:  Negative for cough and shortness of breath.    Cardiovascular:  Negative for chest pain and palpitations.   Gastrointestinal:  Negative for abdominal pain and vomiting.   Genitourinary:  Negative for dysuria and hematuria.   Musculoskeletal:  Negative for arthralgias and back pain.   Skin:  Negative for color change and rash.   Neurological:  Negative for seizures and syncope.   All other systems reviewed and are negative.        Current Medications       Current Outpatient Medications:     atorvastatin (LIPITOR) 10 mg tablet, Take 1 tablet (10 mg total)  by mouth daily, Disp: 30 tablet, Rfl: 5    ofloxacin (FLOXIN) 0.3 % otic solution, Administer 5 drops to the right ear daily, Disp: 5 mL, Rfl: 0    acetaminophen (TYLENOL) 650 mg CR tablet, Take 2 tablets (1,300 mg total) by mouth every 8 (eight) hours as needed for mild pain (Patient not taking: Reported on 7/7/2024), Disp: 30 tablet, Rfl: 0    albuterol (2.5 mg/3 mL) 0.083 % nebulizer solution, Take 3 mL (2.5 mg total) by nebulization every 6 (six) hours as needed for wheezing or shortness of breath (Patient not taking: Reported on 7/7/2024), Disp: 75 mL, Rfl: 0    albuterol (ProAir HFA) 90 mcg/act inhaler, Inhale 2 puffs every 6 (six) hours as needed for wheezing (Patient not taking: Reported on 7/7/2024), Disp: 8.5 g, Rfl: 1    Alcohol Swabs PADS, Use once daily when testing finger sticks. (Patient not taking: Reported on 7/7/2024), Disp: 90 each, Rfl: 1    citalopram (CeleXA) 40 mg tablet, TAKE 1 TABLET (40 MG TOTAL) BY MOUTH DAILY (Patient not taking: Reported on 7/7/2024), Disp: 30 tablet, Rfl: 5    fluticasone (FLONASE) 50 mcg/act nasal spray, 2 sprays into each nostril daily (Patient not taking: Reported on 7/7/2024), Disp: 1 g, Rfl: 0    fluticasone (FLONASE) 50 mcg/act nasal spray, 1 spray into each nostril daily (Patient not taking: Reported on 7/7/2024), Disp: 16 g, Rfl: 0    glucose blood (True Metrix Blood Glucose Test) test strip, Use as instructed (Patient not taking: Reported on 7/7/2024), Disp: 100 each, Rfl: 3    glucose blood test strip, Use as instructed (Patient not taking: Reported on 7/7/2024), Disp: 100 each, Rfl: 2    glucose blood test strip, Use as instructed. Test up to 3 times a day as needed (Patient not taking: Reported on 3/8/2024), Disp: 200 each, Rfl: 11    hydrOXYzine HCL (ATARAX) 50 mg tablet, Take 1 tablet (50 mg total) by mouth every 6 (six) hours as needed for itching, allergies or anxiety (Patient not taking: Reported on 7/7/2024), Disp: 120 tablet, Rfl: 1    ibuprofen  (MOTRIN) 400 mg tablet, Take 400 mg by mouth every 6 (six) hours as needed for mild pain (Patient not taking: Reported on 7/7/2024), Disp: , Rfl:     Insulin Pen Needle (B-D UF III MINI PEN NEEDLES) 31G X 5 MM MISC, Inject under the skin daily at bedtime (Patient not taking: Reported on 7/7/2024), Disp: 90 each, Rfl: 1    Lancets MISC, Use once for 1 dose Onetouch Verio. Test once daily or as directed., Disp: 100 each, Rfl: 1    Levemir FlexPen 100 units/mL injection pen, INJECT 20 UNITS UNDER THE SKIN DAILY AT BEDTIME (Patient not taking: Reported on 7/7/2024), Disp: 15 mL, Rfl: 3    loratadine-pseudoephedrine (CLARITIN-D 12-HOUR) 5-120 mg per tablet, Take 1 tablet by mouth 2 (two) times a day (Patient not taking: Reported on 7/7/2024), Disp: 30 tablet, Rfl: 0    methylPREDNISolone 4 MG tablet therapy pack, Use as directed on package (Patient not taking: Reported on 7/7/2024), Disp: 1 each, Rfl: 0    montelukast (SINGULAIR) 10 mg tablet, TAKE 1 TABLET (10 MG TOTAL) BY MOUTH DAILY AT BEDTIME (Patient not taking: Reported on 3/8/2024), Disp: 90 tablet, Rfl: 3    pramipexole (MIRAPEX) 0.5 mg tablet, TAKE 1 TABLET (0.5 MG TOTAL) BY MOUTH DAILY AT BEDTIME (Patient not taking: Reported on 7/7/2024), Disp: 30 tablet, Rfl: 0    sitaGLIPtin (JANUVIA) 25 mg tablet, Take 1 tablet (25 mg total) by mouth daily (Patient not taking: Reported on 7/7/2024), Disp: 30 tablet, Rfl: 5    SUMAtriptan (IMITREX) 25 mg tablet, TAKE 1 TABLET (25 MG TOTAL) BY MOUTH ONCE AS NEEDED FOR MIGRAINE (Patient not taking: Reported on 7/7/2024), Disp: 9 tablet, Rfl: 0    topiramate (TOPAMAX) 25 mg sprinkle capsule, TAKE 1 CAPSULE (25 MG TOTAL) BY MOUTH 2 (TWO) TIMES A DAY (Patient not taking: Reported on 7/7/2024), Disp: 60 capsule, Rfl: 0    traZODone (DESYREL) 100 mg tablet, TAKE 1 TABLET (100 MG TOTAL) BY MOUTH DAILY AT BEDTIME (Patient not taking: Reported on 7/7/2024), Disp: 30 tablet, Rfl: 0    Current Allergies     Allergies as of 07/07/2024 -  "Reviewed 07/07/2024   Allergen Reaction Noted    Apple - food allergy Anaphylaxis 03/13/2022    Apple fruit extract - food allergy Anaphylaxis 03/29/2023    Grass pollen(k-o-r-t-swt milana) Hives, Itching, Shortness Of Breath, Sneezing, and Throat Swelling 11/17/2021    Dust mite extract Hives and Itching 11/17/2021    Misc natural products Diarrhea, Nausea Only, and Vomiting 11/17/2021    Methylprednisolone Hives and Rash 03/19/2023    Raw vegetable - food allergy Blisters, Dizziness, Eye Swelling, Fatigue, Headache, Hives, Itching, Rash, Sneezing, Throat Swelling, and Wheezing 03/21/2022    Red dye - food allergy Hives, Itching, and Rash 11/17/2021            The following portions of the patient's history were reviewed and updated as appropriate: allergies, current medications, past family history, past medical history, past social history, past surgical history and problem list.     Past Medical History:   Diagnosis Date    Anxiety     Asthma     Closed nondisplaced fracture of proximal phalanx of lesser toe of left foot 1/15/2020    Diabetes mellitus (HCC)     Hyperlipidemia        Past Surgical History:   Procedure Laterality Date    ARTHROSCOPY KNEE Left     CHOLECYSTECTOMY      ECTOPIC PREGNANCY SURGERY      SHOULDER SURGERY Right     torn bicep       Family History   Problem Relation Age of Onset    Obesity Mother     Obesity Father     Diabetes type II Father     Diabetes Father     Obesity Brother     No Known Problems Daughter     No Known Problems Maternal Grandmother     Diabetes Maternal Grandfather     No Known Problems Paternal Grandmother     No Known Problems Maternal Aunt     No Known Problems Maternal Aunt     No Known Problems Maternal Aunt     Heart disease Neg Hx     Stroke Neg Hx     Cancer Neg Hx          Medications have been verified.        Objective   /78   Pulse 93   Temp 98.6 °F (37 °C)   Resp 20   Ht 5' 4\" (1.626 m)   Wt 111 kg (244 lb)   SpO2 97%   BMI 41.88 kg/m²      "   Physical Exam     Physical Exam  Vitals reviewed.   Constitutional:       General: She is not in acute distress.  HENT:      Head: Normocephalic.      Right Ear: Tympanic membrane and external ear normal.      Left Ear: Tympanic membrane, ear canal and external ear normal.      Ears:      Comments: Moderate redness and swelling of right ear canal     Nose: Nose normal.      Mouth/Throat:      Mouth: Mucous membranes are moist.      Pharynx: Oropharynx is clear.   Eyes:      Conjunctiva/sclera: Conjunctivae normal.   Cardiovascular:      Rate and Rhythm: Normal rate and regular rhythm.      Pulses: Normal pulses.      Heart sounds: Normal heart sounds.   Pulmonary:      Effort: Pulmonary effort is normal.      Breath sounds: Normal breath sounds.   Musculoskeletal:      Cervical back: Normal range of motion and neck supple.   Skin:     General: Skin is warm.   Neurological:      General: No focal deficit present.      Mental Status: She is alert and oriented to person, place, and time.

## 2024-07-09 ENCOUNTER — APPOINTMENT (EMERGENCY)
Dept: CT IMAGING | Facility: HOSPITAL | Age: 49
End: 2024-07-09
Payer: COMMERCIAL

## 2024-07-09 ENCOUNTER — HOSPITAL ENCOUNTER (EMERGENCY)
Facility: HOSPITAL | Age: 49
Discharge: HOME | End: 2024-07-09
Attending: EMERGENCY MEDICINE | Admitting: EMERGENCY MEDICINE
Payer: COMMERCIAL

## 2024-07-09 VITALS
TEMPERATURE: 97.6 F | OXYGEN SATURATION: 99 % | BODY MASS INDEX: 41.66 KG/M2 | SYSTOLIC BLOOD PRESSURE: 115 MMHG | DIASTOLIC BLOOD PRESSURE: 62 MMHG | HEART RATE: 70 BPM | HEIGHT: 64 IN | WEIGHT: 244 LBS | RESPIRATION RATE: 18 BRPM

## 2024-07-09 DIAGNOSIS — K86.2 PANCREATIC CYST: ICD-10-CM

## 2024-07-09 DIAGNOSIS — R11.2 NAUSEA AND VOMITING: ICD-10-CM

## 2024-07-09 DIAGNOSIS — R10.9 ABDOMINAL PAIN: Primary | ICD-10-CM

## 2024-07-09 DIAGNOSIS — R19.7 DIARRHEA: ICD-10-CM

## 2024-07-09 LAB
ALBUMIN SERPL BCG-MCNC: 4.3 G/DL (ref 3.5–5)
ALP SERPL-CCNC: 71 U/L (ref 34–104)
ALT SERPL W P-5'-P-CCNC: 41 U/L (ref 7–52)
ANION GAP SERPL CALCULATED.3IONS-SCNC: 8 MMOL/L (ref 4–13)
AST SERPL W P-5'-P-CCNC: 32 U/L (ref 13–39)
BASOPHILS # BLD AUTO: 0.05 THOUSANDS/ÂΜL (ref 0–0.1)
BASOPHILS NFR BLD AUTO: 1 % (ref 0–1)
BILIRUB SERPL-MCNC: 0.59 MG/DL (ref 0.2–1)
BILIRUB UR QL STRIP: NEGATIVE
BUN SERPL-MCNC: 8 MG/DL (ref 5–25)
CALCIUM SERPL-MCNC: 10 MG/DL (ref 8.4–10.2)
CHLORIDE SERPL-SCNC: 105 MMOL/L (ref 96–108)
CLARITY UR: CLEAR
CO2 SERPL-SCNC: 22 MMOL/L (ref 21–32)
COLOR UR: YELLOW
CREAT SERPL-MCNC: 0.54 MG/DL (ref 0.6–1.3)
EOSINOPHIL # BLD AUTO: 0.19 THOUSAND/ÂΜL (ref 0–0.61)
EOSINOPHIL NFR BLD AUTO: 2 % (ref 0–6)
ERYTHROCYTE [DISTWIDTH] IN BLOOD BY AUTOMATED COUNT: 12.2 % (ref 11.6–15.1)
EXT PREGNANCY TEST URINE: NEGATIVE
EXT. CONTROL: NORMAL
GFR SERPL CREATININE-BSD FRML MDRD: 113 ML/MIN/1.73SQ M
GLUCOSE SERPL-MCNC: 182 MG/DL (ref 65–140)
GLUCOSE UR STRIP-MCNC: ABNORMAL MG/DL
HCT VFR BLD AUTO: 44 % (ref 34.8–46.1)
HGB BLD-MCNC: 14.5 G/DL (ref 11.5–15.4)
HGB UR QL STRIP.AUTO: NEGATIVE
IMM GRANULOCYTES # BLD AUTO: 0.05 THOUSAND/UL (ref 0–0.2)
IMM GRANULOCYTES NFR BLD AUTO: 1 % (ref 0–2)
KETONES UR STRIP-MCNC: ABNORMAL MG/DL
LEUKOCYTE ESTERASE UR QL STRIP: NEGATIVE
LIPASE SERPL-CCNC: 20 U/L (ref 11–82)
LYMPHOCYTES # BLD AUTO: 2.7 THOUSANDS/ÂΜL (ref 0.6–4.47)
LYMPHOCYTES NFR BLD AUTO: 33 % (ref 14–44)
MAGNESIUM SERPL-MCNC: 1.9 MG/DL (ref 1.9–2.7)
MCH RBC QN AUTO: 30.5 PG (ref 26.8–34.3)
MCHC RBC AUTO-ENTMCNC: 33 G/DL (ref 31.4–37.4)
MCV RBC AUTO: 92 FL (ref 82–98)
MONOCYTES # BLD AUTO: 0.86 THOUSAND/ÂΜL (ref 0.17–1.22)
MONOCYTES NFR BLD AUTO: 10 % (ref 4–12)
NEUTROPHILS # BLD AUTO: 4.41 THOUSANDS/ÂΜL (ref 1.85–7.62)
NEUTS SEG NFR BLD AUTO: 53 % (ref 43–75)
NITRITE UR QL STRIP: NEGATIVE
NRBC BLD AUTO-RTO: 0 /100 WBCS
PH UR STRIP.AUTO: 6 [PH]
PLATELET # BLD AUTO: 225 THOUSANDS/UL (ref 149–390)
PMV BLD AUTO: 10 FL (ref 8.9–12.7)
POTASSIUM SERPL-SCNC: 4.2 MMOL/L (ref 3.5–5.3)
PROT SERPL-MCNC: 7.4 G/DL (ref 6.4–8.4)
PROT UR STRIP-MCNC: NEGATIVE MG/DL
RBC # BLD AUTO: 4.76 MILLION/UL (ref 3.81–5.12)
SODIUM SERPL-SCNC: 135 MMOL/L (ref 135–147)
SP GR UR STRIP.AUTO: >=1.03
UROBILINOGEN UR QL STRIP.AUTO: 0.2 E.U./DL
WBC # BLD AUTO: 8.26 THOUSAND/UL (ref 4.31–10.16)

## 2024-07-09 PROCEDURE — 83690 ASSAY OF LIPASE: CPT | Performed by: EMERGENCY MEDICINE

## 2024-07-09 PROCEDURE — 96375 TX/PRO/DX INJ NEW DRUG ADDON: CPT

## 2024-07-09 PROCEDURE — 81025 URINE PREGNANCY TEST: CPT | Performed by: PHYSICIAN ASSISTANT

## 2024-07-09 PROCEDURE — 80053 COMPREHEN METABOLIC PANEL: CPT | Performed by: EMERGENCY MEDICINE

## 2024-07-09 PROCEDURE — 81003 URINALYSIS AUTO W/O SCOPE: CPT | Performed by: EMERGENCY MEDICINE

## 2024-07-09 PROCEDURE — 96374 THER/PROPH/DIAG INJ IV PUSH: CPT

## 2024-07-09 PROCEDURE — 99285 EMERGENCY DEPT VISIT HI MDM: CPT | Performed by: PHYSICIAN ASSISTANT

## 2024-07-09 PROCEDURE — 99284 EMERGENCY DEPT VISIT MOD MDM: CPT

## 2024-07-09 PROCEDURE — 85025 COMPLETE CBC W/AUTO DIFF WBC: CPT | Performed by: EMERGENCY MEDICINE

## 2024-07-09 PROCEDURE — 96361 HYDRATE IV INFUSION ADD-ON: CPT

## 2024-07-09 PROCEDURE — 83735 ASSAY OF MAGNESIUM: CPT | Performed by: PHYSICIAN ASSISTANT

## 2024-07-09 PROCEDURE — 74177 CT ABD & PELVIS W/CONTRAST: CPT

## 2024-07-09 PROCEDURE — 36415 COLL VENOUS BLD VENIPUNCTURE: CPT

## 2024-07-09 RX ORDER — KETOROLAC TROMETHAMINE 30 MG/ML
15 INJECTION, SOLUTION INTRAMUSCULAR; INTRAVENOUS ONCE
Status: COMPLETED | OUTPATIENT
Start: 2024-07-09 | End: 2024-07-09

## 2024-07-09 RX ORDER — DICYCLOMINE HCL 20 MG
20 TABLET ORAL ONCE
Status: COMPLETED | OUTPATIENT
Start: 2024-07-09 | End: 2024-07-09

## 2024-07-09 RX ORDER — ONDANSETRON 4 MG/1
4 TABLET, ORALLY DISINTEGRATING ORAL EVERY 6 HOURS PRN
Qty: 20 TABLET | Refills: 0 | Status: SHIPPED | OUTPATIENT
Start: 2024-07-09

## 2024-07-09 RX ORDER — ONDANSETRON 2 MG/ML
4 INJECTION INTRAMUSCULAR; INTRAVENOUS ONCE
Status: COMPLETED | OUTPATIENT
Start: 2024-07-09 | End: 2024-07-09

## 2024-07-09 RX ORDER — DICYCLOMINE HCL 20 MG
20 TABLET ORAL 2 TIMES DAILY
Qty: 20 TABLET | Refills: 0 | Status: SHIPPED | OUTPATIENT
Start: 2024-07-09

## 2024-07-09 RX ADMIN — DICYCLOMINE HYDROCHLORIDE 20 MG: 20 TABLET ORAL at 17:58

## 2024-07-09 RX ADMIN — KETOROLAC TROMETHAMINE 15 MG: 30 INJECTION, SOLUTION INTRAMUSCULAR at 15:12

## 2024-07-09 RX ADMIN — ONDANSETRON 4 MG: 2 INJECTION INTRAMUSCULAR; INTRAVENOUS at 15:12

## 2024-07-09 RX ADMIN — SODIUM CHLORIDE 1000 ML: 0.9 INJECTION, SOLUTION INTRAVENOUS at 15:13

## 2024-07-09 RX ADMIN — IOHEXOL 100 ML: 350 INJECTION, SOLUTION INTRAVENOUS at 16:23

## 2024-07-09 NOTE — DISCHARGE INSTRUCTIONS
"Schedule an appointment with your PCP to arrange follow-up MRI for further evaluation of the following CT finding:  \"Unchanged 8 mm hypodense lesion in the pancreatic body, likely a cyst.\"  "

## 2024-07-09 NOTE — Clinical Note
Gerri Parkinsno was seen and treated in our emergency department on 7/9/2024.                Diagnosis:     Gerri  may return to work on return date.    She may return on this date: 07/11/2024         If you have any questions or concerns, please don't hesitate to call.      Tawana Winkler PA-C    ______________________________           _______________          _______________  Hospital Representative                              Date                                Time

## 2024-07-09 NOTE — ED PROVIDER NOTES
History  Chief Complaint   Patient presents with    Abdominal Pain     Patient arrives with complaints of lower abdominal pain that started today. Vomited x2 today. Adds that has had diarrhea for the past month.     Patient is a 48-year-old female with a PMHx of anxiety, asthma, DM2 and HLD, presenting to the ED for evaluation of abdominal pain, vomiting and diarrhea.  Patient states that she has had ongoing diarrhea for the past month and says that she has about 4-5 episodes of nonbloody/nonmelanotic diarrhea daily.  She woke up this morning with new periumbilical abdominal pain and nausea and had 2 episodes of nonbloody/nonbilious vomiting.  She denies any fevers, chest pain, SOB, flank pain or urinary symptoms.  She denies any recent travel or sick contacts.  She was recently started on ofloxacin drops for otitis externa but is not on any oral antibiotics.  Prior abdominal surgeries include a cholecystectomy.        Prior to Admission Medications   Prescriptions Last Dose Informant Patient Reported? Taking?   Alcohol Swabs PADS Not Taking Self No No   Sig: Use once daily when testing finger sticks.   Patient not taking: Reported on 7/7/2024   Insulin Pen Needle (B-D UF III MINI PEN NEEDLES) 31G X 5 MM MISC Not Taking Self No No   Sig: Inject under the skin daily at bedtime   Patient not taking: Reported on 7/7/2024   Lancets MISC  Self No No   Sig: Use once for 1 dose Onetouch Verio. Test once daily or as directed.   Levemir FlexPen 100 units/mL injection pen Not Taking Self No No   Sig: INJECT 20 UNITS UNDER THE SKIN DAILY AT BEDTIME   Patient not taking: Reported on 7/7/2024   SUMAtriptan (IMITREX) 25 mg tablet Not Taking Self No No   Sig: TAKE 1 TABLET (25 MG TOTAL) BY MOUTH ONCE AS NEEDED FOR MIGRAINE   Patient not taking: Reported on 7/7/2024   acetaminophen (TYLENOL) 650 mg CR tablet Not Taking Self No No   Sig: Take 2 tablets (1,300 mg total) by mouth every 8 (eight) hours as needed for mild pain   Patient  not taking: Reported on 2024   albuterol (2.5 mg/3 mL) 0.083 % nebulizer solution Not Taking Self No No   Sig: Take 3 mL (2.5 mg total) by nebulization every 6 (six) hours as needed for wheezing or shortness of breath   Patient not taking: Reported on 2024   albuterol (ProAir HFA) 90 mcg/act inhaler Not Taking Self No No   Sig: Inhale 2 puffs every 6 (six) hours as needed for wheezing   Patient not taking: Reported on 2024   atorvastatin (LIPITOR) 10 mg tablet Not Taking Self No No   Sig: Take 1 tablet (10 mg total) by mouth daily   Patient not taking: Reported on 2024   citalopram (CeleXA) 40 mg tablet Not Taking Self No No   Sig: TAKE 1 TABLET (40 MG TOTAL) BY MOUTH DAILY   Patient not taking: Reported on 2024   fluticasone (FLONASE) 50 mcg/act nasal spray Not Taking Self No No   Si sprays into each nostril daily   Patient not taking: Reported on 2024   fluticasone (FLONASE) 50 mcg/act nasal spray Not Taking Self No No   Si spray into each nostril daily   Patient not taking: Reported on 2024   glucose blood (True Metrix Blood Glucose Test) test strip Not Taking Self No No   Sig: Use as instructed   Patient not taking: Reported on 2024   glucose blood test strip Not Taking Self No No   Sig: Use as instructed   Patient not taking: Reported on 2024   glucose blood test strip Not Taking Self No No   Sig: Use as instructed. Test up to 3 times a day as needed   Patient not taking: Reported on 3/8/2024   hydrOXYzine HCL (ATARAX) 50 mg tablet Not Taking Self No No   Sig: Take 1 tablet (50 mg total) by mouth every 6 (six) hours as needed for itching, allergies or anxiety   Patient not taking: Reported on 2024   ibuprofen (MOTRIN) 400 mg tablet Not Taking Self Yes No   Sig: Take 400 mg by mouth every 6 (six) hours as needed for mild pain   Patient not taking: Reported on 2024   loratadine-pseudoephedrine (CLARITIN-D 12-HOUR) 5-120 mg per tablet Not Taking Self No No   Sig:  Take 1 tablet by mouth 2 (two) times a day   Patient not taking: Reported on 7/7/2024   methylPREDNISolone 4 MG tablet therapy pack Not Taking Self No No   Sig: Use as directed on package   Patient not taking: Reported on 7/7/2024   montelukast (SINGULAIR) 10 mg tablet Not Taking Self No No   Sig: TAKE 1 TABLET (10 MG TOTAL) BY MOUTH DAILY AT BEDTIME   Patient not taking: Reported on 3/8/2024   ofloxacin (FLOXIN) 0.3 % otic solution   No No   Sig: Administer 5 drops to the right ear daily   pramipexole (MIRAPEX) 0.5 mg tablet Not Taking Self No No   Sig: TAKE 1 TABLET (0.5 MG TOTAL) BY MOUTH DAILY AT BEDTIME   Patient not taking: Reported on 7/7/2024   sitaGLIPtin (JANUVIA) 25 mg tablet Not Taking Self No No   Sig: Take 1 tablet (25 mg total) by mouth daily   Patient not taking: Reported on 7/7/2024   topiramate (TOPAMAX) 25 mg sprinkle capsule Not Taking Self No No   Sig: TAKE 1 CAPSULE (25 MG TOTAL) BY MOUTH 2 (TWO) TIMES A DAY   Patient not taking: Reported on 7/7/2024   traZODone (DESYREL) 100 mg tablet Not Taking Self No No   Sig: TAKE 1 TABLET (100 MG TOTAL) BY MOUTH DAILY AT BEDTIME   Patient not taking: Reported on 7/7/2024      Facility-Administered Medications: None       Past Medical History:   Diagnosis Date    Anxiety     Asthma     Closed nondisplaced fracture of proximal phalanx of lesser toe of left foot 1/15/2020    Diabetes mellitus (HCC)     Hyperlipidemia        Past Surgical History:   Procedure Laterality Date    ARTHROSCOPY KNEE Left     CHOLECYSTECTOMY      ECTOPIC PREGNANCY SURGERY      SHOULDER SURGERY Right     torn bicep       Family History   Problem Relation Age of Onset    Obesity Mother     Obesity Father     Diabetes type II Father     Diabetes Father     Obesity Brother     No Known Problems Daughter     No Known Problems Maternal Grandmother     Diabetes Maternal Grandfather     No Known Problems Paternal Grandmother     No Known Problems Maternal Aunt     No Known Problems  Maternal Aunt     No Known Problems Maternal Aunt     Heart disease Neg Hx     Stroke Neg Hx     Cancer Neg Hx      I have reviewed and agree with the history as documented.    E-Cigarette/Vaping    E-Cigarette Use Never User      E-Cigarette/Vaping Substances    Nicotine No     THC No     CBD No     Flavoring No     Other No     Unknown No      Social History     Tobacco Use    Smoking status: Never    Smokeless tobacco: Never   Vaping Use    Vaping status: Never Used   Substance Use Topics    Alcohol use: Not Currently     Alcohol/week: 0.0 standard drinks of alcohol     Comment: 0    Drug use: Not Currently       Review of Systems   Constitutional:  Negative for chills and fever.   HENT:  Negative for congestion, rhinorrhea and sore throat.    Eyes:  Negative for visual disturbance.   Respiratory:  Negative for cough and shortness of breath.    Cardiovascular:  Negative for chest pain, palpitations and leg swelling.   Gastrointestinal:  Positive for abdominal pain, diarrhea, nausea and vomiting. Negative for constipation.   Genitourinary:  Negative for dysuria, flank pain, frequency and hematuria.   Musculoskeletal:  Negative for back pain and neck pain.   Skin:  Negative for rash.   Neurological:  Negative for dizziness, syncope, weakness and headaches.   All other systems reviewed and are negative.      Physical Exam  Physical Exam  Vitals and nursing note reviewed.   Constitutional:       General: She is awake.      Appearance: Normal appearance. She is well-developed. She is not toxic-appearing or diaphoretic.   HENT:      Head: Normocephalic and atraumatic.      Right Ear: External ear normal.      Left Ear: External ear normal.      Nose: Nose normal.      Mouth/Throat:      Lips: Pink.      Mouth: Mucous membranes are moist.   Eyes:      General: Lids are normal. No scleral icterus.     Conjunctiva/sclera: Conjunctivae normal.      Pupils: Pupils are equal, round, and reactive to light.   Cardiovascular:       Rate and Rhythm: Normal rate and regular rhythm.      Pulses: Normal pulses.           Radial pulses are 2+ on the right side and 2+ on the left side.      Heart sounds: Normal heart sounds, S1 normal and S2 normal.   Pulmonary:      Effort: Pulmonary effort is normal. No accessory muscle usage.      Breath sounds: Normal breath sounds. No stridor. No decreased breath sounds, wheezing, rhonchi or rales.   Abdominal:      General: Abdomen is flat. Bowel sounds are normal. There is no distension.      Palpations: Abdomen is soft.      Tenderness: There is generalized abdominal tenderness and tenderness in the periumbilical area. There is no right CVA tenderness, left CVA tenderness, guarding or rebound.   Musculoskeletal:      Cervical back: Full passive range of motion without pain and neck supple. No signs of trauma. No pain with movement.      Right lower leg: No edema.      Left lower leg: No edema.   Lymphadenopathy:      Cervical: No cervical adenopathy.   Skin:     General: Skin is warm and dry.      Capillary Refill: Capillary refill takes less than 2 seconds.      Coloration: Skin is not cyanotic, jaundiced or pale.   Neurological:      Mental Status: She is alert and oriented to person, place, and time.      GCS: GCS eye subscore is 4. GCS verbal subscore is 5. GCS motor subscore is 6.      Gait: Gait normal.   Psychiatric:         Mood and Affect: Mood normal.         Speech: Speech normal.         Behavior: Behavior is cooperative.         Vital Signs  ED Triage Vitals   Temperature Pulse Respirations Blood Pressure SpO2   07/09/24 1359 07/09/24 1357 07/09/24 1357 07/09/24 1357 07/09/24 1357   97.6 °F (36.4 °C) 95 18 115/75 94 %      Temp Source Heart Rate Source Patient Position - Orthostatic VS BP Location FiO2 (%)   07/09/24 1359 07/09/24 1357 07/09/24 1600 07/09/24 1357 --   Temporal Monitor Lying Left arm       Pain Score       07/09/24 1357       6           Vitals:    07/09/24 1630 07/09/24  1645 07/09/24 1700 07/09/24 1805   BP:  112/57 112/57 115/62   Pulse: 75 73 68 70   Patient Position - Orthostatic VS:  Lying Lying Sitting         Visual Acuity      ED Medications  Medications   sodium chloride 0.9 % bolus 1,000 mL (0 mL Intravenous Stopped 7/9/24 1613)   ondansetron (ZOFRAN) injection 4 mg (4 mg Intravenous Given 7/9/24 1512)   ketorolac (TORADOL) injection 15 mg (15 mg Intravenous Given 7/9/24 1512)   iohexol (OMNIPAQUE) 350 MG/ML injection (MULTI-DOSE) 100 mL (100 mL Intravenous Given 7/9/24 1623)   dicyclomine (BENTYL) tablet 20 mg (20 mg Oral Given 7/9/24 1758)       Diagnostic Studies  Results Reviewed       Procedure Component Value Units Date/Time    POCT pregnancy, urine [[id number]]  (Normal) Resulted: 07/09/24 1510    Lab Status: Final result Updated: 07/09/24 1523     EXT Preg Test, Ur Negative     Control Valid    Magnesium [[id number]]  (Normal) Collected: 07/09/24 1405    Lab Status: Final result Specimen: Blood from Arm, Left Updated: 07/09/24 1523     Magnesium 1.9 mg/dL     UA w Reflex to Microscopic w Reflex to Culture [[id number]]  (Abnormal) Collected: 07/09/24 1457    Lab Status: Final result Specimen: Urine, Clean Catch Updated: 07/09/24 1503     Color, UA Yellow     Clarity, UA Clear     Specific Gravity, UA >=1.030     pH, UA 6.0     Leukocytes, UA Negative     Nitrite, UA Negative     Protein, UA Negative mg/dl      Glucose, UA 1+ mg/dl      Ketones, UA Trace mg/dl      Urobilinogen, UA 0.2 E.U./dl      Bilirubin, UA Negative     Occult Blood, UA Negative    Comprehensive metabolic panel [[id number]]  (Abnormal) Collected: 07/09/24 1405    Lab Status: Final result Specimen: Blood from Arm, Left Updated: 07/09/24 1431     Sodium 135 mmol/L      Potassium 4.2 mmol/L      Chloride 105 mmol/L      CO2 22 mmol/L      ANION GAP 8 mmol/L      BUN 8 mg/dL      Creatinine 0.54 mg/dL      Glucose 182 mg/dL      Calcium 10.0 mg/dL      AST 32 U/L      ALT 41 U/L      Alkaline  Phosphatase 71 U/L      Total Protein 7.4 g/dL      Albumin 4.3 g/dL      Total Bilirubin 0.59 mg/dL      eGFR 113 ml/min/1.73sq m     Narrative:      National Kid[employer] Disease Foundation guidelines for Chronic Kidney Disease (CKD):     Stage 1 with normal or high GFR (GFR > 90 mL/min/1.73 square meters)    Stage 2 Mild CKD (GFR = 60-89 mL/min/1.73 square meters)    Stage 3A Moderate CKD (GFR = 45-59 mL/min/1.73 square meters)    Stage 3B Moderate CKD (GFR = 30-44 mL/min/1.73 square meters)    Stage 4 Severe CKD (GFR = 15-29 mL/min/1.73 square meters)    Stage 5 End Stage CKD (GFR <15 mL/min/1.73 square meters)  Note: GFR calculation is accurate only with a steady state creatinine    Lipase [[id number]]  (Normal) Collected: 07/09/24 1405    Lab Status: Final result Specimen: Blood from Arm, Left Updated: 07/09/24 1431     Lipase 20 u/L     CBC and differential [[id number]] Collected: 07/09/24 1405    Lab Status: Final result Specimen: Blood from Arm, Left Updated: 07/09/24 1416     WBC 8.26 Thousand/uL      RBC 4.76 Million/uL      Hemoglobin 14.5 g/dL      Hematocrit 44.0 %      MCV 92 fL      MCH 30.5 pg      MCHC 33.0 g/dL      RDW 12.2 %      MPV 10.0 fL      Platelets 225 Thousands/uL      nRBC 0 /100 WBCs      Segmented % 53 %      Immature Grans % 1 %      Lymphocytes % 33 %      Monocytes % 10 %      Eosinophils Relative 2 %      Basophils Relative 1 %      Absolute Neutrophils 4.41 Thousands/µL      Absolute Immature Grans 0.05 Thousand/uL      Absolute Lymphocytes 2.70 Thousands/µL      Absolute Monocytes 0.86 Thousand/µL      Eosinophils Absolute 0.19 Thousand/µL      Basophils Absolute 0.05 Thousands/µL                    CT abdomen pelvis with contrast   Final Result by Ish Hudson MD (07/09 1716)   Addendum (preliminary) 1 of 1 by Ish Hudson MD (07/09 1716)   ADDENDUM:      Follow-up with nonurgent MRI of the pancreas for for the above described    suspected pancreatic  cyst.      The study was marked in EPIC for immediate notification.      Final      No acute findings.         Workstation performed: [id number]                    Procedures  Procedures         ED Course                                 SBIRT 20yo+      Flowsheet Row Most Recent Value   Initial Alcohol Screen: US AUDIT-C     1. How often do you have a drink containing alcohol? 0 Filed at: 07/09/2024 1352   2. How many drinks containing alcohol do you have on a typical day you are drinking?  0 Filed at: 07/09/2024 1359   3a. Male UNDER 65: How often do you have five or more drinks on one occasion? 0 Filed at: 07/09/2024 1353   3b. FEMALE Any Age, or MALE 65+: How often do you have 4 or more drinks on one occassion? 0 Filed at: 07/09/2024 1359   Audit-C Score 0 Filed at: 07/09/2024 1359   SELMA: How many times in the past year have you...    Used an illegal drug or used a prescription medication for non-medical reasons? Never Filed at: 07/09/2024 1359                      Medical Decision Making  Patient is a 48-year-old female with a PMHx of anxiety, asthma, DM2 and HLD, presenting to the ED for evaluation of abdominal pain, vomiting and diarrhea.    DDx including but not limited to: appendicitis, gastroenteritis, gastritis, PUD, pancreatitis, colitis, enteritis, food poisoning, IBD, IBS, diverticulitis, pyelonephritis, UTI.     Patient's labs are notable for hyperglycemia without evidence of DKA but are otherwise unremarkable.  UA not consistent with infection.  CT abdomen/pelvis shows no acute findings.  The finding of suspected pancreatic cyst was discussed with patient in detail and I advised her to reach out to her PCP to arrange a follow-up MRI for further evaluation.  Patient feeling improved after IV fluids, Zofran and Toradol.  She is tolerating PO with no episodes of vomiting in the ED.  She was given prescriptions for Zofran and Bentyl to take as needed for her symptoms.  She was given a referral to GI for  further evaluation of ongoing diarrhea.  Advised close follow-up with PCP or return to the ED for any new/worsening symptoms.    The management plan was discussed in detail with the patient at bedside and all questions were answered. Strict ED return instructions were discussed at bedside. Prior to discharge, both verbal and written instructions were provided. We discussed the signs and symptoms that should prompt the patient to return to the ED. All questions were answered and the patient was comfortable with the plan of care and discharged home. The patient agrees to return to the Emergency Department for concerns and/or progression of illness.     Amount and/or Complexity of Data Reviewed  Labs: ordered.  Radiology: ordered.    Risk  Prescription drug management.                 Disposition  Final diagnoses:   Abdominal pain   Diarrhea   Nausea and vomiting   Pancreatic cyst     Time reflects when diagnosis was documented in both MDM as applicable and the Disposition within this note       Time User Action Codes Description Comment    7/9/2024  5:53 PM Tawana Winkler Add [R10.9] Abdominal pain     7/9/2024  5:53 PM Tawana Winkler Add [R19.7] Diarrhea     7/9/2024  5:53 PM Tawana Winkler Add [R11.2] Nausea and vomiting     7/9/2024  5:59 PM Tawana Winkler Add [K86.2] Pancreatic cyst           ED Disposition       ED Disposition   Discharge    Condition   Stable    Date/Time   Tue Jul 9, 2024 5186    Comment   Gerri Parkinson discharge to home/self care.                   Follow-up Information       Follow up With Specialties Details Why Contact Info Additional Information    [facility] Schedule an appointment as soon as possible for a visit   [address] [place] [id number]  [phone] [facility], [address] [place] [address]  [place], [address], [phone]    Tamica HAMMOND  [facility], Nurse Practitioner Schedule an appointment as soon as possible for a visit   [address] [facility] [address]  [facility] [address] [phone]       [facility]  If symptoms worsen [address]  [facility] [address] [phone] [facility], [address] [facility] [address] [facility], [place], [place] [address]            Discharge Medication List as of 7/9/2024  6:02 PM        START taking these medications    Details   dicyclomine (BENTYL) 20 mg tablet Take 1 tablet (20 mg total) by mouth 2 (two) times a day, Starting Tue 7/9/2024, Normal      ondansetron (ZOFRAN-ODT) 4 mg disintegrating tablet Take 1 tablet (4 mg total) by mouth every 6 (six) hours as needed for nausea or vomiting, Starting Tue 7/9/2024, Normal           CONTINUE these medications which have NOT CHANGED    Details   acetaminophen (TYLENOL) 650 mg CR tablet Take 2 tablets (1,300 mg total) by mouth every 8 (eight) hours as needed for mild pain, Starting Mon 7/10/2023, Normal      albuterol (2.5 mg/3 mL) 0.083 % nebulizer solution Take 3 mL (2.5 mg total) by nebulization every 6 (six) hours as needed for wheezing or shortness of breath, Starting Fri 3/29/2024, Normal      albuterol (ProAir HFA) 90 mcg/act inhaler Inhale 2 puffs every 6 (six) hours as needed for wheezing, Starting Mon 10/16/2023, Normal      Alcohol Swabs PADS Use once daily when testing finger sticks., Normal      atorvastatin (LIPITOR) 10 mg tablet Take 1 tablet (10 mg total) by mouth daily, Starting Tue 11/28/2023, Normal      citalopram (CeleXA) 40 mg tablet TAKE 1 TABLET (40 MG TOTAL) BY MOUTH DAILY, Starting Fri 12/8/2023, Normal      !! fluticasone (FLONASE) 50 mcg/act nasal spray 2 sprays into each nostril daily, Starting Sat 4/20/2024, Normal      !! fluticasone (FLONASE) 50 mcg/act nasal spray 1 spray into each nostril daily, Starting Tue 4/23/2024, Normal       !! glucose blood (True Metrix Blood Glucose Test) test strip Use as instructed, Normal      !! glucose blood test strip Use as instructed, Normal      !! glucose blood test strip Use as instructed. Test up to 3 times a day as needed, Normal      hydrOXYzine HCL (ATARAX) 50 mg tablet Take 1 tablet (50 mg total) by mouth every 6 (six) hours as needed for itching, allergies or anxiety, Starting Tue 11/28/2023, Normal      ibuprofen (MOTRIN) 400 mg tablet Take 400 mg by mouth every 6 (six) hours as needed for mild pain, Historical Med      Insulin Pen Needle (B-D UF III MINI PEN NEEDLES) 31G X 5 MM MISC Inject under the skin daily at bedtime, Starting Tue 11/28/2023, Normal      Lancets MISC Use once for 1 dose Onetouch Verio. Test once daily or as directed., Starting Fri 6/11/2021, Normal      Levemir FlexPen 100 units/mL injection pen INJECT 20 UNITS UNDER THE SKIN DAILY AT BEDTIME, Normal      loratadine-pseudoephedrine (CLARITIN-D 12-HOUR) 5-120 mg per tablet Take 1 tablet by mouth 2 (two) times a day, Starting Sat 4/20/2024, Normal      methylPREDNISolone 4 MG tablet therapy pack Use as directed on package, Normal      montelukast (SINGULAIR) 10 mg tablet TAKE 1 TABLET (10 MG TOTAL) BY MOUTH DAILY AT BEDTIME, Starting Fri 12/8/2023, Normal      ofloxacin (FLOXIN) 0.3 % otic solution Administer 5 drops to the right ear daily, Starting Sun 7/7/2024, Normal      pramipexole (MIRAPEX) 0.5 mg tablet TAKE 1 TABLET (0.5 MG TOTAL) BY MOUTH DAILY AT BEDTIME, Starting Wed 9/13/2023, Normal      sitaGLIPtin (JANUVIA) 25 mg tablet Take 1 tablet (25 mg total) by mouth daily, Starting Tue 8/29/2023, Normal      SUMAtriptan (IMITREX) 25 mg tablet TAKE 1 TABLET (25 MG TOTAL) BY MOUTH ONCE AS NEEDED FOR MIGRAINE, Starting Mon 4/4/2022, Normal      topiramate (TOPAMAX) 25 mg sprinkle capsule TAKE 1 CAPSULE (25 MG TOTAL) BY MOUTH 2 (TWO) TIMES A DAY, Starting Wed 12/6/2023, Normal      traZODone (DESYREL) 100 mg tablet TAKE 1  TABLET (100 MG TOTAL) BY MOUTH DAILY AT BEDTIME, Starting Mon 2/20/2023, Normal       !! - Potential duplicate medications found. Please discuss with provider.              PDMP Review       None            ED Provider  Electronically Signed by             Tawana Winkler PA-C  07/10/24 5508

## 2024-07-10 DIAGNOSIS — H60.331 ACUTE SWIMMER'S EAR OF RIGHT SIDE: ICD-10-CM

## 2024-07-10 RX ORDER — OFLOXACIN 3 MG/ML
5 SOLUTION AURICULAR (OTIC) DAILY
Qty: 5 ML | Refills: 0 | Status: SHIPPED | OUTPATIENT
Start: 2024-07-10

## 2024-07-23 ENCOUNTER — TELEPHONE (OUTPATIENT)
Dept: OBGYN CLINIC | Facility: CLINIC | Age: 49
End: 2024-07-23

## 2024-08-02 ENCOUNTER — OFFICE VISIT (OUTPATIENT)
Dept: FAMILY MEDICINE CLINIC | Facility: CLINIC | Age: 49
End: 2024-08-02
Payer: COMMERCIAL

## 2024-08-02 VITALS
DIASTOLIC BLOOD PRESSURE: 74 MMHG | HEART RATE: 100 BPM | RESPIRATION RATE: 16 BRPM | OXYGEN SATURATION: 97 % | TEMPERATURE: 97.6 F | BODY MASS INDEX: 41.73 KG/M2 | HEIGHT: 64 IN | WEIGHT: 244.4 LBS | SYSTOLIC BLOOD PRESSURE: 132 MMHG

## 2024-08-02 DIAGNOSIS — Z12.11 SCREENING FOR COLON CANCER: ICD-10-CM

## 2024-08-02 DIAGNOSIS — Z12.4 SCREENING FOR CERVICAL CANCER: ICD-10-CM

## 2024-08-02 DIAGNOSIS — E11.9 TYPE 2 DIABETES MELLITUS WITHOUT COMPLICATION, WITHOUT LONG-TERM CURRENT USE OF INSULIN (HCC): Chronic | ICD-10-CM

## 2024-08-02 DIAGNOSIS — Z12.31 ENCOUNTER FOR SCREENING MAMMOGRAM FOR MALIGNANT NEOPLASM OF BREAST: ICD-10-CM

## 2024-08-02 DIAGNOSIS — Z79.4 TYPE 2 DIABETES MELLITUS WITH HYPERGLYCEMIA, WITH LONG-TERM CURRENT USE OF INSULIN (HCC): Primary | Chronic | ICD-10-CM

## 2024-08-02 DIAGNOSIS — E11.65 TYPE 2 DIABETES MELLITUS WITH HYPERGLYCEMIA, WITH LONG-TERM CURRENT USE OF INSULIN (HCC): Primary | Chronic | ICD-10-CM

## 2024-08-02 DIAGNOSIS — E78.49 OTHER HYPERLIPIDEMIA: Chronic | ICD-10-CM

## 2024-08-02 LAB — SL AMB POCT HEMOGLOBIN AIC: 8.6 (ref ?–6.5)

## 2024-08-02 PROCEDURE — 83036 HEMOGLOBIN GLYCOSYLATED A1C: CPT | Performed by: NURSE PRACTITIONER

## 2024-08-02 PROCEDURE — 99213 OFFICE O/P EST LOW 20 MIN: CPT | Performed by: NURSE PRACTITIONER

## 2024-08-02 RX ORDER — ATORVASTATIN CALCIUM 10 MG/1
10 TABLET, FILM COATED ORAL DAILY
Qty: 30 TABLET | Refills: 5 | Status: SHIPPED | OUTPATIENT
Start: 2024-08-02

## 2024-08-02 RX ORDER — INSULIN DETEMIR 100 [IU]/ML
20 INJECTION, SOLUTION SUBCUTANEOUS
Qty: 15 ML | Refills: 3 | Status: SHIPPED | OUTPATIENT
Start: 2024-08-02

## 2024-08-02 NOTE — PROGRESS NOTES
Ambulatory Visit  Name: Gerri Parkinson      : 1975      MRN: [id number]  Encounter Provider: JENNIFER Botello  Encounter Date: 2024   Encounter department: [facility]    Assessment & Plan   1. Type 2 diabetes mellitus with hyperglycemia, with long-term current use of insulin (HCC)  -     POCT hemoglobin A1c  -     sitaGLIPtin (JANUVIA) 25 mg tablet; Take 1 tablet (25 mg total) by mouth daily  -     Comprehensive metabolic panel; Future  -     Hemoglobin A1C; Future  -     CBC and differential; Future  2. Screening for cervical cancer  -     Ambulatory Referral to Obstetrics / Gynecology; Future  3. Screening for colon cancer  4. Encounter for screening mammogram for malignant neoplasm of breast  -     Mammo screening bilateral w 3d & cad; Future  5. Type 2 diabetes mellitus without complication, without long-term current use of insulin (HCC)  -     insulin detemir (Levemir FlexPen) 100 Units/mL injection pen; Inject 20 Units under the skin daily at bedtime  -     atorvastatin (LIPITOR) 10 mg tablet; Take 1 tablet (10 mg total) by mouth daily  6. Other hyperlipidemia  -     Lipid panel; Future      Depression Screening and Follow-up Plan: Patient was screened for depression during today's encounter. They screened negative with a PHQ-9 score of 0.        History of Present Illness     Patient here for routine follow up visit.  Doing well, staying clean. Has not used meth in over a year. Needs form completed to get medication on insurance.  Has not been taking any of her medication.       Review of Systems   Constitutional: Negative.  Negative for fatigue and fever.   Respiratory: Negative.  Negative for shortness of breath and wheezing.    Cardiovascular: Negative.  Negative for chest pain and palpitations.   Gastrointestinal: Negative.    Skin: Negative.  Negative for rash.   Neurological: Negative.  Negative for dizziness, light-headedness and headaches.    Psychiatric/Behavioral: Negative.  Negative for decreased concentration. The patient is not nervous/anxious.      Past Medical History:   Diagnosis Date    Anxiety     Asthma     Closed nondisplaced fracture of proximal phalanx of lesser toe of left foot 1/15/2020    Diabetes mellitus (HCC)     Hyperlipidemia      Past Surgical History:   Procedure Laterality Date    ARTHROSCOPY KNEE Left     CHOLECYSTECTOMY      ECTOPIC PREGNANCY SURGERY      SHOULDER SURGERY Right     torn bicep     Family History   Problem Relation Age of Onset    Obesity Mother     Obesity Father     Diabetes type II Father     Diabetes Father     Obesity Brother     No Known Problems Daughter     No Known Problems Maternal Grandmother     Diabetes Maternal Grandfather     No Known Problems Paternal Grandmother     No Known Problems Maternal Aunt     No Known Problems Maternal Aunt     No Known Problems Maternal Aunt     Heart disease Neg Hx     Stroke Neg Hx     Cancer Neg Hx      Social History     Tobacco Use    Smoking status: Never    Smokeless tobacco: Never   Vaping Use    Vaping status: Never Used   Substance and Sexual Activity    Alcohol use: Not Currently     Alcohol/week: 0.0 standard drinks of alcohol     Comment: 0    Drug use: Not Currently    Sexual activity: Not Currently     Current Outpatient Medications on File Prior to Visit   Medication Sig    acetaminophen (TYLENOL) 650 mg CR tablet Take 2 tablets (1,300 mg total) by mouth every 8 (eight) hours as needed for mild pain (Patient not taking: Reported on 7/7/2024)    albuterol (2.5 mg/3 mL) 0.083 % nebulizer solution Take 3 mL (2.5 mg total) by nebulization every 6 (six) hours as needed for wheezing or shortness of breath (Patient not taking: Reported on 7/7/2024)    albuterol (ProAir HFA) 90 mcg/act inhaler Inhale 2 puffs every 6 (six) hours as needed for wheezing (Patient not taking: Reported on 7/7/2024)    Alcohol Swabs PADS Use once daily when testing finger sticks.  (Patient not taking: Reported on 7/7/2024)    citalopram (CeleXA) 40 mg tablet TAKE 1 TABLET (40 MG TOTAL) BY MOUTH DAILY (Patient not taking: Reported on 7/7/2024)    dicyclomine (BENTYL) 20 mg tablet Take 1 tablet (20 mg total) by mouth 2 (two) times a day (Patient not taking: Reported on 8/2/2024)    fluticasone (FLONASE) 50 mcg/act nasal spray 2 sprays into each nostril daily (Patient not taking: Reported on 7/7/2024)    fluticasone (FLONASE) 50 mcg/act nasal spray 1 spray into each nostril daily (Patient not taking: Reported on 7/7/2024)    glucose blood (True Metrix Blood Glucose Test) test strip Use as instructed (Patient not taking: Reported on 7/7/2024)    glucose blood test strip Use as instructed (Patient not taking: Reported on 7/7/2024)    glucose blood test strip Use as instructed. Test up to 3 times a day as needed (Patient not taking: Reported on 3/8/2024)    hydrOXYzine HCL (ATARAX) 50 mg tablet Take 1 tablet (50 mg total) by mouth every 6 (six) hours as needed for itching, allergies or anxiety (Patient not taking: Reported on 7/7/2024)    ibuprofen (MOTRIN) 400 mg tablet Take 400 mg by mouth every 6 (six) hours as needed for mild pain (Patient not taking: Reported on 7/7/2024)    Insulin Pen Needle (B-D UF III MINI PEN NEEDLES) 31G X 5 MM MISC Inject under the skin daily at bedtime (Patient not taking: Reported on 7/7/2024)    Lancets MISC Use once for 1 dose Onetouch Verio. Test once daily or as directed.    loratadine-pseudoephedrine (CLARITIN-D 12-HOUR) 5-120 mg per tablet Take 1 tablet by mouth 2 (two) times a day (Patient not taking: Reported on 7/7/2024)    methylPREDNISolone 4 MG tablet therapy pack Use as directed on package (Patient not taking: Reported on 7/7/2024)    montelukast (SINGULAIR) 10 mg tablet TAKE 1 TABLET (10 MG TOTAL) BY MOUTH DAILY AT BEDTIME (Patient not taking: Reported on 3/8/2024)    ofloxacin (FLOXIN) 0.3 % otic solution ADMINISTER 5 DROPS TO THE RIGHT EAR DAILY  (Patient not taking: Reported on 8/2/2024)    ondansetron (ZOFRAN-ODT) 4 mg disintegrating tablet Take 1 tablet (4 mg total) by mouth every 6 (six) hours as needed for nausea or vomiting (Patient not taking: Reported on 8/2/2024)    pramipexole (MIRAPEX) 0.5 mg tablet TAKE 1 TABLET (0.5 MG TOTAL) BY MOUTH DAILY AT BEDTIME (Patient not taking: Reported on 7/7/2024)    SUMAtriptan (IMITREX) 25 mg tablet TAKE 1 TABLET (25 MG TOTAL) BY MOUTH ONCE AS NEEDED FOR MIGRAINE (Patient not taking: Reported on 7/7/2024)    topiramate (TOPAMAX) 25 mg sprinkle capsule TAKE 1 CAPSULE (25 MG TOTAL) BY MOUTH 2 (TWO) TIMES A DAY (Patient not taking: Reported on 7/7/2024)    traZODone (DESYREL) 100 mg tablet TAKE 1 TABLET (100 MG TOTAL) BY MOUTH DAILY AT BEDTIME (Patient not taking: Reported on 7/7/2024)    [DISCONTINUED] atorvastatin (LIPITOR) 10 mg tablet Take 1 tablet (10 mg total) by mouth daily (Patient not taking: Reported on 7/9/2024)    [DISCONTINUED] Levemir FlexPen 100 units/mL injection pen INJECT 20 UNITS UNDER THE SKIN DAILY AT BEDTIME (Patient not taking: Reported on 7/7/2024)    [DISCONTINUED] sitaGLIPtin (JANUVIA) 25 mg tablet Take 1 tablet (25 mg total) by mouth daily (Patient not taking: Reported on 7/7/2024)     Allergies   Allergen Reactions    Apple - Food Allergy Anaphylaxis    Apple Fruit Extract - Food Allergy Anaphylaxis     Throat closes off    Grass Pollen(K-O-R-T-Swt Justus) Hives, Itching, Shortness Of Breath, Sneezing and Throat Swelling    Dust Mite Extract Hives and Itching    Misc Natural Products Diarrhea, Nausea Only and Vomiting    Methylprednisolone Hives and Rash    Raw Vegetable - Food Allergy Blisters, Dizziness, Eye Swelling, Fatigue, Headache, Hives, Itching, Rash, Sneezing, Throat Swelling and Wheezing    Red Dye - Food Allergy Hives, Itching and Rash     Immunization History   Administered Date(s) Administered    COVID-19 PFIZER VACCINE 0.3 ML IM 11/01/2021    INFLUENZA 12/14/2018, 10/25/2019,  "10/06/2021    Influenza, injectable, quadrivalent, preservative free 0.5 mL 12/14/2018, 10/25/2019    Influenza, recombinant, quadrivalent,injectable, preservative free 10/06/2021    Tdap 07/09/2017, 09/28/2021, 03/11/2022     Objective     /74   Pulse 100   Temp 97.6 °F (36.4 °C) (Temporal)   Resp 16   Ht 5' 4\" (1.626 m)   Wt 111 kg (244 lb 6.4 oz)   SpO2 97%   BMI 41.95 kg/m²     Physical Exam  Vitals and nursing note reviewed.   Constitutional:       General: She is not in acute distress.     Appearance: Normal appearance. She is well-developed. She is not diaphoretic.   HENT:      Head: Normocephalic and atraumatic.   Pulmonary:      Effort: Pulmonary effort is normal. No tachypnea or respiratory distress.      Breath sounds: Normal breath sounds.   Skin:     General: Skin is warm.   Neurological:      General: No focal deficit present.      Mental Status: She is alert and oriented to person, place, and time.   Psychiatric:         Mood and Affect: Mood and affect normal.         Speech: Speech normal.         Behavior: Behavior normal. Behavior is cooperative.         Thought Content: Thought content normal.         Cognition and Memory: Cognition and memory normal.         Judgment: Judgment normal.         "

## 2024-08-07 ENCOUNTER — OFFICE VISIT (OUTPATIENT)
Dept: OBGYN CLINIC | Facility: CLINIC | Age: 49
End: 2024-08-07

## 2024-08-07 VITALS
WEIGHT: 245.8 LBS | TEMPERATURE: 97.1 F | HEIGHT: 64 IN | SYSTOLIC BLOOD PRESSURE: 111 MMHG | HEART RATE: 97 BPM | DIASTOLIC BLOOD PRESSURE: 73 MMHG | BODY MASS INDEX: 41.96 KG/M2

## 2024-08-07 DIAGNOSIS — S93.491D SPRAIN OF ANTERIOR TALOFIBULAR LIGAMENT OF RIGHT ANKLE, SUBSEQUENT ENCOUNTER: ICD-10-CM

## 2024-08-07 DIAGNOSIS — S93.421D SPRAIN OF DELTOID LIGAMENT OF RIGHT ANKLE, SUBSEQUENT ENCOUNTER: ICD-10-CM

## 2024-08-07 DIAGNOSIS — S99.911D ANKLE INJURY, RIGHT, SUBSEQUENT ENCOUNTER: Primary | ICD-10-CM

## 2024-08-07 PROCEDURE — 99213 OFFICE O/P EST LOW 20 MIN: CPT | Performed by: FAMILY MEDICINE

## 2024-08-07 NOTE — LETTER
August 7, 2024     Patient: Gerri Parkinson  YOB: 1975  Date of Visit: 8/7/2024      To Whom it May Concern:    Gerri Parkinson is under my professional care. Gerri was seen in my office on 8/7/2024. Gerri may return to work on 8/7/24 without restrictions .    If you have any questions or concerns, please don't hesitate to call.         Sincerely,          Randy Hanson MD        CC: No Recipients

## 2024-08-07 NOTE — PROGRESS NOTES
"[facility] [address] [place]  [facility] [place] [place] [address] [phone]      Assessment:  1. Ankle injury, right, subsequent encounter  2. Sprain of deltoid ligament of right ankle, subsequent encounter  3. Sprain of anterior talofibular ligament of right ankle, subsequent encounter      Plan:  Patient Instructions   F/u 3 wks  Restart therapy- try  1x per week- some weakness/balance issues.  Continue home exercises  Brace as needed.   Return in about 3 weeks (around 8/28/2024) for Recheck.    Chief Complaint:  Chief Complaint   Patient presents with    Right Ankle - Follow-up       Subjective:   HPI    Patient ID: Gerri Parkinson is a 48 y.o. female     Here for WC injury R ankle pain  Getting better  Some pain with overdoing it- prolonged walking  Stopped Wearing boot  Stopped Wearing ankle brace- wore out  No pain walking  Went to PT- helped  Doing home exercises.  No pain meds      Review of Systems   Constitutional:  Negative for fatigue and fever.   Respiratory:  Negative for shortness of breath.    Cardiovascular:  Negative for chest pain.   Gastrointestinal:  Negative for abdominal pain and nausea.   Genitourinary:  Negative for dysuria.   Musculoskeletal:  Positive for arthralgias.   Skin:  Negative for rash and wound.   Neurological:  Negative for weakness and headaches.       Objective:  Vitals:  /73 (BP Location: Left arm, Patient Position: Sitting, Cuff Size: Standard)   Pulse 97   Temp (!) 97.1 °F (36.2 °C) (Tympanic)   Ht 5' 4\" (1.626 m)   Wt 111 kg (245 lb 12.8 oz)   BMI 42.19 kg/m²     The following portions of the patient's history were reviewed and updated as appropriate: allergies, current medications, past family history, past medical history, past social history, past surgical history, and problem list.    Physical exam:  Physical Exam  Constitutional:       Appearance: Normal appearance. She is normal weight.   Eyes:      Extraocular " Movements: Extraocular movements intact.   Pulmonary:      Effort: Pulmonary effort is normal.   Musculoskeletal:      Cervical back: Normal range of motion.   Skin:     General: Skin is warm and dry.   Neurological:      General: No focal deficit present.      Mental Status: She is alert and oriented to person, place, and time. Mental status is at baseline.   Psychiatric:         Mood and Affect: Mood normal.         Behavior: Behavior normal.         Thought Content: Thought content normal.         Judgment: Judgment normal.       Right Ankle Exam     Tenderness   The patient is experiencing tenderness in the ATF.  Swelling: none    Range of Motion   The patient has normal right ankle ROM.    Muscle Strength   The patient has normal right ankle strength.    Tests   Varus tilt: positive     Comments:  Dec strength- single foot raise  Pain walking on tip toes          Strength/Myotome Testing     Right Ankle/Foot   Normal strength            Randy Hanson MD

## 2024-08-07 NOTE — PATIENT INSTRUCTIONS
F/u 3 wks  Restart therapy- try  1x per week- some weakness/balance issues.  Continue home exercises  Brace as needed.

## 2024-08-13 NOTE — PROGRESS NOTES
Additional outreach made to patient to assess need for housing resources, as patient is homeless. No answer, voicemail left, and awaiting return call.
No

## 2024-08-22 LAB
LEFT EYE DIABETIC RETINOPATHY: NORMAL
RIGHT EYE DIABETIC RETINOPATHY: NORMAL
SEVERITY (EYE EXAM): NORMAL

## 2024-09-09 ENCOUNTER — OFFICE VISIT (OUTPATIENT)
Dept: OBGYN CLINIC | Facility: CLINIC | Age: 49
End: 2024-09-09
Payer: OTHER MISCELLANEOUS

## 2024-09-09 VITALS
BODY MASS INDEX: 42 KG/M2 | DIASTOLIC BLOOD PRESSURE: 86 MMHG | SYSTOLIC BLOOD PRESSURE: 124 MMHG | HEIGHT: 64 IN | WEIGHT: 246 LBS | HEART RATE: 104 BPM | TEMPERATURE: 99.2 F

## 2024-09-09 DIAGNOSIS — F30.9 MOOD DISORDER OF MANIC TYPE (HCC): ICD-10-CM

## 2024-09-09 DIAGNOSIS — S93.421D SPRAIN OF DELTOID LIGAMENT OF RIGHT ANKLE, SUBSEQUENT ENCOUNTER: ICD-10-CM

## 2024-09-09 DIAGNOSIS — S93.491D SPRAIN OF ANTERIOR TALOFIBULAR LIGAMENT OF RIGHT ANKLE, SUBSEQUENT ENCOUNTER: ICD-10-CM

## 2024-09-09 DIAGNOSIS — M67.873 ACHILLES TENDINOSIS OF RIGHT ANKLE: ICD-10-CM

## 2024-09-09 DIAGNOSIS — S99.911D ANKLE INJURY, RIGHT, SUBSEQUENT ENCOUNTER: Primary | ICD-10-CM

## 2024-09-09 DIAGNOSIS — G43.109 MIGRAINE WITH AURA AND WITHOUT STATUS MIGRAINOSUS, NOT INTRACTABLE: ICD-10-CM

## 2024-09-09 PROCEDURE — 99214 OFFICE O/P EST MOD 30 MIN: CPT | Performed by: FAMILY MEDICINE

## 2024-09-09 RX ORDER — SUMATRIPTAN 25 MG/1
25 TABLET, FILM COATED ORAL ONCE AS NEEDED
Qty: 9 TABLET | Refills: 5 | Status: SHIPPED | OUTPATIENT
Start: 2024-09-09

## 2024-09-09 RX ORDER — PRAMIPEXOLE DIHYDROCHLORIDE 0.5 MG/1
0.5 TABLET ORAL
Qty: 30 TABLET | Refills: 5 | Status: SHIPPED | OUTPATIENT
Start: 2024-09-09

## 2024-09-09 NOTE — LETTER
September 9, 2024     Patient: Gerri Parkinson  YOB: 1975  Date of Visit: 9/9/2024      To Whom it May Concern:    Gerri Parkinson is under my professional care. Gerri was seen in my office on 9/9/2024. Gerri may return to work on 9/9/24 without restrictions .    If you have any questions or concerns, please don't hesitate to call.         Sincerely,          Randy Hanson MD        CC: No Recipients

## 2024-09-09 NOTE — TELEPHONE ENCOUNTER
Reason for call:   [x] Refill   [] Prior Auth  [] Other:     Office:   [x] PCP/Provider - [facility] / JENNIFER Botello   [] Specialty/Provider -     Medication:       Does the patient have enough for 3 days?   [] Yes   [x] No - Send as HP to POD

## 2024-09-09 NOTE — PATIENT INSTRUCTIONS
F/u 2 wks  Boot as needed  Begin physical therapy  The ankle injury has resolved, but she continues to have achilles pain.  This was seen on 3/24 MRI.  This is most likely secondary to the injury on 2/26/24.

## 2024-09-09 NOTE — PROGRESS NOTES
"[facility] [address]  [facility] [address] [phone]      Assessment:  1. Ankle injury, right, subsequent encounter  -     Ambulatory Referral to Physical Therapy; Future  2. Sprain of deltoid ligament of right ankle, subsequent encounter  -     Ambulatory Referral to Physical Therapy; Future  3. Sprain of anterior talofibular ligament of right ankle, subsequent encounter  -     Ambulatory Referral to Physical Therapy; Future  4. Achilles tendinosis of right ankle  -     Ambulatory Referral to Physical Therapy; Future      Plan:  Patient Instructions   F/u 2 wks  Boot as needed  Begin physical therapy  The ankle injury has resolved, but she continues to have achilles pain.  This was seen on 3/24 MRI.  This is most likely secondary to the injury on 2/26/24.   Return in about 2 weeks (around 9/23/2024) for Recheck.    Chief Complaint:  Chief Complaint   Patient presents with    Right Ankle - Follow-up       Subjective:   HPI    Patient ID: Gerri Parkinson is a 49 y.o. female     Here for WC injury R ankle pain  Less ankle pain  Having heel pain- pain for  2 wks  Some pain in heel walking  No pain in ankle walking.  Hasn't started PT yet  Doing home exercises.  Ibuprofen PRN    MRI shows-  Mild Achilles insertional tendinosis with associated retrocalcaneal spur       Review of Systems   Constitutional:  Negative for fatigue and fever.   Respiratory:  Negative for shortness of breath.    Cardiovascular:  Negative for chest pain.   Gastrointestinal:  Negative for abdominal pain and nausea.   Genitourinary:  Negative for dysuria.   Musculoskeletal:  Positive for arthralgias.   Skin:  Negative for rash and wound.   Neurological:  Negative for weakness and headaches.       Objective:  Vitals:  /86 (BP Location: Left arm, Patient Position: Sitting, Cuff Size: Large)   Pulse 104   Temp 99.2 °F (37.3 °C) (Tympanic)   Ht 5' 4\" (1.626 m)   Wt " 112 kg (246 lb)   BMI 42.23 kg/m²     The following portions of the patient's history were reviewed and updated as appropriate: allergies, current medications, past family history, past medical history, past social history, past surgical history, and problem list.    Physical exam:  Physical Exam  Constitutional:       Appearance: Normal appearance. She is normal weight.   HENT:      Head: Normocephalic.   Eyes:      Extraocular Movements: Extraocular movements intact.   Pulmonary:      Effort: Pulmonary effort is normal.   Musculoskeletal:         General: Tenderness present.      Cervical back: Normal range of motion.   Skin:     General: Skin is warm and dry.   Neurological:      General: No focal deficit present.      Mental Status: She is alert and oriented to person, place, and time. Mental status is at baseline.   Psychiatric:         Mood and Affect: Mood normal.         Behavior: Behavior normal.         Thought Content: Thought content normal.         Judgment: Judgment normal.       Right Ankle Exam     Tenderness   Right ankle tenderness location: distal achilles TTP.  Swelling: none    Range of Motion   The patient has normal right ankle ROM.    Muscle Strength   The patient has normal right ankle strength.     Comments:  Pain with single foot raise.          Strength/Myotome Testing     Right Ankle/Foot   Normal strength            Randy Hanson MD

## 2024-09-19 ENCOUNTER — CONSULT (OUTPATIENT)
Dept: GASTROENTEROLOGY | Facility: CLINIC | Age: 49
End: 2024-09-19
Payer: COMMERCIAL

## 2024-09-19 VITALS
HEIGHT: 64 IN | WEIGHT: 251.2 LBS | BODY MASS INDEX: 42.88 KG/M2 | OXYGEN SATURATION: 98 % | SYSTOLIC BLOOD PRESSURE: 123 MMHG | DIASTOLIC BLOOD PRESSURE: 85 MMHG | HEART RATE: 94 BPM | TEMPERATURE: 98.1 F

## 2024-09-19 DIAGNOSIS — R13.19 OTHER DYSPHAGIA: ICD-10-CM

## 2024-09-19 DIAGNOSIS — K21.9 GASTROESOPHAGEAL REFLUX DISEASE, UNSPECIFIED WHETHER ESOPHAGITIS PRESENT: ICD-10-CM

## 2024-09-19 DIAGNOSIS — R11.2 NAUSEA AND VOMITING: ICD-10-CM

## 2024-09-19 DIAGNOSIS — K86.2 PANCREATIC CYST: ICD-10-CM

## 2024-09-19 DIAGNOSIS — R19.7 DIARRHEA: ICD-10-CM

## 2024-09-19 DIAGNOSIS — Z12.11 SCREENING FOR COLON CANCER: ICD-10-CM

## 2024-09-19 DIAGNOSIS — R10.9 ABDOMINAL PAIN: Primary | ICD-10-CM

## 2024-09-19 PROCEDURE — 99244 OFF/OP CNSLTJ NEW/EST MOD 40: CPT | Performed by: STUDENT IN AN ORGANIZED HEALTH CARE EDUCATION/TRAINING PROGRAM

## 2024-09-19 RX ORDER — PANTOPRAZOLE SODIUM 40 MG/1
40 TABLET, DELAYED RELEASE ORAL
Qty: 30 TABLET | Refills: 2 | Status: SHIPPED | OUTPATIENT
Start: 2024-09-19

## 2024-09-19 RX ORDER — POLYETHYLENE GLYCOL 3350 17 G/17G
238 POWDER, FOR SOLUTION ORAL ONCE
Qty: 238 G | Refills: 0 | Status: SHIPPED | OUTPATIENT
Start: 2024-09-19 | End: 2024-09-20

## 2024-09-19 RX ORDER — BISACODYL 5 MG/1
20 TABLET, DELAYED RELEASE ORAL ONCE
Qty: 4 TABLET | Refills: 0 | Status: SHIPPED | OUTPATIENT
Start: 2024-09-19 | End: 2024-09-19

## 2024-09-19 NOTE — PATIENT INSTRUCTIONS
We will schedule you for upper endoscopy (EGD) and colonoscopy  Please follow the instructions for the bowel prep  We will start you on pantoprazole 40 mg daily  Take this 30 minutes before dinner        COLONOSCOPY  MIRALAX/Dulcolax Bowel Preparation Instructions    The OR/GI Lab will contact you the evening prior to your procedure with your exact arrival time.    Our practice requires a 1 week notice for any cancellations or rescheduling. We kindly ask that you immediately notify us of any changes including any new medications that are prescribed. Thank you for your cooperation.       WEEK BEFORE YOUR PROCEDURE:  Stop taking Iron tablets.  5 days prior, AVOID vegetables and fruits with skins or seeds, nuts, corn, popcorn and whole grain breads.   Purchase: One (1) 238-gram container of Miralax (polyethylene glycol 3350), four (4) 5 mg Dulcolax (bisacodyl) tablets, and one (1) 64-ounce bottle of Gatorade (sports drink) - no red, orange, or purple. These may be purchased at any pharmacy without a prescription. Generic products are permissible.   Arrange responsible transportation for day of the procedure.     DAY BEFORE THE PROCEDURE:   CLEAR liquids only for entire day prior. Nothing red, orange or purple.    You MAY have:                                                               Soda  Water  Broth Gatorade  Jello  Popsicles Coffee/tea without milk/creamer     YOU MAY NOT HAVE:  Solid foods   Milk and milk products    Juice with pulp    BOWEL PREPARATION:  Includes: One (1) 238-gram container of Miralax (polyethylene glycol 3350), four (4) 5 mg Dulcolax (bisacodyl) tablets, and one (1) 64-ounce bottle of Gatorade (sports drink).  Preparation may be refrigerated.  Entire bowel prep should be completed.     Afternoon before the procedure (2:00 pm - 5:00 pm):    Take two (2) 5 mg Dulcolax laxative tablets.     Evening before the procedure (6:00 pm):  Mix entire container of Miralax with one (1) 64-ounce bottle of  Gatorade and shake until all medication is dissolved.   Begin drinking solution. Drink an eight (8) ounce cup every 10-15 minutes until you have consumed half (32 ounces) of the solution.  Refrigerate remaining solution.    Night before the procedure (8:00 pm):  Take two (2) 5 mg Dulcolax laxative tablets.     Beginning 5 hours before your procedure:  Drink the remaining amount of prepared solution (32 ounces).  Drink an eight (8) ounce cup every 10-15 minutes until you have consumed the remaining solution.     Bowel prep should be completed 4 hours prior to procedure time.    NOTHING TO EAT OR DRINK AFTER MIDNIGHT- EXCEPT FOR YOUR PREP    DAY OF THE PROCEDURE:  You may brush your teeth.  Leave all jewelry at home.  Please arrive for your procedure as indicated by the OR / GI Lab / Endoscopy Unit. The hospital will contact you the day before with your exact arrival time.   Make sure you have arranged ahead of time for a responsible adult (18 or older) to accompany and drive you home after the procedure.  Please discuss any transportation concerns with our staff prior to your procedure.    The effects of the anesthesia can persist for 24 hours.  After receiving the sedation, you must exercise caution before engaging in any activity that could harm yourself and others (such as driving a car).  Do not make any important decisions or do not drink any alcoholic beverages during this time period.  After your procedure, you may have anything you'd like to eat or drink.  You will probably want to start with something light.  Please include plenty of fluids.  Avoid items that cause gas such as sodas and salads.    SPECIAL INSTRUCTIONS:    For patients currently taking blood thinners and/or antiplatelet therapy our office will contact the prescribing provider.  Our office will contact you with any required changes to your medication regimen.     Blood thinner (i.e. - Coumadin, Pradaxa, Lovenox, Xarelto, Eliquis)  ?  Continue  (Do Not Stop)  ? Stop______________for_____________days prior to the procedure.    Antiplatelet (i.e. - Plavix, Aggrenox, Effient, Brilinta)  ?  Continue (Do Not Stop)  ? Stop______________for_____________days prior to the procedure.       Diabetes:   If you are Diabetic, please see separate Diabetic Instruction Sheet.          Prescribed medications:  Do not stop your aspirin, or any of your other medications (unless instructed otherwise).    Take the rest of your prescribed medications with small sips of water at least 2 hours prior to your procedure.      For any questions or concerns related to your bowel preparation or pre-procedure instructions, please contact our office at [phone].  Thank you for choosing [facility]!

## 2024-09-20 DIAGNOSIS — R10.9 ABDOMINAL PAIN: ICD-10-CM

## 2024-09-20 DIAGNOSIS — R19.7 DIARRHEA: ICD-10-CM

## 2024-09-20 DIAGNOSIS — Z12.11 SCREENING FOR COLON CANCER: ICD-10-CM

## 2024-09-20 RX ORDER — POLYETHYLENE GLYCOL 3350 17 G/17G
POWDER, FOR SOLUTION ORAL
Qty: 238 G | Refills: 0 | Status: SHIPPED | OUTPATIENT
Start: 2024-09-20

## 2024-09-22 PROBLEM — Z12.11 SCREENING FOR COLON CANCER: Status: ACTIVE | Noted: 2024-09-22

## 2024-09-22 NOTE — ASSESSMENT & PLAN NOTE
Significant reflux noted.  I suspect that she has an underlying hiatal hernia based on her overall body habitus.  Other potential etiologies include gastroparesis which could certainly exacerbate reflux as well.  Peptic ulcer disease is within the differential.    Start pantoprazole as noted above  Plan for EGD for evaluation

## 2024-09-22 NOTE — ASSESSMENT & PLAN NOTE
8 mm cyst noted in pancreatic body on CT imaging.  This appears to be stable in appearance.  Likely benign cyst versus possibly IPMN.    Consider repeat imaging in 3 to 6 months  Lesion is likely too small at this time for EUS and FNA but can consider this depending on any change in appearance

## 2024-09-22 NOTE — ASSESSMENT & PLAN NOTE
Etiology unclear.  Possibly related to diabetes versus possibly SIBO versus IBS.  Overall, low suspicion for IBD or infectious etiologies.  Other potential etiologies also include EPI although this seems less likely.    Plan for bidirectional endoscopy for evaluation  Plan for biopsies  If unremarkable, can consider empiric treatment course with rifaximin for possible IBS with diarrhea  We will defer stool studies for now although this may be considered depending on her endoscopic workup

## 2024-09-22 NOTE — ASSESSMENT & PLAN NOTE
Etiology unclear.  She does have a pancreatic cyst noted on CT imaging although low suspicion that this is contributing to any degree of her pain.  May have underlying functional disorder versus peptic ulcer disease versus other etiologies such as IBD although less likely.    We will schedule her for bidirectional endoscopy for evaluation  MiraLAX/Dulcolax bowel prep  Start pantoprazole 40 mg daily    I obtained informed consent from the patient.  The risks/benefits/alternatives of the procedure were discussed with the patient.  Risks included, but not limited to, infection, bleeding, perforation, injury to organs in the abdomen, missed lesion and incomplete procedure were discussed.  Patient was agreeable and electronic signature was obtained.

## 2024-09-22 NOTE — PROGRESS NOTES
[facility]  Outpatient Consultation  Encounter: [id number]     PATIENT INFO     Name: Gerri Parkinson  YOB: 1975   Age: 49 y.o.   Sex: female   MRN: [id number]    ASSESSMENT & PLAN     Gerri Parkinson is a 49 y.o. female with history of diabetes, dyslipidemia, and migraines who presents to GI office for evaluation of various GI symptoms including abdominal pain, GERD, dysphagia, and diarrhea, and also incidental finding of pancreatic cyst on imaging.    Problem List Items Addressed This Visit       Abdominal pain - Primary     Etiology unclear.  She does have a pancreatic cyst noted on CT imaging although low suspicion that this is contributing to any degree of her pain.  May have underlying functional disorder versus peptic ulcer disease versus other etiologies such as IBD although less likely.    We will schedule her for bidirectional endoscopy for evaluation  MiraLAX/Dulcolax bowel prep  Start pantoprazole 40 mg daily    I obtained informed consent from the patient.  The risks/benefits/alternatives of the procedure were discussed with the patient.  Risks included, but not limited to, infection, bleeding, perforation, injury to organs in the abdomen, missed lesion and incomplete procedure were discussed.  Patient was agreeable and electronic signature was obtained.          Relevant Orders    Colonoscopy    Diarrhea     Etiology unclear.  Possibly related to diabetes versus possibly SIBO versus IBS.  Overall, low suspicion for IBD or infectious etiologies.  Other potential etiologies also include EPI although this seems less likely.    Plan for bidirectional endoscopy for evaluation  Plan for biopsies  If unremarkable, can consider empiric treatment course with rifaximin for possible IBS with diarrhea  We will defer stool studies for now although this may be considered depending on her endoscopic workup         Relevant Orders    Colonoscopy    Nausea and vomiting      Etiology unclear.  May have underlying gastroparesis possibly in the setting of diabetes.  There could be underlying functional disorder or peptic ulcer disease contributing as well.    Start pantoprazole 40 mg daily  Plan for EGD for evaluation  If endoscopy is unremarkable, consider gastric emptying study         Relevant Medications    pantoprazole (PROTONIX) 40 mg tablet    Other Relevant Orders    EGD    Pancreatic cyst     8 mm cyst noted in pancreatic body on CT imaging.  This appears to be stable in appearance.  Likely benign cyst versus possibly IPMN.    Consider repeat imaging in 3 to 6 months  Lesion is likely too small at this time for EUS and FNA but can consider this depending on any change in appearance         GERD (gastroesophageal reflux disease)     Significant reflux noted.  I suspect that she has an underlying hiatal hernia based on her overall body habitus.  Other potential etiologies include gastroparesis which could certainly exacerbate reflux as well.  Peptic ulcer disease is within the differential.    Start pantoprazole as noted above  Plan for EGD for evaluation         Relevant Medications    pantoprazole (PROTONIX) 40 mg tablet    Other Relevant Orders    EGD    Other dysphagia     Mild dysphagia symptoms.  This may be oropharyngeal in nature versus possibly peptic stricture or Schatzki ring.  Overall, low suspicion for dysmotility but certainly possible as well.  May have underlying hiatal hernia which may be contributing as well.    We will start pantoprazole 40 mg daily  Plan for EGD for further evaluation and potential intervention    I obtained informed consent from the patient.  The risks/benefits/alternatives of the procedure were discussed with the patient.  Risks included, but not limited to, infection, bleeding, perforation, injury to organs in the abdomen, missed lesion and incomplete procedure were discussed.  Patient was agreeable and electronic signature was obtained.           Relevant Medications    pantoprazole (PROTONIX) 40 mg tablet    Other Relevant Orders    EGD    Screening for colon cancer     Due for colon cancer screening.  No family history of colon cancer.  Does have abdominal pain and diarrhea although this appears to be more chronic in nature.  No other alarming lower GI symptoms.    We will schedule her for colonoscopy  MiraLAX/Dulcolax bowel prep    I obtained informed consent from the patient.  The risks/benefits/alternatives of the procedure were discussed with the patient.  Risks included, but not limited to, infection, bleeding, perforation, injury to organs in the abdomen, missed lesion and incomplete procedure were discussed.  Patient was agreeable and electronic signature was obtained.          Relevant Orders    Colonoscopy     Orders Placed This Encounter   Procedures    EGD    Colonoscopy       FOLLOW-UP: 6 months    HISTORY OF PRESENT ILLNESS       Gerri Parkinson is a 49 y.o. female who presents to GI office for evaluation of various GI symptoms including abdominal pain, GERD, dysphagia, diarrhea, and also finding of pancreatic cyst on imaging.  Patient is new to this office.  She has been referred by her PCP.    Patient reports that she has had chronic issues with abdominal pain and diarrhea.  This has been ongoing for several months.  She denies any hematochezia or melena but does report some urgency.  Abdominal pain appears to be more generalized and intermittent.  Some association with bowel habits.  Unclear if there is any association with dietary habits.  She does have reflux and does report some intermittent dysphagia primarily to solids.  Denies any issues with liquids.  No nausea or vomiting.  She does not take anything chronically for acid suppression therapy.  No prior endoscopy or colonoscopy.    She has had imaging which revealed 8 mm lesion in the pancreas likely a cyst.  She denies any family history of pancreatic disease or cancer.  She  denies any family history of liver disease or colon cancer as well.  No prior history of IBD.  Weight has remained stable.  No other alarming symptoms.     ENDOSCOPIC HISTORY     UPPER ENDOSCOPY: None    COLONOSCOPY: None    REVIEW OF SYSTEMS     CONSTITUTIONAL: Denies any fever, chills, rigors, and weight loss  HEENT: No earache or tinnitus, denies hearing loss or visual disturbances  CARDIOVASCULAR: No chest pain or palpitations  RESPIRATORY: Denies any cough, hemoptysis, shortness of breath or dyspnea on exertion  GASTROINTESTINAL: As noted in the History of Present Illness  GENITOURINARY: No problems with urination, denies any hematuria or dysuria  NEUROLOGIC: No dizziness or vertigo  MUSCULOSKELETAL: Denies any muscle or joint pain   SKIN: Denies jaundice or itching  PSYCHOSOCIAL: Denies any recent memory loss     Historical Information   Past Medical History:   Diagnosis Date    Anxiety     Asthma     Closed nondisplaced fracture of proximal phalanx of lesser toe of left foot 1/15/2020    Diabetes mellitus (HCC)     Hyperlipidemia      Past Surgical History:   Procedure Laterality Date    ARTHROSCOPY KNEE Left     CHOLECYSTECTOMY      ECTOPIC PREGNANCY SURGERY      SHOULDER SURGERY Right     torn bicep     Social History   Social History     Substance and Sexual Activity   Alcohol Use Not Currently    Alcohol/week: 0.0 standard drinks of alcohol    Comment: 0     Social History     Substance and Sexual Activity   Drug Use Not Currently     Social History     Tobacco Use   Smoking Status Never   Smokeless Tobacco Never     Family History   Problem Relation Age of Onset    Obesity Mother     Obesity Father     Diabetes type II Father     Diabetes Father     Obesity Brother     No Known Problems Daughter     No Known Problems Maternal Grandmother     Diabetes Maternal Grandfather     No Known Problems Paternal Grandmother     No Known Problems Maternal Aunt     No Known Problems Maternal Aunt     No Known  Problems Maternal Aunt     Heart disease Neg Hx     Stroke Neg Hx     Cancer Neg Hx        MEDICATIONS & ALLERGIES     Current Outpatient Medications   Medication Instructions    acetaminophen (TYLENOL) 1,300 mg, Oral, Every 8 hours PRN    albuterol (ProAir HFA) 90 mcg/act inhaler 2 puffs, Inhalation, Every 6 hours PRN    albuterol 2.5 mg, Nebulization, Every 6 hours PRN    Alcohol Swabs PADS Use once daily when testing finger sticks.    atorvastatin (LIPITOR) 10 mg, Oral, Daily    bisacodyl (DULCOLAX) 20 mg, Oral, Once    citalopram (CELEXA) 40 mg, Oral, Daily    dicyclomine (BENTYL) 20 mg, Oral, 2 times daily    fluticasone (FLONASE) 50 mcg/act nasal spray 2 sprays, Nasal, Daily    fluticasone (FLONASE) 50 mcg/act nasal spray 1 spray, Nasal, Daily    GaviLAX 17 GM/SCOOP powder TAKE 238 G BY MOUTH ONCE FOR 1 DOSE TAKE 238 G MY MOUTH. USE AS DIRECTED    glucose blood (True Metrix Blood Glucose Test) test strip Use as instructed    glucose blood test strip Use as instructed    glucose blood test strip Use as instructed. Test up to 3 times a day as needed    hydrOXYzine HCL (ATARAX) 50 mg, Oral, Every 6 hours PRN    ibuprofen (MOTRIN) 400 mg, Every 6 hours PRN    Insulin Pen Needle (B-D UF III MINI PEN NEEDLES) 31G X 5 MM MISC Subcutaneous, Daily at bedtime    Lancets MISC Does not apply, Once, Onetouch Verio. Test once daily or as directed.    Levemir FlexPen 20 Units, Subcutaneous, Daily at bedtime    loratadine-pseudoephedrine (CLARITIN-D 12-HOUR) 5-120 mg per tablet 1 tablet, Oral, 2 times daily    methylPREDNISolone 4 MG tablet therapy pack Use as directed on package    montelukast (SINGULAIR) 10 mg, Oral, Daily at bedtime    ofloxacin (FLOXIN) 0.3 % otic solution 5 drops, Right Ear, Daily    ondansetron (ZOFRAN-ODT) 4 mg, Oral, Every 6 hours PRN    pantoprazole (PROTONIX) 40 mg, Oral, Daily before dinner    pramipexole (MIRAPEX) 0.5 mg, Oral, Daily at bedtime    sitaGLIPtin (JANUVIA) 25 mg, Oral, Daily     "SUMAtriptan (IMITREX) 25 mg, Oral, Once as needed    topiramate (TOPAMAX) 25 mg, Oral, 2 times daily    traZODone (DESYREL) 100 mg, Oral, Daily at bedtime     Allergies   Allergen Reactions    Apple - Food Allergy Anaphylaxis    Apple Fruit Extract - Food Allergy Anaphylaxis     Throat closes off    Grass Pollen(K-O-R-T-Swt Justus) Hives, Itching, Shortness Of Breath, Sneezing and Throat Swelling    Dust Mite Extract Hives and Itching    Misc Natural Products Diarrhea, Nausea Only and Vomiting    Methylprednisolone Hives and Rash    Raw Vegetable - Food Allergy Blisters, Dizziness, Eye Swelling, Fatigue, Headache, Hives, Itching, Rash, Sneezing, Throat Swelling and Wheezing    Red Dye - Food Allergy Hives, Itching and Rash       PHYSICAL EXAM      Objective   Blood pressure 123/85, pulse 94, temperature 98.1 °F (36.7 °C), temperature source Temporal, height 5' 4\" (1.626 m), weight 114 kg (251 lb 3.2 oz), SpO2 98%, not currently breastfeeding. Body mass index is 43.12 kg/m².    General Appearance:   Alert, cooperative, no distress   HEENT:   Normocephalic, atraumatic, anicteric     Neck:   Supple, symmetrical, trachea midline   Lungs:   Equal chest rise, respirations unlabored    Heart:   Regular rate   Abdomen:   Soft, mild discomfort on palpation, non-distended; normal bowel sounds; no masses, no organomegaly    Rectal:   Deferred    Extremities:   No cyanosis or edema    Neuro:   Moves all 4 extremities    Skin:   No jaundice, rashes, or lesions       LABORATORY RESULTS     No visits with results within 1 Day(s) from this visit.   Latest known visit with results is:   Office Visit on 08/02/2024   Component Date Value    Hemoglobin A1C 08/02/2024 8.6 (A)         IMAGING RESULTS     No results found.  I have personally reviewed any available and pertinent imaging study reports.      Mark Jin D.O.  [facility]  Division of Gastroenterology & Hepatology  Available on " [email or web address]  [email or web address]    ** Please Note: This note is constructed using a voice recognition dictation system. **

## 2024-09-22 NOTE — ASSESSMENT & PLAN NOTE
Mild dysphagia symptoms.  This may be oropharyngeal in nature versus possibly peptic stricture or Schatzki ring.  Overall, low suspicion for dysmotility but certainly possible as well.  May have underlying hiatal hernia which may be contributing as well.    We will start pantoprazole 40 mg daily  Plan for EGD for further evaluation and potential intervention    I obtained informed consent from the patient.  The risks/benefits/alternatives of the procedure were discussed with the patient.  Risks included, but not limited to, infection, bleeding, perforation, injury to organs in the abdomen, missed lesion and incomplete procedure were discussed.  Patient was agreeable and electronic signature was obtained.

## 2024-09-22 NOTE — ASSESSMENT & PLAN NOTE
Due for colon cancer screening.  No family history of colon cancer.  Does have abdominal pain and diarrhea although this appears to be more chronic in nature.  No other alarming lower GI symptoms.    We will schedule her for colonoscopy  MiraLAX/Dulcolax bowel prep    I obtained informed consent from the patient.  The risks/benefits/alternatives of the procedure were discussed with the patient.  Risks included, but not limited to, infection, bleeding, perforation, injury to organs in the abdomen, missed lesion and incomplete procedure were discussed.  Patient was agreeable and electronic signature was obtained.

## 2024-09-22 NOTE — ASSESSMENT & PLAN NOTE
Etiology unclear.  May have underlying gastroparesis possibly in the setting of diabetes.  There could be underlying functional disorder or peptic ulcer disease contributing as well.    Start pantoprazole 40 mg daily  Plan for EGD for evaluation  If endoscopy is unremarkable, consider gastric emptying study

## 2024-09-26 ENCOUNTER — OFFICE VISIT (OUTPATIENT)
Dept: URGENT CARE | Facility: CLINIC | Age: 49
End: 2024-09-26
Payer: COMMERCIAL

## 2024-09-26 VITALS
TEMPERATURE: 98.4 F | BODY MASS INDEX: 42.23 KG/M2 | OXYGEN SATURATION: 95 % | HEART RATE: 97 BPM | DIASTOLIC BLOOD PRESSURE: 80 MMHG | WEIGHT: 246 LBS | RESPIRATION RATE: 18 BRPM | SYSTOLIC BLOOD PRESSURE: 140 MMHG

## 2024-09-26 DIAGNOSIS — L24.9 IRRITANT CONTACT DERMATITIS, UNSPECIFIED TRIGGER: Primary | ICD-10-CM

## 2024-09-26 DIAGNOSIS — N76.0 ACUTE VAGINITIS: ICD-10-CM

## 2024-09-26 PROCEDURE — 99213 OFFICE O/P EST LOW 20 MIN: CPT | Performed by: PHYSICIAN ASSISTANT

## 2024-09-26 RX ORDER — CLOTRIMAZOLE AND BETAMETHASONE DIPROPIONATE 10; .64 MG/G; MG/G
CREAM TOPICAL 2 TIMES DAILY
Qty: 45 G | Refills: 0 | Status: SHIPPED | OUTPATIENT
Start: 2024-09-26

## 2024-09-26 NOTE — PROGRESS NOTES
[facility]        NAME: Gerri Parkinson is a 49 y.o. female  : 1975    MRN: [id number]  DATE: 2024  TIME: 6:19 PM    Assessment and Plan   Irritant contact dermatitis, unspecified trigger [L24.9]  1. Irritant contact dermatitis, unspecified trigger  clotrimazole-betamethasone (LOTRISONE) 1-0.05 % cream      2. Acute vaginitis  clotrimazole-betamethasone (LOTRISONE) 1-0.05 % cream            Patient Instructions       Follow up with PCP in 3-5 days.  Proceed to  ER if symptoms worsen.    If tests have been performed at [facility], our office will contact you with results if changes need to be made to the care plan discussed with you at the visit.  You can review your full results on [facility].    Chief Complaint     Chief Complaint   Patient presents with    Rash     Used a different brand of soap on Monday . Has rash on vaginal and kenny area.          History of Present Illness       Patient is a 49 year old female presenting to [facility] with vaginal rash.  Patient had a diarrhea accident 3 days ago and used soap that was at work.  Patient is sensitive to a lot of soaps.  Pt has applied diaper rash cream.      Rash  This is a new problem. The current episode started in the past 7 days. The problem has been gradually worsening since onset. Location: Vaginal rash. The problem is mild. The rash is characterized by redness, itchiness and burning. Pertinent negatives include no cough, fever, shortness of breath, sore throat or vomiting.       Review of Systems   Review of Systems   Constitutional:  Negative for chills and fever.   HENT:  Negative for ear pain and sore throat.    Eyes:  Negative for pain and visual disturbance.   Respiratory:  Negative for cough and shortness of breath.    Cardiovascular:  Negative for chest pain and palpitations.   Gastrointestinal:  Negative for abdominal pain and vomiting.   Genitourinary:  Negative for dysuria and hematuria.   Musculoskeletal:   Negative for arthralgias and back pain.   Skin:  Positive for rash. Negative for color change.   Neurological:  Negative for seizures and syncope.   All other systems reviewed and are negative.        Current Medications       Current Outpatient Medications:     albuterol (2.5 mg/3 mL) 0.083 % nebulizer solution, Take 3 mL (2.5 mg total) by nebulization every 6 (six) hours as needed for wheezing or shortness of breath, Disp: 75 mL, Rfl: 0    albuterol (ProAir HFA) 90 mcg/act inhaler, Inhale 2 puffs every 6 (six) hours as needed for wheezing, Disp: 8.5 g, Rfl: 1    atorvastatin (LIPITOR) 10 mg tablet, Take 1 tablet (10 mg total) by mouth daily, Disp: 30 tablet, Rfl: 5    clotrimazole-betamethasone (LOTRISONE) 1-0.05 % cream, Apply topically 2 (two) times a day, Disp: 45 g, Rfl: 0    loratadine-pseudoephedrine (CLARITIN-D 12-HOUR) 5-120 mg per tablet, Take 1 tablet by mouth 2 (two) times a day, Disp: 30 tablet, Rfl: 0    montelukast (SINGULAIR) 10 mg tablet, TAKE 1 TABLET (10 MG TOTAL) BY MOUTH DAILY AT BEDTIME, Disp: 90 tablet, Rfl: 3    acetaminophen (TYLENOL) 650 mg CR tablet, Take 2 tablets (1,300 mg total) by mouth every 8 (eight) hours as needed for mild pain (Patient not taking: Reported on 7/7/2024), Disp: 30 tablet, Rfl: 0    Alcohol Swabs PADS, Use once daily when testing finger sticks., Disp: 90 each, Rfl: 1    bisacodyl (DULCOLAX) 5 mg EC tablet, Take 4 tablets (20 mg total) by mouth once for 1 dose, Disp: 4 tablet, Rfl: 0    citalopram (CeleXA) 40 mg tablet, TAKE 1 TABLET (40 MG TOTAL) BY MOUTH DAILY (Patient not taking: Reported on 7/7/2024), Disp: 30 tablet, Rfl: 5    dicyclomine (BENTYL) 20 mg tablet, Take 1 tablet (20 mg total) by mouth 2 (two) times a day (Patient not taking: Reported on 8/2/2024), Disp: 20 tablet, Rfl: 0    fluticasone (FLONASE) 50 mcg/act nasal spray, 2 sprays into each nostril daily (Patient not taking: Reported on 7/7/2024), Disp: 1 g, Rfl: 0    fluticasone (FLONASE) 50 mcg/act  nasal spray, 1 spray into each nostril daily (Patient not taking: Reported on 7/7/2024), Disp: 16 g, Rfl: 0    GaviLAX 17 GM/SCOOP powder, TAKE 238 G BY MOUTH ONCE FOR 1 DOSE TAKE 238 G MY MOUTH. USE AS DIRECTED, Disp: 238 g, Rfl: 0    glucose blood (True Metrix Blood Glucose Test) test strip, Use as instructed (Patient not taking: Reported on 7/7/2024), Disp: 100 each, Rfl: 3    glucose blood test strip, Use as instructed (Patient not taking: Reported on 7/7/2024), Disp: 100 each, Rfl: 2    glucose blood test strip, Use as instructed. Test up to 3 times a day as needed (Patient not taking: Reported on 3/8/2024), Disp: 200 each, Rfl: 11    hydrOXYzine HCL (ATARAX) 50 mg tablet, Take 1 tablet (50 mg total) by mouth every 6 (six) hours as needed for itching, allergies or anxiety (Patient not taking: Reported on 7/7/2024), Disp: 120 tablet, Rfl: 1    ibuprofen (MOTRIN) 400 mg tablet, Take 400 mg by mouth every 6 (six) hours as needed for mild pain (Patient not taking: Reported on 7/7/2024), Disp: , Rfl:     insulin detemir (Levemir FlexPen) 100 Units/mL injection pen, Inject 20 Units under the skin daily at bedtime, Disp: 15 mL, Rfl: 3    Insulin Pen Needle (B-D UF III MINI PEN NEEDLES) 31G X 5 MM MISC, Inject under the skin daily at bedtime, Disp: 90 each, Rfl: 1    Lancets MISC, Use once for 1 dose Onetouch Verio. Test once daily or as directed. (Patient not taking: Reported on 9/9/2024), Disp: 100 each, Rfl: 1    methylPREDNISolone 4 MG tablet therapy pack, Use as directed on package (Patient not taking: Reported on 9/26/2024), Disp: 1 each, Rfl: 0    ofloxacin (FLOXIN) 0.3 % otic solution, ADMINISTER 5 DROPS TO THE RIGHT EAR DAILY (Patient not taking: Reported on 8/2/2024), Disp: 5 mL, Rfl: 0    ondansetron (ZOFRAN-ODT) 4 mg disintegrating tablet, Take 1 tablet (4 mg total) by mouth every 6 (six) hours as needed for nausea or vomiting (Patient not taking: Reported on 8/2/2024), Disp: 20 tablet, Rfl: 0     pantoprazole (PROTONIX) 40 mg tablet, Take 1 tablet (40 mg total) by mouth daily before dinner, Disp: 30 tablet, Rfl: 2    pramipexole (MIRAPEX) 0.5 mg tablet, Take 1 tablet (0.5 mg total) by mouth daily at bedtime, Disp: 30 tablet, Rfl: 5    sitaGLIPtin (JANUVIA) 25 mg tablet, Take 1 tablet (25 mg total) by mouth daily, Disp: 30 tablet, Rfl: 5    SUMAtriptan (IMITREX) 25 mg tablet, Take 1 tablet (25 mg total) by mouth once as needed for migraine, Disp: 9 tablet, Rfl: 5    topiramate (TOPAMAX) 25 mg sprinkle capsule, TAKE 1 CAPSULE (25 MG TOTAL) BY MOUTH 2 (TWO) TIMES A DAY (Patient not taking: Reported on 7/7/2024), Disp: 60 capsule, Rfl: 0    traZODone (DESYREL) 100 mg tablet, TAKE 1 TABLET (100 MG TOTAL) BY MOUTH DAILY AT BEDTIME (Patient not taking: Reported on 7/7/2024), Disp: 30 tablet, Rfl: 0    Current Allergies     Allergies as of 09/26/2024 - Reviewed 09/26/2024   Allergen Reaction Noted    Apple - food allergy Anaphylaxis 03/13/2022    Apple fruit extract - food allergy Anaphylaxis 03/29/2023    Grass pollen(k-o-r-t-swt milana) Hives, Itching, Shortness Of Breath, Sneezing, and Throat Swelling 11/17/2021    Dust mite extract Hives and Itching 11/17/2021    Misc natural products Diarrhea, Nausea Only, and Vomiting 11/17/2021    Methylprednisolone Hives and Rash 03/19/2023    Raw vegetable - food allergy Blisters, Dizziness, Eye Swelling, Fatigue, Headache, Hives, Itching, Rash, Sneezing, Throat Swelling, and Wheezing 03/21/2022    Red dye - food allergy Hives, Itching, and Rash 11/17/2021            The following portions of the patient's history were reviewed and updated as appropriate: allergies, current medications, past family history, past medical history, past social history, past surgical history and problem list.     Past Medical History:   Diagnosis Date    Anxiety     Asthma     Closed nondisplaced fracture of proximal phalanx of lesser toe of left foot 1/15/2020    Diabetes mellitus (HCC)      Hyperlipidemia        Past Surgical History:   Procedure Laterality Date    ARTHROSCOPY KNEE Left     CHOLECYSTECTOMY      ECTOPIC PREGNANCY SURGERY      SHOULDER SURGERY Right     torn bicep       Family History   Problem Relation Age of Onset    Obesity Mother     Obesity Father     Diabetes type II Father     Diabetes Father     Obesity Brother     No Known Problems Daughter     No Known Problems Maternal Grandmother     Diabetes Maternal Grandfather     No Known Problems Paternal Grandmother     No Known Problems Maternal Aunt     No Known Problems Maternal Aunt     No Known Problems Maternal Aunt     Heart disease Neg Hx     Stroke Neg Hx     Cancer Neg Hx          Medications have been verified.        Objective   /80   Pulse 97   Temp 98.4 °F (36.9 °C)   Resp 18   Wt 112 kg (246 lb)   SpO2 95%   BMI 42.23 kg/m²   No LMP recorded. Patient is perimenopausal.       Physical Exam     Physical Exam  Constitutional:       Appearance: Normal appearance.   HENT:      Head: Normocephalic and atraumatic.      Nose: Nose normal.      Mouth/Throat:      Mouth: Mucous membranes are moist.   Eyes:      Extraocular Movements: Extraocular movements intact.      Conjunctiva/sclera: Conjunctivae normal.      Pupils: Pupils are equal, round, and reactive to light.   Cardiovascular:      Rate and Rhythm: Normal rate.   Pulmonary:      Effort: Pulmonary effort is normal.   Genitourinary:     Labia:         Right: Rash present.         Left: Rash present.    Musculoskeletal:         General: Normal range of motion.      Cervical back: Normal range of motion and neck supple.   Skin:     General: Skin is warm and dry.      Capillary Refill: Capillary refill takes less than 2 seconds.   Neurological:      General: No focal deficit present.      Mental Status: She is alert and oriented to person, place, and time.   Psychiatric:         Mood and Affect: Mood normal.         Behavior: Behavior normal.

## 2024-09-30 ENCOUNTER — TELEPHONE (OUTPATIENT)
Age: 49
End: 2024-09-30

## 2024-10-07 ENCOUNTER — HOSPITAL ENCOUNTER (EMERGENCY)
Facility: HOSPITAL | Age: 49
Discharge: HOME | End: 2024-10-07
Attending: FAMILY MEDICINE
Payer: COMMERCIAL

## 2024-10-07 VITALS
TEMPERATURE: 97.4 F | WEIGHT: 240 LBS | HEART RATE: 74 BPM | HEIGHT: 64 IN | DIASTOLIC BLOOD PRESSURE: 65 MMHG | OXYGEN SATURATION: 98 % | BODY MASS INDEX: 40.97 KG/M2 | SYSTOLIC BLOOD PRESSURE: 132 MMHG | RESPIRATION RATE: 16 BRPM

## 2024-10-07 DIAGNOSIS — G43.909 MIGRAINE: Primary | ICD-10-CM

## 2024-10-07 DIAGNOSIS — R73.9 HYPERGLYCEMIA: ICD-10-CM

## 2024-10-07 LAB
ALBUMIN SERPL BCG-MCNC: 4.4 G/DL (ref 3.5–5)
ALP SERPL-CCNC: 73 U/L (ref 34–104)
ALT SERPL W P-5'-P-CCNC: 44 U/L (ref 7–52)
ANION GAP SERPL CALCULATED.3IONS-SCNC: 8 MMOL/L (ref 4–13)
AST SERPL W P-5'-P-CCNC: 44 U/L (ref 13–39)
BASOPHILS # BLD AUTO: 0.04 THOUSANDS/ΜL (ref 0–0.1)
BASOPHILS NFR BLD AUTO: 1 % (ref 0–1)
BILIRUB SERPL-MCNC: 0.52 MG/DL (ref 0.2–1)
BUN SERPL-MCNC: 13 MG/DL (ref 5–25)
CALCIUM SERPL-MCNC: 9.9 MG/DL (ref 8.4–10.2)
CHLORIDE SERPL-SCNC: 102 MMOL/L (ref 96–108)
CO2 SERPL-SCNC: 23 MMOL/L (ref 21–32)
CREAT SERPL-MCNC: 0.57 MG/DL (ref 0.6–1.3)
EOSINOPHIL # BLD AUTO: 0.11 THOUSAND/ΜL (ref 0–0.61)
EOSINOPHIL NFR BLD AUTO: 1 % (ref 0–6)
ERYTHROCYTE [DISTWIDTH] IN BLOOD BY AUTOMATED COUNT: 12 % (ref 11.6–15.1)
EXT PREGNANCY TEST URINE: NEGATIVE
EXT. CONTROL: NORMAL
GFR SERPL CREATININE-BSD FRML MDRD: 110 ML/MIN/1.73SQ M
GLUCOSE SERPL-MCNC: 265 MG/DL (ref 65–140)
GLUCOSE SERPL-MCNC: 280 MG/DL (ref 65–140)
HCT VFR BLD AUTO: 44.6 % (ref 34.8–46.1)
HGB BLD-MCNC: 14.2 G/DL (ref 11.5–15.4)
IMM GRANULOCYTES # BLD AUTO: 0.02 THOUSAND/UL (ref 0–0.2)
IMM GRANULOCYTES NFR BLD AUTO: 0 % (ref 0–2)
LYMPHOCYTES # BLD AUTO: 2.68 THOUSANDS/ΜL (ref 0.6–4.47)
LYMPHOCYTES NFR BLD AUTO: 35 % (ref 14–44)
MCH RBC QN AUTO: 29.5 PG (ref 26.8–34.3)
MCHC RBC AUTO-ENTMCNC: 31.8 G/DL (ref 31.4–37.4)
MCV RBC AUTO: 93 FL (ref 82–98)
MONOCYTES # BLD AUTO: 0.67 THOUSAND/ΜL (ref 0.17–1.22)
MONOCYTES NFR BLD AUTO: 9 % (ref 4–12)
NEUTROPHILS # BLD AUTO: 4.21 THOUSANDS/ΜL (ref 1.85–7.62)
NEUTS SEG NFR BLD AUTO: 54 % (ref 43–75)
NRBC BLD AUTO-RTO: 0 /100 WBCS
PLATELET # BLD AUTO: 238 THOUSANDS/UL (ref 149–390)
PMV BLD AUTO: 10.1 FL (ref 8.9–12.7)
POTASSIUM SERPL-SCNC: 3.9 MMOL/L (ref 3.5–5.3)
PROT SERPL-MCNC: 7.8 G/DL (ref 6.4–8.4)
RBC # BLD AUTO: 4.81 MILLION/UL (ref 3.81–5.12)
SODIUM SERPL-SCNC: 133 MMOL/L (ref 135–147)
WBC # BLD AUTO: 7.73 THOUSAND/UL (ref 4.31–10.16)

## 2024-10-07 PROCEDURE — 96375 TX/PRO/DX INJ NEW DRUG ADDON: CPT

## 2024-10-07 PROCEDURE — 80053 COMPREHEN METABOLIC PANEL: CPT

## 2024-10-07 PROCEDURE — 82948 REAGENT STRIP/BLOOD GLUCOSE: CPT

## 2024-10-07 PROCEDURE — 99284 EMERGENCY DEPT VISIT MOD MDM: CPT

## 2024-10-07 PROCEDURE — 85025 COMPLETE CBC W/AUTO DIFF WBC: CPT

## 2024-10-07 PROCEDURE — 81025 URINE PREGNANCY TEST: CPT

## 2024-10-07 PROCEDURE — 36415 COLL VENOUS BLD VENIPUNCTURE: CPT

## 2024-10-07 PROCEDURE — 96365 THER/PROPH/DIAG IV INF INIT: CPT

## 2024-10-07 RX ORDER — MAGNESIUM SULFATE HEPTAHYDRATE 40 MG/ML
2 INJECTION, SOLUTION INTRAVENOUS ONCE
Status: COMPLETED | OUTPATIENT
Start: 2024-10-07 | End: 2024-10-07

## 2024-10-07 RX ORDER — METOCLOPRAMIDE HYDROCHLORIDE 5 MG/ML
10 INJECTION INTRAMUSCULAR; INTRAVENOUS ONCE
Status: COMPLETED | OUTPATIENT
Start: 2024-10-07 | End: 2024-10-07

## 2024-10-07 RX ORDER — DIPHENHYDRAMINE HYDROCHLORIDE 50 MG/ML
25 INJECTION INTRAMUSCULAR; INTRAVENOUS ONCE
Status: COMPLETED | OUTPATIENT
Start: 2024-10-07 | End: 2024-10-07

## 2024-10-07 RX ORDER — PRAMIPEXOLE DIHYDROCHLORIDE 0.5 MG/1
0.5 TABLET ORAL ONCE
Status: COMPLETED | OUTPATIENT
Start: 2024-10-07 | End: 2024-10-07

## 2024-10-07 RX ORDER — KETOROLAC TROMETHAMINE 30 MG/ML
30 INJECTION, SOLUTION INTRAMUSCULAR; INTRAVENOUS ONCE
Status: COMPLETED | OUTPATIENT
Start: 2024-10-07 | End: 2024-10-07

## 2024-10-07 RX ADMIN — METOCLOPRAMIDE 10 MG: 5 INJECTION, SOLUTION INTRAMUSCULAR; INTRAVENOUS at 13:25

## 2024-10-07 RX ADMIN — SODIUM CHLORIDE 1000 ML: 0.9 INJECTION, SOLUTION INTRAVENOUS at 13:14

## 2024-10-07 RX ADMIN — PRAMIPEXOLE DIHYDROCHLORIDE 0.5 MG: 0.5 TABLET ORAL at 14:55

## 2024-10-07 RX ADMIN — KETOROLAC TROMETHAMINE 30 MG: 30 INJECTION, SOLUTION INTRAMUSCULAR at 13:26

## 2024-10-07 RX ADMIN — MAGNESIUM SULFATE HEPTAHYDRATE 2 G: 40 INJECTION, SOLUTION INTRAVENOUS at 13:24

## 2024-10-07 RX ADMIN — DIPHENHYDRAMINE HYDROCHLORIDE 25 MG: 50 INJECTION, SOLUTION INTRAMUSCULAR; INTRAVENOUS at 13:25

## 2024-10-07 NOTE — ED PROVIDER NOTES
Final diagnoses:   Migraine   Hyperglycemia     ED Disposition       ED Disposition   Discharge    Condition   Stable    Date/Time   Mon Oct 7, 2024  2:48 PM    Comment   Gerri Parkinson discharge to home/self care.                   Assessment & Plan       Medical Decision Making  Patient is a 49-year-old female presenting with a headache since yesterday.  Vitals are within normal notes on arrival and she is in no acute distress.  Neuroexam is nonfocal.    DDx: Migraine, tension headache, viral syndrome.  Doubt CVA/SAH in absence of focal neurologic findings or red flag symptoms.  Plan: CBC, CMP, urine pregnancy.  Supportive care: Migraine cocktail and reevaluate.    Also leukocytosis, anemia, electrolyte disturbance, SAYRA.  Glucose is elevated in the 200s however there is no anion gap and CO2 is normal, gave fluids while in the ED and advised patient to monitor sugars closely at home and follow-up with PCP if she continues to have elevated readings with medication.  Urine pregnancy is negative.  Patient feels improvement in symptoms following migraine cocktail and is stable for discharge home.    I have discussed findings and plan for discharge with the patient/caregiver. Follow up with the appropriate providers including primary care physician was discussed. Return precautions discussed with patient/caregiver as outlined in AVS. Patient/caregiver verbally expressed understanding. Patient stable at time of discharge and ambulated out of the emergency department.         Amount and/or Complexity of Data Reviewed  Labs: ordered.    Risk  Prescription drug management.        ED Course as of 10/07/24 1721   Mon Oct 07, 2024   1445 Reporting improvement in symptoms. Requesting home dose of mirapex for restless legs.       Medications   ketorolac (TORADOL) injection 30 mg (30 mg Intravenous Given 10/7/24 1326)   metoclopramide (REGLAN) injection 10 mg (10 mg Intravenous Given 10/7/24 1325)   diphenhydrAMINE (BENADRYL)  injection 25 mg (25 mg Intravenous Given 10/7/24 1325)   magnesium sulfate 2 g/50 mL IVPB (premix) 2 g (0 g Intravenous Stopped 10/7/24 1424)   sodium chloride 0.9 % bolus 1,000 mL (0 mL Intravenous Stopped 10/7/24 1447)   pramipexole (MIRAPEX) tablet 0.5 mg (0.5 mg Oral Given 10/7/24 1455)       ED Risk Strat Scores                           SBIRT 22yo+      Flowsheet Row Most Recent Value   Initial Alcohol Screen: US AUDIT-C     1. How often do you have a drink containing alcohol? 0 Filed at: 10/07/2024 1255   2. How many drinks containing alcohol do you have on a typical day you are drinking?  0 Filed at: 10/07/2024 1255   3b. FEMALE Any Age, or MALE 65+: How often do you have 4 or more drinks on one occassion? 0 Filed at: 10/07/2024 1255   Audit-C Score 0 Filed at: 10/07/2024 1255   SELMA: How many times in the past year have you...    Used an illegal drug or used a prescription medication for non-medical reasons? Never Filed at: 10/07/2024 1255                            History of Present Illness       Chief Complaint   Patient presents with    Migraine     According to the patient she has had a migraine with episodes of emesis since yesterday       Past Medical History:   Diagnosis Date    Anxiety     Asthma     Closed nondisplaced fracture of proximal phalanx of lesser toe of left foot 1/15/2020    Diabetes mellitus (HCC)     Hyperlipidemia       Past Surgical History:   Procedure Laterality Date    ARTHROSCOPY KNEE Left     CHOLECYSTECTOMY      ECTOPIC PREGNANCY SURGERY      SHOULDER SURGERY Right     torn bicep      Family History   Problem Relation Age of Onset    Obesity Mother     Obesity Father     Diabetes type II Father     Diabetes Father     Obesity Brother     No Known Problems Daughter     No Known Problems Maternal Grandmother     Diabetes Maternal Grandfather     No Known Problems Paternal Grandmother     No Known Problems Maternal Aunt     No Known Problems Maternal Aunt     No Known Problems  Maternal Aunt     Heart disease Neg Hx     Stroke Neg Hx     Cancer Neg Hx       Social History     Tobacco Use    Smoking status: Never    Smokeless tobacco: Never   Vaping Use    Vaping status: Never Used   Substance Use Topics    Alcohol use: Not Currently     Alcohol/week: 0.0 standard drinks of alcohol     Comment: 0    Drug use: Not Currently      E-Cigarette/Vaping    E-Cigarette Use Never User       E-Cigarette/Vaping Substances    Nicotine No     THC No     CBD No     Flavoring No     Other No     Unknown No       I have reviewed and agree with the history as documented.     Patient is a 29-year-old female with a history of type 2 diabetes.  SSI, migraines, hyperlipidemia, asthma, GERD presenting for evaluation of a migraine since yesterday.  Pain is in a bandlike distribution around forehead and is slightly worse on the left side.  Pain started yesterday morning and gradually worsened throughout the day, denies thunderclap headache.  She is associated nausea, vomiting, photophobia.  No visual changes, facial droop, speech disturbances, unilateral weakness/numbness.  No fevers, neck pain, URI symptoms, chest pain, shortness of breath.  She took her Imitrex at home yesterday and this morning without relief.  States this feels similar to prior migraines.      Migraine  Associated symptoms: headaches, nausea and vomiting    Associated symptoms: no abdominal pain, no chest pain, no congestion, no cough, no diarrhea, no ear pain, no fever, no rash, no shortness of breath and no sore throat        Review of Systems   Constitutional:  Negative for chills and fever.   HENT:  Negative for congestion, ear pain and sore throat.    Eyes:  Negative for pain and visual disturbance.   Respiratory:  Negative for cough and shortness of breath.    Cardiovascular:  Negative for chest pain and palpitations.   Gastrointestinal:  Positive for nausea and vomiting. Negative for abdominal pain, constipation and diarrhea.    Genitourinary:  Negative for dysuria and hematuria.   Musculoskeletal:  Negative for arthralgias and back pain.   Skin:  Negative for color change and rash.   Neurological:  Positive for headaches. Negative for seizures, syncope, facial asymmetry, speech difficulty, weakness and light-headedness.   All other systems reviewed and are negative.          Objective       ED Triage Vitals [10/07/24 1254]   Temperature Pulse Blood Pressure Respirations SpO2 Patient Position - Orthostatic VS   (!) 97.4 °F (36.3 °C) 78 118/74 18 97 % Lying      Temp Source Heart Rate Source BP Location FiO2 (%) Pain Score    Temporal Monitor Right arm -- 9      Vitals      Date and Time Temp Pulse SpO2 Resp BP Pain Score FACES Pain Rating User   10/07/24 1445 -- 74 98 % 16 132/65 -- -- AM   10/07/24 1254 97.4 °F (36.3 °C) 78 97 % 18 118/74 9 -- [facility] [place]            Physical Exam  Vitals and nursing note reviewed.   Constitutional:       General: She is not in acute distress.     Appearance: Normal appearance.   HENT:      Head: Normocephalic and atraumatic.      Right Ear: Tympanic membrane, ear canal and external ear normal.      Left Ear: Tympanic membrane, ear canal and external ear normal.      Nose: Nose normal.      Mouth/Throat:      Mouth: Mucous membranes are moist.   Eyes:      General: No scleral icterus.        Right eye: No discharge.         Left eye: No discharge.      Extraocular Movements: Extraocular movements intact.      Conjunctiva/sclera: Conjunctivae normal.      Pupils: Pupils are equal, round, and reactive to light.   Cardiovascular:      Rate and Rhythm: Normal rate and regular rhythm.      Pulses: Normal pulses.      Heart sounds: Normal heart sounds.   Pulmonary:      Effort: Pulmonary effort is normal. No respiratory distress.      Breath sounds: Normal breath sounds.   Abdominal:      Palpations: Abdomen is soft.      Tenderness: There is no abdominal tenderness.   Musculoskeletal:         General: No tenderness,  deformity or signs of injury.      Cervical back: Normal range of motion and neck supple. No rigidity.   Skin:     General: Skin is dry.      Coloration: Skin is not jaundiced.      Findings: No erythema or rash.   Neurological:      General: No focal deficit present.      Mental Status: She is alert and oriented to person, place, and time. Mental status is at baseline.      GCS: GCS eye subscore is 4. GCS verbal subscore is 5. GCS motor subscore is 6.      Cranial Nerves: Cranial nerves 2-12 are intact. No cranial nerve deficit, dysarthria or facial asymmetry.      Sensory: Sensation is intact.      Motor: No weakness or pronator drift.      Coordination: Coordination is intact. Finger-Nose-Finger Test and Heel to Shin Test normal.      Gait: Gait is intact. Gait normal.   Psychiatric:         Mood and Affect: Mood normal.         Behavior: Behavior normal.         Thought Content: Thought content normal.         Results Reviewed       Procedure Component Value Units Date/Time    Comprehensive metabolic panel [[id number]]  (Abnormal) Collected: 10/07/24 1338    Lab Status: Final result Specimen: Blood from Arm, Right Updated: 10/07/24 1400     Sodium 133 mmol/L      Potassium 3.9 mmol/L      Chloride 102 mmol/L      CO2 23 mmol/L      ANION GAP 8 mmol/L      BUN 13 mg/dL      Creatinine 0.57 mg/dL      Glucose 280 mg/dL      Calcium 9.9 mg/dL      AST 44 U/L      ALT 44 U/L      Alkaline Phosphatase 73 U/L      Total Protein 7.8 g/dL      Albumin 4.4 g/dL      Total Bilirubin 0.52 mg/dL      eGFR 110 ml/min/1.73sq m     Narrative:      National Kid[employer] Foundation guidelines for Chronic Kidney Disease (CKD):     Stage 1 with normal or high GFR (GFR > 90 mL/min/1.73 square meters)    Stage 2 Mild CKD (GFR = 60-89 mL/min/1.73 square meters)    Stage 3A Moderate CKD (GFR = 45-59 mL/min/1.73 square meters)    Stage 3B Moderate CKD (GFR = 30-44 mL/min/1.73 square meters)    Stage 4 Severe CKD (GFR = 15-29  mL/min/1.73 square meters)    Stage 5 End Stage CKD (GFR <15 mL/min/1.73 square meters)  Note: GFR calculation is accurate only with a steady state creatinine    CBC and differential [[id number]] Collected: 10/07/24 1338    Lab Status: Final result Specimen: Blood from Arm, Right Updated: 10/07/24 1345     WBC 7.73 Thousand/uL      RBC 4.81 Million/uL      Hemoglobin 14.2 g/dL      Hematocrit 44.6 %      MCV 93 fL      MCH 29.5 pg      MCHC 31.8 g/dL      RDW 12.0 %      MPV 10.1 fL      Platelets 238 Thousands/uL      nRBC 0 /100 WBCs      Segmented % 54 %      Immature Grans % 0 %      Lymphocytes % 35 %      Monocytes % 9 %      Eosinophils Relative 1 %      Basophils Relative 1 %      Absolute Neutrophils 4.21 Thousands/µL      Absolute Immature Grans 0.02 Thousand/uL      Absolute Lymphocytes 2.68 Thousands/µL      Absolute Monocytes 0.67 Thousand/µL      Eosinophils Absolute 0.11 Thousand/µL      Basophils Absolute 0.04 Thousands/µL     POCT pregnancy, urine [[id number]]  (Normal) Resulted: 10/07/24 1324    Lab Status: Final result Specimen: Urine Updated: 10/07/24 1325     EXT Preg Test, Ur Negative     Control Valid    Fingerstick Glucose (POCT) [[id number]]  (Abnormal) Collected: 10/07/24 1321    Lab Status: Final result Specimen: Blood Updated: 10/07/24 1321     POC Glucose 265 mg/dl             No orders to display       Procedures    ED Medication and Procedure Management   Prior to Admission Medications   Prescriptions Last Dose Informant Patient Reported? Taking?   Alcohol Swabs PADS  Self No No   Sig: Use once daily when testing finger sticks.   GaviLAX 17 GM/SCOOP powder   No No   Sig: TAKE 238 G BY MOUTH ONCE FOR 1 DOSE TAKE 238 G MY MOUTH. USE AS DIRECTED   Insulin Pen Needle (B-D UF III MINI PEN NEEDLES) 31G X 5 MM MISC  Self No No   Sig: Inject under the skin daily at bedtime   Lancets MISC  Self No No   Sig: Use once for 1 dose Onetouch Verio. Test once daily or as directed.   Patient not taking:  Reported on 2024   SUMAtriptan (IMITREX) 25 mg tablet  Self No No   Sig: Take 1 tablet (25 mg total) by mouth once as needed for migraine   acetaminophen (TYLENOL) 650 mg CR tablet  Self No No   Sig: Take 2 tablets (1,300 mg total) by mouth every 8 (eight) hours as needed for mild pain   Patient not taking: Reported on 2024   albuterol (2.5 mg/3 mL) 0.083 % nebulizer solution  Self No No   Sig: Take 3 mL (2.5 mg total) by nebulization every 6 (six) hours as needed for wheezing or shortness of breath   albuterol (ProAir HFA) 90 mcg/act inhaler  Self No No   Sig: Inhale 2 puffs every 6 (six) hours as needed for wheezing   atorvastatin (LIPITOR) 10 mg tablet  Self No No   Sig: Take 1 tablet (10 mg total) by mouth daily   bisacodyl (DULCOLAX) 5 mg EC tablet   No No   Sig: Take 4 tablets (20 mg total) by mouth once for 1 dose   citalopram (CeleXA) 40 mg tablet  Self No No   Sig: TAKE 1 TABLET (40 MG TOTAL) BY MOUTH DAILY   Patient not taking: Reported on 2024   clotrimazole-betamethasone (LOTRISONE) 1-0.05 % cream   No No   Sig: Apply topically 2 (two) times a day   dicyclomine (BENTYL) 20 mg tablet  Self No No   Sig: Take 1 tablet (20 mg total) by mouth 2 (two) times a day   Patient not taking: Reported on 2024   fluticasone (FLONASE) 50 mcg/act nasal spray  Self No No   Si sprays into each nostril daily   Patient not taking: Reported on 2024   fluticasone (FLONASE) 50 mcg/act nasal spray  Self No No   Si spray into each nostril daily   Patient not taking: Reported on 2024   glucose blood (True Metrix Blood Glucose Test) test strip  Self No No   Sig: Use as instructed   Patient not taking: Reported on 2024   glucose blood test strip  Self No No   Sig: Use as instructed   Patient not taking: Reported on 2024   glucose blood test strip  Self No No   Sig: Use as instructed. Test up to 3 times a day as needed   Patient not taking: Reported on 3/8/2024   hydrOXYzine HCL (ATARAX) 50 mg  tablet  Self No No   Sig: Take 1 tablet (50 mg total) by mouth every 6 (six) hours as needed for itching, allergies or anxiety   Patient not taking: Reported on 7/7/2024   ibuprofen (MOTRIN) 400 mg tablet  Self Yes No   Sig: Take 400 mg by mouth every 6 (six) hours as needed for mild pain   Patient not taking: Reported on 7/7/2024   insulin detemir (Levemir FlexPen) 100 Units/mL injection pen  Self No No   Sig: Inject 20 Units under the skin daily at bedtime   loratadine-pseudoephedrine (CLARITIN-D 12-HOUR) 5-120 mg per tablet  Self No No   Sig: Take 1 tablet by mouth 2 (two) times a day   methylPREDNISolone 4 MG tablet therapy pack  Self No No   Sig: Use as directed on package   Patient not taking: Reported on 9/26/2024   montelukast (SINGULAIR) 10 mg tablet  Self No No   Sig: TAKE 1 TABLET (10 MG TOTAL) BY MOUTH DAILY AT BEDTIME   ofloxacin (FLOXIN) 0.3 % otic solution  Self No No   Sig: ADMINISTER 5 DROPS TO THE RIGHT EAR DAILY   Patient not taking: Reported on 8/2/2024   ondansetron (ZOFRAN-ODT) 4 mg disintegrating tablet  Self No No   Sig: Take 1 tablet (4 mg total) by mouth every 6 (six) hours as needed for nausea or vomiting   Patient not taking: Reported on 8/2/2024   pantoprazole (PROTONIX) 40 mg tablet   No No   Sig: Take 1 tablet (40 mg total) by mouth daily before dinner   pramipexole (MIRAPEX) 0.5 mg tablet  Self No No   Sig: Take 1 tablet (0.5 mg total) by mouth daily at bedtime   sitaGLIPtin (JANUVIA) 25 mg tablet  Self No No   Sig: Take 1 tablet (25 mg total) by mouth daily   topiramate (TOPAMAX) 25 mg sprinkle capsule  Self No No   Sig: TAKE 1 CAPSULE (25 MG TOTAL) BY MOUTH 2 (TWO) TIMES A DAY   Patient not taking: Reported on 7/7/2024   traZODone (DESYREL) 100 mg tablet  Self No No   Sig: TAKE 1 TABLET (100 MG TOTAL) BY MOUTH DAILY AT BEDTIME   Patient not taking: Reported on 7/7/2024      Facility-Administered Medications: None     Discharge Medication List as of 10/7/2024  2:51 PM        CONTINUE  these medications which have NOT CHANGED    Details   acetaminophen (TYLENOL) 650 mg CR tablet Take 2 tablets (1,300 mg total) by mouth every 8 (eight) hours as needed for mild pain, Starting Mon 7/10/2023, Normal      albuterol (2.5 mg/3 mL) 0.083 % nebulizer solution Take 3 mL (2.5 mg total) by nebulization every 6 (six) hours as needed for wheezing or shortness of breath, Starting Fri 3/29/2024, Normal      albuterol (ProAir HFA) 90 mcg/act inhaler Inhale 2 puffs every 6 (six) hours as needed for wheezing, Starting Mon 10/16/2023, Normal      Alcohol Swabs PADS Use once daily when testing finger sticks., Normal      atorvastatin (LIPITOR) 10 mg tablet Take 1 tablet (10 mg total) by mouth daily, Starting Fri 8/2/2024, Normal      bisacodyl (DULCOLAX) 5 mg EC tablet Take 4 tablets (20 mg total) by mouth once for 1 dose, Starting Thu 9/19/2024, Normal      citalopram (CeleXA) 40 mg tablet TAKE 1 TABLET (40 MG TOTAL) BY MOUTH DAILY, Starting Fri 12/8/2023, Normal      clotrimazole-betamethasone (LOTRISONE) 1-0.05 % cream Apply topically 2 (two) times a day, Starting Thu 9/26/2024, Normal      dicyclomine (BENTYL) 20 mg tablet Take 1 tablet (20 mg total) by mouth 2 (two) times a day, Starting Tue 7/9/2024, Normal      !! fluticasone (FLONASE) 50 mcg/act nasal spray 2 sprays into each nostril daily, Starting Sat 4/20/2024, Normal      !! fluticasone (FLONASE) 50 mcg/act nasal spray 1 spray into each nostril daily, Starting Tue 4/23/2024, Normal      GaviLAX 17 GM/SCOOP powder TAKE 238 G BY MOUTH ONCE FOR 1 DOSE TAKE 238 G MY MOUTH. USE AS DIRECTED, Normal      !! glucose blood (True Metrix Blood Glucose Test) test strip Use as instructed, Normal      !! glucose blood test strip Use as instructed, Normal      !! glucose blood test strip Use as instructed. Test up to 3 times a day as needed, Normal      hydrOXYzine HCL (ATARAX) 50 mg tablet Take 1 tablet (50 mg total) by mouth every 6 (six) hours as needed for itching,  allergies or anxiety, Starting Tue 11/28/2023, Normal      ibuprofen (MOTRIN) 400 mg tablet Take 400 mg by mouth every 6 (six) hours as needed for mild pain, Historical Med      insulin detemir (Levemir FlexPen) 100 Units/mL injection pen Inject 20 Units under the skin daily at bedtime, Starting Fri 8/2/2024, Normal      Insulin Pen Needle (B-D UF III MINI PEN NEEDLES) 31G X 5 MM MISC Inject under the skin daily at bedtime, Starting Tue 11/28/2023, Normal      Lancets MISC Use once for 1 dose Onetouch Verio. Test once daily or as directed., Starting Fri 6/11/2021, Normal      loratadine-pseudoephedrine (CLARITIN-D 12-HOUR) 5-120 mg per tablet Take 1 tablet by mouth 2 (two) times a day, Starting Sat 4/20/2024, Normal      methylPREDNISolone 4 MG tablet therapy pack Use as directed on package, Normal      montelukast (SINGULAIR) 10 mg tablet TAKE 1 TABLET (10 MG TOTAL) BY MOUTH DAILY AT BEDTIME, Starting Fri 12/8/2023, Normal      ofloxacin (FLOXIN) 0.3 % otic solution ADMINISTER 5 DROPS TO THE RIGHT EAR DAILY, Starting Wed 7/10/2024, Normal      ondansetron (ZOFRAN-ODT) 4 mg disintegrating tablet Take 1 tablet (4 mg total) by mouth every 6 (six) hours as needed for nausea or vomiting, Starting Tue 7/9/2024, Normal      pantoprazole (PROTONIX) 40 mg tablet Take 1 tablet (40 mg total) by mouth daily before dinner, Starting Thu 9/19/2024, Normal      pramipexole (MIRAPEX) 0.5 mg tablet Take 1 tablet (0.5 mg total) by mouth daily at bedtime, Starting Mon 9/9/2024, Normal      sitaGLIPtin (JANUVIA) 25 mg tablet Take 1 tablet (25 mg total) by mouth daily, Starting Fri 8/2/2024, Normal      SUMAtriptan (IMITREX) 25 mg tablet Take 1 tablet (25 mg total) by mouth once as needed for migraine, Starting Mon 9/9/2024, Normal      topiramate (TOPAMAX) 25 mg sprinkle capsule TAKE 1 CAPSULE (25 MG TOTAL) BY MOUTH 2 (TWO) TIMES A DAY, Starting Wed 12/6/2023, Normal      traZODone (DESYREL) 100 mg tablet TAKE 1 TABLET (100 MG TOTAL)  BY MOUTH DAILY AT BEDTIME, Starting Mon 2/20/2023, Normal       !! - Potential duplicate medications found. Please discuss with provider.        No discharge procedures on file.  ED SEPSIS DOCUMENTATION   Time reflects when diagnosis was documented in both MDM as applicable and the Disposition within this note       Time User Action Codes Description Comment    10/7/2024  2:49 PM Thea Mcbride [G43.909] Migraine     10/7/2024  2:49 PM Thea Mcbride Add [R73.9] Hyperglycemia                  Thea Mcbride PA-C  10/07/24 1726

## 2024-10-07 NOTE — Clinical Note
Gerri Parkinson was seen and treated in our emergency department on 10/7/2024.                Diagnosis:     Gerri  may return to work on return date.    She may return on this date: 10/08/2024         If you have any questions or concerns, please don't hesitate to call.      Thea Mcbride PA-C    ______________________________           _______________          _______________  Hospital Representative                              Date                                Time

## 2024-10-07 NOTE — DISCHARGE INSTRUCTIONS
Continue taking migraine medications at home as needed for headache. Follow up with primary care doctor if symptoms persist.

## 2024-10-14 DIAGNOSIS — G43.109 MIGRAINE WITH AURA AND WITHOUT STATUS MIGRAINOSUS, NOT INTRACTABLE: ICD-10-CM

## 2024-10-14 RX ORDER — TOPIRAMATE SPINKLE 25 MG/1
25 CAPSULE ORAL 2 TIMES DAILY
Qty: 60 CAPSULE | Refills: 0 | Status: SHIPPED | OUTPATIENT
Start: 2024-10-14

## 2024-10-14 NOTE — TELEPHONE ENCOUNTER
Patient called the RX Refill Line. Message is being forwarded to the office.     Patient is requesting - Pt called refill line and stated she's been having migraines every other day and the Imitrex is not working. Pt is requesting provider to please send in another medication. Please review. Thank you.      [facility] - JUDE HIGGINS - [address]

## 2024-10-14 NOTE — TELEPHONE ENCOUNTER
Reason for call:   [x] Refill   [] Prior Auth  [] Other:     Office:   [x] PCP/Provider - JENNIFER Botello   [] Specialty/Provider -     Medication: (TOPAMAX) 25 mg     Dose/Frequency: 1 cap BID / 60 caps        Pharmacy: [facility] - [address]      Does the patient have enough for 3 days?   [] Yes   [x] No - Send as HP to POD

## 2024-10-15 RX ORDER — SUMATRIPTAN 50 MG/1
50 TABLET, FILM COATED ORAL ONCE AS NEEDED
Qty: 10 TABLET | Refills: 2 | Status: SHIPPED | OUTPATIENT
Start: 2024-10-15

## 2024-10-22 PROBLEM — Z12.11 SCREENING FOR COLON CANCER: Status: RESOLVED | Noted: 2024-09-22 | Resolved: 2024-10-22

## 2024-10-23 RX ORDER — SODIUM CHLORIDE, SODIUM LACTATE, POTASSIUM CHLORIDE, CALCIUM CHLORIDE 600; 310; 30; 20 MG/100ML; MG/100ML; MG/100ML; MG/100ML
125 INJECTION, SOLUTION INTRAVENOUS CONTINUOUS
Status: CANCELLED | OUTPATIENT
Start: 2024-10-23

## 2024-10-24 ENCOUNTER — HOSPITAL ENCOUNTER (OUTPATIENT)
Dept: GASTROENTEROLOGY | Facility: HOSPITAL | Age: 49
Setting detail: OUTPATIENT SURGERY
End: 2024-10-24
Attending: STUDENT IN AN ORGANIZED HEALTH CARE EDUCATION/TRAINING PROGRAM
Payer: COMMERCIAL

## 2024-10-24 ENCOUNTER — ANESTHESIA (OUTPATIENT)
Dept: GASTROENTEROLOGY | Facility: HOSPITAL | Age: 49
End: 2024-10-24
Payer: COMMERCIAL

## 2024-10-24 ENCOUNTER — ANESTHESIA EVENT (OUTPATIENT)
Dept: GASTROENTEROLOGY | Facility: HOSPITAL | Age: 49
End: 2024-10-24
Payer: COMMERCIAL

## 2024-10-24 VITALS
OXYGEN SATURATION: 96 % | HEIGHT: 64 IN | BODY MASS INDEX: 41.83 KG/M2 | WEIGHT: 245 LBS | TEMPERATURE: 97 F | SYSTOLIC BLOOD PRESSURE: 91 MMHG | RESPIRATION RATE: 18 BRPM | DIASTOLIC BLOOD PRESSURE: 54 MMHG | HEART RATE: 78 BPM

## 2024-10-24 DIAGNOSIS — Z12.11 SCREENING FOR COLON CANCER: ICD-10-CM

## 2024-10-24 DIAGNOSIS — R19.7 DIARRHEA: ICD-10-CM

## 2024-10-24 DIAGNOSIS — R13.19 OTHER DYSPHAGIA: ICD-10-CM

## 2024-10-24 DIAGNOSIS — R10.9 ABDOMINAL PAIN: ICD-10-CM

## 2024-10-24 DIAGNOSIS — R11.2 NAUSEA AND VOMITING: ICD-10-CM

## 2024-10-24 DIAGNOSIS — K21.9 GASTROESOPHAGEAL REFLUX DISEASE, UNSPECIFIED WHETHER ESOPHAGITIS PRESENT: ICD-10-CM

## 2024-10-24 PROBLEM — E11.40 DIABETIC NEUROPATHY (HCC): Status: ACTIVE | Noted: 2024-10-24

## 2024-10-24 LAB
EXT PREGNANCY TEST URINE: NEGATIVE
EXT. CONTROL: NORMAL
GLUCOSE SERPL-MCNC: 267 MG/DL (ref 65–140)

## 2024-10-24 PROCEDURE — 88305 TISSUE EXAM BY PATHOLOGIST: CPT | Performed by: PATHOLOGY

## 2024-10-24 PROCEDURE — 45380 COLONOSCOPY AND BIOPSY: CPT | Performed by: STUDENT IN AN ORGANIZED HEALTH CARE EDUCATION/TRAINING PROGRAM

## 2024-10-24 PROCEDURE — 43239 EGD BIOPSY SINGLE/MULTIPLE: CPT | Performed by: STUDENT IN AN ORGANIZED HEALTH CARE EDUCATION/TRAINING PROGRAM

## 2024-10-24 PROCEDURE — 82948 REAGENT STRIP/BLOOD GLUCOSE: CPT

## 2024-10-24 PROCEDURE — 81025 URINE PREGNANCY TEST: CPT | Performed by: STUDENT IN AN ORGANIZED HEALTH CARE EDUCATION/TRAINING PROGRAM

## 2024-10-24 RX ORDER — LIDOCAINE HYDROCHLORIDE 20 MG/ML
INJECTION, SOLUTION EPIDURAL; INFILTRATION; INTRACAUDAL; PERINEURAL AS NEEDED
Status: DISCONTINUED | OUTPATIENT
Start: 2024-10-24 | End: 2024-10-24

## 2024-10-24 RX ORDER — METOCLOPRAMIDE HYDROCHLORIDE 5 MG/ML
INJECTION INTRAMUSCULAR; INTRAVENOUS AS NEEDED
Status: DISCONTINUED | OUTPATIENT
Start: 2024-10-24 | End: 2024-10-24

## 2024-10-24 RX ORDER — PROPOFOL 10 MG/ML
INJECTION, EMULSION INTRAVENOUS AS NEEDED
Status: DISCONTINUED | OUTPATIENT
Start: 2024-10-24 | End: 2024-10-24

## 2024-10-24 RX ORDER — ONDANSETRON 2 MG/ML
INJECTION INTRAMUSCULAR; INTRAVENOUS AS NEEDED
Status: DISCONTINUED | OUTPATIENT
Start: 2024-10-24 | End: 2024-10-24

## 2024-10-24 RX ORDER — SODIUM CHLORIDE, SODIUM LACTATE, POTASSIUM CHLORIDE, CALCIUM CHLORIDE 600; 310; 30; 20 MG/100ML; MG/100ML; MG/100ML; MG/100ML
125 INJECTION, SOLUTION INTRAVENOUS CONTINUOUS
Status: DISPENSED | OUTPATIENT
Start: 2024-10-24

## 2024-10-24 RX ORDER — SODIUM CHLORIDE, SODIUM LACTATE, POTASSIUM CHLORIDE, CALCIUM CHLORIDE 600; 310; 30; 20 MG/100ML; MG/100ML; MG/100ML; MG/100ML
INJECTION, SOLUTION INTRAVENOUS CONTINUOUS PRN
Status: DISCONTINUED | OUTPATIENT
Start: 2024-10-24 | End: 2024-10-24

## 2024-10-24 RX ADMIN — SODIUM CHLORIDE, SODIUM LACTATE, POTASSIUM CHLORIDE, AND CALCIUM CHLORIDE 125 ML/HR: .6; .31; .03; .02 INJECTION, SOLUTION INTRAVENOUS at 08:26

## 2024-10-24 RX ADMIN — METOCLOPRAMIDE 10 MG: 5 INJECTION, SOLUTION INTRAMUSCULAR; INTRAVENOUS at 08:50

## 2024-10-24 RX ADMIN — PROPOFOL 130 MCG/KG/MIN: 10 INJECTION, EMULSION INTRAVENOUS at 08:53

## 2024-10-24 RX ADMIN — SODIUM CHLORIDE, SODIUM LACTATE, POTASSIUM CHLORIDE, AND CALCIUM CHLORIDE: .6; .31; .03; .02 INJECTION, SOLUTION INTRAVENOUS at 08:50

## 2024-10-24 RX ADMIN — PROPOFOL 50 MG: 10 INJECTION, EMULSION INTRAVENOUS at 09:00

## 2024-10-24 RX ADMIN — LIDOCAINE HYDROCHLORIDE 50 MG: 20 INJECTION, SOLUTION EPIDURAL; INFILTRATION; INTRACAUDAL; PERINEURAL at 08:53

## 2024-10-24 RX ADMIN — ONDANSETRON 4 MG: 2 INJECTION INTRAMUSCULAR; INTRAVENOUS at 08:48

## 2024-10-24 RX ADMIN — PROPOFOL 100 MG: 10 INJECTION, EMULSION INTRAVENOUS at 08:55

## 2024-10-24 RX ADMIN — PROPOFOL 150 MCG/KG/MIN: 10 INJECTION, EMULSION INTRAVENOUS at 09:12

## 2024-10-24 RX ADMIN — PROPOFOL 40 MG: 10 INJECTION, EMULSION INTRAVENOUS at 08:59

## 2024-10-24 RX ADMIN — PROPOFOL 50 MG: 10 INJECTION, EMULSION INTRAVENOUS at 08:57

## 2024-10-24 RX ADMIN — PROPOFOL 30 MG: 10 INJECTION, EMULSION INTRAVENOUS at 08:58

## 2024-10-24 NOTE — H&P
[facility]  History & Physical     PATIENT INFO     Name: Gerri Parkinson  YOB: 1975   Age: 49 y.o.   Sex: female   MRN: [id number]     HISTORY OF PRESENT ILLNESS     Gerri Parkinson is a 49 y.o. year old female who presents for EGD and colonoscopy for abdominal pain, GERD, dysphagia, diarrhea, colon cancer screening.  No prior colonoscopy.  No antithrombotics or anticoagulants.     REVIEW OF SYSTEMS     Per the HPI, and otherwise unremarkable.    Historical Information   Past Medical History:   Diagnosis Date    Anxiety     Asthma     Closed nondisplaced fracture of proximal phalanx of lesser toe of left foot 1/15/2020    Diabetes mellitus (HCC)     Hyperlipidemia      Past Surgical History:   Procedure Laterality Date    ARTHROSCOPY KNEE Left     CHOLECYSTECTOMY      ECTOPIC PREGNANCY SURGERY      SHOULDER SURGERY Right     torn bicep     Social History   Social History     Substance and Sexual Activity   Alcohol Use Not Currently    Alcohol/week: 0.0 standard drinks of alcohol    Comment: 0     Social History     Substance and Sexual Activity   Drug Use Not Currently     Social History     Tobacco Use   Smoking Status Never   Smokeless Tobacco Never     Family History   Problem Relation Age of Onset    Obesity Mother     Obesity Father     Diabetes type II Father     Diabetes Father     Obesity Brother     No Known Problems Daughter     No Known Problems Maternal Grandmother     Diabetes Maternal Grandfather     No Known Problems Paternal Grandmother     No Known Problems Maternal Aunt     No Known Problems Maternal Aunt     No Known Problems Maternal Aunt     Heart disease Neg Hx     Stroke Neg Hx     Cancer Neg Hx         MEDICATIONS & ALLERGIES     Current Outpatient Medications   Medication Instructions    albuterol (ProAir HFA) 90 mcg/act inhaler 2 puffs, Inhalation, Every 6 hours PRN    albuterol 2.5 mg, Nebulization, Every 6 hours PRN    Alcohol Swabs PADS Use  once daily when testing finger sticks.    atorvastatin (LIPITOR) 10 mg, Oral, Daily    citalopram (CELEXA) 40 mg, Oral, Daily    clotrimazole-betamethasone (LOTRISONE) 1-0.05 % cream Topical, 2 times daily    fluticasone (FLONASE) 50 mcg/act nasal spray 2 sprays, Nasal, Daily    fluticasone (FLONASE) 50 mcg/act nasal spray 1 spray, Nasal, Daily    GaviLAX 17 GM/SCOOP powder TAKE 238 G BY MOUTH ONCE FOR 1 DOSE TAKE 238 G MY MOUTH. USE AS DIRECTED    glucose blood (True Metrix Blood Glucose Test) test strip Use as instructed    glucose blood test strip Use as instructed    glucose blood test strip Use as instructed. Test up to 3 times a day as needed    hydrOXYzine HCL (ATARAX) 50 mg, Oral, Every 6 hours PRN    ibuprofen (MOTRIN) 400 mg, Every 6 hours PRN    Insulin Pen Needle (B-D UF III MINI PEN NEEDLES) 31G X 5 MM MISC Subcutaneous, Daily at bedtime    Lancets MISC Does not apply, Once, Onetouch Verio. Test once daily or as directed.    Levemir FlexPen 20 Units, Subcutaneous, Daily at bedtime    loratadine-pseudoephedrine (CLARITIN-D 12-HOUR) 5-120 mg per tablet 1 tablet, Oral, 2 times daily    methylPREDNISolone 4 MG tablet therapy pack Use as directed on package    montelukast (SINGULAIR) 10 mg, Oral, Daily at bedtime    ofloxacin (FLOXIN) 0.3 % otic solution 5 drops, Right Ear, Daily    ondansetron (ZOFRAN-ODT) 4 mg, Oral, Every 6 hours PRN    pantoprazole (PROTONIX) 40 mg, Oral, Daily before dinner    pramipexole (MIRAPEX) 0.5 mg, Oral, Daily at bedtime    sitaGLIPtin (JANUVIA) 25 mg, Oral, Daily    SUMAtriptan (IMITREX) 50 mg, Oral, Once as needed    topiramate (TOPAMAX) 25 mg, Oral, 2 times daily    traZODone (DESYREL) 100 mg, Oral, Daily at bedtime     Allergies   Allergen Reactions    Apple - Food Allergy Anaphylaxis    Apple Fruit Extract - Food Allergy Anaphylaxis     Throat closes off    Grass Pollen(K-O-R-T-Swt Justus) Hives, Itching, Shortness Of Breath, Sneezing and Throat Swelling    Dust Mite Extract  "Hives and Itching    Misc Natural Products Diarrhea, Nausea Only and Vomiting    Methylprednisolone Hives and Rash    Raw Vegetable - Food Allergy Blisters, Dizziness, Eye Swelling, Fatigue, Headache, Hives, Itching, Rash, Sneezing, Throat Swelling and Wheezing    Red Dye - Food Allergy Hives, Itching and Rash        PHYSICAL EXAM      Objective   Blood pressure 132/82, pulse 95, temperature 97.5 °F (36.4 °C), temperature source Temporal, resp. rate 16, height 5' 4\" (1.626 m), weight 111 kg (245 lb), SpO2 97%, not currently breastfeeding. Body mass index is 42.05 kg/m².    General Appearance:   Alert, cooperative, no distress   Lungs:   Equal chest rise, respirations unlabored    Heart:   Regular rate and rhythm   Abdomen:   Soft, non-tender, non-distended; normal bowel sounds; no masses, no organomegaly    Extremities:   No edema       ASSESSMENT & PLAN     This is a 49 y.o. year old female here for EGD and colonoscopy, and she is stable and optimized for her procedure.      Mark Jin D.O.  [facility]  Division of Gastroenterology & Hepatology  Available on TigerText  [email or web address]    ** Please Note: This note is constructed using a voice recognition dictation system. **  "

## 2024-10-24 NOTE — ANESTHESIA POSTPROCEDURE EVALUATION
Post-Op Assessment Note    CV Status:  Stable    Pain management: adequate       Mental Status:  Alert and awake   Hydration Status:  Euvolemic   PONV Controlled:  Controlled   Airway Patency:  Patent     Post Op Vitals Reviewed: Yes    No anethesia notable event occurred.    Staff: CRNA, Anesthesiologist           Last Filed PACU Vitals:  Vitals Value Taken Time   Temp 98    Pulse 75    BP 94/53    Resp 18    SpO2 94        Modified Leigh:  No data recorded

## 2024-10-24 NOTE — ANESTHESIA PREPROCEDURE EVALUATION
"Procedure:  EGD  COLONOSCOPY    Relevant Problems   ANESTHESIA (within normal limits)      CARDIO   (+) Migraine without aura, not intractable   (+) Other hyperlipidemia      ENDO  Has been on insulin for \"about a year\".  Reports hasn't been checking BG at home because she accidentally got the wrong strips, and also that she hasn't been using her levemir regularly because \"I forget\"   (+) Type 2 diabetes mellitus with hyperglycemia, with long-term current use of insulin ([facility])      GI/HEPATIC  Confirmed NPO appropriate  S/p bowel prep   (+) GERD (gastroesophageal reflux disease)   (+) Other dysphagia   (+) Pancreatic cyst      /RENAL (within normal limits)      GYN  Urinary beta hcg neg in preop holding   (-) Currently pregnant      HEMATOLOGY (within normal limits)      MUSCULOSKELETAL   (+) Disc degeneration, lumbosacral      NEURO/PSYCH   (+) Depression with anxiety   (+) Diabetic neuropathy ([facility])   (+) Migraine without aura, not intractable      PULMONARY  Typically uses rescue inhaler once every few weeks to months.  Symptoms precipitated by activity, never hospitalized for breathing issues related to asthma   (+) Asthma exacerbation   (-) Smoking   (-) URI (upper respiratory infection)      Other   (+) Morbid obesity with BMI of 40.0-44.9, adult ([facility])        Physical Exam    Airway    Mallampati score: II  TM Distance: >3 FB  Neck ROM: full     Dental   Comment: edentulous     Cardiovascular  Rhythm: regular, Rate: normal    Pulmonary   Breath sounds clear to auscultation, No wheezes    Other Findings  post-pubertal.      Anesthesia Plan  ASA Score- 3     Anesthesia Type- IV sedation with anesthesia with ASA Monitors.         Additional Monitors:     Airway Plan:     Comment: I discussed the risks and benefits of IV sedation anesthesia including the possibility of the need to convert to general anesthesia and the potential risk of awareness.  The patient was given the opportunity to ask questions, which were " answered..       Plan Factors-Exercise tolerance (METS): >4 METS.    Chart reviewed.        Patient is not a current smoker.              Induction- intravenous.    Postoperative Plan-         Informed Consent- Anesthetic plan and risks discussed with patient.  I personally reviewed this patient with the CRNA. Discussed and agreed on the Anesthesia Plan with the CRNA..      Lab Results   Component Value Date    HGBA1C 8.6 (A) 08/02/2024

## 2024-10-29 PROCEDURE — 88305 TISSUE EXAM BY PATHOLOGIST: CPT | Performed by: PATHOLOGY

## 2024-11-07 ENCOUNTER — OFFICE VISIT (OUTPATIENT)
Dept: FAMILY MEDICINE CLINIC | Facility: CLINIC | Age: 49
End: 2024-11-07
Payer: COMMERCIAL

## 2024-11-07 VITALS
DIASTOLIC BLOOD PRESSURE: 62 MMHG | TEMPERATURE: 98.3 F | BODY MASS INDEX: 41.32 KG/M2 | HEART RATE: 99 BPM | WEIGHT: 242 LBS | OXYGEN SATURATION: 98 % | SYSTOLIC BLOOD PRESSURE: 110 MMHG | HEIGHT: 64 IN

## 2024-11-07 DIAGNOSIS — Z00.00 ANNUAL PHYSICAL EXAM: Primary | ICD-10-CM

## 2024-11-07 DIAGNOSIS — Z79.4 TYPE 2 DIABETES MELLITUS WITH HYPERGLYCEMIA, WITH LONG-TERM CURRENT USE OF INSULIN (HCC): Chronic | ICD-10-CM

## 2024-11-07 DIAGNOSIS — E66.01 MORBID OBESITY WITH BMI OF 40.0-44.9, ADULT (HCC): ICD-10-CM

## 2024-11-07 DIAGNOSIS — E11.65 TYPE 2 DIABETES MELLITUS WITH HYPERGLYCEMIA, WITH LONG-TERM CURRENT USE OF INSULIN (HCC): Chronic | ICD-10-CM

## 2024-11-07 DIAGNOSIS — G43.109 MIGRAINE WITH AURA AND WITHOUT STATUS MIGRAINOSUS, NOT INTRACTABLE: ICD-10-CM

## 2024-11-07 PROCEDURE — 99396 PREV VISIT EST AGE 40-64: CPT | Performed by: NURSE PRACTITIONER

## 2024-11-07 RX ORDER — RIZATRIPTAN BENZOATE 10 MG/1
10 TABLET ORAL DAILY PRN
Qty: 10 TABLET | Refills: 0 | Status: SHIPPED | OUTPATIENT
Start: 2024-11-07

## 2024-11-07 NOTE — PATIENT INSTRUCTIONS
"Patient Education     Routine physical for adults   The Basics   Written by the doctors and editors at [facility]   What is a physical? -- A physical is a routine visit, or \"check-up,\" with your doctor. You might also hear it called a \"wellness visit\" or \"preventive visit.\"  During each visit, the doctor will:   Ask about your physical and mental health   Ask about your habits, behaviors, and lifestyle   Do an exam   Give you vaccines if needed   Talk to you about any medicines you take   Give advice about your health   Answer your questions  Getting regular check-ups is an important part of taking care of your health. It can help your doctor find and treat any problems you have. But it's also important for preventing health problems.  A routine physical is different from a \"sick visit.\" A sick visit is when you see a doctor because of a health concern or problem. Since physicals are scheduled ahead of time, you can think about what you want to ask the doctor.  How often should I get a physical? -- It depends on your age and health. In general, for people age 21 years and older:   If you are younger than 50 years, you might be able to get a physical every 3 years.   If you are 50 years or older, your doctor might recommend a physical every year.  If you have an ongoing health condition, like diabetes or high blood pressure, your doctor will probably want to see you more often.  What happens during a physical? -- In general, each visit will include:   Physical exam - The doctor or nurse will check your height, weight, heart rate, and blood pressure. They will also look at your eyes and ears. They will ask about how you are feeling and whether you have any symptoms that bother you.   Medicines - It's a good idea to bring a list of all the medicines you take to each doctor visit. Your doctor will talk to you about your medicines and answer any questions. Tell them if you are having any side effects that bother you. You " "should also tell them if you are having trouble paying for any of your medicines.   Habits and behaviors - This includes:   Your diet   Your exercise habits   Whether you smoke, drink alcohol, or use drugs   Whether you are sexually active   Whether you feel safe at home  Your doctor will talk to you about things you can do to improve your health and lower your risk of health problems. They will also offer help and support. For example, if you want to quit smoking, they can give you advice and might prescribe medicines. If you want to improve your diet or get more physical activity, they can help you with this, too.   Lab tests, if needed - The tests you get will depend on your age and situation. For example, your doctor might want to check your:   Cholesterol   Blood sugar   Iron level   Vaccines - The recommended vaccines will depend on your age, health, and what vaccines you already had. Vaccines are very important because they can prevent certain serious or deadly infections.   Discussion of screening - \"Screening\" means checking for diseases or other health problems before they cause symptoms. Your doctor can recommend screening based on your age, risk, and preferences. This might include tests to check for:   Cancer, such as breast, prostate, cervical, ovarian, colorectal, prostate, lung, or skin cancer   Sexually transmitted infections, such as chlamydia and gonorrhea   Mental health conditions like depression and anxiety  Your doctor will talk to you about the different types of screening tests. They can help you decide which screenings to have. They can also explain what the results might mean.   Answering questions - The physical is a good time to ask the doctor or nurse questions about your health. If needed, they can refer you to other doctors or specialists, too.  Adults older than 65 years often need other care, too. As you get older, your doctor will talk to you about:   How to prevent falling at " home   Hearing or vision tests   Memory testing   How to take your medicines safely   Making sure that you have the help and support you need at home  All topics are updated as new evidence becomes available and our peer review process is complete.  This topic retrieved from [employer] on: May 02, 2024.  Topic 715165 Version 1.0  Release: 32.4.3 - C32.122  © 2024 UpToDate, Inc. and/or its affiliates. All rights reserved.  Consumer Information Use and Disclaimer   Disclaimer: This generalized information is a limited summary of diagnosis, treatment, and/or medication information. It is not meant to be comprehensive and should be used as a tool to help the user understand and/or assess potential diagnostic and treatment options. It does NOT include all information about conditions, treatments, medications, side effects, or risks that may apply to a specific patient. It is not intended to be medical advice or a substitute for the medical advice, diagnosis, or treatment of a health care provider based on the health care provider's examination and assessment of a patient's specific and unique circumstances. Patients must speak with a health care provider for complete information about their health, medical questions, and treatment options, including any risks or benefits regarding use of medications. This information does not endorse any treatments or medications as safe, effective, or approved for treating a specific patient. UpToDate, Inc. and its affiliates disclaim any warranty or liability relating to this information or the use thereof.The use of this information is governed by the Terms of Use, available at https://www.woltersHortonworksuwer.com/en/know/clinical-effectiveness-terms. 2024© UpToDate, Inc. and its affiliates and/or licensors. All rights reserved.  Copyright   © 2024 UpToDate, Inc. and/or its affiliates. All rights reserved.

## 2024-11-07 NOTE — PROGRESS NOTES
Diabetic Foot Exam    Adult Annual Physical  Name: Gerri Parkinson      : 1975      MRN: [id number]  Encounter Provider: JENNIFER Botello  Encounter Date: 2024   Encounter department: [facility]    Assessment & Plan  Annual physical exam         Type 2 diabetes mellitus with hyperglycemia, with long-term current use of insulin (HCC)    Lab Results   Component Value Date    HGBA1C 8.6 (A) 2024       Orders:    sitaGLIPtin (JANUVIA) 50 mg tablet; Take 1 tablet (50 mg total) by mouth daily    Migraine with aura and without status migrainosus, not intractable    Orders:    rizatriptan (MAXALT) 10 mg tablet; Take 1 tablet (10 mg total) by mouth daily as needed for migraine for up to 30 doses may repeat in 2 hours if necessary    Morbid obesity with BMI of 40.0-44.9, adult (HCC)      Orders:    Ambulatory Referral to Weight Management; Future    Immunizations and preventive care screenings were discussed with patient today. Appropriate education was printed on patient's after visit summary.    Counseling:  Alcohol/drug use: discussed moderation in alcohol intake, the recommendations for healthy alcohol use, and avoidance of illicit drug use.  Dental Health: discussed importance of regular tooth brushing, flossing, and dental visits.  Sexual health: discussed sexually transmitted diseases, partner selection, use of condoms, avoidance of unintended pregnancy, and contraceptive alternatives.  Exercise: the importance of regular exercise/physical activity was discussed. Recommend exercise 3-5 times per week for at least 30 minutes.          History of Present Illness     Adult Annual Physical:  Patient presents for annual physical.     Diet and Physical Activity:  - Diet/Nutrition: well balanced diet, limited junk food and portion control.  - Exercise: no formal exercise and 3-4 times a week on average.    General Health:  - Sleep: sleeps well and > 8 hours of sleep on  "average.  - Hearing: normal hearing bilateral ears.  - Vision: wears glasses and goes for regular eye exams.  - Dental:. in the process for dentures.    /GYN Health:  - Follows with GYN: yes.   - Menopause: postmenopausal.   - Last menstrual cycle: 10/14/2024.     Review of Systems  Medical History Reviewed by provider this encounter:     .    Objective     /62   Pulse 99   Temp 98.3 °F (36.8 °C)   Ht 5' 4\" (1.626 m)   Wt 110 kg (242 lb)   SpO2 98%   BMI 41.54 kg/m²     Physical Exam  Vitals and nursing note reviewed.   Constitutional:       General: She is not in acute distress.     Appearance: She is well-developed. She is not ill-appearing or diaphoretic.   HENT:      Head: Normocephalic and atraumatic.      Right Ear: Hearing, tympanic membrane and ear canal normal.      Left Ear: Hearing, tympanic membrane and ear canal normal.      Nose: Nose normal.      Mouth/Throat:      Pharynx: Uvula midline.   Eyes:      General: Lids are normal.      Conjunctiva/sclera: Conjunctivae normal.   Cardiovascular:      Rate and Rhythm: Normal rate and regular rhythm.      Pulses: no weak pulses.           Dorsalis pedis pulses are 2+ on the right side and 2+ on the left side.      Heart sounds: Normal heart sounds, S1 normal and S2 normal. No murmur heard.  Pulmonary:      Effort: Pulmonary effort is normal. No respiratory distress.      Breath sounds: Normal breath sounds.   Musculoskeletal:         General: No tenderness. Normal range of motion.   Feet:      Right foot:      Skin integrity: No ulcer, skin breakdown, erythema, warmth, callus or dry skin.      Left foot:      Skin integrity: No ulcer, skin breakdown, erythema, warmth, callus or dry skin.   Lymphadenopathy:      Cervical: No cervical adenopathy.   Skin:     General: Skin is warm.      Capillary Refill: Capillary refill takes less than 2 seconds.      Findings: No rash.   Neurological:      General: No focal deficit present.      Mental Status: " She is alert and oriented to person, place, and time.   Psychiatric:         Behavior: Behavior normal. Behavior is cooperative.         Thought Content: Thought content normal.         Judgment: Judgment normal.         Diabetic Foot Exam    Patient's shoes and socks removed.    Right Foot/Ankle   Right Foot Inspection  Skin Exam: skin normal and skin intact. No dry skin, no warmth, no callus, no erythema, no maceration, no abnormal color, no pre-ulcer, no ulcer and no callus.     Toe Exam: ROM and strength within normal limits.     Sensory   Monofilament testing: intact    Vascular  Capillary refills: < 3 seconds  The right DP pulse is 2+.     Left Foot/Ankle  Left Foot Inspection  Skin Exam: skin normal and skin intact. No dry skin, no warmth, no erythema, no maceration, normal color, no pre-ulcer, no ulcer and no callus.     Toe Exam: ROM and strength within normal limits.     Sensory   Monofilament testing: intact    Vascular  Capillary refills: < 3 seconds  The left DP pulse is 2+.     Assign Risk Category  No deformity present  No loss of protective sensation  No weak pulses  Risk: 0

## 2024-11-07 NOTE — ASSESSMENT & PLAN NOTE
Lab Results   Component Value Date    HGBA1C 8.6 (A) 08/02/2024       Orders:    sitaGLIPtin (JANUVIA) 50 mg tablet; Take 1 tablet (50 mg total) by mouth daily

## 2024-11-10 DIAGNOSIS — G43.109 MIGRAINE WITH AURA AND WITHOUT STATUS MIGRAINOSUS, NOT INTRACTABLE: ICD-10-CM

## 2024-11-11 RX ORDER — TOPIRAMATE SPINKLE 25 MG/1
25 CAPSULE ORAL 2 TIMES DAILY
Qty: 60 CAPSULE | Refills: 0 | Status: SHIPPED | OUTPATIENT
Start: 2024-11-11

## 2024-11-14 ENCOUNTER — OFFICE VISIT (OUTPATIENT)
Dept: OBGYN CLINIC | Facility: CLINIC | Age: 49
End: 2024-11-14
Payer: OTHER MISCELLANEOUS

## 2024-11-14 VITALS
WEIGHT: 240.8 LBS | TEMPERATURE: 98.2 F | SYSTOLIC BLOOD PRESSURE: 106 MMHG | HEIGHT: 64 IN | BODY MASS INDEX: 41.11 KG/M2 | DIASTOLIC BLOOD PRESSURE: 72 MMHG | HEART RATE: 110 BPM

## 2024-11-14 DIAGNOSIS — S93.421D SPRAIN OF DELTOID LIGAMENT OF RIGHT ANKLE, SUBSEQUENT ENCOUNTER: ICD-10-CM

## 2024-11-14 DIAGNOSIS — M67.873 ACHILLES TENDINOSIS OF RIGHT ANKLE: ICD-10-CM

## 2024-11-14 DIAGNOSIS — S93.491D SPRAIN OF ANTERIOR TALOFIBULAR LIGAMENT OF RIGHT ANKLE, SUBSEQUENT ENCOUNTER: ICD-10-CM

## 2024-11-14 DIAGNOSIS — S99.911D ANKLE INJURY, RIGHT, SUBSEQUENT ENCOUNTER: Primary | ICD-10-CM

## 2024-11-14 PROCEDURE — 99213 OFFICE O/P EST LOW 20 MIN: CPT | Performed by: FAMILY MEDICINE

## 2024-11-14 NOTE — PROGRESS NOTES
"[facility] [address] [address] [phone]      Assessment:  1. Ankle injury, right, subsequent encounter  2. Sprain of deltoid ligament of right ankle, subsequent encounter  3. Sprain of anterior talofibular ligament of right ankle, subsequent encounter  4. Achilles tendinosis of right ankle      Plan:  Patient Instructions   F/u as needed  Continue home exercises     Return if symptoms worsen or fail to improve.    Chief Complaint:  Chief Complaint   Patient presents with   • Right Ankle - Follow-up       Subjective:   HPI    Patient ID: Gerri Parkinson is a 49 y.o. female     Here for WC injury R ankle pain  No pain  Some pain in heel walking  No pain in ankle walking.  Didn't started PT   Doing home exercises.  No pain meds needed.     MRI shows-  Mild Achilles insertional tendinosis with associated retrocalcaneal spur        Review of Systems   Constitutional:  Negative for fatigue and fever.   Respiratory:  Negative for shortness of breath.    Cardiovascular:  Negative for chest pain.   Gastrointestinal:  Negative for abdominal pain and nausea.   Genitourinary:  Negative for dysuria.   Musculoskeletal:  Negative for arthralgias.   Skin:  Negative for rash and wound.   Neurological:  Negative for weakness and headaches.       Objective:  Vitals:  /72 (BP Location: Left arm, Patient Position: Sitting, Cuff Size: Large)   Pulse (!) 110 Comment: with pulse ox  Temp 98.2 °F (36.8 °C) (Tympanic)   Ht 5' 4\" (1.626 m)   Wt 109 kg (240 lb 12.8 oz)   BMI 41.33 kg/m²     The following portions of the patient's history were reviewed and updated as appropriate: allergies, current medications, past family history, past medical history, past social history, past surgical history, and problem list.    Physical exam:  Physical Exam  Constitutional:       Appearance: Normal appearance. She is normal weight.   HENT:      Head: " Normocephalic.   Eyes:      Extraocular Movements: Extraocular movements intact.   Pulmonary:      Effort: Pulmonary effort is normal.   Musculoskeletal:         General: No tenderness.      Cervical back: Normal range of motion.   Skin:     General: Skin is warm and dry.   Neurological:      General: No focal deficit present.      Mental Status: She is alert and oriented to person, place, and time. Mental status is at baseline.   Psychiatric:         Mood and Affect: Mood normal.         Behavior: Behavior normal.         Thought Content: Thought content normal.         Judgment: Judgment normal.     Right Ankle Exam     Tenderness   The patient is experiencing no tenderness.  Swelling: none    Range of Motion   The patient has normal right ankle ROM.    Muscle Strength   The patient has normal right ankle strength.    Tests   Anterior drawer: negative  Varus tilt: negative           Strength/Myotome Testing     Right Ankle/Foot   Normal strength            Randy Hanson MD

## 2024-11-14 NOTE — LETTER
November 14, 2024     Patient: Gerri Parkinson  YOB: 1975  Date of Visit: 11/14/2024      To Whom it May Concern:    Gerri Parkinson is under my professional care. Gerri was seen in my office on 11/14/2024. Gerri may return to work on 11/14/24 with no restrictions .    If you have any questions or concerns, please don't hesitate to call.         Sincerely,          Randy Hanson MD        CC: No Recipients

## 2024-11-18 ENCOUNTER — APPOINTMENT (OUTPATIENT)
Dept: URGENT CARE | Facility: CLINIC | Age: 49
End: 2024-11-18

## 2024-11-24 DIAGNOSIS — E11.9 TYPE 2 DIABETES MELLITUS WITHOUT COMPLICATION, WITHOUT LONG-TERM CURRENT USE OF INSULIN (HCC): Chronic | ICD-10-CM

## 2024-11-25 RX ORDER — FLURBIPROFEN SODIUM 0.3 MG/ML
SOLUTION/ DROPS OPHTHALMIC
Qty: 100 EACH | Refills: 1 | Status: SHIPPED | OUTPATIENT
Start: 2024-11-25

## 2024-12-03 DIAGNOSIS — G43.109 MIGRAINE WITH AURA AND WITHOUT STATUS MIGRAINOSUS, NOT INTRACTABLE: ICD-10-CM

## 2024-12-03 NOTE — TELEPHONE ENCOUNTER
Reason for call:   [x] Refill   [] Prior Auth  [] Other:     Office:   [x] PCP/Provider -   [] Specialty/Provider -     Medication: topamax    Dose/Frequency: 25 mg BID     Quantity: 90D     Pharmacy: [facility]     Does the patient have enough for 3 days?   [x] Yes   [] No - Send as HP to POD

## 2024-12-04 RX ORDER — TOPIRAMATE SPINKLE 25 MG/1
25 CAPSULE ORAL 2 TIMES DAILY
Qty: 180 CAPSULE | Refills: 1 | Status: SHIPPED | OUTPATIENT
Start: 2024-12-04

## 2024-12-09 DIAGNOSIS — E11.9 TYPE 2 DIABETES MELLITUS WITHOUT COMPLICATION, WITHOUT LONG-TERM CURRENT USE OF INSULIN (HCC): Chronic | ICD-10-CM

## 2024-12-10 RX ORDER — ATORVASTATIN CALCIUM 10 MG/1
10 TABLET, FILM COATED ORAL DAILY
Qty: 30 TABLET | Refills: 0 | Status: SHIPPED | OUTPATIENT
Start: 2024-12-10

## 2024-12-31 DIAGNOSIS — G43.109 MIGRAINE WITH AURA AND WITHOUT STATUS MIGRAINOSUS, NOT INTRACTABLE: ICD-10-CM

## 2024-12-31 RX ORDER — RIZATRIPTAN BENZOATE 10 MG/1
10 TABLET ORAL DAILY PRN
Qty: 10 TABLET | Refills: 0 | Status: SHIPPED | OUTPATIENT
Start: 2024-12-31

## 2025-01-05 DIAGNOSIS — R13.19 OTHER DYSPHAGIA: ICD-10-CM

## 2025-01-05 DIAGNOSIS — K21.9 GASTROESOPHAGEAL REFLUX DISEASE, UNSPECIFIED WHETHER ESOPHAGITIS PRESENT: ICD-10-CM

## 2025-01-05 DIAGNOSIS — R11.2 NAUSEA AND VOMITING: ICD-10-CM

## 2025-01-07 RX ORDER — PANTOPRAZOLE SODIUM 40 MG/1
40 TABLET, DELAYED RELEASE ORAL
Qty: 30 TABLET | Refills: 5 | Status: SHIPPED | OUTPATIENT
Start: 2025-01-07

## 2025-01-14 ENCOUNTER — TELEPHONE (OUTPATIENT)
Dept: FAMILY MEDICINE CLINIC | Facility: CLINIC | Age: 50
End: 2025-01-14

## 2025-01-14 NOTE — TELEPHONE ENCOUNTER
"Received notification from [facility] regarding patient's Levemir Flexpen 100U/ML. States \"this insulin is no longer available in this pen. Please send something else for patient.\"   "

## 2025-01-14 NOTE — TELEPHONE ENCOUNTER
"Patient with remote smoking history who quit over 40 years ago, [place], on chronic Macro-Bid, GERD and reported history of rheumatoid arthritis not on chronic meds. She has had recurrent reported "upper respiratory infections" treated with Levaquin and prednisone. She reports her cough did stop after completion of her antibiotics and steroids.     Continue Prednisone taper as ordered  Fungal immuno studies and fungitell pending  Sputum culture with fungal sputum culture if able to obtain  KYE and ANCA pending  Rheumatoid factor negative  Follow imaging and ABGs as needed  Outpatient pulmonary follow-up upon discharge  Referral sent to clinic and message sent to office to schedule appointment   " Left msg for patient to call office to discuss.

## 2025-01-14 NOTE — TELEPHONE ENCOUNTER
Can we please contact the insurance to see what is covered so we are not going back and forth with the pharmacy or does patient want to call around to see if she can get this at a different pharmacy?.

## 2025-01-23 DIAGNOSIS — E11.9 TYPE 2 DIABETES MELLITUS WITHOUT COMPLICATION, WITHOUT LONG-TERM CURRENT USE OF INSULIN (HCC): Chronic | ICD-10-CM

## 2025-01-23 RX ORDER — ATORVASTATIN CALCIUM 10 MG/1
10 TABLET, FILM COATED ORAL DAILY
Qty: 30 TABLET | Refills: 5 | Status: SHIPPED | OUTPATIENT
Start: 2025-01-23

## 2025-02-22 ENCOUNTER — APPOINTMENT (OUTPATIENT)
Dept: LAB | Facility: CLINIC | Age: 50
End: 2025-02-22
Payer: COMMERCIAL

## 2025-02-22 DIAGNOSIS — E11.65 TYPE 2 DIABETES MELLITUS WITH HYPERGLYCEMIA, WITH LONG-TERM CURRENT USE OF INSULIN (HCC): Chronic | ICD-10-CM

## 2025-02-22 DIAGNOSIS — Z79.4 TYPE 2 DIABETES MELLITUS WITH HYPERGLYCEMIA, WITH LONG-TERM CURRENT USE OF INSULIN (HCC): Chronic | ICD-10-CM

## 2025-02-22 DIAGNOSIS — E78.49 OTHER HYPERLIPIDEMIA: Chronic | ICD-10-CM

## 2025-02-22 LAB
ALBUMIN SERPL BCG-MCNC: 4 G/DL (ref 3.5–5)
ALP SERPL-CCNC: 100 U/L (ref 34–104)
ALT SERPL W P-5'-P-CCNC: 79 U/L (ref 7–52)
ANION GAP SERPL CALCULATED.3IONS-SCNC: 8 MMOL/L (ref 4–13)
AST SERPL W P-5'-P-CCNC: 90 U/L (ref 13–39)
BASOPHILS # BLD AUTO: 0.03 THOUSANDS/ΜL (ref 0–0.1)
BASOPHILS NFR BLD AUTO: 0 % (ref 0–1)
BILIRUB SERPL-MCNC: 0.57 MG/DL (ref 0.2–1)
BUN SERPL-MCNC: 10 MG/DL (ref 5–25)
CALCIUM SERPL-MCNC: 9.5 MG/DL (ref 8.4–10.2)
CHLORIDE SERPL-SCNC: 105 MMOL/L (ref 96–108)
CHOLEST SERPL-MCNC: 151 MG/DL (ref ?–200)
CO2 SERPL-SCNC: 24 MMOL/L (ref 21–32)
CREAT SERPL-MCNC: 0.57 MG/DL (ref 0.6–1.3)
EOSINOPHIL # BLD AUTO: 0.18 THOUSAND/ΜL (ref 0–0.61)
EOSINOPHIL NFR BLD AUTO: 3 % (ref 0–6)
ERYTHROCYTE [DISTWIDTH] IN BLOOD BY AUTOMATED COUNT: 12.6 % (ref 11.6–15.1)
EST. AVERAGE GLUCOSE BLD GHB EST-MCNC: 309 MG/DL
GFR SERPL CREATININE-BSD FRML MDRD: 110 ML/MIN/1.73SQ M
GLUCOSE P FAST SERPL-MCNC: 248 MG/DL (ref 65–99)
HBA1C MFR BLD: 12.4 %
HCT VFR BLD AUTO: 46.1 % (ref 34.8–46.1)
HDLC SERPL-MCNC: 49 MG/DL
HGB BLD-MCNC: 14.9 G/DL (ref 11.5–15.4)
IMM GRANULOCYTES # BLD AUTO: 0.03 THOUSAND/UL (ref 0–0.2)
IMM GRANULOCYTES NFR BLD AUTO: 0 % (ref 0–2)
LDLC SERPL CALC-MCNC: 78 MG/DL (ref 0–100)
LYMPHOCYTES # BLD AUTO: 2.79 THOUSANDS/ΜL (ref 0.6–4.47)
LYMPHOCYTES NFR BLD AUTO: 40 % (ref 14–44)
MCH RBC QN AUTO: 30 PG (ref 26.8–34.3)
MCHC RBC AUTO-ENTMCNC: 32.3 G/DL (ref 31.4–37.4)
MCV RBC AUTO: 93 FL (ref 82–98)
MONOCYTES # BLD AUTO: 0.6 THOUSAND/ΜL (ref 0.17–1.22)
MONOCYTES NFR BLD AUTO: 9 % (ref 4–12)
NEUTROPHILS # BLD AUTO: 3.35 THOUSANDS/ΜL (ref 1.85–7.62)
NEUTS SEG NFR BLD AUTO: 48 % (ref 43–75)
NONHDLC SERPL-MCNC: 102 MG/DL
NRBC BLD AUTO-RTO: 0 /100 WBCS
PLATELET # BLD AUTO: 174 THOUSANDS/UL (ref 149–390)
PMV BLD AUTO: 11.4 FL (ref 8.9–12.7)
POTASSIUM SERPL-SCNC: 4.2 MMOL/L (ref 3.5–5.3)
PROT SERPL-MCNC: 7.4 G/DL (ref 6.4–8.4)
RBC # BLD AUTO: 4.97 MILLION/UL (ref 3.81–5.12)
SODIUM SERPL-SCNC: 137 MMOL/L (ref 135–147)
TRIGL SERPL-MCNC: 119 MG/DL (ref ?–150)
WBC # BLD AUTO: 6.98 THOUSAND/UL (ref 4.31–10.16)

## 2025-02-22 PROCEDURE — 80061 LIPID PANEL: CPT

## 2025-02-22 PROCEDURE — 80053 COMPREHEN METABOLIC PANEL: CPT

## 2025-02-22 PROCEDURE — 36415 COLL VENOUS BLD VENIPUNCTURE: CPT

## 2025-02-22 PROCEDURE — 85025 COMPLETE CBC W/AUTO DIFF WBC: CPT

## 2025-02-22 PROCEDURE — 83036 HEMOGLOBIN GLYCOSYLATED A1C: CPT

## 2025-03-04 ENCOUNTER — OFFICE VISIT (OUTPATIENT)
Dept: FAMILY MEDICINE CLINIC | Facility: CLINIC | Age: 50
End: 2025-03-04
Payer: COMMERCIAL

## 2025-03-04 VITALS
TEMPERATURE: 97.8 F | WEIGHT: 246 LBS | HEART RATE: 89 BPM | SYSTOLIC BLOOD PRESSURE: 116 MMHG | BODY MASS INDEX: 42 KG/M2 | HEIGHT: 64 IN | DIASTOLIC BLOOD PRESSURE: 66 MMHG | RESPIRATION RATE: 18 BRPM | OXYGEN SATURATION: 99 %

## 2025-03-04 DIAGNOSIS — K76.0 HEPATIC STEATOSIS: ICD-10-CM

## 2025-03-04 DIAGNOSIS — E11.9 TYPE 2 DIABETES MELLITUS WITHOUT COMPLICATION, WITHOUT LONG-TERM CURRENT USE OF INSULIN (HCC): Primary | ICD-10-CM

## 2025-03-04 DIAGNOSIS — F41.8 DEPRESSION WITH ANXIETY: ICD-10-CM

## 2025-03-04 DIAGNOSIS — F30.9 MOOD DISORDER OF MANIC TYPE (HCC): ICD-10-CM

## 2025-03-04 PROCEDURE — 99214 OFFICE O/P EST MOD 30 MIN: CPT | Performed by: NURSE PRACTITIONER

## 2025-03-04 RX ORDER — TIRZEPATIDE 5 MG/.5ML
5 INJECTION, SOLUTION SUBCUTANEOUS WEEKLY
Qty: 2 ML | Refills: 1 | Status: SHIPPED | OUTPATIENT
Start: 2025-04-03 | End: 2025-03-06

## 2025-03-04 RX ORDER — PRAMIPEXOLE DIHYDROCHLORIDE 0.5 MG/1
1 TABLET ORAL
Qty: 30 TABLET | Refills: 5 | Status: SHIPPED | OUTPATIENT
Start: 2025-03-04

## 2025-03-04 RX ORDER — ATORVASTATIN CALCIUM 10 MG/1
10 TABLET, FILM COATED ORAL DAILY
Qty: 30 TABLET | Refills: 5 | Status: SHIPPED | OUTPATIENT
Start: 2025-03-04

## 2025-03-04 RX ORDER — INSULIN DETEMIR 100 [IU]/ML
20 INJECTION, SOLUTION SUBCUTANEOUS
Qty: 15 ML | Refills: 3 | Status: SHIPPED | OUTPATIENT
Start: 2025-03-04

## 2025-03-04 RX ORDER — TIRZEPATIDE 2.5 MG/.5ML
2.5 INJECTION, SOLUTION SUBCUTANEOUS WEEKLY
Qty: 2 ML | Refills: 0 | Status: SHIPPED | OUTPATIENT
Start: 2025-03-04 | End: 2025-03-06

## 2025-03-04 NOTE — ASSESSMENT & PLAN NOTE
Depression Screening Follow-up Plan: Patient's depression screening was positive with a PHQ-9 score of 10.

## 2025-03-04 NOTE — ASSESSMENT & PLAN NOTE
Orders:  •  pramipexole (MIRAPEX) 0.5 mg tablet; Take 2 tablets (1 mg total) by mouth daily at bedtime

## 2025-03-04 NOTE — PROGRESS NOTES
Name: Gerri Parkinson      : 1975      MRN: [id number]  Encounter Provider: JENNIFER Botello  Encounter Date: 3/4/2025   Encounter department: [facility] PRIMARY CARE  :  Assessment & Plan  Type 2 diabetes mellitus without complication, without long-term current use of insulin (HCC)    Lab Results   Component Value Date    HGBA1C 12.4 (H) 2025     Orders:  •  Albumin / creatinine urine ratio; Future  •  Mounjaro 2.5 MG/0.5ML SOAJ; Inject 2.5 mg under the skin once a week  •  Mounjaro 5 MG/0.5ML SOAJ; Inject 5 mg under the skin once a week Do not start before April 3, 2025.  •  Hemoglobin A1C; Future  •  Comprehensive metabolic panel; Future  •  insulin detemir (Levemir FlexPen) 100 Units/mL injection pen; Inject 20 Units under the skin daily at bedtime  •  atorvastatin (LIPITOR) 10 mg tablet; Take 1 tablet (10 mg total) by mouth daily    Mood disorder of manic type (HCC)    Orders:  •  pramipexole (MIRAPEX) 0.5 mg tablet; Take 2 tablets (1 mg total) by mouth daily at bedtime    Depression with anxiety  Depression Screening Follow-up Plan: Patient's depression screening was positive with a PHQ-9 score of 10.        Hepatic steatosis               Depression Screening and Follow-up Plan: Patient's depression screening was positive with a PHQ-9 score of 10.         History of Present Illness   Patient here for routine follow up visit. Recently had flu A. Glucose has been elevated at home.     DM2 is very uncontrolled. Has cut out soda. Will eat some sweets. Complaint with medication but reports uncompliance with diet, does not count carbs. Willing to try ozempic or mounjaro.     Restless leg is acting up more. Reports the medication mirapex is not working as well for her anymore. Would like to see about increasing medication dose.       Review of Systems   Constitutional:  Positive for fatigue. Negative for chills and fever.   Eyes:  Positive for visual disturbance.   Respiratory:  "Negative.     Cardiovascular: Negative.    Endocrine: Positive for polydipsia and polyuria.   Neurological:  Negative for dizziness, light-headedness and headaches.   Psychiatric/Behavioral:  Negative for decreased concentration and dysphoric mood.        Objective   /66   Pulse 89   Temp 97.8 °F (36.6 °C)   Resp 18   Ht 5' 4\" (1.626 m)   Wt 112 kg (246 lb)   SpO2 99%   BMI 42.23 kg/m²      Physical Exam  Vitals and nursing note reviewed.   Constitutional:       General: She is not in acute distress.     Appearance: She is well-developed. She is not diaphoretic.   HENT:      Head: Normocephalic and atraumatic.   Pulmonary:      Effort: Pulmonary effort is normal. No tachypnea or respiratory distress.      Breath sounds: Normal breath sounds.   Neurological:      Mental Status: She is alert and oriented to person, place, and time.   Psychiatric:         Speech: Speech normal.         Behavior: Behavior normal. Behavior is cooperative.         Thought Content: Thought content normal.         Judgment: Judgment normal.         "

## 2025-03-05 ENCOUNTER — TELEPHONE (OUTPATIENT)
Dept: ADMINISTRATIVE | Facility: OTHER | Age: 50
End: 2025-03-05

## 2025-03-05 ENCOUNTER — TELEPHONE (OUTPATIENT)
Dept: FAMILY MEDICINE CLINIC | Facility: CLINIC | Age: 50
End: 2025-03-05

## 2025-03-05 DIAGNOSIS — E11.9 TYPE 2 DIABETES MELLITUS WITHOUT COMPLICATION, WITHOUT LONG-TERM CURRENT USE OF INSULIN (HCC): Primary | ICD-10-CM

## 2025-03-05 NOTE — TELEPHONE ENCOUNTER
PA for Mounjaro 2.5 MG/0.5ML SOAJ SUBMITTED to     via    [x]CMM-KEY: [id number]  []Surescripts-Case ID #   []Availity-Auth ID # NDC #   []Faxed to plan   []Other website   []Phone call Case ID #     [x]PA sent as URGENT    All office notes, labs and other pertaining documents and studies sent. Clinical questions answered. Awaiting determination from insurance company.     Turnaround time for your insurance to make a decision on your Prior Authorization can take 7-21 business days.

## 2025-03-05 NOTE — TELEPHONE ENCOUNTER
Upon review of the In Basket request and the patient's chart, initial outreach has been made via fax to facility. Please see Contacts section for details.     Thank you  Zeinab Tom MA

## 2025-03-05 NOTE — TELEPHONE ENCOUNTER
Upon review of the In Basket request we were able to locate, review, and update the patient chart as requested for Diabetic Eye Exam.    Any additional questions or concerns should be emailed to the Practice Liaisons via the appropriate education email address, please do not reply via In Basket.    Thank you  Zeinab Tom MA   [facility]

## 2025-03-05 NOTE — TELEPHONE ENCOUNTER
----- Message from [place] PEDERSON sent at 3/4/2025  6:12 PM EST -----  Regarding: DM EYE  03/04/25 6:13 PM    Hello, our patient Gerri Parkinson has had Diabetic Eye Exam completed/performed. Please assist in updating the patient chart by making an External outreach to [facility]. The date of service is IN THE LAST YEAR.    Thank you,  Virginia Briggs MA  [facility] PRIMARY CARE

## 2025-03-05 NOTE — LETTER
Diabetic Eye Exam Form    Date Requested: 25  Patient: Gerri Parkinson  Patient : 1975   Referring Provider: JENNIFER Botello      DIABETIC Eye Exam Date _______________________________      Type of Exam MUST be documented for Diabetic Eye Exams. Please CHECK ONE.     Retinal Exam       Dilated Retinal Exam       OCT       Optomap-Iris Exam      Fundus Photography       Left Eye - Please check Retinopathy or No Retinopathy        Exam did show retinopathy    Exam did not show retinopathy       Right Eye - Please check Retinopathy or No Retinopathy       Exam did show retinopathy    Exam did not show retinopathy       Comments __________________________________________________________    Practice Providing Exam ______________________________________________    Exam Performed By (print name) _______________________________________      Provider Signature ___________________________________________________      These reports are needed for  compliance.  Please fax this completed form and a copy of the Diabetic Eye Exam report to our office located at [address] [place] [address] as soon as possible via Fax [phone] attention Zeinab: Phone [phone]  We thank you for your assistance in treating our mutual patient.

## 2025-03-06 ENCOUNTER — TELEPHONE (OUTPATIENT)
Dept: ADMINISTRATIVE | Facility: OTHER | Age: 50
End: 2025-03-06

## 2025-03-06 ENCOUNTER — TELEPHONE (OUTPATIENT)
Dept: FAMILY MEDICINE CLINIC | Facility: CLINIC | Age: 50
End: 2025-03-06

## 2025-03-06 NOTE — TELEPHONE ENCOUNTER
Patient called the RX Refill Line. Message is being forwarded to the office.     Patient is requesting an alternative to levimir. Pharmacy notified patient that this medications discontinued and patient doesn't vials patient wants another flex pen     Please contact patient at [phone]

## 2025-03-06 NOTE — TELEPHONE ENCOUNTER
----- Message from [place] PEDERSON sent at 3/4/2025  7:35 AM EST -----  Regarding: pap  03/04/25 7:35 AM    Hello, our patient Gerri Parkinson has had Pap Smear (HPV) aka Cervical Cancer Screening completed/performed. Please assist in updating the patient chart by pulling the Care Everywhere (CE) document. The date of service is 12/9/24.     Thank you,  Virginia Briggs MA  [facility] PRIMARY CARE

## 2025-03-06 NOTE — TELEPHONE ENCOUNTER
PA for Mounjaro 2.5 mg Denied  DENIED    Reason:        Message sent to office clinical pool Yes    Denial letter scanned into Media Yes    Appeal started No (Provider will need to decide if appeal is warranted and send clinical documentation to Prior Authorization Team for initiation.)    **Please follow up with your patient regarding denial and next steps**

## 2025-03-06 NOTE — TELEPHONE ENCOUNTER
Reason for call:   [] Refill   [] Prior Auth  [x] Other: Pt called, pt aware mounjaro was denied and now waiting for ozempic.

## 2025-03-06 NOTE — TELEPHONE ENCOUNTER
Upon review of the In Basket request we were able to locate, review, and update the patient chart as requested for Pap Smear (HPV) aka Cervical Cancer Screening.    Any additional questions or concerns should be emailed to the Practice Liaisons via the appropriate education email address, please do not reply via In Basket.    Thank you  Ana Mills MA   [facility]

## 2025-03-07 ENCOUNTER — TELEPHONE (OUTPATIENT)
Dept: FAMILY MEDICINE CLINIC | Facility: CLINIC | Age: 50
End: 2025-03-07

## 2025-03-07 NOTE — TELEPHONE ENCOUNTER
Reason for call:   [x] Prior Auth  [] Other:     Caller:  [x] Patient  [] Pharmacy  Name:   Address:   Callback Number:     Medication:   semaglutide, 0.25 or 0.5 mg/dose, (Ozempic, 0.25 or 0.5 MG/DOSE,) 2 mg/3 mL injection pen       Dose/Frequency: Sig: Inject 0.25 mg under the skin once weekly for 28 days, THEN inject 0.5 mg under the skin once weekly    Quantity: 9mL    Ordering Provider:   [x] PCP/Provider - amy sutherland  [] Speciality/Provider -

## 2025-03-10 NOTE — TELEPHONE ENCOUNTER
PA for ozempic 0.25mg  APPROVED     Date(s) approved 03/07/2025 to 03/07/2026    All strengths of the drug are approved      Patient advised by          []MyChart Message  []Phone call   [x]LMOM  []L/M to call office as no active Communication consent on file  []Unable to leave detailed message as VM not approved on Communication consent       Pharmacy advised by    [x]Fax  []Phone call  []Secure Chat       Approval letter scanned into Media Yes

## 2025-03-17 ENCOUNTER — TELEPHONE (OUTPATIENT)
Age: 50
End: 2025-03-17

## 2025-03-17 NOTE — TELEPHONE ENCOUNTER
Patient wants clarification on her insulins. Patient is now taking Ozempic and wants to clarify if she should be taking Januvia also? Please advise.

## 2025-03-18 DIAGNOSIS — E11.9 TYPE 2 DIABETES MELLITUS WITHOUT COMPLICATION, WITHOUT LONG-TERM CURRENT USE OF INSULIN (HCC): Primary | ICD-10-CM

## 2025-03-18 RX ORDER — INSULIN GLARGINE 100 [IU]/ML
20 INJECTION, SOLUTION SUBCUTANEOUS
Qty: 6 ML | Refills: 5 | Status: SHIPPED | OUTPATIENT
Start: 2025-03-18

## 2025-03-18 NOTE — TELEPHONE ENCOUNTER
No, She should not be taking Januvia when she started the mounjaro. This was discussed at her visit and in her discharge instructions.

## 2025-03-18 NOTE — TELEPHONE ENCOUNTER
Patient notified.    She states she needs a new order for a different kind of pen-based insulin. Levamir is no longer available in the pen form and she does not want to use vials.

## 2025-03-18 NOTE — TELEPHONE ENCOUNTER
Sent in lantus for her instead. I do not know if this is covered. She can call her insurance to see what is covered.

## 2025-03-19 ENCOUNTER — TELEPHONE (OUTPATIENT)
Age: 50
End: 2025-03-19

## 2025-03-31 DIAGNOSIS — G43.109 MIGRAINE WITH AURA AND WITHOUT STATUS MIGRAINOSUS, NOT INTRACTABLE: ICD-10-CM

## 2025-03-31 RX ORDER — RIZATRIPTAN BENZOATE 10 MG/1
10 TABLET ORAL DAILY PRN
Qty: 10 TABLET | Refills: 2 | Status: SHIPPED | OUTPATIENT
Start: 2025-03-31

## 2025-03-31 NOTE — TELEPHONE ENCOUNTER
Reason for call:   [x] Refill   [] Prior Auth  [] Other:     Office:   [x] PCP/Provider - Tamica Perez   [] Specialty/Provider -     Medication: rizatriptan (MAXALT) 10 mg tablet     Dose/Frequency: TAKE 1 TABLET (10 MG TOTAL) BY MOUTH DAILY AS NEEDED FOR MIGRAINE FOR UP TO 30 DOSES MAY REPEAT IN 2 HOURS IF NECESSARY     Quantity: 10    Pharmacy: [facility]     Local Pharmacy   Does the patient have enough for 3 days?   [] Yes   [x] No - Send as HP to POD     Statement Selected

## 2025-05-23 ENCOUNTER — OFFICE VISIT (OUTPATIENT)
Dept: FAMILY MEDICINE CLINIC | Facility: CLINIC | Age: 50
End: 2025-05-23

## 2025-05-23 VITALS
RESPIRATION RATE: 18 BRPM | OXYGEN SATURATION: 98 % | DIASTOLIC BLOOD PRESSURE: 76 MMHG | HEIGHT: 64 IN | HEART RATE: 88 BPM | BODY MASS INDEX: 39.44 KG/M2 | TEMPERATURE: 97.8 F | SYSTOLIC BLOOD PRESSURE: 100 MMHG | WEIGHT: 231 LBS

## 2025-05-23 DIAGNOSIS — E11.9 TYPE 2 DIABETES MELLITUS WITHOUT COMPLICATION, WITHOUT LONG-TERM CURRENT USE OF INSULIN (HCC): ICD-10-CM

## 2025-05-23 DIAGNOSIS — R14.2 FLATULENCE, ERUCTATION, AND GAS PAIN: ICD-10-CM

## 2025-05-23 DIAGNOSIS — R14.0 ABDOMINAL BLOATING: ICD-10-CM

## 2025-05-23 DIAGNOSIS — F30.9 MOOD DISORDER OF MANIC TYPE (HCC): ICD-10-CM

## 2025-05-23 DIAGNOSIS — Z79.4 TYPE 2 DIABETES MELLITUS WITH HYPERGLYCEMIA, WITH LONG-TERM CURRENT USE OF INSULIN (HCC): Primary | ICD-10-CM

## 2025-05-23 DIAGNOSIS — G43.109 MIGRAINE WITH AURA AND WITHOUT STATUS MIGRAINOSUS, NOT INTRACTABLE: ICD-10-CM

## 2025-05-23 DIAGNOSIS — R14.3 FLATULENCE, ERUCTATION, AND GAS PAIN: ICD-10-CM

## 2025-05-23 DIAGNOSIS — E34.9 HORMONE IMBALANCE: ICD-10-CM

## 2025-05-23 DIAGNOSIS — N92.6 IRREGULAR PERIODS: ICD-10-CM

## 2025-05-23 DIAGNOSIS — E11.65 TYPE 2 DIABETES MELLITUS WITH HYPERGLYCEMIA, WITH LONG-TERM CURRENT USE OF INSULIN (HCC): Primary | ICD-10-CM

## 2025-05-23 DIAGNOSIS — R14.1 FLATULENCE, ERUCTATION, AND GAS PAIN: ICD-10-CM

## 2025-05-23 LAB — SL AMB POCT HEMOGLOBIN AIC: 8.6 (ref ?–6.5)

## 2025-05-23 RX ORDER — INSULIN GLARGINE 100 [IU]/ML
20 INJECTION, SOLUTION SUBCUTANEOUS
Qty: 6 ML | Refills: 5 | Status: SHIPPED | OUTPATIENT
Start: 2025-05-23

## 2025-05-23 RX ORDER — FAMOTIDINE 20 MG/1
20 TABLET, FILM COATED ORAL 2 TIMES DAILY
Qty: 180 TABLET | Refills: 3 | Status: SHIPPED | OUTPATIENT
Start: 2025-05-23 | End: 2026-05-18

## 2025-05-23 RX ORDER — ATORVASTATIN CALCIUM 10 MG/1
10 TABLET, FILM COATED ORAL DAILY
Qty: 90 TABLET | Refills: 3 | Status: SHIPPED | OUTPATIENT
Start: 2025-05-23

## 2025-05-23 RX ORDER — TOPIRAMATE SPINKLE 25 MG/1
25 CAPSULE ORAL 2 TIMES DAILY
Qty: 180 CAPSULE | Refills: 1 | Status: SHIPPED | OUTPATIENT
Start: 2025-05-23

## 2025-05-23 RX ORDER — PRAMIPEXOLE DIHYDROCHLORIDE 1 MG/1
1 TABLET ORAL
Qty: 90 TABLET | Refills: 1 | Status: SHIPPED | OUTPATIENT
Start: 2025-05-23

## 2025-05-23 NOTE — ASSESSMENT & PLAN NOTE
-  continue ozempic and  lantus.   Lab Results   Component Value Date    HGBA1C 8.6 (A) 05/23/2025       Orders:  •  POCT hemoglobin A1c

## 2025-05-23 NOTE — PROGRESS NOTES
Name: Gerri Parkinson      : 1975      MRN: [id number]  Encounter Provider: JENNIFER Botello  Encounter Date: 2025   Encounter department: [facility] PRIMARY CARE  :  Assessment & Plan  Type 2 diabetes mellitus with hyperglycemia, with long-term current use of insulin (HCC)  -  continue ozempic and  lantus.   Lab Results   Component Value Date    HGBA1C 8.6 (A) 2025       Orders:  •  POCT hemoglobin A1c    Type 2 diabetes mellitus without complication, without long-term current use of insulin (HCC)    Lab Results   Component Value Date    HGBA1C 8.6 (A) 2025       Orders:  •  semaglutide, 0.25 or 0.5 mg/dose, (Ozempic, 0.25 or 0.5 MG/DOSE,) 2 mg/3 mL injection pen; Inject 0.75 mL (0.5 mg total) under the skin every 7 days  •  Insulin Glargine Solostar (Lantus SoloStar) 100 UNIT/ML SOPN; Inject 0.2 mL (20 Units total) under the skin daily at bedtime  •  atorvastatin (LIPITOR) 10 mg tablet; Take 1 tablet (10 mg total) by mouth daily    Flatulence, eructation, and gas pain    Orders:  •  famotidine (PEPCID) 20 mg tablet; Take 1 tablet (20 mg total) by mouth 2 (two) times a day    Abdominal bloating    Orders:  •  famotidine (PEPCID) 20 mg tablet; Take 1 tablet (20 mg total) by mouth 2 (two) times a day    Hormone imbalance    Orders:  •  Ambulatory Referral to Obstetrics / Gynecology; Future    Irregular periods    Orders:  •  Ambulatory Referral to Obstetrics / Gynecology; Future    Mood disorder of manic type (HCC)    Orders:  •  pramipexole (MIRAPEX) 1 mg tablet; Take 1 tablet (1 mg total) by mouth daily at bedtime    Migraine with aura and without status migrainosus, not intractable    Orders:  •  topiramate (TOPAMAX) 25 mg sprinkle capsule; Take 1 capsule (25 mg total) by mouth 2 (two) times a day          Depression Screening and Follow-up Plan: Patient was screened for depression during today's encounter. They screened negative with a PHQ-9 score of 0.        History of  "Present Illness   Patient here for routine follow up for Dm2- HgbA1c improved from 12.4 down to 8.6. changing diet, cut out sweets, no soda, drinking more water. Has lost 15lbs in the alst 3 months.  Taking medication as prescribed. Taking ozempic, having some diarrhea.       Review of Systems   Constitutional: Negative.  Negative for fatigue and fever.   Respiratory: Negative.  Negative for shortness of breath and wheezing.    Cardiovascular: Negative.  Negative for chest pain and palpitations.   Skin: Negative.  Negative for rash.   Neurological: Negative.  Negative for dizziness, light-headedness and headaches.   Psychiatric/Behavioral: Negative.  Negative for dysphoric mood, hallucinations and sleep disturbance.        Objective   /76   Pulse 88   Temp 97.8 °F (36.6 °C)   Resp 18   Ht 5' 4\" (1.626 m)   Wt 105 kg (231 lb)   SpO2 98%   BMI 39.65 kg/m²      Physical Exam  Vitals and nursing note reviewed.   Constitutional:       General: She is not in acute distress.     Appearance: Normal appearance. She is well-developed. She is not diaphoretic.   HENT:      Head: Normocephalic and atraumatic.     Cardiovascular:      Rate and Rhythm: Normal rate and regular rhythm.   Pulmonary:      Effort: Pulmonary effort is normal. No tachypnea or respiratory distress.      Breath sounds: Normal breath sounds.     Neurological:      General: No focal deficit present.      Mental Status: She is alert and oriented to person, place, and time.     Psychiatric:         Speech: Speech normal.         Behavior: Behavior normal. Behavior is cooperative.         Thought Content: Thought content normal.         Judgment: Judgment normal.         "

## 2025-05-23 NOTE — ASSESSMENT & PLAN NOTE
Orders:  •  pramipexole (MIRAPEX) 1 mg tablet; Take 1 tablet (1 mg total) by mouth daily at bedtime

## 2025-05-27 ENCOUNTER — TELEPHONE (OUTPATIENT)
Dept: FAMILY MEDICINE CLINIC | Facility: CLINIC | Age: 50
End: 2025-05-27

## 2025-05-27 NOTE — TELEPHONE ENCOUNTER
Duplicate encounter created, please see telephone encounter from 3/7/25 regarding Ozempic PA status. Please review patient's chart to see if there is already an encounter regarding the medication in question and to document anything regarding this medication in regards to anything regarding the authorization process etc before creating another encounter     Thank You.

## 2025-06-07 DIAGNOSIS — E11.9 TYPE 2 DIABETES MELLITUS WITHOUT COMPLICATION, WITHOUT LONG-TERM CURRENT USE OF INSULIN (HCC): Chronic | ICD-10-CM

## 2025-06-08 RX ORDER — PEN NEEDLE, DIABETIC 31 GX5/16"
NEEDLE, DISPOSABLE MISCELLANEOUS
Qty: 100 EACH | Refills: 1 | Status: SHIPPED | OUTPATIENT
Start: 2025-06-08

## 2025-06-16 DIAGNOSIS — R13.19 OTHER DYSPHAGIA: ICD-10-CM

## 2025-06-16 DIAGNOSIS — K21.9 GASTROESOPHAGEAL REFLUX DISEASE, UNSPECIFIED WHETHER ESOPHAGITIS PRESENT: ICD-10-CM

## 2025-06-16 DIAGNOSIS — R11.2 NAUSEA AND VOMITING: ICD-10-CM

## 2025-06-17 RX ORDER — PANTOPRAZOLE SODIUM 40 MG/1
40 TABLET, DELAYED RELEASE ORAL
Qty: 90 TABLET | Refills: 1 | Status: SHIPPED | OUTPATIENT
Start: 2025-06-17

## 2025-06-28 ENCOUNTER — APPOINTMENT (EMERGENCY)
Dept: RADIOLOGY | Facility: HOSPITAL | Age: 50
End: 2025-06-28
Payer: COMMERCIAL

## 2025-06-28 ENCOUNTER — HOSPITAL ENCOUNTER (EMERGENCY)
Facility: HOSPITAL | Age: 50
Discharge: HOME | End: 2025-06-28
Payer: COMMERCIAL

## 2025-06-28 VITALS
WEIGHT: 233 LBS | DIASTOLIC BLOOD PRESSURE: 71 MMHG | HEIGHT: 64 IN | OXYGEN SATURATION: 97 % | SYSTOLIC BLOOD PRESSURE: 113 MMHG | HEART RATE: 81 BPM | TEMPERATURE: 98 F | RESPIRATION RATE: 18 BRPM | BODY MASS INDEX: 39.78 KG/M2

## 2025-06-28 DIAGNOSIS — S60.222A CONTUSION OF LEFT HAND, INITIAL ENCOUNTER: Primary | ICD-10-CM

## 2025-06-28 DIAGNOSIS — M79.642 LEFT HAND PAIN: ICD-10-CM

## 2025-06-28 PROCEDURE — 73130 X-RAY EXAM OF HAND: CPT

## 2025-06-28 PROCEDURE — 99284 EMERGENCY DEPT VISIT MOD MDM: CPT

## 2025-06-28 PROCEDURE — 99283 EMERGENCY DEPT VISIT LOW MDM: CPT

## 2025-06-28 RX ORDER — IBUPROFEN 600 MG/1
600 TABLET, FILM COATED ORAL ONCE
Status: COMPLETED | OUTPATIENT
Start: 2025-06-28 | End: 2025-06-28

## 2025-06-28 RX ADMIN — IBUPROFEN 600 MG: 600 TABLET ORAL at 22:00

## 2025-06-29 NOTE — DISCHARGE INSTRUCTIONS
I do not see any large fractures on your x-ray today, the radiologist review your films over the next 24 hours, if they see anything small, we will call you to let you know how this could change your management.  In the meantime wear your brace as prescribed whenever you will be using your hand.  You should remain out of work until your symptoms are improving, you are weightbearing as tolerated meaning try not to lift anything heavy if it is causing you pain.  Continue use ibuprofen 600 mg, keep the hand elevated, and use compression when possible as well as ice for the next 24 hours to reduce swelling.